# Patient Record
Sex: FEMALE | Race: WHITE | Employment: OTHER | RURAL
[De-identification: names, ages, dates, MRNs, and addresses within clinical notes are randomized per-mention and may not be internally consistent; named-entity substitution may affect disease eponyms.]

---

## 2017-02-06 ENCOUNTER — OFFICE VISIT (OUTPATIENT)
Dept: FAMILY MEDICINE CLINIC | Age: 72
End: 2017-02-06

## 2017-02-06 VITALS
HEIGHT: 60 IN | WEIGHT: 179.4 LBS | BODY MASS INDEX: 35.22 KG/M2 | HEART RATE: 67 BPM | DIASTOLIC BLOOD PRESSURE: 41 MMHG | OXYGEN SATURATION: 99 % | TEMPERATURE: 98 F | SYSTOLIC BLOOD PRESSURE: 140 MMHG | RESPIRATION RATE: 20 BRPM

## 2017-02-06 DIAGNOSIS — M19.90 OSTEOARTHRITIS, UNSPECIFIED OSTEOARTHRITIS TYPE, UNSPECIFIED SITE: ICD-10-CM

## 2017-02-06 DIAGNOSIS — N32.81 OVERACTIVE BLADDER: ICD-10-CM

## 2017-02-06 DIAGNOSIS — M40.14 OTHER SECONDARY KYPHOSIS, THORACIC REGION: ICD-10-CM

## 2017-02-06 DIAGNOSIS — I10 ESSENTIAL HYPERTENSION: ICD-10-CM

## 2017-02-06 DIAGNOSIS — M17.0 PRIMARY OSTEOARTHRITIS OF BOTH KNEES: ICD-10-CM

## 2017-02-06 DIAGNOSIS — J30.9 ALLERGIC RHINITIS, UNSPECIFIED ALLERGIC RHINITIS TRIGGER, UNSPECIFIED RHINITIS SEASONALITY: ICD-10-CM

## 2017-02-06 DIAGNOSIS — E03.4 HYPOTHYROIDISM DUE TO ACQUIRED ATROPHY OF THYROID: ICD-10-CM

## 2017-02-06 DIAGNOSIS — M54.5 CHRONIC LOW BACK PAIN, UNSPECIFIED BACK PAIN LATERALITY, WITH SCIATICA PRESENCE UNSPECIFIED: ICD-10-CM

## 2017-02-06 DIAGNOSIS — K52.9 COLITIS: Primary | ICD-10-CM

## 2017-02-06 DIAGNOSIS — M85.80 OSTEOPENIA: ICD-10-CM

## 2017-02-06 DIAGNOSIS — G89.29 CHRONIC LOW BACK PAIN, UNSPECIFIED BACK PAIN LATERALITY, WITH SCIATICA PRESENCE UNSPECIFIED: ICD-10-CM

## 2017-02-06 RX ORDER — BUDESONIDE 3 MG/1
9 CAPSULE, COATED PELLETS ORAL
Qty: 270 CAP | Refills: 1 | Status: SHIPPED | OUTPATIENT
Start: 2017-02-06 | End: 2022-02-22 | Stop reason: ALTCHOICE

## 2017-02-06 RX ORDER — TACROLIMUS 0.5 MG/1
CAPSULE ORAL DAILY
COMMUNITY
End: 2018-03-01

## 2017-02-06 RX ORDER — CLOBETASOL PROPIONATE 0.5 MG/G
OINTMENT TOPICAL DAILY
COMMUNITY
End: 2022-04-01

## 2017-02-06 RX ORDER — HYDROXYCHLOROQUINE SULFATE 200 MG/1
200 TABLET, FILM COATED ORAL 2 TIMES DAILY
COMMUNITY
End: 2019-05-29 | Stop reason: ALTCHOICE

## 2017-02-06 RX ORDER — FLUCONAZOLE 150 MG/1
150 TABLET ORAL
COMMUNITY
End: 2021-08-25 | Stop reason: SDUPTHER

## 2017-02-06 RX ORDER — TACROLIMUS 1 MG/1
1 CAPSULE ORAL 3 TIMES DAILY
COMMUNITY
End: 2022-02-22 | Stop reason: ALTCHOICE

## 2017-02-06 RX ORDER — TACROLIMUS 1 MG/G
OINTMENT TOPICAL DAILY
COMMUNITY
End: 2022-02-22 | Stop reason: ALTCHOICE

## 2017-02-06 NOTE — PROGRESS NOTES
Subjective: David Snowden presents with flare of her colitis please see previous notes. Followed by Dr. Rai Laureano in gastroenterology as an appointment with him in the spring. Is having a flare of her colitis typically treats flares with increased dose of Pentasa and budesonide. She is running out of budesonide and would like a refill takes 3 capsules a day for flares can use for 6-8 months. Having a flare over the last several weeks multiple loose stools overnight 6-8 last night occasionally some blood-tinged. No marcia hematochezia. Crampy abdominal pain but no significant localized tenderness. No nausea vomiting. Appetite is decreased taking fluids well. No fever chills or constitutional symptoms  She does plan phone follow-up with her gastroenterologist which is strongly encourage. She will be leaving on a short trip and needs medication now      Problem list reviewed as part of this encounter. Past Medical History   Diagnosis Date    Allergic rhinitis     Hypertension     Hypothyroidism     Lichen planus      seen at [facility], unspecified whether generalized or localized, unspecified site     Overactive bladder     Phlebitis and thrombophlebitis of lower extremities, unspecified ([address]     Raynaud's syndrome       Medication list reviewed and updated. Review of Systems -as per the above in previous documentation  Objective:  Visit Vitals    /41 (BP 1 Location: Left arm, BP Patient Position: Sitting)    Pulse 67    Temp 98 °F (36.7 °C) (Temporal)    Resp 20    Ht 5' (1.524 m)    Wt 179 lb 6.4 oz (81.4 kg)    SpO2 99%    BMI 35.04 kg/m2     Alert and oriented. No acute distress. HEENT   Eyes pupils equal, round, react to light and accommodation. Extraocular movements intact. Nose patent. Neck supple full range of motion no thyromegaly. No carotid bruits.   No significant lymphadenopathy  Lungs clear to auscultation without wheezes, rales, or rhonchi. Heart  RRR,  S1 & S2 are normal intensity. No murmur; no gallop  Abdomen bowel sounds active. No tenderness, guarding, rebound, masses, organomegaly. Back no CVA tenderness. Extremities without clubbing, cyanosis, or edema  Neuro HMF intact. Cranial nerves II through XII grossly normal.  Motor is 5 over 5 and symmetrical.        Assessment:  Encounter Diagnoses   Name Primary?  Colitis Yes    Osteoarthritis, unspecified osteoarthritis type, unspecified site     Essential hypertension     Hypothyroidism due to acquired atrophy of thyroid     Allergic rhinitis, unspecified allergic rhinitis trigger, unspecified rhinitis seasonality     Overactive bladder     Osteopenia     Other secondary kyphosis, thoracic region     Chronic low back pain, unspecified back pain laterality, with sciatica presence unspecified     Primary osteoarthritis of both knees            Plan:  See orders. Orders Placed This Encounter    tacrolimus (PROGRAF) 1 mg capsule     Sig: Take  by mouth three (3) times daily. One pill in water/ swish 3x daily    tacrolimus (PROGRAF) 0.5 mg capsule     Sig: Take  by mouth daily.  tacrolimus (PROTOPIC) 0.1 % ointment     Sig: Apply  to affected area daily. am    clobetasol (TEMOVATE) 0.05 % ointment     Sig: Apply  to affected area daily. pm    fluconazole (DIFLUCAN) 150 mg tablet     Sig: Take 150 mg by mouth daily. FDA advises cautious prescribing of oral fluconazole in pregnancy.  hydroxychloroquine (PLAQUENIL) 200 mg tablet     Sig: Take 200 mg by mouth two (2) times a day.  budesonide (ENTOCORT EC) 3 mg capsule     Sig: Take 3 Caps by mouth every morning. Taking 3 tabs in am     Dispense:  270 Cap     Refill:  1    colitis flare will refill budesonide, she is going to follow-up with her gastroenterologist  Follow up if symptoms worsen, change, fail to improve or any new symptoms develop.           There are no Patient Instructions on file for this visit.    (Please note that portions of this note were completed with a voice recognition program. Efforts were made to edit the dictations but occasionally words are mis-transcribed.)

## 2017-02-06 NOTE — MR AVS SNAPSHOT
Visit Information Date & Time Provider Department Dept. Phone Encounter #  
 2/6/2017  8:30 AM Margarita SheikhTho 71 [phone] [id number] Follow-up Instructions Return if symptoms worsen or fail to improve. Upcoming Health Maintenance Date Due DTaP/Tdap/Td series (1 - Tdap) 6/30/1965 FOBT Q 1 YEAR AGE 50-75 6/30/1994 GLAUCOMA SCREENING Q2Y 6/30/2009 OSTEOPOROSIS SCREENING (DEXA) 6/30/2009 MEDICARE YEARLY EXAM 6/30/2009 Pneumococcal 65+ Low/Medium Risk (2 of 2 - PPSV23) 11/8/2015 BREAST CANCER SCRN MAMMOGRAM 3/25/2018 Allergies as of 2/6/2017  Review Complete On: 2/6/2017 By: Margarita Sheikh MD  
  
 Severity Noted Reaction Type Reactions Latex High 11/16/2015    Rash Keflex [Cephalexin]  09/30/2013    Rash Levaquin [Levofloxacin]  09/30/2013    Rash Macrobid [Nitrofurantoin Monohyd/m-cryst]  09/30/2013    Rash  
 Sulfa (Sulfonamide Antibiotics)  09/30/2013    Rash Current Immunizations  Reviewed on 11/17/2016 Name Date Influenza High Dose Vaccine PF 11/17/2016 Influenza Vaccine 10/24/2014, 10/1/2013  9:36 AM  
 Influenza Vaccine Nidia Lanius) 10/20/2015 Pneumococcal Vaccine (Unspecified Type) 11/8/2010 Zoster Vaccine, Live 10/31/2014 Not reviewed this visit You Were Diagnosed With   
  
 Codes Comments Colitis    -  Primary ICD-10-CM: K52.9 ICD-9-CM: 558.9 Osteoarthritis, unspecified osteoarthritis type, unspecified site     ICD-10-CM: M19.90 ICD-9-CM: 715.90 Essential hypertension     ICD-10-CM: I10 
ICD-9-CM: 401.9 Hypothyroidism due to acquired atrophy of thyroid     ICD-10-CM: E03.4 ICD-9-CM: 244.8, 246.8 Allergic rhinitis, unspecified allergic rhinitis trigger, unspecified rhinitis seasonality     ICD-10-CM: J30.9 ICD-9-CM: 477.9 Overactive bladder     ICD-10-CM: N32.81 ICD-9-CM: 596.51 Osteopenia     ICD-10-CM: M85.80 ICD-9-CM: 733.90   
 Other secondary kyphosis, thoracic region     ICD-10-CM: M40.14 ICD-9-CM: 737.41 Chronic low back pain, unspecified back pain laterality, with sciatica presence unspecified     ICD-10-CM: M54.5, G89.29 ICD-9-CM: 724.2, 338.29 Primary osteoarthritis of both knees     ICD-10-CM: M17.0 ICD-9-CM: 715.16 Vitals BP Pulse Temp Resp Height(growth percentile) Weight(growth percentile) 140/41 (BP 1 Location: Left arm, BP Patient Position: Sitting) 67 98 °F (36.7 °C) (Temporal) 20 5' (1.524 m) 179 lb 6.4 oz (81.4 kg) SpO2 BMI OB Status Smoking Status 99% 35.04 kg/m2 Hysterectomy Former Smoker BMI and BSA Data Body Mass Index Body Surface Area 35.04 kg/m 2 1.86 m 2 Preferred Pharmacy Pharmacy Name Phone [address], [address] [place] [address] [phone] Your Updated Medication List  
  
   
This list is accurate as of: 2/6/17  9:12 AM.  Always use your most recent med list.  
  
  
  
  
 ADULT MULTIVITAMIN GUMMIES PO Take  by mouth. ALEVE -25 mg Tab Generic drug:  naproxen-diphenhydramine Take  by mouth daily. Pt takes 2 tabs  
  
 amLODIPine 10 mg tablet Commonly known as:  Young Sourav TAKE 1 TAB BY MOUTH DAILY. Arthritis Pain Relief 650 mg CR tablet Generic drug:  acetaminophen Take 650 mg by mouth every eight (8) hours. aspirin 81 mg chewable tablet Take 81 mg by mouth daily. augmented betamethasone dipropionate 0.05 % ointment Commonly known as:  Theoplis Sergeant Use BID to lesions on legs and arms  
  
 budesonide 3 mg capsule Commonly known as:  ENTOCORT EC Take 3 Caps by mouth every morning. Taking 3 tabs in am  
  
 calcium carbonate 200 mg calcium (500 mg) Chew Commonly known as:  TUMS Take 2 Tabs by mouth two (2) times a day. clindamycin 150 mg capsule Commonly known as:  CLEOCIN  
TAKE 4 CAPSULES BY MOUTH 1 HOUR BEFORE DENTAL APT. clobetasol 0.05 % ointment Commonly known as:  Sung Nim Apply  to affected area daily. pm  
  
 fluconazole 150 mg tablet Commonly known as:  DIFLUCAN Take 150 mg by mouth daily. FDA advises cautious prescribing of oral fluconazole in pregnancy. hydroxychloroquine 200 mg tablet Commonly known as:  PLAQUENIL Take 200 mg by mouth two (2) times a day. ibandronate 150 mg tablet Commonly known as:  Lawrence Locket TAKE 1 TABLET BY MOUTH ONCE EVERY 30 DAYS, FOR POST-MENOPAUSAL OSTEOPOROSIS PREVENTION  
  
 levothyroxine 100 mcg tablet Commonly known as:  SYNTHROID  
TAKE 1 TABLET BY MOUTH DAILY BEFORE BREAKFAST. lisinopril 40 mg tablet Commonly known as:  PRINIVIL, ZESTRIL  
TAKE 1 TAB BY MOUTH DAILY. loratadine 10 mg tablet Commonly known as:  Alyssa Lucia Take 1 Tab by mouth daily. montelukast 10 mg tablet Commonly known as:  SINGULAIR  
TAKE ONE TABLET BY MOUTH EVERY DAY  
  
 MYRBETRIQ 25 mg ER tablet Generic drug:  mirabegron ER Take 25 mg by mouth daily. omeprazole 20 mg capsule Commonly known as:  PRILOSEC Take 20 mg by mouth daily. OTHER  
zicam  
  
 PENTASA 500 mg CR capsule Generic drug:  mesalamine Take 500 mg by mouth two (2) times a day. Two Tablets 4x  daily * tacrolimus 1 mg capsule Commonly known as:  PROGRAF Take  by mouth three (3) times daily. One pill in water/ swish 3x daily * tacrolimus 0.5 mg capsule Commonly known as:  PROGRAF Take  by mouth daily. * tacrolimus 0.1 % ointment Commonly known as:  PROTOPIC Apply  to affected area daily. am  
  
 VITAMIN D3 1,000 unit tablet Generic drug:  cholecalciferol Take 1,000 Units by mouth daily. Indications: VITAMIN D DEFICIENCY * Notice: This list has 3 medication(s) that are the same as other medications prescribed for you. Read the directions carefully, and ask your doctor or other care provider to review them with you. Prescriptions Sent to Pharmacy Refills  
 budesonide (ENTOCORT EC) 3 mg capsule 1 Sig: Take 3 Caps by mouth every morning. Taking 3 tabs in am  
 Class: Normal  
 Pharmacy: [address], [address]  #: [phone] Route: Oral  
  
Follow-up Instructions Return if symptoms worsen or fail to improve. Introducing \[facility]\"" & HEALTH SERVICES! [facility] introduces Kekanto patient portal. Now you can access parts of your medical record, email your doctor's office, and request medication refills online. 1. In your internet browser, go to [email or web address] 2. Click on the First Time User? Click Here link in the Sign In box. You will see the New Member Sign Up page. 3. Enter your Kekanto Access Code exactly as it appears below. You will not need to use this code after youve completed the sign-up process. If you do not sign up before the expiration date, you must request a new code. · Kekanto Access Code: [email or web address] Expires: 2/15/2017 11:45 AM 
 
4. Enter the last four digits of your Social Security Number (xxxx) and Date of Birth (mm/dd/yyyy) as indicated and click Submit. You will be taken to the next sign-up page. 5. Create a Kekanto ID. This will be your Kekanto login ID and cannot be changed, so think of one that is secure and easy to remember. 6. Create a Kekanto password. You can change your password at any time. 7. Enter your Password Reset Question and Answer. This can be used at a later time if you forget your password. 8. Enter your e-mail address. You will receive e-mail notification when new information is available in [address] 9. Click Sign Up. You can now view and download portions of your medical record. 10. Click the Download Summary menu link to download a portable copy of your medical information.  
 
If you have questions, please visit the Frequently Asked Questions section of the LUVHAN. Remember, Voter[employer]ide[employer] is NOT to be used for urgent needs. For medical emergencies, dial 911. Now available from your iPhone and Android! Please provide this summary of care documentation to your next provider. Your primary care clinician is listed as Wesley Chung. If you have any questions after today's visit, please call [phone].

## 2017-03-31 RX ORDER — MONTELUKAST SODIUM 10 MG/1
10 TABLET ORAL DAILY
Qty: 30 TAB | Refills: 0 | Status: SHIPPED | OUTPATIENT
Start: 2017-03-31 | End: 2017-05-03 | Stop reason: SDUPTHER

## 2017-05-03 RX ORDER — LISINOPRIL 40 MG/1
TABLET ORAL
Qty: 90 TAB | Refills: 1 | Status: SHIPPED | OUTPATIENT
Start: 2017-05-03 | End: 2018-01-25 | Stop reason: SDUPTHER

## 2017-05-03 RX ORDER — MONTELUKAST SODIUM 10 MG/1
10 TABLET ORAL DAILY
Qty: 90 TAB | Refills: 0 | Status: SHIPPED | OUTPATIENT
Start: 2017-05-03 | End: 2017-10-23

## 2017-05-03 NOTE — TELEPHONE ENCOUNTER
Has an appointment to see Luis Enrique Bailey on 5/26/2017. Needs refills of Montelukast, Lisinopril. 90 day supply please.   Send RX's to [facility]

## 2017-05-26 ENCOUNTER — OFFICE VISIT (OUTPATIENT)
Dept: FAMILY MEDICINE CLINIC | Age: 72
End: 2017-05-26

## 2017-05-26 VITALS
WEIGHT: 183 LBS | OXYGEN SATURATION: 99 % | BODY MASS INDEX: 35.93 KG/M2 | RESPIRATION RATE: 16 BRPM | DIASTOLIC BLOOD PRESSURE: 60 MMHG | TEMPERATURE: 97.9 F | HEIGHT: 60 IN | HEART RATE: 81 BPM | SYSTOLIC BLOOD PRESSURE: 130 MMHG

## 2017-05-26 DIAGNOSIS — K52.9 COLITIS: ICD-10-CM

## 2017-05-26 DIAGNOSIS — L43.9 LICHEN PLANUS: ICD-10-CM

## 2017-05-26 DIAGNOSIS — Z00.00 ENCOUNTER FOR MEDICARE ANNUAL WELLNESS EXAM: ICD-10-CM

## 2017-05-26 DIAGNOSIS — I10 ESSENTIAL HYPERTENSION: Primary | ICD-10-CM

## 2017-05-26 DIAGNOSIS — E03.4 HYPOTHYROIDISM DUE TO ACQUIRED ATROPHY OF THYROID: ICD-10-CM

## 2017-05-26 DIAGNOSIS — J40 BRONCHITIS: ICD-10-CM

## 2017-05-26 RX ORDER — CICLOPIROX OLAMINE 7.7 MG/G
CREAM TOPICAL 2 TIMES DAILY
COMMUNITY
End: 2018-11-26 | Stop reason: ALTCHOICE

## 2017-05-26 RX ORDER — IPRATROPIUM BROMIDE 0.5 MG/2.5ML
0.5 SOLUTION RESPIRATORY (INHALATION) AS NEEDED
Qty: 2.5 ML | Refills: 0
Start: 2017-05-26 | End: 2017-10-23

## 2017-05-26 RX ORDER — AZITHROMYCIN 250 MG/1
TABLET, FILM COATED ORAL
Qty: 6 TAB | Refills: 0 | Status: SHIPPED | OUTPATIENT
Start: 2017-05-26 | End: 2017-05-31

## 2017-05-26 RX ORDER — ALBUTEROL SULFATE 0.83 MG/ML
2.5 SOLUTION RESPIRATORY (INHALATION) ONCE
Qty: 1 EACH | Refills: 0
Start: 2017-05-26 | End: 2017-05-26

## 2017-05-26 RX ORDER — TRIAMCINOLONE ACETONIDE 1 MG/G
CREAM TOPICAL 2 TIMES DAILY
COMMUNITY
End: 2018-11-26 | Stop reason: ALTCHOICE

## 2017-05-26 RX ORDER — BENZONATATE 200 MG/1
200 CAPSULE ORAL
Qty: 30 CAP | Refills: 3 | Status: SHIPPED | OUTPATIENT
Start: 2017-05-26 | End: 2017-06-02

## 2017-05-26 NOTE — MR AVS SNAPSHOT
Visit Information Date & Time Provider Department Dept. Phone Encounter #  
 5/26/2017 10:30 AM Ron Avila NP [address] [place] [address] [phone] [id number] Upcoming Health Maintenance Date Due DTaP/Tdap/Td series (1 - Tdap) 6/30/1965 FOBT Q 1 YEAR AGE 50-75 6/30/1994 GLAUCOMA SCREENING Q2Y 6/30/2009 OSTEOPOROSIS SCREENING (DEXA) 6/30/2009 MEDICARE YEARLY EXAM 6/30/2009 Pneumococcal 65+ Low/Medium Risk (2 of 2 - PPSV23) 11/8/2015 INFLUENZA AGE 9 TO ADULT 8/1/2017 BREAST CANCER SCRN MAMMOGRAM 4/20/2019 Allergies as of 5/26/2017  Review Complete On: 5/26/2017 By: Ron Avila NP Severity Noted Reaction Type Reactions Latex High 11/16/2015    Rash Keflex [Cephalexin]  09/30/2013    Rash Levaquin [Levofloxacin]  09/30/2013    Rash Macrobid [Nitrofurantoin Monohyd/m-cryst]  09/30/2013    Rash  
 Sulfa (Sulfonamide Antibiotics)  09/30/2013    Rash Current Immunizations  Reviewed on 11/17/2016 Name Date Influenza High Dose Vaccine PF 11/17/2016 Influenza Vaccine 10/24/2014, 10/1/2013  9:36 AM  
 Influenza Vaccine Landy Sherri) 10/20/2015 Pneumococcal Vaccine (Unspecified Type) 11/8/2010 Zoster Vaccine, Live 10/31/2014 Not reviewed this visit You Were Diagnosed With   
  
 Codes Comments Essential hypertension    -  Primary ICD-10-CM: I10 
ICD-9-CM: 401.9 Hypothyroidism due to acquired atrophy of thyroid     ICD-10-CM: E03.4 ICD-9-CM: 244.8, 246.8 Lichen planus     QFR-21-ZW: L43.9 ICD-9-CM: 697.0 Colitis     ICD-10-CM: K52.9 ICD-9-CM: 558.9 Bronchitis     ICD-10-CM: J40 ICD-9-CM: 048 Vitals BP Pulse Temp Resp Height(growth percentile) Weight(growth percentile) 130/60 (BP 1 Location: Left arm, BP Patient Position: Sitting) 81 97.9 °F (36.6 °C) (Oral) 16 5' (1.524 m) 183 lb (83 kg) SpO2 BMI OB Status Smoking Status 99% 35.74 kg/m2 Hysterectomy Former Smoker BMI and BSA Data Body Mass Index Body Surface Area 35.74 kg/m 2 1.87 m 2 Preferred Pharmacy Pharmacy Name Phone [facility]/PHARMACY #[id number], [address] [phone] Your Updated Medication List  
  
   
This list is accurate as of: 5/26/17 11:42 AM.  Always use your most recent med list.  
  
  
  
  
 ADULT MULTIVITAMIN GUMMIES PO Take  by mouth. albuterol 2.5 mg /3 mL (0.083 %) nebulizer solution Commonly known as:  PROVENTIL VENTOLIN  
3 mL by Nebulization route once for 1 dose. ALEVE -25 mg Tab Generic drug:  naproxen-diphenhydramine Take  by mouth daily. Pt takes 2 tabs  
  
 amLODIPine 10 mg tablet Commonly known as:  Renée Half TAKE 1 TAB BY MOUTH DAILY. Arthritis Pain Relief 650 mg CR tablet Generic drug:  acetaminophen Take 650 mg by mouth every eight (8) hours. aspirin 81 mg chewable tablet Take 81 mg by mouth daily. augmented betamethasone dipropionate 0.05 % ointment Commonly known as:  Pilar Molt Use BID to lesions on legs and arms  
  
 azithromycin 250 mg tablet Commonly known as:  Samuel Ebbs Take 2 tablets today, then take 1 tablet daily  
  
 benzonatate 200 mg capsule Commonly known as:  TESSALON Take 1 Cap by mouth three (3) times daily as needed for Cough for up to 7 days. budesonide 3 mg capsule Commonly known as:  ENTOCORT EC Take 3 Caps by mouth every morning. Taking 3 tabs in am  
  
 calcium carbonate 200 mg calcium (500 mg) Chew Commonly known as:  TUMS Take 2 Tabs by mouth two (2) times a day. ciclopirox 0.77 % topical cream  
Commonly known as:  Sarah Alex Apply  to affected area two (2) times a day. clindamycin 150 mg capsule Commonly known as:  CLEOCIN  
TAKE 4 CAPSULES BY MOUTH 1 HOUR BEFORE DENTAL APT. clobetasol 0.05 % ointment Commonly known as:  Mayking Fire Apply  to affected area daily. pm  
  
 fluconazole 150 mg tablet Commonly known as:  DIFLUCAN Take 150 mg by mouth daily. FDA advises cautious prescribing of oral fluconazole in pregnancy. hydroxychloroquine 200 mg tablet Commonly known as:  PLAQUENIL Take 200 mg by mouth two (2) times a day. ibandronate 150 mg tablet Commonly known as:  Fina Grit TAKE 1 TABLET BY MOUTH ONCE EVERY 30 DAYS, FOR POST-MENOPAUSAL OSTEOPOROSIS PREVENTION  
  
 ipratropium 0.02 % nebulizer solution Commonly known as:  ATROVENT  
2.5 mL by Nebulization route as needed for Wheezing. Indications: PREVENTION OF BRONCHOSPASM WITH CHRONIC BRONCHITIS  
  
 levothyroxine 100 mcg tablet Commonly known as:  SYNTHROID  
TAKE 1 TABLET BY MOUTH DAILY BEFORE BREAKFAST. lisinopril 40 mg tablet Commonly known as:  PRINIVIL, ZESTRIL  
TAKE 1 TABLET BY MOUTH DAILY. loratadine 10 mg tablet Commonly known as:  Vonna Hedger Take 1 Tab by mouth daily. montelukast 10 mg tablet Commonly known as:  SINGULAIR Take 1 Tab by mouth daily. MYRBETRIQ 25 mg ER tablet Generic drug:  mirabegron ER Take 25 mg by mouth daily. omeprazole 20 mg capsule Commonly known as:  PRILOSEC Take 20 mg by mouth daily. OTHER  
zicam  
  
 PENTASA 500 mg CR capsule Generic drug:  mesalamine Take 500 mg by mouth two (2) times a day. Two Tablets 4x  daily * tacrolimus 1 mg capsule Commonly known as:  PROGRAF Take  by mouth three (3) times daily. One pill in water/ swish 3x daily * tacrolimus 0.5 mg capsule Commonly known as:  PROGRAF Take  by mouth daily. * tacrolimus 0.1 % ointment Commonly known as:  PROTOPIC Apply  to affected area daily. am  
  
 triamcinolone acetonide 0.1 % topical cream  
Commonly known as:  KENALOG Apply  to affected area two (2) times a day. use thin layer VITAMIN D3 1,000 unit tablet Generic drug:  cholecalciferol Take 1,000 Units by mouth daily.  Indications: VITAMIN D DEFICIENCY  
  
 * Notice: This list has 3 medication(s) that are the same as other medications prescribed for you. Read the directions carefully, and ask your doctor or other care provider to review them with you. Prescriptions Sent to Pharmacy Refills  
 benzonatate (TESSALON) 200 mg capsule 3 Sig: Take 1 Cap by mouth three (3) times daily as needed for Cough for up to 7 days. Class: Normal  
 Pharmacy: [facility]/pharmacy #[id number] [facility], [facility] [address] Ph #: [phone] Route: Oral  
 azithromycin (ZITHROMAX) 250 mg tablet 0 Sig: Take 2 tablets today, then take 1 tablet daily Class: Normal  
 Pharmacy: [facility]/pharmacy #[id number] [place] [address] [address] Ph #: [phone] We Performed the Following ALBUTEROL, INHAL. SOL., FDA-APPROVED FINAL, NON-COMPOUND UNIT DOSE, 1 MG [ [facility]] INHAL RX, AIRWAY OBST/DX SPUTUM INDUCT [id number] CPT(R)] IPRATROPIUM BROMIDE NON-COMP [ [facility]] [place] INHAL RX, AIRWAY OBST/DX SPUTUM INDUCT [id number] CPT(R)] To-Do List   
 05/26/2017 Imaging:  XR CHEST PA LAT Introducing [facility] & HEALTH SERVICES! Ayala Rivera introduces Unified patient portal. Now you can access parts of your medical record, email your doctor's office, and request medication refills online. 1. In your internet browser, go to [email or web address] 2. Click on the First Time User? Click Here link in the Sign In box. You will see the New Member Sign Up page. 3. Enter your Unified Access Code exactly as it appears below. You will not need to use this code after youve completed the sign-up process. If you do not sign up before the expiration date, you must request a new code. · Unified Access Code: [email or web address] Expires: 7/18/2017  8:34 AM 
 
4. Enter the last four digits of your Social Security Number (xxxx) and Date of Birth (mm/dd/yyyy) as indicated and click Submit.  You will be taken to the next sign-up page. 5. Create a Unbound Concepts ID. This will be your [employer] login ID and cannot be changed, so think of one that is secure and easy to remember. 6. Create a [employer]s password. You can change your password at any time. 7. Enter your Password Reset Question and Answer. This can be used at a later time if you forget your password. 8. Enter your e-mail address. You will receive e-mail notification when new information is available in [address] 9. Click Sign Up. You can now view and download portions of your medical record. 10. Click the Download Summary menu link to download a portable copy of your medical information. If you have questions, please visit the Frequently Asked Questions section of the Unbound Concepts website. Remember, [employer] is NOT to be used for urgent needs. For medical emergencies, dial 911. Now available from your iPhone and Android! Please provide this summary of care documentation to your next provider. Your primary care clinician is listed as Yo Pérez. If you have any questions after today's visit, please call [phone].

## 2017-05-28 NOTE — PROGRESS NOTES
Subjective: Navid Root is a 66 y.o. female who presents today with the following:  Chief Complaint   Patient presents with   [facility] Annual Wellness Visit   Navid Root presents today for an acute vidit and scheduled medicare wellness exam. Compliant with medication. Respiratory infection: ill x 7 days with cough, chest congestion and low grade fever up to 100.1. Taking OTC zycam, tylenol, mucinex  and nasal saline with minimal relief. HTN: Denies chest pain, dyspnea, palpitations, HA or blurred vision. BP normotensive today. Hypothyrodism: On synthroid will check level next visit. Denies heat or cold intolerance. Colitis:No recent flare up. Followed by Dr. Yuri Malone take Pentasa and budesonide for flare ups. Lichen planus with candidiasis : followed at [facility]:  Gen: denies fever, chills, fatigue, weight loss, weight gain  HEENT:denies blurry vision,positivr  nasal congestion, sore throat  Resp: denies dypsnea,positve cough, wheezing  CV: denies chest pain radiating to the jaws or arms, palpitations, lower extremity edema  Abd: denies nausea, vomiting, diarrhea, constipation  Neuro: denies numbness/tingling  Endo: denies polyuria, polydipsia, heat/cold intolerance  Heme: no lymphadenopathy    Allergies   Allergen Reactions    Latex Rash    Keflex [Cephalexin] Rash    Levaquin [Levofloxacin] Rash    Macrobid [Nitrofurantoin Monohyd/M-Cryst] Rash    Sulfa (Sulfonamide Antibiotics) Rash         Current Outpatient Prescriptions:     ciclopirox (LOPROX) 0.77 % topical cream, Apply  to affected area two (2) times a day., Disp: , Rfl:     triamcinolone acetonide (KENALOG) 0.1 % topical cream, Apply  to affected area two (2) times a day. use thin layer, Disp: , Rfl:     ipratropium (ATROVENT) 0.02 % nebulizer solution, 2.5 mL by Nebulization route as needed for Wheezing.  Indications: PREVENTION OF BRONCHOSPASM WITH CHRONIC BRONCHITIS, Disp: 2.5 mL, Rfl: 0    benzonatate (TESSALON) 200 mg capsule, Take 1 Cap by mouth three (3) times daily as needed for Cough for up to 7 days. , Disp: 30 Cap, Rfl: 3    azithromycin (ZITHROMAX) 250 mg tablet, Take 2 tablets today, then take 1 tablet daily, Disp: 6 Tab, Rfl: 0    montelukast (SINGULAIR) 10 mg tablet, Take 1 Tab by mouth daily. , Disp: 90 Tab, Rfl: 0    lisinopril (PRINIVIL, ZESTRIL) 40 mg tablet, TAKE 1 TABLET BY MOUTH DAILY. , Disp: 90 Tab, Rfl: 1    amLODIPine (NORVASC) 10 mg tablet, TAKE 1 TAB BY MOUTH DAILY. , Disp: 90 Tab, Rfl: 1    levothyroxine (SYNTHROID) 100 mcg tablet, TAKE 1 TABLET BY MOUTH DAILY BEFORE BREAKFAST., Disp: 90 Tab, Rfl: 1    tacrolimus (PROGRAF) 1 mg capsule, Take  by mouth three (3) times daily. One pill in water/ swish 3x daily, Disp: , Rfl:     tacrolimus (PROTOPIC) 0.1 % ointment, Apply  to affected area daily. am, Disp: , Rfl:     clobetasol (TEMOVATE) 0.05 % ointment, Apply  to affected area daily. pm, Disp: , Rfl:     fluconazole (DIFLUCAN) 150 mg tablet, Take 150 mg by mouth daily. FDA advises cautious prescribing of oral fluconazole in pregnancy. , Disp: , Rfl:     hydroxychloroquine (PLAQUENIL) 200 mg tablet, Take 200 mg by mouth two (2) times a day., Disp: , Rfl:     budesonide (ENTOCORT EC) 3 mg capsule, Take 3 Caps by mouth every morning. Taking 3 tabs in am, Disp: 270 Cap, Rfl: 1    ibandronate (BONIVA) 150 mg tablet, TAKE 1 TABLET BY MOUTH ONCE EVERY 30 DAYS, FOR POST-MENOPAUSAL OSTEOPOROSIS PREVENTION, Disp: 12 Tab, Rfl: 1    augmented betamethasone dipropionate (DIPROLENE-AF) 0.05 % ointment, Use BID to lesions on legs and arms, Disp: 45 g, Rfl: 1    OTHER, zicam, Disp: , Rfl:     calcium carbonate (TUMS) 200 mg calcium (500 mg) chew, Take 2 Tabs by mouth two (2) times a day., Disp: , Rfl:     acetaminophen (ARTHRITIS PAIN RELIEF) 650 mg CR tablet, Take 650 mg by mouth every eight (8) hours. , Disp: , Rfl:     naproxen-diphenhydramine (ALEVE PM) 220-25 mg tab, Take  by mouth daily. Pt takes 2 tabs, Disp: , Rfl:     aspirin 81 mg chewable tablet, Take 81 mg by mouth daily. , Disp: , Rfl:     FOLIC ACID/MULTIVIT-MINERALS (ADULT MULTIVITAMIN GUMMIES PO), Take  by mouth., Disp: , Rfl:     clindamycin (CLEOCIN) 150 mg capsule, TAKE 4 CAPSULES BY MOUTH 1 HOUR BEFORE DENTAL APT., Disp: 16 capsule, Rfl: 0    loratadine (CLARITIN) 10 mg tablet, Take 1 Tab by mouth daily. , Disp: 90 Tab, Rfl: 3    mesalamine (PENTASA) 500 mg CR capsule, Take 500 mg by mouth two (2) times a day. Two Tablets 4x  daily, Disp: , Rfl:     mirabegron (MYRBETRIQ) 25 mg Tb24, Take 25 mg by mouth daily. , Disp: , Rfl:     omeprazole (PRILOSEC) 20 mg capsule, Take 20 mg by mouth daily. , Disp: , Rfl:     cholecalciferol (VITAMIN D3) 1,000 unit tablet, Take 1,000 Units by mouth daily. Indications: VITAMIN D DEFICIENCY, Disp: , Rfl:     tacrolimus (PROGRAF) 0.5 mg capsule, Take  by mouth daily. , Disp: , Rfl:     Past Medical History:   Diagnosis Date    Allergic rhinitis     Hypertension     Hypothyroidism     Lichen planus     seen at [facility], unspecified whether generalized or localized, unspecified site     Overactive bladder     Phlebitis and thrombophlebitis of lower extremities, unspecified ([facility] [address]     Raynaud's syndrome        Past Surgical History:   Procedure Laterality Date    HX APPENDECTOMY      HX GYN      HYSTERECTOMY//BTL    HX HEENT      TONSILLECTOMY    HX HYSTERECTOMY      HX OOPHORECTOMY      HX UROLOGICAL      BLADDER SLING       History   Smoking Status    Former Smoker    Types: Cigarettes   Smokeless Tobacco    Not on file       Social History     Social History    Marital status:      Spouse name: N/A    Number of children: N/A    Years of education: N/A     Social History Main Topics    Smoking status: Former Smoker     Types: Cigarettes    Smokeless tobacco: None    Alcohol use No    Drug use: No    Sexual activity: No     Other Topics Concern     Service No    Blood Transfusions No    Caffeine Concern No    Occupational Exposure No    Hobby Hazards No    Sleep Concern No    Stress Concern No    Weight Concern No    Special Diet No    Back Care No    Exercise Yes    Bike Helmet No    Seat Belt Yes    Self-Exams No     Social History Narrative       Family History   Problem Relation Age of Onset    Stroke Mother     Dementia Mother     Cancer Father      BRAIN TUMOR         Objective:     Visit Vitals    /60 (BP 1 Location: Left arm, BP Patient Position: Sitting)    Pulse 81    Temp 97.9 °F (36.6 °C) (Oral)    Resp 16    Ht 5' (1.524 m)    Wt 183 lb (83 kg)    SpO2 99%    BMI 35.74 kg/m2     Body mass index is 35.74 kg/(m^2). General: Alert and oriented. No acute distress. Well nourished  HEENT :  Ears:TMs are normal. Canals are clear. Eyes: pupils equal, round, react to light and accommodation. Extra ocular movements intact. Nose: patent. Mouth and throat is clear. Neck:supple full range of motion no thyromegaly. Trachea midline, No carotid bruits. No significant lymphadenopathy  Lungs[de-identified] expiratory  wheezes,with diffuse rales, negative rhonchi. Heart :RRR, S1 & S2 are normal intensity. No murmur; no gallop  Abdomen: bowel sounds active. No tenderness, guarding, rebound, masses, hepatic or spleen enlargement  Back: no CVA tenderness. Extremities: without clubbing, cyanosis, or edema  Pulses: radial and femoral pulses are normal  Neuro: HMF intact. Cranial nerves II through XII grossly normal.  Motor: is 5 over 5 and symmetrical.   Deep tendon reflexes: +2 equal    Results for orders placed or performed in visit on 12/12/16   UPPER RESPIRATORY CULTURE   Result Value Ref Range    Upper Respiratory Culture Routine respiratory raquel  Scant growth      TEST CODE CHANGE   Result Value Ref Range    Test code change Comment        No results found for this visit on 05/26/17.     Assessment/ Plan:     Marcus Lyn was seen today for annual wellness visit. Diagnoses and all orders for this visit:    Essential hypertension    Hypothyroidism due to acquired atrophy of thyroid    Lichen planus    Colitis    Bronchitis  -     ALBUTEROL, INHAL. XBB.()  -     INHAL RX, AIRWAY OBST/DX SPUTUM INDUCT ([id number])  -     IPRATROPIUM BROMIDE NON-COMP  -     DE INHAL RX, AIRWAY OBST/DX SPUTUM INDUCT  -     XR CHEST PA LAT; Future    Other orders  -     albuterol (PROVENTIL VENTOLIN) 2.5 mg /3 mL (0.083 %) nebulizer solution; 3 mL by Nebulization route once for 1 dose. -     ipratropium (ATROVENT) 0.02 % nebulizer solution; 2.5 mL by Nebulization route as needed for Wheezing. Indications: PREVENTION OF BRONCHOSPASM WITH CHRONIC BRONCHITIS  -     benzonatate (TESSALON) 200 mg capsule; Take 1 Cap by mouth three (3) times daily as needed for Cough for up to 7 days. -     azithromycin (ZITHROMAX) 250 mg tablet; Take 2 tablets today, then take 1 tablet daily         1. Essential hypertension    2. Hypothyroidism due to acquired atrophy of thyroid    3. Lichen planus    4. Colitis    5. Bronchitis        Orders Placed This Encounter    INHAL RX, AIRWAY OBST/DX SPUTUM INDUCT ([id number])    XR CHEST PA LAT     Standing Status:   Future     Standing Expiration Date:   6/26/2018     Order Specific Question:   Reason for Exam     Answer:   cough     Order Specific Question:   Is Patient Allergic to Contrast Dye? Answer:   No     Order Specific Question:   Which facility to perform procedure? Answer:   [facility]    ALBUTEROL, INHAL. HVB.()    IPRATROPIUM BROMIDE NON-COMP    DE INHAL RX, AIRWAY OBST/DX SPUTUM INDUCT    ciclopirox (LOPROX) 0.77 % topical cream     Sig: Apply  to affected area two (2) times a day.  triamcinolone acetonide (KENALOG) 0.1 % topical cream     Sig: Apply  to affected area two (2) times a day.  use thin layer    albuterol (PROVENTIL VENTOLIN) 2.5 mg /3 mL (0.083 %) nebulizer solution     Sig: 3 mL by Nebulization route once for 1 dose. Dispense:  1 Each     Refill:  0    ipratropium (ATROVENT) 0.02 % nebulizer solution     Si.5 mL by Nebulization route as needed for Wheezing. Indications: PREVENTION OF BRONCHOSPASM WITH CHRONIC BRONCHITIS     Dispense:  2.5 mL     Refill:  0    benzonatate (TESSALON) 200 mg capsule     Sig: Take 1 Cap by mouth three (3) times daily as needed for Cough for up to 7 days. Dispense:  30 Cap     Refill:  3    azithromycin (ZITHROMAX) 250 mg tablet     Sig: Take 2 tablets today, then take 1 tablet daily     Dispense:  6 Tab     Refill:  0     RTO in 6 months or sooner. Verbal and written instructions (see AVS) provided.  Patient expresses understanding of diagnosis and treatment plan. CRIS Collazo        Iveth Walker is a 66 y.o. female and presents for annual Medicare Wellness Visit. Problem List: Reviewed with patient and discussed risk factors.     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    Vitamin D deficiency E55.9    Pitting edema R60.9    Primary osteoarthritis of right hand P80.392    Lichen planus R96.2       Current medical providers:  Patient Care Team:  Crystal Clarke NP as PCP - General (Nurse Practitioner)  Fay Harada, NP (Nurse Practitioner)    PSH: Reviewed with patient  Past Surgical History:   Procedure Laterality Date    HX APPENDECTOMY      HX GYN      HYSTERECTOMY//BTL    HX HEENT      TONSILLECTOMY    HX HYSTERECTOMY      HX OOPHORECTOMY      HX UROLOGICAL      BLADDER SLING        SH: Reviewed with patient  Social History   Substance Use Topics    Smoking status: Former Smoker     Types: Cigarettes    Smokeless tobacco: None    Alcohol use No       FH: Reviewed with patient  Family History   Problem Relation Age of Onset    Stroke Mother     Dementia Mother     Cancer Father      BRAIN TUMOR       Medications/Allergies: Reviewed with patient  Current Outpatient Prescriptions on File Prior to Visit   Medication Sig Dispense Refill    montelukast (SINGULAIR) 10 mg tablet Take 1 Tab by mouth daily. 90 Tab 0    lisinopril (PRINIVIL, ZESTRIL) 40 mg tablet TAKE 1 TABLET BY MOUTH DAILY. 90 Tab 1    amLODIPine (NORVASC) 10 mg tablet TAKE 1 TAB BY MOUTH DAILY. 90 Tab 1    levothyroxine (SYNTHROID) 100 mcg tablet TAKE 1 TABLET BY MOUTH DAILY BEFORE BREAKFAST. 90 Tab 1    tacrolimus (PROGRAF) 1 mg capsule Take  by mouth three (3) times daily. One pill in water/ swish 3x daily      tacrolimus (PROTOPIC) 0.1 % ointment Apply  to affected area daily. am      clobetasol (TEMOVATE) 0.05 % ointment Apply  to affected area daily. pm      fluconazole (DIFLUCAN) 150 mg tablet Take 150 mg by mouth daily. FDA advises cautious prescribing of oral fluconazole in pregnancy.  hydroxychloroquine (PLAQUENIL) 200 mg tablet Take 200 mg by mouth two (2) times a day.  budesonide (ENTOCORT EC) 3 mg capsule Take 3 Caps by mouth every morning. Taking 3 tabs in am 270 Cap 1    ibandronate (BONIVA) 150 mg tablet TAKE 1 TABLET BY MOUTH ONCE EVERY 30 DAYS, FOR POST-MENOPAUSAL OSTEOPOROSIS PREVENTION 12 Tab 1    augmented betamethasone dipropionate (DIPROLENE-AF) 0.05 % ointment Use BID to lesions on legs and arms 45 g 1    OTHER zicam      calcium carbonate (TUMS) 200 mg calcium (500 mg) chew Take 2 Tabs by mouth two (2) times a day.  acetaminophen (ARTHRITIS PAIN RELIEF) 650 mg CR tablet Take 650 mg by mouth every eight (8) hours.  naproxen-diphenhydramine (ALEVE PM) 220-25 mg tab Take  by mouth daily. Pt takes 2 tabs      aspirin 81 mg chewable tablet Take 81 mg by mouth daily.       FOLIC ACID/MULTIVIT-MINERALS (ADULT MULTIVITAMIN GUMMIES PO) Take  by mouth.  clindamycin (CLEOCIN) 150 mg capsule TAKE 4 CAPSULES BY MOUTH 1 HOUR BEFORE DENTAL APT. 16 capsule 0    loratadine (CLARITIN) 10 mg tablet Take 1 Tab by mouth daily. 90 Tab 3    mesalamine (PENTASA) 500 mg CR capsule Take 500 mg by mouth two (2) times a day. Two Tablets 4x  daily      mirabegron (MYRBETRIQ) 25 mg Tb24 Take 25 mg by mouth daily.  omeprazole (PRILOSEC) 20 mg capsule Take 20 mg by mouth daily.  cholecalciferol (VITAMIN D3) 1,000 unit tablet Take 1,000 Units by mouth daily. Indications: VITAMIN D DEFICIENCY      tacrolimus (PROGRAF) 0.5 mg capsule Take  by mouth daily. No current facility-administered medications on file prior to visit. Allergies   Allergen Reactions    Latex Rash    Keflex [Cephalexin] Rash    Levaquin [Levofloxacin] Rash    Macrobid [Nitrofurantoin Monohyd/M-Cryst] Rash    Sulfa (Sulfonamide Antibiotics) Rash       Objective:  Visit Vitals    /60 (BP 1 Location: Left arm, BP Patient Position: Sitting)    Pulse 81    Temp 97.9 °F (36.6 °C) (Oral)    Resp 16    Ht 5' (1.524 m)    Wt 183 lb (83 kg)    SpO2 99%    BMI 35.74 kg/m2    Body mass index is 35.74 kg/(m^2). Assessment of cognitive impairment: Alert and oriented x 3    Depression Screen:   PHQ over the last two weeks 5/26/2017   Little interest or pleasure in doing things Not at all   Feeling down, depressed or hopeless Not at all   Total Score PHQ 2 0       Fall Risk Assessment:    Fall Risk Assessment, last 12 mths 5/26/2017   Able to walk? Yes   Fall in past 12 months? Yes   Fall with injury? No   Number of falls in past 12 months -   Fall Risk Score -       Functional Ability:   Does the patient exhibit a steady gait? yes   How long did it take the patient to get up and walk from a sitting position?  3 seconds   Is the patient self reliant?  (ie can do own laundry, meals, household chores)  yes     Does the patient handle his/her own medications? yes     Does the patient handle his/her own money? yes     Is the patients home safe (ie good lighting, handrails on stairs and bath, etc.)? yes     Did you notice or did patient express any hearing difficulties? no     Did you notice or did patient express any vision difficulties?   no     Were distance and reading eye charts used? no       Advance Care Planning:   Patient was offered the opportunity to discuss advance care planning:  yes     Does patient have an Advance Directive:  no   If no, did you provide information on C[employer]ctions? yes       Plan:      Orders Placed This Encounter    INHAL RX, AIRWAY OBST/DX SPUTUM INDUCT ([id number])    XR CHEST PA LAT    ALBUTEROL, INHAL. RTZ.()    IPRATROPIUM BROMIDE NON-COMP    AR INHAL RX, AIRWAY OBST/DX SPUTUM INDUCT    ciclopirox (LOPROX) 0.77 % topical cream    triamcinolone acetonide (KENALOG) 0.1 % topical cream    albuterol (PROVENTIL VENTOLIN) 2.5 mg /3 mL (0.083 %) nebulizer solution    ipratropium (ATROVENT) 0.02 % nebulizer solution    benzonatate (TESSALON) 200 mg capsule    azithromycin (ZITHROMAX) 250 mg tablet       Health Maintenance   Topic Date Due    DTaP/Tdap/Td series (1 - Tdap) 06/30/1965    FOBT Q 1 YEAR AGE 50-75  06/30/1994    GLAUCOMA SCREENING Q2Y  06/30/2009    OSTEOPOROSIS SCREENING (DEXA)  06/30/2009    MEDICARE YEARLY EXAM  06/30/2009    Pneumococcal 65+ Low/Medium Risk (2 of 2 - PPSV23) 11/08/2015    INFLUENZA AGE 9 TO ADULT  08/01/2017    BREAST CANCER SCRN MAMMOGRAM  04/20/2019    ZOSTER VACCINE AGE 60>  Completed       *Patient verbalized understanding and agreement with the plan. A copy of the After Visit Summary with personalized health plan was given to the patient today.       Gina ROLAND

## 2017-05-30 ENCOUNTER — DOCUMENTATION ONLY (OUTPATIENT)
Dept: FAMILY MEDICINE CLINIC | Age: 72
End: 2017-05-30

## 2017-05-30 DIAGNOSIS — J40 BRONCHITIS: ICD-10-CM

## 2017-05-30 NOTE — PROGRESS NOTES
CXR results provided via phone call. CXR  No cardiopulmonary abnormalities noted. Continues to have watery eyes sneezing and coughing. D/C loratidine . Start allegra and flonase.      Daron Harrison NP-C

## 2017-05-30 NOTE — PROGRESS NOTES
I spoke with patient . Discontinue loratidine. Start allegra and flonase OTC for watery eyes , sneezing and nasal congestion.

## 2017-06-19 ENCOUNTER — TELEPHONE (OUTPATIENT)
Dept: FAMILY MEDICINE CLINIC | Age: 72
End: 2017-06-19

## 2017-06-19 DIAGNOSIS — R05.9 COUGH: Primary | ICD-10-CM

## 2017-06-19 RX ORDER — BENZONATATE 200 MG/1
200 CAPSULE ORAL
Qty: 30 CAP | Refills: 3 | Status: SHIPPED | OUTPATIENT
Start: 2017-06-19 | End: 2017-06-26

## 2017-06-19 NOTE — TELEPHONE ENCOUNTER
Patient advised per Humera Martin that cough med has been called into [facility].  Patient reminded to call office if symptoms become worse.

## 2017-06-19 NOTE — TELEPHONE ENCOUNTER
----- Message from Saeed Myers sent at 6/19/2017  7:32 AM EDT -----  Regarding: JOHNNIE Mead/Telephone  Pt was calling for an appt today because she has a cough and would like a call back if she able to come in for an appt. Pt best contact number [phone].

## 2017-06-19 NOTE — TELEPHONE ENCOUNTER
Please ask Ms Dominique García if she would like cough medicine called in or is she taking OTC. Does she have any sign of  Infection such as fever. Is she coughing up blood or blood clots? Thanks!   DL

## 2017-06-19 NOTE — TELEPHONE ENCOUNTER
Pt states she is coughing more. Started a new inhaler Stiolto. Went to pulmonary MD last week. Pt states she has COPD. Please advise.

## 2017-06-19 NOTE — TELEPHONE ENCOUNTER
Spoke with patient. Last seen by Josseline Post 3 weeks ago for same symptoms. Stated she has already spoken with Uche Azul LPN. Also wanted it noted in her chart per specialist in [facility] she does have COPD.

## 2017-08-17 RX ORDER — LEVOTHYROXINE SODIUM 100 UG/1
TABLET ORAL
Qty: 90 TAB | Refills: 1 | Status: SHIPPED | OUTPATIENT
Start: 2017-08-17 | End: 2018-02-10 | Stop reason: SDUPTHER

## 2017-08-17 RX ORDER — AMLODIPINE BESYLATE 10 MG/1
TABLET ORAL
Qty: 90 TAB | Refills: 1 | Status: SHIPPED | OUTPATIENT
Start: 2017-08-17 | End: 2018-02-06 | Stop reason: SDUPTHER

## 2017-10-12 ENCOUNTER — TELEPHONE (OUTPATIENT)
Dept: FAMILY MEDICINE CLINIC | Age: 72
End: 2017-10-12

## 2017-10-12 NOTE — TELEPHONE ENCOUNTER
Patient wants RX for overwhelming feelings and depression, no appointments available today,advised seeking medical attention at an ER or urgent care facility.

## 2017-10-20 ENCOUNTER — CLINICAL SUPPORT (OUTPATIENT)
Dept: FAMILY MEDICINE CLINIC | Age: 72
End: 2017-10-20

## 2017-10-20 DIAGNOSIS — Z23 ENCOUNTER FOR IMMUNIZATION: ICD-10-CM

## 2017-10-20 NOTE — MR AVS SNAPSHOT
Visit Information Date & Time Provider Department Dept. Phone Encounter #  
 10/20/2017 11:45 AM [facility] [address] [facility] [phone] [id number] Upcoming Health Maintenance Date Due DTaP/Tdap/Td series (1 - Tdap) 6/30/1965 FOBT Q 1 YEAR AGE 50-75 6/30/1994 GLAUCOMA SCREENING Q2Y 6/30/2009 OSTEOPOROSIS SCREENING (DEXA) 6/30/2009 Pneumococcal 65+ Low/Medium Risk (2 of 2 - PPSV23) 11/8/2015 INFLUENZA AGE 9 TO ADULT 8/1/2017 MEDICARE YEARLY EXAM 5/27/2018 BREAST CANCER SCRN MAMMOGRAM 4/20/2019 Allergies as of 10/20/2017  Review Complete On: 5/26/2017 By: Krzysztof Florence NP Severity Noted Reaction Type Reactions Latex High 11/16/2015    Rash Keflex [Cephalexin]  09/30/2013    Rash Levaquin [Levofloxacin]  09/30/2013    Rash Macrobid [Nitrofurantoin Monohyd/m-cryst]  09/30/2013    Rash  
 Sulfa (Sulfonamide Antibiotics)  09/30/2013    Rash Current Immunizations  Reviewed on 11/17/2016 Name Date Influenza High Dose Vaccine PF  Incomplete, 11/17/2016 Influenza Vaccine 10/24/2014, 10/1/2013  9:36 AM  
 Influenza Vaccine Lexus Savory) 10/20/2015 Pneumococcal Vaccine (Unspecified Type) 11/8/2010 Zoster Vaccine, Live 10/31/2014 Not reviewed this visit You Were Diagnosed With   
  
 Codes Comments Encounter for immunization     ICD-10-CM: D74 ICD-9-CM: V03.89 Vitals OB Status Smoking Status Hysterectomy Former Smoker Preferred Pharmacy Pharmacy Name Phone [id number] [address] [phone] Your Updated Medication List  
  
   
This list is accurate as of: 10/20/17 12:00 PM.  Always use your most recent med list.  
  
  
  
  
 ADULT MULTIVITAMIN GUMMIES PO Take  by mouth. ALEVE -25 mg Tab Generic drug:  naproxen-diphenhydramine Take  by mouth daily. Pt takes 2 tabs amLODIPine 10 mg tablet Commonly known as:  Karly Blade TAKE 1 TAB BY MOUTH DAILY. Arthritis Pain Relief 650 mg CR tablet Generic drug:  acetaminophen Take 650 mg by mouth every eight (8) hours. aspirin 81 mg chewable tablet Take 81 mg by mouth daily. augmented betamethasone dipropionate 0.05 % ointment Commonly known as:  [place]  Use BID to lesions on legs and arms  
  
 budesonide 3 mg capsule Commonly known as:  ENTOCORT EC Take 3 Caps by mouth every morning. Taking 3 tabs in am  
  
 calcium carbonate 200 mg calcium (500 mg) Chew Commonly known as:  TUMS Take 2 Tabs by mouth two (2) times a day. ciclopirox 0.77 % topical cream  
Commonly known as:  Farzana Cordon Apply  to affected area two (2) times a day. clindamycin 150 mg capsule Commonly known as:  CLEOCIN  
TAKE 4 CAPSULES BY MOUTH 1 HOUR BEFORE DENTAL APT. clobetasol 0.05 % ointment Commonly known as:  Sherryll Stalling Apply  to affected area daily. pm  
  
 fluconazole 150 mg tablet Commonly known as:  DIFLUCAN Take 150 mg by mouth daily. FDA advises cautious prescribing of oral fluconazole in pregnancy. hydroxychloroquine 200 mg tablet Commonly known as:  PLAQUENIL Take 200 mg by mouth two (2) times a day. ibandronate 150 mg tablet Commonly known as:  Chikis Copeland TAKE 1 TABLET BY MOUTH ONCE EVERY 30 DAYS, FOR POST-MENOPAUSAL OSTEOPOROSIS PREVENTION  
  
 ipratropium 0.02 % Soln Commonly known as:  ATROVENT  
2.5 mL by Nebulization route as needed for Wheezing. Indications: PREVENTION OF BRONCHOSPASM WITH CHRONIC BRONCHITIS  
  
 levothyroxine 100 mcg tablet Commonly known as:  SYNTHROID  
TAKE 1 TABLET BY MOUTH DAILY BEFORE BREAKFAST. lisinopril 40 mg tablet Commonly known as:  PRINIVIL, ZESTRIL  
TAKE 1 TABLET BY MOUTH DAILY. loratadine 10 mg tablet Commonly known as:  Alanda Ganong Take 1 Tab by mouth daily. montelukast 10 mg tablet Commonly known as:  SINGULAIR Take 1 Tab by mouth daily. MYRBETRIQ 25 mg ER tablet Generic drug:  mirabegron ER Take 25 mg by mouth daily. omeprazole 20 mg capsule Commonly known as:  PRILOSEC Take 20 mg by mouth daily. OTHER  
zicam  
  
 PENTASA 500 mg CR capsule Generic drug:  mesalamine Take 500 mg by mouth two (2) times a day. Two Tablets 4x  daily STIOLTO RESPIMAT 2.5-2.5 mcg/actuation Mist  
Generic drug:  tiotropium-olodaterol Take  by inhalation daily. * tacrolimus 1 mg capsule Commonly known as:  PROGRAF Take  by mouth three (3) times daily. One pill in water/ swish 3x daily * tacrolimus 0.5 mg capsule Commonly known as:  PROGRAF Take  by mouth daily. * tacrolimus 0.1 % ointment Commonly known as:  PROTOPIC Apply  to affected area daily. am  
  
 triamcinolone acetonide 0.1 % topical cream  
Commonly known as:  KENALOG Apply  to affected area two (2) times a day. use thin layer VITAMIN D3 1,000 unit tablet Generic drug:  cholecalciferol Take 1,000 Units by mouth daily. Indications: VITAMIN D DEFICIENCY * Notice: This list has 3 medication(s) that are the same as other medications prescribed for you. Read the directions carefully, and ask your doctor or other care provider to review them with you. We Performed the Following INFLUENZA VIRUS VACCINE, HIGH DOSE SEASONAL, PRESERVATIVE FREE [06705 CPT(R)] Patient Instructions Vaccine Information Statement Influenza (Flu) Vaccine (Inactivated or Recombinant): What you need to know Many Vaccine Information Statements are available in Korean and other languages. See www.immunize.org/vis Hojas de Información Sobre Vacunas están disponibles en Español y en muchos otros idiomas. Visite www.immunize.org/vis 1. Why get vaccinated?  
 
Influenza (flu) is a contagious disease that spreads around the [facility] in the [place] every year, usually between October and May. Flu is caused by influenza viruses, and is spread mainly by coughing, sneezing, and close contact. Anyone can get flu. Flu strikes suddenly and can last several days. Symptoms vary by age, but can include: 
 fever/chills  sore throat  muscle aches  fatigue  cough  headache  runny or stuffy nose Flu can also lead to pneumonia and blood infections, and cause diarrhea and seizures in children. If you have a medical condition, such as heart or lung disease, flu can make it worse. Flu is more dangerous for some people. Infants and young children, people 72years of age and older, pregnant women, and people with certain health conditions or a weakened immune system are at greatest risk. Each year thousands of people in the [facility] die from flu, and many more are hospitalized. Flu vaccine can: 
 keep you from getting flu, 
 make flu less severe if you do get it, and 
 keep you from spreading flu to your family and other people. 2. Inactivated and recombinant flu vaccines A dose of flu vaccine is recommended every flu season. Children 6 months through 6years of age may need two doses during the same flu season. Everyone else needs only one dose each flu season. Some inactivated flu vaccines contain a very small amount of a mercury-based preservative called thimerosal. Studies have not shown thimerosal in vaccines to be harmful, but flu vaccines that do not contain thimerosal are available. There is no live flu virus in flu shots. They cannot cause the flu. There are many flu viruses, and they are always changing. Each year a new flu vaccine is made to protect against three or four viruses that are likely to cause disease in the upcoming flu season. But even when the vaccine doesnt exactly match these viruses, it may still provide some protection Flu vaccine cannot prevent:  flu that is caused by a virus not covered by the vaccine, or 
 illnesses that look like flu but are not. It takes about 2 weeks for protection to develop after vaccination, and protection lasts through the flu season. 3. Some people should not get this vaccine Tell the person who is giving you the vaccine:  If you have any severe, life-threatening allergies. If you ever had a life-threatening allergic reaction after a dose of flu vaccine, or have a severe allergy to any part of this vaccine, you may be advised not to get vaccinated. Most, but not all, types of flu vaccine contain a small amount of egg protein.  If you ever had Guillain-Barré Syndrome (also called GBS). Some people with a history of GBS should not get this vaccine. This should be discussed with your doctor.  If you are not feeling well. It is usually okay to get flu vaccine when you have a mild illness, but you might be asked to come back when you feel better. 4. Risks of a vaccine reaction With any medicine, including vaccines, there is a chance of reactions. These are usually mild and go away on their own, but serious reactions are also possible. Most people who get a flu shot do not have any problems with it. Minor problems following a flu shot include:  
 soreness, redness, or swelling where the shot was given  hoarseness  sore, red or itchy eyes  cough  fever  aches  headache  itching  fatigue If these problems occur, they usually begin soon after the shot and last 1 or 2 days. More serious problems following a flu shot can include the following:  There may be a small increased risk of Guillain-Barré Syndrome (GBS) after inactivated flu vaccine. This risk has been estimated at 1 or 2 additional cases per million people vaccinated. This is much lower than the risk of severe complications from flu, which can be prevented by flu vaccine.  Young children who get the flu shot along with pneumococcal vaccine (PCV13) and/or DTaP vaccine at the same time might be slightly more likely to have a seizure caused by fever. Ask your doctor for more information. Tell your doctor if a child who is getting flu vaccine has ever had a seizure. Problems that could happen after any injected vaccine:  People sometimes faint after a medical procedure, including vaccination. Sitting or lying down for about 15 minutes can help prevent fainting, and injuries caused by a fall. Tell your doctor if you feel dizzy, or have vision changes or ringing in the ears.  Some people get severe pain in the shoulder and have difficulty moving the arm where a shot was given. This happens very rarely.  Any medication can cause a severe allergic reaction. Such reactions from a vaccine are very rare, estimated at about 1 in a million doses, and would happen within a few minutes to a few hours after the vaccination. As with any medicine, there is a very remote chance of a vaccine causing a serious injury or death. The safety of vaccines is always being monitored. For more information, visit: www.cdc.gov/vaccinesafety/ 
 
5. What if there is a serious reaction? What should I look for?  Look for anything that concerns you, such as signs of a severe allergic reaction, very high fever, or unusual behavior. Signs of a severe allergic reaction can include hives, swelling of the face and throat, difficulty breathing, a fast heartbeat, dizziness, and weakness  usually within a few minutes to a few hours after the vaccination. What should I do?  If you think it is a severe allergic reaction or other emergency that cant wait, call 9-1-1 and get the person to the nearest hospital. Otherwise, call your doctor.  Reactions should be reported to the Vaccine Adverse Event Reporting System (VAERS).  Your doctor should file this report, or you can do it yourself through  the VAERS web site at www.vaers. [facility].gov, or by calling 0-796.857.4212. VAERS does not give medical advice. 6. The National Vaccine Injury Compensation Program 
 
The [facility] Vaccine Injury Compensation Program (VICP) is a federal program that was created to compensate people who may have been injured by certain vaccines. Persons who believe they may have been injured by a vaccine can learn about the program and about filing a claim by calling 5-961.118.8849 or visiting the [employer] [address] website at www.[facility].gov/vaccinecompensation. There is a time limit to file a claim for compensation. 7. How can I learn more?  Ask your healthcare provider. He or she can give you the vaccine package insert or suggest other sources of information.  Call your local or state health department.  Contact the Centers for Disease Control and Prevention (CDC): 
- Call 9-446.690.9585 (5-970-HXW-INFO) or 
- Visit CDCs website at www.cdc.gov/flu Vaccine Information Statement Inactivated Influenza Vaccine 8/7/2015 
42 RHETT Whitman [id number] Department of [facility] and [place] Centers for Disease Control and Prevention Office Use Only Introducing [facility]! [facility] introduces Improveit! 360 patient portal. Now you can access parts of your medical record, email your doctor's office, and request medication refills online. 1. In your internet browser, go to [email or web address] 2. Click on the First Time User? Click Here link in the Sign In box. You will see the New Member Sign Up page. 3. Enter your Improveit! 360 Access Code exactly as it appears below. You will not need to use this code after youve completed the sign-up process. If you do not sign up before the expiration date, you must request a new code. · Improveit! 360 Access Code: [email or web address] Expires: 1/18/2018 11:57 AM 
 
4.  Enter the last four digits of your Social Security Number (xxxx) and Date of Birth (mm/dd/yyyy) as indicated and click Submit. You will be taken to the next sign-up page. 5. Create a ExceleraRx ID. This will be your ExceleraRx login ID and cannot be changed, so think of one that is secure and easy to remember. 6. Create a ExceleraRx password. You can change your password at any time. 7. Enter your Password Reset Question and Answer. This can be used at a later time if you forget your password. 8. Enter your e-mail address. You will receive e-mail notification when new information is available in [address] 9. Click Sign Up. You can now view and download portions of your medical record. 10. Click the Download Summary menu link to download a portable copy of your medical information. If you have questions, please visit the Frequently Asked Questions section of the Excel[employer] website. Remember, ExceleraRx is NOT to be used for urgent needs. For medical emergencies, dial 911. Now available from your iPhone and Android! Please provide this summary of care documentation to your next provider. Your primary care clinician is listed as Waqar Bird. If you have any questions after today's visit, please call [phone].

## 2017-10-20 NOTE — PATIENT INSTRUCTIONS
Vaccine Information Statement    Influenza (Flu) Vaccine (Inactivated or Recombinant): What you need to know    Many Vaccine Information Statements are available in Khmer and other languages. See www.immunize.org/vis  Hojas de Información Sobre Vacunas están disponibles en Español y en muchos otros idiomas. Visite www.immunize.org/vis    1. Why get vaccinated? Influenza (flu) is a contagious disease that spreads around the [place] every year, usually between October and May. Flu is caused by influenza viruses, and is spread mainly by coughing, sneezing, and close contact. Anyone can get flu. Flu strikes suddenly and can last several days. Symptoms vary by age, but can include:   fever/chills   sore throat   muscle aches   fatigue   cough   headache    runny or stuffy nose    Flu can also lead to pneumonia and blood infections, and cause diarrhea and seizures in children. If you have a medical condition, such as heart or lung disease, flu can make it worse. Flu is more dangerous for some people. Infants and young children, people 72years of age and older, pregnant women, and people with certain health conditions or a weakened immune system are at greatest risk. Each year thousands of people in the [facility] die from flu, and many more are hospitalized. Flu vaccine can:   keep you from getting flu,   make flu less severe if you do get it, and   keep you from spreading flu to your family and other people. 2. Inactivated and recombinant flu vaccines    A dose of flu vaccine is recommended every flu season. Children 6 months through 6years of age may need two doses during the same flu season. Everyone else needs only one dose each flu season.        Some inactivated flu vaccines contain a very small amount of a mercury-based preservative called thimerosal. Studies have not shown thimerosal in vaccines to be harmful, but flu vaccines that do not contain thimerosal are available. There is no live flu virus in flu shots. They cannot cause the flu. There are many flu viruses, and they are always changing. Each year a new flu vaccine is made to protect against three or four viruses that are likely to cause disease in the upcoming flu season. But even when the vaccine doesnt exactly match these viruses, it may still provide some protection    Flu vaccine cannot prevent:   flu that is caused by a virus not covered by the vaccine, or   illnesses that look like flu but are not. It takes about 2 weeks for protection to develop after vaccination, and protection lasts through the flu season. 3. Some people should not get this vaccine    Tell the person who is giving you the vaccine:     If you have any severe, life-threatening allergies. If you ever had a life-threatening allergic reaction after a dose of flu vaccine, or have a severe allergy to any part of this vaccine, you may be advised not to get vaccinated. Most, but not all, types of flu vaccine contain a small amount of egg protein.  If you ever had Guillain-Barré Syndrome (also called GBS). Some people with a history of GBS should not get this vaccine. This should be discussed with your doctor.  If you are not feeling well. It is usually okay to get flu vaccine when you have a mild illness, but you might be asked to come back when you feel better. 4. Risks of a vaccine reaction    With any medicine, including vaccines, there is a chance of reactions. These are usually mild and go away on their own, but serious reactions are also possible. Most people who get a flu shot do not have any problems with it.      Minor problems following a flu shot include:    soreness, redness, or swelling where the shot was given     hoarseness   sore, red or itchy eyes   cough   fever   aches   headache   itching   fatigue  If these problems occur, they usually begin soon after the shot and last 1 or 2 days. More serious problems following a flu shot can include the following:     There may be a small increased risk of Guillain-Barré Syndrome (GBS) after inactivated flu vaccine. This risk has been estimated at 1 or 2 additional cases per million people vaccinated. This is much lower than the risk of severe complications from flu, which can be prevented by flu vaccine.  Young children who get the flu shot along with pneumococcal vaccine (PCV13) and/or DTaP vaccine at the same time might be slightly more likely to have a seizure caused by fever. Ask your doctor for more information. Tell your doctor if a child who is getting flu vaccine has ever had a seizure. Problems that could happen after any injected vaccine:      People sometimes faint after a medical procedure, including vaccination. Sitting or lying down for about 15 minutes can help prevent fainting, and injuries caused by a fall. Tell your doctor if you feel dizzy, or have vision changes or ringing in the ears.  Some people get severe pain in the shoulder and have difficulty moving the arm where a shot was given. This happens very rarely.  Any medication can cause a severe allergic reaction. Such reactions from a vaccine are very rare, estimated at about 1 in a million doses, and would happen within a few minutes to a few hours after the vaccination. As with any medicine, there is a very remote chance of a vaccine causing a serious injury or death. The safety of vaccines is always being monitored. For more information, visit: www.cdc.gov/vaccinesafety/    5. What if there is a serious reaction? What should I look for?  Look for anything that concerns you, such as signs of a severe allergic reaction, very high fever, or unusual behavior.     Signs of a severe allergic reaction can include hives, swelling of the face and throat, difficulty breathing, a fast heartbeat, dizziness, and weakness - usually within a few minutes to a few hours after the vaccination. What should I do?  If you think it is a severe allergic reaction or other emergency that cant wait, call 9-1-1 and get the person to the nearest hospital. Otherwise, call your doctor.  Reactions should be reported to the Vaccine Adverse Event Reporting System (VAERS). Your doctor should file this report, or you can do it yourself through  the VAERS web site at www.vaers. [facility].gov, or by calling 1-814.332.8420. VAERS does not give medical advice. 6. The National Vaccine Injury Compensation Program    The [facility] Vaccine Injury Compensation Program (VICP) is a federal program that was created to compensate people who may have been injured by certain vaccines. Persons who believe they may have been injured by a vaccine can learn about the program and about filing a claim by calling 3-810.598.8758 or visiting the [employer] website at www.[facility].gov/vaccinecompensation. There is a time limit to file a claim for compensation. 7. How can I learn more?  Ask your healthcare provider. He or she can give you the vaccine package insert or suggest other sources of information.  Call your local or state health department.  Contact the Centers for Disease Control and Prevention (CDC):  - Call 9-176.607.7696 (1-800-CDC-INFO) or  - Visit CDCs website at www.cdc.gov/flu    Vaccine Information Statement   Inactivated Influenza Vaccine   8/7/2015  [address] Koko [id number]    Department of Health and Human Services  Centers for Disease Control and Prevention    Office Use Only

## 2017-10-23 RX ORDER — UMECLIDINIUM BROMIDE AND VILANTEROL TRIFENATATE 62.5; 25 UG/1; UG/1
POWDER RESPIRATORY (INHALATION)
Refills: 5 | COMMUNITY
Start: 2017-09-15 | End: 2022-03-03

## 2017-10-23 RX ORDER — ALBUTEROL SULFATE 90 UG/1
2 AEROSOL, METERED RESPIRATORY (INHALATION)
COMMUNITY

## 2017-10-23 RX ORDER — BENZONATATE 200 MG/1
CAPSULE ORAL
Refills: 1 | COMMUNITY
Start: 2017-10-06 | End: 2018-11-11

## 2017-11-15 ENCOUNTER — OFFICE VISIT (OUTPATIENT)
Dept: FAMILY MEDICINE CLINIC | Age: 72
End: 2017-11-15

## 2017-11-15 VITALS
HEART RATE: 72 BPM | SYSTOLIC BLOOD PRESSURE: 132 MMHG | DIASTOLIC BLOOD PRESSURE: 70 MMHG | WEIGHT: 182.4 LBS | TEMPERATURE: 97 F | HEIGHT: 60 IN | BODY MASS INDEX: 35.81 KG/M2 | RESPIRATION RATE: 18 BRPM

## 2017-11-15 DIAGNOSIS — R39.9 UTI SYMPTOMS: Primary | ICD-10-CM

## 2017-11-15 LAB
BILIRUB UR QL STRIP: NEGATIVE
GLUCOSE UR-MCNC: NEGATIVE MG/DL
KETONES P FAST UR STRIP-MCNC: NEGATIVE MG/DL
PH UR STRIP: 5.5 [PH] (ref 4.6–8)
PROT UR QL STRIP: NEGATIVE
SP GR UR STRIP: 1.01 (ref 1–1.03)
UA UROBILINOGEN AMB POC: NORMAL (ref 0.2–1)
URINALYSIS CLARITY POC: NORMAL
URINALYSIS COLOR POC: YELLOW
URINE BLOOD POC: NORMAL
URINE LEUKOCYTES POC: NORMAL
URINE NITRITES POC: NEGATIVE

## 2017-11-15 RX ORDER — AZITHROMYCIN 250 MG/1
250 TABLET, FILM COATED ORAL SEE ADMIN INSTRUCTIONS
Qty: 6 TAB | Refills: 0 | Status: SHIPPED | OUTPATIENT
Start: 2017-11-15 | End: 2017-12-13 | Stop reason: ALTCHOICE

## 2017-11-15 NOTE — MR AVS SNAPSHOT
Visit Information Date & Time Provider Department Dept. Phone Encounter #  
 11/15/2017  9:00 AM Elian Landry NP [facility] [address] [facility] [phone] [id number] Your Appointments 11/15/2017  9:00 AM  
ESTABLISHED PATIENT with Elian Landry NP  
[address] ([address]) Appt Note: walk in 7:25 sick visit [address] [address]  
[address] [facility] [place] [address] [address] Upcoming Health Maintenance Date Due DTaP/Tdap/Td series (1 - Tdap) 6/30/1965 FOBT Q 1 YEAR AGE 50-75 6/30/1994 GLAUCOMA SCREENING Q2Y 6/30/2009 OSTEOPOROSIS SCREENING (DEXA) 6/30/2009 MEDICARE YEARLY EXAM 5/27/2018 BREAST CANCER SCRN MAMMOGRAM 4/20/2019 Allergies as of 11/15/2017  Review Complete On: 11/15/2017 By: Elian Landry NP Severity Noted Reaction Type Reactions Latex High 11/16/2015    Rash Keflex [Cephalexin]  09/30/2013    Rash Levaquin [Levofloxacin]  09/30/2013    Rash Macrobid [Nitrofurantoin Monohyd/m-cryst]  09/30/2013    Rash  
 Sulfa (Sulfonamide Antibiotics)  09/30/2013    Rash Current Immunizations  Reviewed on 11/17/2016 Name Date Influenza High Dose Vaccine PF 10/20/2017, 11/17/2016 Influenza Vaccine 10/24/2014, 10/1/2013  9:36 AM  
 Influenza Vaccine Noe Push) 10/20/2015 Pneumococcal Conjugate (PCV-13) 10/20/2017 Pneumococcal Vaccine (Unspecified Type) 11/8/2010 Zoster Vaccine, Live 10/31/2014 Not reviewed this visit You Were Diagnosed With   
  
 Codes Comments UTI symptoms    -  Primary ICD-10-CM: R39.9 ICD-9-CM: 788.99 BMI 35.0-35.9,adult     ICD-10-CM: D44.43 ICD-9-CM: V85.35 Vitals BP Pulse Temp Resp Height(growth percentile) Weight(growth percentile)  132/70 (BP 1 Location: Left arm, BP Patient Position: Sitting) 72 97 °F (36.1 °C) (Temporal) 18 5' (1.524 m) 182 lb 6.4 oz (82.7 kg) BMI OB Status Smoking Status 35.62 kg/m2 Hysterectomy Former Smoker Vitals History BMI and BSA Data Body Mass Index Body Surface Area  
 35.62 kg/m 2 1.87 m 2 Preferred Pharmacy Pharmacy Name Phone [id number] [place] [phone] Your Updated Medication List  
  
   
This list is accurate as of: 11/15/17  8:25 AM.  Always use your most recent med list.  
  
  
  
  
 ADULT MULTIVITAMIN GUMMIES PO Take  by mouth. albuterol 90 mcg/actuation inhaler Commonly known as:  PROVENTIL HFA, VENTOLIN HFA, PROAIR HFA Take 2 Puffs by inhalation. ALEVE -25 mg Tab Generic drug:  naproxen-diphenhydramine Take  by mouth daily. Pt takes 2 tabs  
  
 amLODIPine 10 mg tablet Commonly known as:  Darielaa Anay TAKE 1 TAB BY MOUTH DAILY. ANORO ELLIPTA 62.5-25 mcg/actuation inhaler Generic drug:  umeclidinium-vilanterol USE 1 INHALATION ONCE A DAY Arthritis Pain Relief 650 mg Jordon Pipe Generic drug:  acetaminophen Take 650 mg by mouth every eight (8) hours. aspirin 81 mg chewable tablet Take 81 mg by mouth daily. augmented betamethasone dipropionate 0.05 % ointment Commonly known as:  Linda Benes Use BID to lesions on legs and arms  
  
 azithromycin 250 mg tablet Commonly known as:  Natasha Magda Take 1 Tab by mouth See Admin Instructions. Take 2 tabs today and one each day x 4 days  Indications: cystitis  
  
 benzonatate 200 mg capsule Commonly known as:  TESSALON  
TAKE ONE CAPSULE BY MOUTH 3 TIMES A DAY AS NEEDED FOR COUGH FOR UP TO 7 DAYS  
  
 budesonide 3 mg capsule Commonly known as:  ENTOCORT EC Take 3 Caps by mouth every morning. Taking 3 tabs in am  
  
 calcium carbonate 200 mg calcium (500 mg) Chew Commonly known as:  TUMS Take 2 Tabs by mouth two (2) times a day. ciclopirox 0.77 % topical cream  
Commonly known as:  Ashia August Apply  to affected area two (2) times a day. clindamycin 150 mg capsule Commonly known as:  CLEOCIN  
TAKE 4 CAPSULES BY MOUTH 1 HOUR BEFORE DENTAL APT. clobetasol 0.05 % ointment Commonly known as:  Harrie Reagin Apply  to affected area daily. pm  
  
 fluconazole 150 mg tablet Commonly known as:  DIFLUCAN Take 150 mg by mouth daily. FDA advises cautious prescribing of oral fluconazole in pregnancy. hydroxychloroquine 200 mg tablet Commonly known as:  PLAQUENIL Take 200 mg by mouth two (2) times a day. ibandronate 150 mg tablet Commonly known as:  Lina Elder TAKE 1 TABLET BY MOUTH ONCE EVERY 30 DAYS, FOR POST-MENOPAUSAL OSTEOPOROSIS PREVENTION  
  
 levothyroxine 100 mcg tablet Commonly known as:  SYNTHROID  
TAKE 1 TABLET BY MOUTH DAILY BEFORE BREAKFAST. lisinopril 40 mg tablet Commonly known as:  PRINIVIL, ZESTRIL  
TAKE 1 TABLET BY MOUTH DAILY. MYRBETRIQ 25 mg ER tablet Generic drug:  mirabegron ER Take 25 mg by mouth daily. omeprazole 20 mg capsule Commonly known as:  PRILOSEC Take 20 mg by mouth daily. PENTASA 500 mg CR capsule Generic drug:  mesalamine Take 500 mg by mouth two (2) times a day. Two Tablets 4x  daily * tacrolimus 1 mg capsule Commonly known as:  PROGRAF Take  by mouth three (3) times daily. One pill in water/ swish 3x daily * tacrolimus 0.5 mg capsule Commonly known as:  PROGRAF Take  by mouth daily. * tacrolimus 0.1 % ointment Commonly known as:  PROTOPIC Apply  to affected area daily. am  
  
 triamcinolone acetonide 0.1 % topical cream  
Commonly known as:  KENALOG Apply  to affected area two (2) times a day. use thin layer VITAMIN D3 1,000 unit tablet Generic drug:  cholecalciferol Take 1,000 Units by mouth daily.  Indications: VITAMIN D DEFICIENCY  
  
 * Notice: This list has 3 medication(s) that are the same as other medications prescribed for you. Read the directions carefully, and ask your doctor or other care provider to review them with you. Prescriptions Sent to Pharmacy Refills  
 azithromycin (ZITHROMAX) 250 mg tablet 0 Sig: Take 1 Tab by mouth See Admin Instructions. Take 2 tabs today and one each day x 4 days  Indications: cystitis Class: Normal  
 Pharmacy: [facility]/pharmacy #[id number] Sravani Gerard, [address] [place] Ph #: [phone] Route: Oral  
  
We Performed the Following AMB POC URINALYSIS DIP STICK AUTO W/O MICRO [59560 CPT(R)] Comments: AMB POC URINALYSIS DIP STICK AUTO W/O  
 CULTURE, URINE [23572 CPT(R)] Introducing [facility] & [facility] SERVICES! [facility] introduces [employer] patient portal. Now you can access parts of your medical record, email your doctor's office, and request medication refills online. 1. In your internet browser, go to [email or web address] 2. Click on the First Time User? Click Here link in the Sign In box. You will see the New Member Sign Up page. 3. Enter your Cerac Access Code exactly as it appears below. You will not need to use this code after youve completed the sign-up process. If you do not sign up before the expiration date, you must request a new code. · Cerac Access Code: [email or web address] Expires: 1/18/2018 10:57 AM 
 
4. Enter the last four digits of your Social Security Number (xxxx) and Date of Birth (mm/dd/yyyy) as indicated and click Submit. You will be taken to the next sign-up page. 5. Create a Cerac ID. This will be your Cerac login ID and cannot be changed, so think of one that is secure and easy to remember. 6. Create a Cerac password. You can change your password at any time. 7. Enter your Password Reset Question and Answer. This can be used at a later time if you forget your password. 8. Enter your e-mail address. You will receive e-mail notification when new information is available in [address] 9. Click Sign Up. You can now view and download portions of your medical record. 10. Click the Download Summary menu link to download a portable copy of your medical information. If you have questions, please visit the Frequently Asked Questions section of the inFreeDA website. Remember, inFreeDA is NOT to be used for urgent needs. For medical emergencies, dial 911. Now available from your iPhone and Android! Please provide this summary of care documentation to your next provider. Your primary care clinician is listed as Sandra Pimentel. If you have any questions after today's visit, please call [phone].

## 2017-11-16 NOTE — PROGRESS NOTES
Subjective: Poli Huddleston is a 67 y.o. female who presents today with the following:  Chief Complaint   Patient presents with    Bladder Infection     Frnaklin Ramirez presents for an acute visit. Burning , hesitancy and urgency x 1 week. . Denies , , feverflank pain or  Hematuria. COMPLIANT WITH MEDICATION:   BMIDiscussed the patient's BMI with her. The BMI follow up plan is as follows:     dietary management education, guidance, and counseling  encourage exercise  monitor weight  prescribed dietary intake    An After Visit Summary was printed and given to the patient. ROS:  Gen: denies fever, chills, fatigue, weight loss, weight gain  HEENT:denies blurry vision, nasal congestion, sore throat  Resp: denies dypsnea, cough, wheezing  CV: denies chest pain radiating to the jaws or arms, palpitations, lower extremity edema  Abd: denies nausea, vomiting, diarrhea, constipation  Neuro: denies numbness/tingling  Endo: denies polyuria, polydipsia, heat/cold intolerance  Heme: no lymphadenopathy    Allergies   Allergen Reactions    Latex Rash    Keflex [Cephalexin] Rash    Levaquin [Levofloxacin] Rash    Macrobid [Nitrofurantoin Monohyd/M-Cryst] Rash    Sulfa (Sulfonamide Antibiotics) Rash         Current Outpatient Prescriptions:     azithromycin (ZITHROMAX) 250 mg tablet, Take 1 Tab by mouth See Admin Instructions. Take 2 tabs today and one each day x 4 days  Indications: cystitis, Disp: 6 Tab, Rfl: 0    ANORO ELLIPTA 62.5-25 mcg/actuation inhaler, USE 1 INHALATION ONCE A DAY, Disp: , Rfl: 5    albuterol (PROVENTIL HFA, VENTOLIN HFA, PROAIR HFA) 90 mcg/actuation inhaler, Take 2 Puffs by inhalation. , Disp: , Rfl:     benzonatate (TESSALON) 200 mg capsule, TAKE ONE CAPSULE BY MOUTH 3 TIMES A DAY AS NEEDED FOR COUGH FOR UP TO 7 DAYS, Disp: , Rfl: 1    amLODIPine (NORVASC) 10 mg tablet, TAKE 1 TAB BY MOUTH DAILY. , Disp: 90 Tab, Rfl: 1    levothyroxine (SYNTHROID) 100 mcg tablet, TAKE 1 TABLET BY MOUTH   clindamycin (CLEOCIN) 150 mg capsule, TAKE 4 CAPSULES BY MOUTH 1 HOUR BEFORE DENTAL APT., Disp: 16 capsule, Rfl: 0    mesalamine (PENTASA) 500 mg CR capsule, Take 500 mg by mouth two (2) times a day. Two Tablets 4x  daily, Disp: , Rfl:     mirabegron (MYRBETRIQ) 25 mg Tb24, Take 25 mg by mouth daily. , Disp: , Rfl:     omeprazole (PRILOSEC) 20 mg capsule, Take 20 mg by mouth daily. , Disp: , Rfl:     cholecalciferol (VITAMIN D3) 1,000 unit tablet, Take 1,000 Units by mouth daily.  Indications: VITAMIN D DEFICIENCY, Disp: , Rfl:     Past Medical History:   Diagnosis Date    Allergic rhinitis     COPD (chronic obstructive pulmonary disease) (HCC)     Hypertension     Hypothyroidism     Lichen planus     seen at [facility], unspecified whether generalized or localized, unspecified site     Overactive bladder     Phlebitis and thrombophlebitis of lower extremities, unspecified     Raynaud's syndrome        Past Surgical History:   Procedure Laterality Date    HX APPENDECTOMY      HX GYN      HYSTERECTOMY//BTL    HX HEENT      TONSILLECTOMY    HX HYSTERECTOMY      HX OOPHORECTOMY      HX UROLOGICAL      BLADDER SLING       History   Smoking Status    Former Smoker    Types: Cigarettes   Smokeless Tobacco    Never Used       Social History     Social History    Marital status:      Spouse name: N/A    Number of children: N/A    Years of education: N/A     Social History Main Topics    Smoking status: Former Smoker     Types: Cigarettes    Smokeless tobacco: Never Used    Alcohol use No    Drug use: No    Sexual activity: No     Other Topics Concern     Service No    Blood Transfusions No    Caffeine Concern No    Occupational Exposure No    Hobby Hazards No    Sleep Concern No    Stress Concern No    Weight Concern No    Special Diet No    Back Care No    Exercise Yes    Bike Helmet No    Seat Belt Yes    Self-Exams No     Social History Narrative       Family History   Problem Relation Age of Onset    Stroke Mother     Dementia Mother     Cancer Father      BRAIN TUMOR         Objective:     Visit Vitals    /70 (BP 1 Location: Left arm, BP Patient Position: Sitting)    Pulse 72    Temp 97 °F (36.1 °C) (Temporal)    Resp 18    Ht 5' (1.524 m)    Wt 182 lb 6.4 oz (82.7 kg)    BMI 35.62 kg/m2     Body mass index is 35.62 kg/(m^2). General: Alert and oriented. No acute distress. Well nourished  HEENT :  Ears:TMs are normal. Canals are clear. Eyes: pupils equal, round, react to light and accommodation. Extra ocular movements intact. Nose: patent. Mouth and throat is clear. Neck:supple full range of motion no thyromegaly. Trachea midline, No carotid bruits. No significant lymphadenopathy  Lungs[de-identified] clear to auscultation without wheezes, rales, or rhonchi. Heart :RRR, S1 & S2 are normal intensity. No murmur; no gallop  Abdomen: bowel sounds active. No tenderness, guarding, rebound, masses, hepatic or spleen enlargement  Back: no CVA tenderness. Extremities: without clubbing, cyanosis, or edema  Pulses: radial and femoral pulses are normal  Neuro: HMF intact.  Cranial nerves II through XII grossly normal.  Motor: is 5 over 5 and symmetrical.   Deep tendon reflexes: +2 equal    Results for orders placed or performed in visit on 11/15/17   AMB POC URINALYSIS DIP STICK AUTO W/O MICRO   Result Value Ref Range    Color (UA POC) Yellow     Clarity (UA POC) Cloudy     Glucose (UA POC) Negative Negative    Bilirubin (UA POC) Negative Negative    Ketones (UA POC) Negative Negative    Specific gravity (UA POC) 1.010 1.001 - 1.035    Blood (UA POC) Trace Negative    pH (UA POC) 5.5 4.6 - 8.0    Protein (UA POC) Negative Negative    Urobilinogen (UA POC) 0.2 mg/dL 0.2 - 1    Nitrites (UA POC) Negative Negative    Leukocyte esterase (UA POC) 2+ Negative       Results for orders placed or performed in visit on 11/15/17   AMB POC URINALYSIS DIP STICK AUTO W/O MICRO   Result Value Ref Range    Color (UA POC) Yellow     Clarity (UA POC) Cloudy     Glucose (UA POC) Negative Negative    Bilirubin (UA POC) Negative Negative    Ketones (UA POC) Negative Negative    Specific gravity (UA POC) 1.010 1.001 - 1.035    Blood (UA POC) Trace Negative    pH (UA POC) 5.5 4.6 - 8.0    Protein (UA POC) Negative Negative    Urobilinogen (UA POC) 0.2 mg/dL 0.2 - 1    Nitrites (UA POC) Negative Negative    Leukocyte esterase (UA POC) 2+ Negative       Assessment/ Plan:     Diagnoses and all orders for this visit:    1. UTI symptoms  -     CULTURE, URINE  -     AMB POC URINALYSIS DIP STICK AUTO W/O MICRO    2. BMI 35.0-35.9,adult    Other orders  -     azithromycin (ZITHROMAX) 250 mg tablet; Take 1 Tab by mouth See Admin Instructions. Take 2 tabs today and one each day x 4 days  Indications: cystitis         1. UTI symptoms    2. BMI 35.0-35.9,adult        Orders Placed This Encounter    CULTURE, URINE    AMB POC URINALYSIS DIP STICK AUTO W/O MICRO     AMB POC URINALYSIS DIP STICK AUTO W/O    azithromycin (ZITHROMAX) 250 mg tablet     Sig: Take 1 Tab by mouth See Admin Instructions. Take 2 tabs today and one each day x 4 days  Indications: cystitis     Dispense:  6 Tab     Refill:  0         Verbal and written instructions (see AVS) provided.  Patient expresses understanding of diagnosis and treatment plan. Follow-up Disposition:  Return if symptoms worsen or fail to improve.       CRIS Paz

## 2017-11-17 LAB — BACTERIA UR CULT: ABNORMAL

## 2017-12-12 ENCOUNTER — TELEPHONE (OUTPATIENT)
Dept: FAMILY MEDICINE CLINIC | Age: 72
End: 2017-12-12

## 2017-12-12 NOTE — TELEPHONE ENCOUNTER
----- Message from Jodee John sent at 12/12/2017  3:46 PM EST -----  Regarding: Dr. Georgie Bettencourt / Telephone  Contact: [phone]  Pt requested a f/up appt for a UTI

## 2017-12-13 ENCOUNTER — OFFICE VISIT (OUTPATIENT)
Dept: FAMILY MEDICINE CLINIC | Age: 72
End: 2017-12-13

## 2017-12-13 VITALS
RESPIRATION RATE: 18 BRPM | OXYGEN SATURATION: 98 % | DIASTOLIC BLOOD PRESSURE: 60 MMHG | HEART RATE: 68 BPM | SYSTOLIC BLOOD PRESSURE: 144 MMHG | TEMPERATURE: 97.2 F | HEIGHT: 60 IN | WEIGHT: 180 LBS | BODY MASS INDEX: 35.34 KG/M2

## 2017-12-13 DIAGNOSIS — N30.90 CYSTITIS: ICD-10-CM

## 2017-12-13 DIAGNOSIS — R39.9 UTI SYMPTOMS: Primary | ICD-10-CM

## 2017-12-13 LAB
BILIRUB UR QL STRIP: NEGATIVE
GLUCOSE UR-MCNC: NEGATIVE MG/DL
KETONES P FAST UR STRIP-MCNC: NEGATIVE MG/DL
PH UR STRIP: 6 [PH] (ref 4.6–8)
PROT UR QL STRIP: NEGATIVE
SP GR UR STRIP: 1.01 (ref 1–1.03)
UA UROBILINOGEN AMB POC: NORMAL (ref 0.2–1)
URINALYSIS CLARITY POC: CLEAR
URINALYSIS COLOR POC: YELLOW
URINE BLOOD POC: NORMAL
URINE LEUKOCYTES POC: NORMAL
URINE NITRITES POC: NEGATIVE

## 2017-12-13 RX ORDER — DOXYCYCLINE 100 MG/1
100 CAPSULE ORAL 2 TIMES DAILY
Qty: 42 CAP | Refills: 0 | Status: SHIPPED | OUTPATIENT
Start: 2017-12-13 | End: 2018-01-03

## 2017-12-13 NOTE — MR AVS SNAPSHOT
Visit Information Date & Time Provider Department Dept. Phone Encounter #  
 12/13/2017  9:30 AM Joel Diane NP [address] [place] [address] [phone] [id number] Upcoming Health Maintenance Date Due DTaP/Tdap/Td series (1 - Tdap) 6/30/1966 FOBT Q 1 YEAR AGE 50-75 6/30/1994 GLAUCOMA SCREENING Q2Y 6/30/2009 OSTEOPOROSIS SCREENING (DEXA) 6/30/2009 MEDICARE YEARLY EXAM 5/27/2018 Allergies as of 12/13/2017  Review Complete On: 12/13/2017 By: Konrad Barnes RN Severity Noted Reaction Type Reactions Latex High 11/16/2015    Rash Keflex [Cephalexin]  09/30/2013    Rash Levaquin [Levofloxacin]  09/30/2013    Rash Macrobid [Nitrofurantoin Monohyd/m-cryst]  09/30/2013    Rash  
 Sulfa (Sulfonamide Antibiotics)  09/30/2013    Rash Current Immunizations  Reviewed on 11/17/2016 Name Date Influenza High Dose Vaccine PF 10/20/2017, 11/17/2016 Influenza Vaccine 10/24/2014, 10/1/2013  9:36 AM, 10/9/2009 12:00 AM, 10/17/2008 12:00 AM  
 Influenza Vaccine (Quad) 10/20/2015 Pneumococcal Conjugate (PCV-13) 10/20/2017 Pneumococcal Vaccine (Unspecified Type) 11/8/2010 Zoster Vaccine, Live 10/31/2014 Not reviewed this visit You Were Diagnosed With   
  
 Codes Comments UTI symptoms    -  Primary ICD-10-CM: R39.9 ICD-9-CM: 788.99 Cystitis     ICD-10-CM: N30.90 ICD-9-CM: 595.9 Vitals BP Pulse Temp Resp Height(growth percentile) 144/60 (BP 1 Location: Left arm, BP Patient Position: Sitting) 68 97.2 °F (36.2 °C) (Temporal) 18 5' (1.524 m) Weight(growth percentile) SpO2 BMI OB Status Smoking Status 180 lb (81.6 kg) 98% 35.15 kg/m2 Hysterectomy Former Smoker Vitals History BMI and BSA Data Body Mass Index Body Surface Area  
 35.15 kg/m 2 1.86 m 2 Preferred Pharmacy Pharmacy Name Phone [id number], [address] [phone] Your Updated Medication List  
  
   
This list is accurate as of: 12/13/17 10:08 AM.  Always use your most recent med list.  
  
  
  
  
 ADULT MULTIVITAMIN GUMMIES PO Take  by mouth. albuterol 90 mcg/actuation inhaler Commonly known as:  PROVENTIL HFA, VENTOLIN HFA, PROAIR HFA Take 2 Puffs by inhalation. ALEVE -25 mg Tab Generic drug:  naproxen-diphenhydramine Take  by mouth daily. Pt takes 2 tabs  
  
 amLODIPine 10 mg tablet Commonly known as:  Wandra Anay TAKE 1 TAB BY MOUTH DAILY. ANORO ELLIPTA 62.5-25 mcg/actuation inhaler Generic drug:  umeclidinium-vilanterol USE 1 INHALATION ONCE A DAY Arthritis Pain Relief 650 mg Jordon Pipe Generic drug:  acetaminophen Take 650 mg by mouth every eight (8) hours. aspirin 81 mg chewable tablet Take 81 mg by mouth daily. augmented betamethasone dipropionate 0.05 % ointment Commonly known as:  Linda Benes Use BID to lesions on legs and arms  
  
 benzonatate 200 mg capsule Commonly known as:  TESSALON  
TAKE ONE CAPSULE BY MOUTH 3 TIMES A DAY AS NEEDED FOR COUGH FOR UP TO 7 DAYS  
  
 budesonide 3 mg capsule Commonly known as:  ENTOCORT EC Take 3 Caps by mouth every morning. Taking 3 tabs in am  
  
 calcium carbonate 200 mg calcium (500 mg) Chew Commonly known as:  TUMS Take 2 Tabs by mouth two (2) times a day. ciclopirox 0.77 % topical cream  
Commonly known as:  Teresa Arts Apply  to affected area two (2) times a day. clindamycin 150 mg capsule Commonly known as:  CLEOCIN  
TAKE 4 CAPSULES BY MOUTH 1 HOUR BEFORE DENTAL APT. clobetasol 0.05 % ointment Commonly known as:  Rosalia Nestle Apply  to affected area daily. pm  
  
 doxycycline 100 mg capsule Commonly known as:  VIBRAMYCIN Take 1 Cap by mouth two (2) times a day for 21 days. Indications: e coli infection  
  
 fluconazole 150 mg tablet Commonly known as:  DIFLUCAN  
 Take 150 mg by mouth daily. FDA advises cautious prescribing of oral fluconazole in pregnancy. hydroxychloroquine 200 mg tablet Commonly known as:  PLAQUENIL Take 200 mg by mouth two (2) times a day. ibandronate 150 mg tablet Commonly known as:  Ruslan Searing TAKE 1 TABLET BY MOUTH ONCE EVERY 30 DAYS, FOR POST-MENOPAUSAL OSTEOPOROSIS PREVENTION  
  
 levothyroxine 100 mcg tablet Commonly known as:  SYNTHROID  
TAKE 1 TABLET BY MOUTH DAILY BEFORE BREAKFAST. lisinopril 40 mg tablet Commonly known as:  PRINIVIL, ZESTRIL  
TAKE 1 TABLET BY MOUTH DAILY. MYRBETRIQ 25 mg ER tablet Generic drug:  mirabegron ER Take 25 mg by mouth daily. omeprazole 20 mg capsule Commonly known as:  PRILOSEC Take 20 mg by mouth daily. PENTASA 500 mg CR capsule Generic drug:  mesalamine Take 500 mg by mouth two (2) times a day. Two Tablets 4x  daily * tacrolimus 1 mg capsule Commonly known as:  PROGRAF Take  by mouth three (3) times daily. One pill in water/ swish 3x daily * tacrolimus 0.5 mg capsule Commonly known as:  PROGRAF Take  by mouth daily. * tacrolimus 0.1 % ointment Commonly known as:  PROTOPIC Apply  to affected area daily. am  
  
 triamcinolone acetonide 0.1 % topical cream  
Commonly known as:  KENALOG Apply  to affected area two (2) times a day. use thin layer VITAMIN D3 1,000 unit tablet Generic drug:  cholecalciferol Take 1,000 Units by mouth daily. Indications: VITAMIN D DEFICIENCY * Notice: This list has 3 medication(s) that are the same as other medications prescribed for you. Read the directions carefully, and ask your doctor or other care provider to review them with you. Prescriptions Sent to Pharmacy Refills  
 doxycycline (VIBRAMYCIN) 100 mg capsule 0 Sig: Take 1 Cap by mouth two (2) times a day for 21 days. Indications: e coli infection  Class: Normal  
 Pharmacy: [facility]/pharmacy #[id number] [address]  #: [phone] Route: Oral  
  
We Performed the Following AMB POC URINALYSIS DIP STICK AUTO W/O MICRO [93849 CPT(R)] Comments: AMB POC URINALYSIS DIP STICK AUTO W/O  
 CULTURE, URINE [36041 CPT(R)] Introducing [facility] & HEALTH SERVICES! Wei Mckinney introduces Wipebook patient portal. Now you can access parts of your medical record, email your doctor's office, and request medication refills online. 1. In your internet browser, go to [email or web address] 2. Click on the First Time User? Click Here link in the Sign In box. You will see the New Member Sign Up page. 3. Enter your Wipebook Access Code exactly as it appears below. You will not need to use this code after youve completed the sign-up process. If you do not sign up before the expiration date, you must request a new code. · Wipebook Access Code: [email or web address] Expires: 1/18/2018 10:57 AM 
 
4. Enter the last four digits of your Social Security Number (xxxx) and Date of Birth (mm/dd/yyyy) as indicated and click Submit. You will be taken to the next sign-up page. 5. Create a Wipebook ID. This will be your Wipebook login ID and cannot be changed, so think of one that is secure and easy to remember. 6. Create a Wipebook password. You can change your password at any time. 7. Enter your Password Reset Question and Answer. This can be used at a later time if you forget your password. 8. Enter your e-mail address. You will receive e-mail notification when new information is available in [address] 9. Click Sign Up. You can now view and download portions of your medical record. 10. Click the Download Summary menu link to download a portable copy of your medical information. If you have questions, please visit the Frequently Asked Questions section of the Wipebook website.  Remember, Wipebook is NOT to be used for urgent needs. For medical emergencies, dial 911. Now available from your iPhone and Android! Please provide this summary of care documentation to your next provider. Your primary care clinician is listed as Louann Jay. If you have any questions after today's visit, please call [phone].

## 2017-12-15 LAB
BACTERIA UR CULT: ABNORMAL
BACTERIA UR CULT: ABNORMAL

## 2017-12-17 NOTE — PROGRESS NOTES
Subjective: Fredrick Campos is a 67 y.o. female who presents today with the following:  Chief Complaint   Patient presents with    Bladder Infection     frequency   HPI:  Juhi Youssef presents for an acute visit for urinary symptoms. Frequency (6x per night), burning , hematuria. Followed by Dr. Tamar Clark   COMPLIANT WITH MEDICATION:         ROS:  Gen: denies fever, chills, fatigue, weight loss, weight gain  HEENT:denies blurry vision, nasal congestion, sore throat  Resp: denies dypsnea, cough, wheezing  CV: denies chest pain radiating to the jaws or arms, palpitations, lower extremity edema  Abd: denies nausea, vomiting, diarrhea, constipation  Neuro: denies numbness/tingling  Endo: denies polyuria, polydipsia, heat/cold intolerance  Heme: no lymphadenopathy    Allergies   Allergen Reactions    Latex Rash    Keflex [Cephalexin] Rash    Levaquin [Levofloxacin] Rash    Macrobid [Nitrofurantoin Monohyd/M-Cryst] Rash    Sulfa (Sulfonamide Antibiotics) Rash         Current Outpatient Prescriptions:     doxycycline (VIBRAMYCIN) 100 mg capsule, Take 1 Cap by mouth two (2) times a day for 21 days. Indications: e coli infection, Disp: 42 Cap, Rfl: 0    ANORO ELLIPTA 62.5-25 mcg/actuation inhaler, USE 1 INHALATION ONCE A DAY, Disp: , Rfl: 5    albuterol (PROVENTIL HFA, VENTOLIN HFA, PROAIR HFA) 90 mcg/actuation inhaler, Take 2 Puffs by inhalation. , Disp: , Rfl:     benzonatate (TESSALON) 200 mg capsule, TAKE ONE CAPSULE BY MOUTH 3 TIMES A DAY AS NEEDED FOR COUGH FOR UP TO 7 DAYS, Disp: , Rfl: 1    amLODIPine (NORVASC) 10 mg tablet, TAKE 1 TAB BY MOUTH DAILY. , Disp: 90 Tab, Rfl: 1    levothyroxine (SYNTHROID) 100 mcg tablet, TAKE 1 TABLET BY MOUTH DAILY BEFORE BREAKFAST., Disp: 90 Tab, Rfl: 1    ciclopirox (LOPROX) 0.77 % topical cream, Apply  to affected area two (2) times a day., Disp: , Rfl:     triamcinolone acetonide (KENALOG) 0.1 % topical cream, Apply  to affected area two (2) times a day.  use thin layer, Disp: , Rfl:     lisinopril (PRINIVIL, ZESTRIL) 40 mg tablet, TAKE 1 TABLET BY MOUTH DAILY. , Disp: 90 Tab, Rfl: 1    tacrolimus (PROGRAF) 1 mg capsule, Take  by mouth three (3) times daily. One pill in water/ swish 3x daily, Disp: , Rfl:     tacrolimus (PROGRAF) 0.5 mg capsule, Take  by mouth daily. , Disp: , Rfl:     tacrolimus (PROTOPIC) 0.1 % ointment, Apply  to affected area daily. am, Disp: , Rfl:     clobetasol (TEMOVATE) 0.05 % ointment, Apply  to affected area daily. pm, Disp: , Rfl:     fluconazole (DIFLUCAN) 150 mg tablet, Take 150 mg by mouth daily. FDA advises cautious prescribing of oral fluconazole in pregnancy. , Disp: , Rfl:     hydroxychloroquine (PLAQUENIL) 200 mg tablet, Take 200 mg by mouth two (2) times a day., Disp: , Rfl:     budesonide (ENTOCORT EC) 3 mg capsule, Take 3 Caps by mouth every morning. Taking 3 tabs in am, Disp: 270 Cap, Rfl: 1    ibandronate (BONIVA) 150 mg tablet, TAKE 1 TABLET BY MOUTH ONCE EVERY 30 DAYS, FOR POST-MENOPAUSAL OSTEOPOROSIS PREVENTION, Disp: 12 Tab, Rfl: 1    augmented betamethasone dipropionate (DIPROLENE-AF) 0.05 % ointment, Use BID to lesions on legs and arms, Disp: 45 g, Rfl: 1    calcium carbonate (TUMS) 200 mg calcium (500 mg) chew, Take 2 Tabs by mouth two (2) times a day., Disp: , Rfl:     acetaminophen (ARTHRITIS PAIN RELIEF) 650 mg CR tablet, Take 650 mg by mouth every eight (8) hours. , Disp: , Rfl:     naproxen-diphenhydramine (ALEVE PM) 220-25 mg tab, Take  by mouth daily. Pt takes 2 tabs, Disp: , Rfl:     aspirin 81 mg chewable tablet, Take 81 mg by mouth daily. , Disp: , Rfl:     FOLIC ACID/MULTIVIT-MINERALS (ADULT MULTIVITAMIN GUMMIES PO), Take  by mouth., Disp: , Rfl:     clindamycin (CLEOCIN) 150 mg capsule, TAKE 4 CAPSULES BY MOUTH 1 HOUR BEFORE DENTAL APT., Disp: 16 capsule, Rfl: 0    mesalamine (PENTASA) 500 mg CR capsule, Take 500 mg by mouth two (2) times a day.  Two Tablets 4x  daily, Disp: , Rfl:     mirabegron (MYRBETRIQ) 25 mg Tb24, Take 25 mg by mouth daily. , Disp: , Rfl:     omeprazole (PRILOSEC) 20 mg capsule, Take 20 mg by mouth daily. , Disp: , Rfl:     cholecalciferol (VITAMIN D3) 1,000 unit tablet, Take 1,000 Units by mouth daily.  Indications: VITAMIN D DEFICIENCY, Disp: , Rfl:     Past Medical History:   Diagnosis Date    Allergic rhinitis     COPD (chronic obstructive pulmonary disease) (HCC)     Hypertension     Hypothyroidism     Lichen planus     seen at [facility], unspecified whether generalized or localized, unspecified site     Overactive bladder     Phlebitis and thrombophlebitis of lower extremities, unspecified     Raynaud's syndrome        Past Surgical History:   Procedure Laterality Date    HX APPENDECTOMY      HX GYN      HYSTERECTOMY//BTL    HX HEENT      TONSILLECTOMY    HX HYSTERECTOMY      HX OOPHORECTOMY      HX UROLOGICAL      BLADDER SLING       History   Smoking Status    Former Smoker    Types: Cigarettes   Smokeless Tobacco    Never Used       Social History     Social History    Marital status:      Spouse name: N/A    Number of children: N/A    Years of education: N/A     Social History Main Topics    Smoking status: Former Smoker     Types: Cigarettes    Smokeless tobacco: Never Used    Alcohol use No    Drug use: No    Sexual activity: No     Other Topics Concern     Service No    Blood Transfusions No    Caffeine Concern No    Occupational Exposure No    Hobby Hazards No    Sleep Concern No    Stress Concern No    Weight Concern No    Special Diet No    Back Care No    Exercise Yes    Bike Helmet No    Seat Belt Yes    Self-Exams No     Social History Narrative       Family History   Problem Relation Age of Onset    Stroke Mother     Dementia Mother     Cancer Father      BRAIN TUMOR         Objective:     Visit Vitals    /60 (BP 1 Location: Left arm, BP Patient Position: Sitting)    Pulse 68    Temp 97.2 °F (36.2 °C) (Temporal)    Resp 18    Ht 5' (1.524 m)    Wt 180 lb (81.6 kg)    SpO2 98%    BMI 35.15 kg/m2     Body mass index is 35.15 kg/(m^2). General: Alert and oriented. No acute distress. Well nourished  HENT :  Eyes: pupils equal, round, react to light and accommodation. Extra ocular movements intact. Nose: patent. Mouth and throat is clear. Neck:supple full range of motion no thyromegaly. Trachea midline, No carotid bruits. No significant lymphadenopathy  Lungs[de-identified] clear to auscultation without wheezes, rales, or rhonchi. Heart :RRR, S1 & S2 are normal intensity. No murmur; no gallop  Abdomen: bowel sounds active. No tenderness, guarding, rebound, masses, hepatic or spleen enlargement  Back: no CVA tenderness. Extremities: without clubbing, cyanosis, or edema  Pulses: radial and femoral pulses are normal  Neuro: HMF intact. Cranial nerves II through XII grossly normal.         Results for orders placed or performed in visit on 12/13/17   CULTURE, URINE   Result Value Ref Range    Urine Culture, Routine (A)      Escherichia coli  Identified by an automated biochemical system.       Urine Culture, Routine       Mixed urogenital raquel  Less than 10,000 colonies/mL         Susceptibility    Escherichia coli -  (no method available)*     Amoxicillin/Clavulanic A S Susceptible ug/mL     Ampicillin ($) S Susceptible ug/mL     Cefepime ($$) S Susceptible ug/mL     Ceftriaxone ($) S Susceptible ug/mL     Cefuroxime ($) S Susceptible ug/mL     Cephalothin S Susceptible ug/mL     Ciprofloxacin ($) S Susceptible ug/mL     Ertapenem ($$$$) S Susceptible ug/mL     Gentamicin ($) S Susceptible ug/mL     Imipenem S Susceptible ug/mL     Levofloxacin ($) S Susceptible ug/mL     Nitrofurantoin S Susceptible ug/mL     Piperacillin S Susceptible ug/mL     Tetracycline S Susceptible ug/mL     Tobramycin ($) S Susceptible ug/mL     Trimeth-Sulfamethoxa R Resistant ug/mL     * Performed at:  [address] [place] [address] [address], [address] [facility]  [id number] Director: Mo Zendejas MD, Phone:  [phone]   AMB POC URINALYSIS DIP STICK AUTO W/O MICRO   Result Value Ref Range    Color (UA POC) Yellow     Clarity (UA POC) Clear     Glucose (UA POC) Negative Negative    Bilirubin (UA POC) Negative Negative    Ketones (UA POC) Negative Negative    Specific gravity (UA POC) 1.015 1.001 - 1.035    Blood (UA POC) Trace Negative    pH (UA POC) 6.0 4.6 - 8.0    Protein (UA POC) Negative Negative    Urobilinogen (UA POC) 0.2 mg/dL 0.2 - 1    Nitrites (UA POC) Negative Negative    Leukocyte esterase (UA POC) 1+ Negative       Results for orders placed or performed in visit on 12/13/17   CULTURE, URINE   Result Value Ref Range    Urine Culture, Routine (A)      Escherichia coli  Identified by an automated biochemical system.       Urine Culture, Routine       Mixed urogenital raquel  Less than 10,000 colonies/mL         Susceptibility    Escherichia coli -  (no method available)*     Amoxicillin/Clavulanic A S Susceptible ug/mL     Ampicillin ($) S Susceptible ug/mL     Cefepime ($$) S Susceptible ug/mL     Ceftriaxone ($) S Susceptible ug/mL     Cefuroxime ($) S Susceptible ug/mL     Cephalothin S Susceptible ug/mL     Ciprofloxacin ($) S Susceptible ug/mL     Ertapenem ($$$$) S Susceptible ug/mL     Gentamicin ($) S Susceptible ug/mL     Imipenem S Susceptible ug/mL     Levofloxacin ($) S Susceptible ug/mL     Nitrofurantoin S Susceptible ug/mL     Piperacillin S Susceptible ug/mL     Tetracycline S Susceptible ug/mL     Tobramycin ($) S Susceptible ug/mL     Trimeth-Sulfamethoxa R Resistant ug/mL     * Performed at:  [address] [address] [facility]  [id number] Director: Mo Zendejas MD, Phone:  [phone]    Narrative    Performed at:  [address] [place] [address], [address] [facility]  [id number]  : Mo Zendejas MD, Phone:  [phone]   AMB POC URINALYSIS DIP STICK AUTO W/O MICRO   Result Value Ref Range    Color (UA POC) Yellow     Clarity (UA POC) Clear     Glucose (UA POC) Negative Negative    Bilirubin (UA POC) Negative Negative    Ketones (UA POC) Negative Negative    Specific gravity (UA POC) 1.015 1.001 - 1.035    Blood (UA POC) Trace Negative    pH (UA POC) 6.0 4.6 - 8.0    Protein (UA POC) Negative Negative    Urobilinogen (UA POC) 0.2 mg/dL 0.2 - 1    Nitrites (UA POC) Negative Negative    Leukocyte esterase (UA POC) 1+ Negative       Assessment/ Plan:     Diagnoses and all orders for this visit:    1. UTI symptoms  -     AMB POC URINALYSIS DIP STICK AUTO W/O MICRO  -     CULTURE, URINE    2. Cystitis    Other orders  -     doxycycline (VIBRAMYCIN) 100 mg capsule; Take 1 Cap by mouth two (2) times a day for 21 days. Indications: e coli infection         1. UTI symptoms    2. Cystitis        Orders Placed This Encounter    CULTURE, URINE    AMB POC URINALYSIS DIP STICK AUTO W/O MICRO     AMB POC URINALYSIS DIP STICK AUTO W/O    doxycycline (VIBRAMYCIN) 100 mg capsule     Sig: Take 1 Cap by mouth two (2) times a day for 21 days. Indications: e coli infection     Dispense:  42 Cap     Refill:  0     Continue antibiotics until appointment in early Jan with urologist    Verbal and written instructions (see AVS) provided.  Patient expresses understanding of diagnosis and treatment plan. Follow-up Disposition:  Return if symptoms worsen or fail to improve.       CRIS Isbell

## 2018-01-25 RX ORDER — LISINOPRIL 40 MG/1
TABLET ORAL
Qty: 90 TAB | Refills: 1 | Status: SHIPPED | OUTPATIENT
Start: 2018-01-25 | End: 2018-07-30 | Stop reason: SDUPTHER

## 2018-02-07 RX ORDER — AMLODIPINE BESYLATE 10 MG/1
TABLET ORAL
Qty: 90 TAB | Refills: 1 | Status: SHIPPED | OUTPATIENT
Start: 2018-02-07 | End: 2018-06-18 | Stop reason: SDUPTHER

## 2018-02-10 RX ORDER — LEVOTHYROXINE SODIUM 100 UG/1
TABLET ORAL
Qty: 90 TAB | Refills: 1 | Status: SHIPPED | OUTPATIENT
Start: 2018-02-10 | End: 2018-06-18 | Stop reason: SDUPTHER

## 2018-02-22 DIAGNOSIS — Z20.828 EXPOSURE TO THE FLU: Primary | ICD-10-CM

## 2018-02-22 RX ORDER — OSELTAMIVIR PHOSPHATE 75 MG/1
75 CAPSULE ORAL DAILY
Qty: 10 CAP | Refills: 0 | Status: SHIPPED | OUTPATIENT
Start: 2018-02-22 | End: 2018-03-01

## 2018-03-01 ENCOUNTER — OFFICE VISIT (OUTPATIENT)
Dept: RHEUMATOLOGY | Age: 73
End: 2018-03-01

## 2018-03-01 VITALS
DIASTOLIC BLOOD PRESSURE: 72 MMHG | TEMPERATURE: 97.7 F | BODY MASS INDEX: 34.36 KG/M2 | WEIGHT: 175 LBS | RESPIRATION RATE: 18 BRPM | HEART RATE: 65 BPM | SYSTOLIC BLOOD PRESSURE: 146 MMHG | HEIGHT: 60 IN

## 2018-03-01 DIAGNOSIS — Z78.0 POST-MENOPAUSAL: ICD-10-CM

## 2018-03-01 DIAGNOSIS — M85.80 OSTEOPENIA WITH HIGH RISK OF FRACTURE: Primary | ICD-10-CM

## 2018-03-01 DIAGNOSIS — M19.041 PRIMARY OSTEOARTHRITIS OF RIGHT HAND: ICD-10-CM

## 2018-03-01 DIAGNOSIS — M89.9 DISORDER OF BONE: ICD-10-CM

## 2018-03-01 RX ORDER — IBANDRONATE SODIUM 150 MG/1
150 TABLET, FILM COATED ORAL
Qty: 12 TAB | Refills: 6 | Status: SHIPPED | OUTPATIENT
Start: 2018-03-01 | End: 2018-05-25 | Stop reason: SDUPTHER

## 2018-03-01 NOTE — PROGRESS NOTES
REASON FOR VISIT    This is the a follow up visit for Ms. Giana Tony for High Risk Osteopenia. Osteoppenia Historical Synopsis    Height loss since age 26 (at least two inches): 2 inches  Fracture history includes: distal legs/ankle  Family history of hip fracture: none  Fall Risk: no    Daily calcium intake is 1200 mg  Daily vitamin D intake is 1000 IU     Smoking history: 10 years duration (quit 40 years ago)  Alcohol consumption: none  Prednisone history: none    Exercise: yes    Previous work-up for osteoporosis includes the following:  DEXA Scan: 9/02/2015  Vitamin 25OH D level: 40.5 (11/16/2015)  PTH: None  TSH:0.347 (7/06/2016)    Therapy History includes:    Current osteoporosis therapy includes: Boniva (9/2015 to present)  Prior osteoporosis therapy includes: none  The following osteoporosis therapy have been ineffective: none  The following osteoporosis therapy were stopped because of side effects: none    HISTORY OF PRESENT ILLNESS      Ms. Andres Callahan presents for follow up. On her last visit, I continued Boniva. Her last dose was 2/15/2018. She reports an interval fall but no fractures. She had an interval diagnosis of colitis and oral lichen planus. REVIEW OF SYSTEMS    A comprehensive review of systems was performed and pertinent results are documented in the HPI, review of systems is otherwise non-contributory. PAST MEDICAL HISTORY    She has a past medical history of Allergic rhinitis; COPD (chronic obstructive pulmonary disease) ([address] Hypertension; Hypothyroidism; Lichen planus; Osteoarthrosis, unspecified whether generalized or localized, unspecified site; Overactive bladder; Phlebitis and thrombophlebitis of lower extremities, unspecified; and Raynaud's syndrome. FAMILY HISTORY    Her family history includes Cancer in her father; Dementia in her mother; Stroke in her mother. SOCIAL HISTORY    She reports that she has quit smoking.  Her smoking use included Cigarettes. She has never used smokeless tobacco. She reports that she does not drink alcohol or use illicit drugs. MEDICATIONS    Current Outpatient Prescriptions   Medication Sig Dispense Refill    ibandronate (BONIVA) 150 mg tablet Take 150 mg by mouth every thirty (30) days for 90 days. 12 Tab 6    levothyroxine (SYNTHROID) 100 mcg tablet TAKE 1 TABLET BY MOUTH DAILY BEFORE BREAKFAST. 90 Tab 1    amLODIPine (NORVASC) 10 mg tablet TAKE 1 TABLET BY MOUTH DAILY. 90 Tab 1    lisinopril (PRINIVIL, ZESTRIL) 40 mg tablet TAKE 1 TABLET BY MOUTH EVERY DAY 90 Tab 1    ANORO ELLIPTA 62.5-25 mcg/actuation inhaler USE 1 INHALATION ONCE A DAY  5    albuterol (PROVENTIL HFA, VENTOLIN HFA, PROAIR HFA) 90 mcg/actuation inhaler Take 2 Puffs by inhalation.  benzonatate (TESSALON) 200 mg capsule TAKE ONE CAPSULE BY MOUTH 3 TIMES A DAY AS NEEDED FOR COUGH FOR UP TO 7 DAYS  1    ciclopirox (LOPROX) 0.77 % topical cream Apply  to affected area two (2) times a day.  triamcinolone acetonide (KENALOG) 0.1 % topical cream Apply  to affected area two (2) times a day. use thin layer      tacrolimus (PROGRAF) 1 mg capsule Take  by mouth three (3) times daily. One pill in water/ swish 3x daily      tacrolimus (PROTOPIC) 0.1 % ointment Apply  to affected area daily. am      clobetasol (TEMOVATE) 0.05 % ointment Apply  to affected area daily. pm      fluconazole (DIFLUCAN) 150 mg tablet Take 150 mg by mouth daily. FDA advises cautious prescribing of oral fluconazole in pregnancy.  hydroxychloroquine (PLAQUENIL) 200 mg tablet Take 200 mg by mouth two (2) times a day.  budesonide (ENTOCORT EC) 3 mg capsule Take 3 Caps by mouth every morning. Taking 3 tabs in am 270 Cap 1    augmented betamethasone dipropionate (DIPROLENE-AF) 0.05 % ointment Use BID to lesions on legs and arms 45 g 1    calcium carbonate (TUMS) 200 mg calcium (500 mg) chew Take 2 Tabs by mouth two (2) times a day.       acetaminophen (ARTHRITIS PAIN RELIEF) 650 mg CR tablet Take 650 mg by mouth every eight (8) hours.  naproxen-diphenhydramine (ALEVE PM) 220-25 mg tab Take  by mouth daily. Pt takes 2 tabs      aspirin 81 mg chewable tablet Take 81 mg by mouth daily.  FOLIC ACID/MULTIVIT-MINERALS (ADULT MULTIVITAMIN GUMMIES PO) Take  by mouth.  mesalamine (PENTASA) 500 mg CR capsule Take 500 mg by mouth two (2) times a day. Two Tablets 4x  daily      mirabegron (MYRBETRIQ) 25 mg Tb24 Take 25 mg by mouth daily.  omeprazole (PRILOSEC) 20 mg capsule Take 20 mg by mouth daily.  cholecalciferol (VITAMIN D3) 1,000 unit tablet Take 1,000 Units by mouth daily. Indications: VITAMIN D DEFICIENCY         ALLERGIES    Allergies   Allergen Reactions    Latex Rash    Keflex [Cephalexin] Rash    Levaquin [Levofloxacin] Rash    Macrobid [Nitrofurantoin Monohyd/M-Cryst] Rash    Sulfa (Sulfonamide Antibiotics) Rash       PHYSICAL EXAMINATION    Visit Vitals    /72 (BP 1 Location: Left arm, BP Patient Position: Sitting)    Pulse 65    Temp 97.7 °F (36.5 °C)    Resp 18    Ht 5' (1.524 m)    Wt 175 lb (79.4 kg)    BMI 34.18 kg/m2     Body mass index is 34.18 kg/(m^2). General: Patient is alert, oriented x 3, not in acute distress    HEENT:   Conjunctiva are not injected and appear moist, oral mucous membranes are moist, there are no ulcers present, there is no alopecia, neck is supple, there is no lymphadenopathy. Salivary glands are normal    Cardiovascular:  Heart is regular rate and rhythm, no murmurs. Chest:  Lungs are clear to auscultation bilaterally. Abdomen:  Soft, non-tender. Extremities:  Free of clubbing, cyanosis, edema, extremities well perfused. Neurological exam:  No focal sensory deficits, muscle strength is full in upper and lower extremities     Skin exam:  There are no rashes, no tophi, no psoriasis, no active Raynaud's, no livedo reticularis, no periungual erythema.     Musculoskeletal exam:  A comprehensive musculoskeletal exam was performed for all joints of each upper and lower extremity and assessed for swelling, tenderness and range of motion. No synovitis. DATA REVIEW    Laboratory     Recent laboratory results were reviewed, summarized, and discussed with the patient. Imaging    Musculoskeletal Ultrasound    None    Radiographs    Thoracic Spine 11/16/2015: No thoracic malalignment. Mild multilevel degenerative disc disease without evidence for acute fracture.      CT Imaging    None    MR Imaging    None    DXA     DXA 9/02/2015:  lumbar spine L1-L4 T score 0.9 (BMD 1.149 g/cm2),  Left femoral neck T score: -1.0 (0.741 g/cm2) and right femoral neck T score -1.1 (0.727g/cm2). ASSESSMENT AND PLAN    1) Osteopenia with High Risk. Her most recent DXA scan showed a t-score of -1.1 in the femoral neck while on no therapy. She has been on Boniva since 9/2015. She reports an interval fall but no fractures. The patient has kyphosis and a T-score between -1.0 and -2.5, advanced age, previous fracture, history of cigarette smoking. She has been on Boniva for 27 months. Rosalina Left al reported that the risk of atypical femoral fractures is low, with an incidence of up to 50 per 100,000 person-years during the first 5 years of bisphosphonate use, resulting in a clear positive benefit/risk ratio within this time frame (J Bone  Res. 2016 Jan;31(1):16-35). Therefore, if she requires treatment for high risk osteopenia or osteoporosis, after 5 years of Boniva, she should be started on Prolia unless she fractures, and then I would recommend Forteo (24 months) or Tylmos (18 months). No more bipshosphonates after 5 years of total use. She is due for a repeat DXA. I gave her an order for a DXA and future lab orders.  She lives 2 hours away so has a round trip of at least 4 hours, which I explained to her is too far for her sake just for an office visit for osteoporosis management and that I am confident that her PCP can continue her osteopenia/osteoporosis management. 2) Bilateral Hand Osteoarthritis. This was not an active issue today. 3) Proton Pump Inhibitors. We avoid proton pump inhibitors, such as omeprazole, due to the FDA warning for atypical fractures, osteoporosis, increased risk for Clostridium difficile associated diarrhea and hypomagnesaemia. They have also been linked to increased cardiovascular disease by increasing plasma levels of asymmetrical dimethylarginine which inhibits generation of vascular nitric oxide (NO), and thus increase platelet interaction with the vessel wall (Filippo SANCHEZ et al. Circulation. 2013 Aug 20;128(8):845-53). I spent at least 15 minutes in a face to face encouter with the patient. All questions asked and answered. The patient voiced understanding of the aforementioned assessment and plan. Summary of plan was provided in the After Visit Summary patient instructions. TODAY'S ORDERS    Orders Placed This Encounter    DEXA BONE DENSITY STUDY AXIAL    PROTEIN ELECTROPHORESIS W/ REFLX LU    PTH INTACT    TSH REFLEX TO T4    VITAMIN D, 25 HYDROXY    ibandronate (BONIVA) 150 mg tablet       No future appointments.     Kilo Bernard MD, [address]    Adult Rheumatology   Musculoskeletal Ultrasound Certified  [address] [place] [address], [place], [address] [place] [address]   Phone [phone]  Fax [phone]

## 2018-03-01 NOTE — PATIENT INSTRUCTIONS
If after 5 years of Boniva, you continue to have a bone density scan showing high risk osteopenia or osteoporosis, then I recommend you start taking Prolia injections which are every 6 months injections. No more than 5 years on any bisphosphosphonate medication (Fosamax, Actonel, Boniva, Reclast). Kylie Echevarria al reported that the risk of atypical femoral fractures is low, with an incidence of up to 50 per 100,000 person-years during the first 5 years of bisphosphonate use, resulting in a clear positive benefit/risk ratio within this time frame (J Bone [place] Res. 2016 Jan;31(1):16-35). Therefore, after years, you need to be on Prolia.      If you fracture, you will need to be on either Forteo (24 months) or Tymlos (18 months) daily injections

## 2018-03-01 NOTE — MR AVS SNAPSHOT
[address] 
[phone] Patient: Lydia Zapata MRN: [id number] Visit Information Date & Time Provider Department Dept. Phone Encounter #  
 3/1/2018  3:40 PM Ignacio Schwartz, [address] [id number] Upcoming Health Maintenance Date Due DTaP/Tdap/Td series (1 - Tdap) 6/30/1965 FOBT Q 1 YEAR AGE 50-75 6/30/1994 GLAUCOMA SCREENING Q2Y 6/30/2009 OSTEOPOROSIS SCREENING (DEXA) 6/30/2009 MEDICARE YEARLY EXAM 5/27/2018 BREAST CANCER SCRN MAMMOGRAM 4/20/2019 Allergies as of 3/1/2018  Review Complete On: 3/1/2018 By: Natasha Ortega RN Severity Noted Reaction Type Reactions Latex High 11/16/2015    Rash Keflex [Cephalexin]  09/30/2013    Rash Levaquin [Levofloxacin]  09/30/2013    Rash Macrobid [Nitrofurantoin Monohyd/m-cryst]  09/30/2013    Rash  
 Sulfa (Sulfonamide Antibiotics)  09/30/2013    Rash Current Immunizations  Reviewed on 11/17/2016 Name Date Influenza High Dose Vaccine PF 10/20/2017, 11/17/2016 Influenza Vaccine 10/24/2014, 10/1/2013  9:36 AM, 10/9/2009 12:00 AM, 10/17/2008 12:00 AM  
 Influenza Vaccine (Quad) 10/20/2015 Pneumococcal Conjugate (PCV-13) 10/20/2017 Pneumococcal Vaccine (Unspecified Type) 11/8/2010 Zoster Vaccine, Live 10/31/2014 Not reviewed this visit You Were Diagnosed With   
  
 Codes Comments Osteopenia, unspecified location    -  Primary ICD-10-CM: M85.80 ICD-9-CM: 733.90 Post-menopausal     ICD-10-CM: Z78.0 ICD-9-CM: V49.81 Other osteoporosis without current pathological fracture     ICD-10-CM: M81.8 ICD-9-CM: 733.09 Disorder of bone     ICD-10-CM: M89.9 ICD-9-CM: 733.90 Vitals BP Pulse Temp Resp Height(growth percentile) Weight(growth percentile) 146/72 (BP 1 Location: Left arm, BP Patient Position: Sitting) 65 97.7 °F (36.5 °C) 18 5' (1.524 m) 175 lb (79.4 kg) BMI OB Status Smoking Status 34.18 kg/m2 Hysterectomy Former Smoker BMI and BSA Data Body Mass Index Body Surface Area  
 34.18 kg/m 2 1.83 m 2 Preferred Pharmacy Pharmacy Name Phone [facility]/PHARMACY #[id number], [address] [facility] [address] [place] [phone] Your Updated Medication List  
  
   
This list is accurate as of 3/1/18  4:01 PM.  Always use your most recent med list.  
  
  
  
  
 ADULT MULTIVITAMIN GUMMIES PO Take  by mouth. albuterol 90 mcg/actuation inhaler Commonly known as:  PROVENTIL HFA, VENTOLIN HFA, PROAIR HFA Take 2 Puffs by inhalation. ALEVE -25 mg Tab Generic drug:  naproxen-diphenhydramine Take  by mouth daily. Pt takes 2 tabs  
  
 amLODIPine 10 mg tablet Commonly known as:  Larayne Diana TAKE 1 TABLET BY MOUTH DAILY. ANORO ELLIPTA 62.5-25 mcg/actuation inhaler Generic drug:  umeclidinium-vilanterol USE 1 INHALATION ONCE A DAY Arthritis Pain Relief 650 mg Dee Cos Generic drug:  acetaminophen Take 650 mg by mouth every eight (8) hours. aspirin 81 mg chewable tablet Take 81 mg by mouth daily. augmented betamethasone dipropionate 0.05 % ointment Commonly known as:  Efrain Race Use BID to lesions on legs and arms  
  
 benzonatate 200 mg capsule Commonly known as:  TESSALON  
TAKE ONE CAPSULE BY MOUTH 3 TIMES A DAY AS NEEDED FOR COUGH FOR UP TO 7 DAYS  
  
 budesonide 3 mg capsule Commonly known as:  ENTOCORT EC Take 3 Caps by mouth every morning. Taking 3 tabs in am  
  
 calcium carbonate 200 mg calcium (500 mg) Chew Commonly known as:  TUMS Take 2 Tabs by mouth two (2) times a day. ciclopirox 0.77 % topical cream  
Commonly known as:  Leim Castleman Apply  to affected area two (2) times a day. clobetasol 0.05 % ointment Commonly known as:  Somis Sand Apply  to affected area daily. pm  
  
 fluconazole 150 mg tablet Commonly known as:  DIFLUCAN Take 150 mg by mouth daily. FDA advises cautious prescribing of oral fluconazole in pregnancy. hydroxychloroquine 200 mg tablet Commonly known as:  PLAQUENIL Take 200 mg by mouth two (2) times a day. ibandronate 150 mg tablet Commonly known as:  Kari Agreste Take 150 mg by mouth every thirty (30) days for 90 days. levothyroxine 100 mcg tablet Commonly known as:  SYNTHROID  
TAKE 1 TABLET BY MOUTH DAILY BEFORE BREAKFAST. lisinopril 40 mg tablet Commonly known as:  PRINIVIL, ZESTRIL  
TAKE 1 TABLET BY MOUTH EVERY DAY  
  
 MYRBETRIQ 25 mg ER tablet Generic drug:  mirabegron ER Take 25 mg by mouth daily. omeprazole 20 mg capsule Commonly known as:  PRILOSEC Take 20 mg by mouth daily. PENTASA 500 mg CR capsule Generic drug:  mesalamine Take 500 mg by mouth two (2) times a day. Two Tablets 4x  daily * tacrolimus 1 mg capsule Commonly known as:  PROGRAF Take  by mouth three (3) times daily. One pill in water/ swish 3x daily * tacrolimus 0.1 % ointment Commonly known as:  PROTOPIC Apply  to affected area daily. am  
  
 triamcinolone acetonide 0.1 % topical cream  
Commonly known as:  KENALOG Apply  to affected area two (2) times a day. use thin layer VITAMIN D3 1,000 unit tablet Generic drug:  cholecalciferol Take 1,000 Units by mouth daily. Indications: VITAMIN D DEFICIENCY * Notice: This list has 2 medication(s) that are the same as other medications prescribed for you. Read the directions carefully, and ask your doctor or other care provider to review them with you. Prescriptions Sent to Pharmacy Refills  
 ibandronate (BONIVA) 150 mg tablet 6 Sig: Take 150 mg by mouth every thirty (30) days for 90 days.   
 Class: Normal  
 Pharmacy: [facility]/pharmacy #[id number] [address] Ph #: [phone] Route: Oral  
  
To-Do List   
 03/01/2018 Imaging:  DEXA BONE DENSITY STUDY AXIAL   
  
 03/02/2018 Lab:  PROTEIN ELECTROPHORESIS W/ REFLX LU   
  
 03/02/2018 Lab:  PTH INTACT   
  
 03/02/2018 Lab:  TSH REFLEX TO T4   
  
 03/02/2018 Lab:  VITAMIN D, 25 HYDROXY Patient Instructions If after 5 years of Boniva, you continue to have a bone density scan showing high risk osteopenia or osteoporosis, then I recommend you start taking Prolia injections which are every 6 months injections. No more than 5 years on any bisphosphosphonate medication (Fosamax, Actonel, Boniva, Reclast). Saad Willson al reported that the risk of atypical femoral fractures is low, with an incidence of up to 50 per 100,000 person-years during the first 5 years of bisphosphonate use, resulting in a clear positive benefit/risk ratio within this time frame (J Bone  Res. 2016 Jan;31(1):16-35). Therefore, after years, you need to be on Prolia. If you fracture, you will need to be on either Forteo (24 months) or Tymlos (18 months) daily injections Introducing \[facility]\"" & HEALTH SERVICES! [facility] introduces [employer] patient portal. Now you can access parts of your medical record, email your doctor's office, and request medication refills online. 1. In your internet browser, go to [email or web address] 2. Click on the First Time User? Click Here link in the Sign In box. You will see the New Member Sign Up page. 3. Enter your Z[employer]ie Access Code exactly as it appears below. You will not need to use this code after youve completed the sign-up process. If you do not sign up before the expiration date, you must request a new code. · [employer] Access Code: [email or web address] Expires: 5/30/2018  3:57 PM 
 
4.  Enter the last four digits of your Social Security Number (xxxx) and Date of Birth (mm/dd/yyyy) as indicated and click Submit. You will be taken to the next sign-up page. 5. Create a frents ID. This will be your frents login ID and cannot be changed, so think of one that is secure and easy to remember. 6. Create a frents password. You can change your password at any time. 7. Enter your Password Reset Question and Answer. This can be used at a later time if you forget your password. 8. Enter your e-mail address. You will receive e-mail notification when new information is available in [address] 9. Click Sign Up. You can now view and download portions of your medical record. 10. Click the Download Summary menu link to download a portable copy of your medical information. If you have questions, please visit the Frequently Asked Questions section of the frents website. Remember, frents is NOT to be used for urgent needs. For medical emergencies, dial 911. Now available from your iPhone and Android! Please provide this summary of care documentation to your next provider. Your primary care clinician is listed as Karely Lora. If you have any questions after today's visit, please call [phone].

## 2018-03-12 ENCOUNTER — DOCUMENTATION ONLY (OUTPATIENT)
Dept: RHEUMATOLOGY | Age: 73
End: 2018-03-12

## 2018-05-03 ENCOUNTER — LAB ONLY (OUTPATIENT)
Dept: FAMILY MEDICINE CLINIC | Age: 73
End: 2018-05-03

## 2018-05-03 DIAGNOSIS — L43.8 ORAL LICHEN PLANUS: Primary | ICD-10-CM

## 2018-05-04 LAB — HCV AB S/CO SERPL IA: <0.1 S/CO RATIO (ref 0–0.9)

## 2018-05-25 DIAGNOSIS — M85.80 OSTEOPENIA WITH HIGH RISK OF FRACTURE: ICD-10-CM

## 2018-05-25 RX ORDER — IBANDRONATE SODIUM 150 MG/1
150 TABLET, FILM COATED ORAL
Qty: 12 TAB | Refills: 6 | Status: SHIPPED | OUTPATIENT
Start: 2018-05-25 | End: 2018-08-23

## 2018-06-18 ENCOUNTER — OFFICE VISIT (OUTPATIENT)
Dept: FAMILY MEDICINE CLINIC | Age: 73
End: 2018-06-18

## 2018-06-18 VITALS
WEIGHT: 181.6 LBS | DIASTOLIC BLOOD PRESSURE: 60 MMHG | SYSTOLIC BLOOD PRESSURE: 144 MMHG | HEIGHT: 59 IN | BODY MASS INDEX: 36.61 KG/M2 | TEMPERATURE: 97.4 F | RESPIRATION RATE: 26 BRPM | HEART RATE: 68 BPM | OXYGEN SATURATION: 100 %

## 2018-06-18 DIAGNOSIS — M85.80 OSTEOPENIA, UNSPECIFIED LOCATION: ICD-10-CM

## 2018-06-18 DIAGNOSIS — Z23 ENCOUNTER FOR IMMUNIZATION: ICD-10-CM

## 2018-06-18 DIAGNOSIS — I10 ESSENTIAL HYPERTENSION: Primary | ICD-10-CM

## 2018-06-18 DIAGNOSIS — E03.9 ACQUIRED HYPOTHYROIDISM: ICD-10-CM

## 2018-06-18 PROBLEM — E66.01 SEVERE OBESITY (BMI 35.0-39.9): Status: ACTIVE | Noted: 2018-06-18

## 2018-06-18 RX ORDER — AMLODIPINE BESYLATE 10 MG/1
TABLET ORAL
Qty: 90 TAB | Refills: 1 | Status: SHIPPED | OUTPATIENT
Start: 2018-06-18 | End: 2019-02-02 | Stop reason: SDUPTHER

## 2018-06-18 RX ORDER — LEVOTHYROXINE SODIUM 100 UG/1
TABLET ORAL
Qty: 90 TAB | Refills: 1 | Status: SHIPPED | OUTPATIENT
Start: 2018-06-18 | End: 2019-02-02 | Stop reason: SDUPTHER

## 2018-06-18 NOTE — PROGRESS NOTES
Subjective: Natalee Lyn is a 67 y.o. 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 Blood pressure normotensive. Osteopenia: Dr. Kimberly Baca has prescribed Boniva for five years. (2 1/2 years at present). Requests PCP to continue and thensSwitch to Prolia after five years. HM: DTAP plans to take care of her young grandchildren over the summer. Hypothyroidism:takes levothyroxine 100 mcg daily as prescribed          ROS:  Gen: denies fever, chills, fatigue, weight loss, weight gain  HEENT:denies blurry vision, nasal congestion, sore throat  Resp: denies dypsnea, cough, wheezing  CV: denies chest pain radiating to the jaws or arms, palpitations, lower extremity edema  Abd: denies nausea, vomiting, diarrhea, constipation  Neuro: denies numbness/tingling  Endo: denies polyuria, polydipsia, heat/cold intolerance  Heme: no lymphadenopathy    Allergies   Allergen Reactions    Latex Rash    Keflex [Cephalexin] Rash    Levaquin [Levofloxacin] Rash    Macrobid [Nitrofurantoin Monohyd/M-Cryst] Rash    Sulfa (Sulfonamide Antibiotics) Rash         Current Outpatient Prescriptions:     levothyroxine (SYNTHROID) 100 mcg tablet, TAKE 1 TABLET BY MOUTH DAILY BEFORE BREAKFAST. , Disp: 90 Tab, Rfl: 1    amLODIPine (NORVASC) 10 mg tablet, TAKE 1 TABLET BY MOUTH DAILY. , Disp: 90 Tab, Rfl: 1    ibandronate (BONIVA) 150 mg tablet, Take 150 mg by mouth every thirty (30) days for 90 days. , Disp: 12 Tab, Rfl: 6    lisinopril (PRINIVIL, ZESTRIL) 40 mg tablet, TAKE 1 TABLET BY MOUTH EVERY DAY, Disp: 90 Tab, Rfl: 1    ANORO ELLIPTA 62.5-25 mcg/actuation inhaler, USE 1 INHALATION ONCE A DAY, Disp: , Rfl: 5    albuterol (PROVENTIL HFA, VENTOLIN HFA, PROAIR HFA) 90 mcg/actuation inhaler, Take 2 Puffs by inhalation. , Disp: , Rfl:     benzonatate (TESSALON) 200 mg capsule, TAKE ONE CAPSULE BY MOUTH 3 TIMES A DAY AS NEEDED FOR COUGH FOR UP TO 7 DAYS, Disp: , Rfl: 1    ciclopirox (LOPROX) 0.77 % topical cream, Apply  to affected area two (2) times a day., Disp: , Rfl:     triamcinolone acetonide (KENALOG) 0.1 % topical cream, Apply  to affected area two (2) times a day. use thin layer, Disp: , Rfl:     tacrolimus (PROGRAF) 1 mg capsule, Take  by mouth three (3) times daily. One pill in water/ swish 3x daily, Disp: , Rfl:     tacrolimus (PROTOPIC) 0.1 % ointment, Apply  to affected area daily. am, Disp: , Rfl:     clobetasol (TEMOVATE) 0.05 % ointment, Apply  to affected area daily. pm, Disp: , Rfl:     fluconazole (DIFLUCAN) 150 mg tablet, Take 150 mg by mouth daily. FDA advises cautious prescribing of oral fluconazole in pregnancy. , Disp: , Rfl:     hydroxychloroquine (PLAQUENIL) 200 mg tablet, Take 200 mg by mouth two (2) times a day., Disp: , Rfl:     budesonide (ENTOCORT EC) 3 mg capsule, Take 3 Caps by mouth every morning.  Taking 3 tabs in am, Disp: 270 Cap, Rfl: 1    augmented betamethasone dipropionate (DIPROLENE-AF) 0.05 % ointment, Use BID to lesions on legs and arms, Disp: 45 g, Rfl: 1    calcium carbonate (TUMS) 200 mg calcium (500 mg) chew, Take 2 Tabs by mouth two (2) times a day., Disp: , Rfl:     acetaminophen (ARTHRITIS PAIN RELIEF) 650 mg CR tablet, Take 650 mg by mouth every eight (8) hours. , Disp: , Rfl:     naproxen-diphenhydramine (ALEVE PM) 220-25 mg tab, Take  by mouth daily. Pt takes 2 tabs, Disp: , Rfl:     aspirin 81 mg chewable tablet, Take 81 mg by mouth daily. , Disp: , Rfl:     FOLIC ACID/MULTIVIT-MINERALS (ADULT MULTIVITAMIN GUMMIES PO), Take  by mouth., Disp: , Rfl:     mesalamine (PENTASA) 500 mg CR capsule, Take 500 mg by mouth two (2) times a day. Two Tablets 4x  daily, Disp: , Rfl:     mirabegron (MYRBETRIQ) 25 mg Tb24, Take 25 mg by mouth daily. , Disp: , Rfl:     omeprazole (PRILOSEC) 20 mg capsule, Take 20 mg by mouth daily. , Disp: , Rfl:     cholecalciferol (VITAMIN D3) 1,000 unit tablet, Take 1,000 Units by mouth daily.  Indications: VITAMIN D DEFICIENCY, Disp: , Rfl:     Past Medical History:   Diagnosis Date    Allergic rhinitis     COPD (chronic obstructive pulmonary disease) (HCC)     Hypertension     Hypothyroidism     Lichen planus     [place] [facility]    Menopause     Osteoarthrosis, unspecified whether generalized or localized, unspecified site     Overactive bladder     Phlebitis and thrombophlebitis of lower extremities, unspecified     Raynaud's syndrome        Past Surgical History:   Procedure Laterality Date    HX APPENDECTOMY      HX GYN      HYSTERECTOMY//BTL    HX HEENT      TONSILLECTOMY    HX HYSTERECTOMY      HX MOHS PROCEDURES  2018    left lower leg    HX OOPHORECTOMY      HX UROLOGICAL      BLADDER SLING       History   Smoking Status    Former Smoker    Types: Cigarettes   Smokeless Tobacco    Never Used       Social History     Social History    Marital status:      Spouse name: N/A    Number of children: N/A    Years of education: N/A     Social History Main Topics    Smoking status: Former Smoker     Types: Cigarettes    Smokeless tobacco: Never Used    Alcohol use No    Drug use: No    Sexual activity: No     Other Topics Concern     Service No    Blood Transfusions No    Caffeine Concern No    Occupational Exposure No    Hobby Hazards No    Sleep Concern No    Stress Concern No    Weight Concern No    Special Diet No    Back Care No    Exercise Yes    Bike Helmet No    Seat Belt Yes    Self-Exams No     Social History Narrative       Family History   Problem Relation Age of Onset    Stroke Mother     Dementia Mother     Cancer Father      BRAIN TUMOR    COPD Brother      emphsema    Cancer Niece          Objective:     Visit Vitals    /60 (BP 1 Location: Left arm, BP Patient Position: Sitting)    Pulse 68    Temp 97.4 °F (36.3 °C) (Temporal)    Resp 26    Ht 4' 11.25\" (1.505 m)    Wt 181 lb 9.6 oz (82.4 kg)    SpO2 100%    BMI 36.37 kg/m2     Body mass index is 36.37 kg/(m^2). General: Alert and oriented. No acute distress. Well nourished  HEENT :  Ears:TMs are normal. Canals are clear. Eyes: pupils equal, round, react to light and accommodation. Extra ocular movements intact. Nose: patent. Mouth and throat is clear. Neck:supple full range of motion no thyromegaly. Trachea midline, No carotid bruits. No significant lymphadenopathy  Lungs[de-identified] clear to auscultation without wheezes, rales, or rhonchi. Heart :RRR, S1 & S2 are normal intensity. No murmur; no gallop  Abdomen: bowel sounds active. No tenderness, guarding, rebound, masses, hepatic or spleen enlargement  Back: no CVA tenderness. Extremities: without clubbing, cyanosis, or edema  Pulses: radial and femoral pulses are normal  Neuro: HMF intact. Cranial nerves II through XII grossly normal.  Motor: is 5 over 5 and symmetrical.   Deep tendon reflexes: +2 equal  SKIN: Left lower leg stitches from MOHS procedure 6/13/2018, no redness, oozing, or warmness. Results for orders placed or performed in visit on 05/03/18   HEPATITIS C AB   Result Value Ref Range    Hep C Virus Ab <0.1 0.0 - 0.9 s/co ratio       No results found for this visit on 06/18/18. Assessment/ Plan:     1.  BMI 36.0-36.9,adult  Discussed the patient's BMI with her. The BMI follow up plan is as follows:     dietary management education, guidance, and counseling  encourage exercise  monitor weight  prescribed dietary intake    An After Visit Summary was printed and given to the patient. Discussed diet and exercise. Sarita Anderson states that she rides a bicycle for exercise. - CBC WITH AUTOMATED DIFF; Future  - METABOLIC PANEL, COMPREHENSIVE; Future  - LIPID PANEL; Future  - TSH 3RD GENERATION; Future    2. Encounter for immunization    - TETANUS, DIPHTHERIA TOXOIDS AND ACELLULAR PERTUSSIS VACCINE (TDAP), IN INDIVIDS. >=7, IM  - [place] IMMUNIZ ADMIN,1 SINGLE/COMB VAC/TOXOID    - 3. Essential hypertension  BP for age in goal  - CBC WITH AUTOMATED DIFF; Future  - METABOLIC PANEL, COMPREHENSIVE; Future  - LIPID PANEL; Future  - TSH 3RD GENERATION; Future    4. Acquired hypothyroidism    - TSH 3RD GENERATION; Future      Orders Placed This Encounter    TETANUS, DIPHTHERIA TOXOIDS AND ACELLULAR PERTUSSIS VACCINE (TDAP), IN INDIVIDS. >=7, IM    CBC WITH AUTOMATED DIFF     Standing Status:   Future     Standing Expiration Date:   31/13/2683    METABOLIC PANEL, COMPREHENSIVE     Standing Status:   Future     Standing Expiration Date:   10/18/2018    LIPID PANEL     Standing Status:   Future     Standing Expiration Date:   10/18/2018    TSH 3RD GENERATION     Standing Status:   Future     Standing Expiration Date:   10/18/2018    [place] IMMUNIZ ADMIN,1 SINGLE/COMB VAC/TOXOID   Nettie Adams levothyroxine (SYNTHROID) 100 mcg tablet     Sig: TAKE 1 TABLET BY MOUTH DAILY BEFORE BREAKFAST. Dispense:  90 Tab     Refill:  1    amLODIPine (NORVASC) 10 mg tablet     Sig: TAKE 1 TABLET BY MOUTH DAILY. Dispense:  90 Tab     Refill:  1         Verbal and written instructions (see AVS) provided.  Patient expresses understanding of diagnosis and treatment plan.     Health Maintenance Due   Topic Date Due    DTaP/Tdap/Td series (1 - Tdap) 06/30/1965    GLAUCOMA SCREENING Q2Y  06/30/2009         Follow-up Disposition:  Return in about 9 months (around 3/18/2019). or sooner as needed.       CRIS Knapp

## 2018-06-18 NOTE — PATIENT INSTRUCTIONS
Tdap (Tetanus, Diphtheria, Pertussis) Vaccine: What You Need to Know  Why get vaccinated? Tetanus, diphtheria, and pertussis are very serious diseases. Tdap vaccine can protect us from these diseases. And Tdap vaccine given to pregnant women can protect  babies against pertussis. Tetanus (lockjaw) is rare in the [facility] today. It causes painful muscle tightening and stiffness, usually all over the body. · It can lead to tightening of muscles in the head and neck so you can't open your mouth, swallow, or sometimes even breathe. Tetanus kills about 1 out of 10 people who are infected even after receiving the best medical care. Diphtheria is also rare in the [place] today. It can cause a thick coating to form in the back of the throat. · It can lead to breathing problems, heart failure, paralysis, and death. Pertussis (whooping cough) causes severe coughing spells, which can cause difficulty breathing, vomiting, and disturbed sleep. · It can also lead to weight loss, incontinence, and rib fractures. Up to 2 in 100 adolescents and 5 in 100 adults with pertussis are hospitalized or have complications, which could include pneumonia or death. These diseases are caused by bacteria. Diphtheria and pertussis are spread from person to person through secretions from coughing or sneezing. Tetanus enters the body through cuts, scratches, or wounds. Before vaccines, as many as 200,000 cases of diphtheria, 200,000 cases of pertussis, and hundreds of cases of tetanus were reported in the [place] each year. Since vaccination began, reports of cases for tetanus and diphtheria have dropped by about 99% and for pertussis by about 80%. Tdap vaccine  The Tdap vaccine can protect adolescents and adults from tetanus, diphtheria, and pertussis. One dose of Tdap is routinely given at age 6 or 15. People who did not get Tdap at that age should get it as soon as possible.   Tdap is especially important for health care professionals and anyone having close contact with a baby younger than 12 months. Pregnant women should get a dose of Tdap during every pregnancy, to protect the  from pertussis. Infants are most at risk for severe, life-threatening complications from pertussis. Another vaccine, called Td, protects against tetanus and diphtheria, but not pertussis. A Td booster should be given every 10 years. Tdap may be given as one of these boosters if you have never gotten Tdap before. Tdap may also be given after a severe cut or burn to prevent tetanus infection. Your doctor or the person giving you the vaccine can give you more information. Tdap may safely be given at the same time as other vaccines. Some people should not get this vaccine  · A person who has ever had a life-threatening allergic reaction after a previous dose of any diphtheria-, tetanus-, or pertussis-containing vaccine, OR has a severe allergy to any part of this vaccine, should not get Tdap vaccine. Tell the person giving the vaccine about any severe allergies. · Anyone who had coma or long repeated seizures within 7 days after a childhood dose of DTP or DTaP, or a previous dose of Tdap, should not get Tdap, unless a cause other than the vaccine was found. They can still get Td. · Talk to your doctor if you:  ¨ Have seizures or another nervous system problem. ¨ Had severe pain or swelling after any vaccine containing diphtheria, tetanus, or pertussis. ¨ Ever had a condition called Guillain-Barré Syndrome (GBS). ¨ Aren't feeling well on the day the shot is scheduled. Risks  With any medicine, including vaccines, there is a chance of side effects. These are usually mild and go away on their own. Serious reactions are also possible but are rare. Most people who get Tdap vaccine do not have any problems with it.   Mild problems following Tdap  (Did not interfere with activities)  · Pain where the shot was given (about 3 in 4 adolescents or 2 in 3 adults)  · Redness or swelling where the shot was given (about 1 person in 5)  · Mild fever of at least 100.4°F (up to about 1 in 25 adolescents or 1 in 100 adults)  · Headache (about 3 or 4 people in 10)  · Tiredness (about 1 person in 3 or 4)  · Nausea, vomiting, diarrhea, stomachache (up to 1 in 4 adolescents or 1 in 10 adults)  · Chills, sore joints (about 1 person in 10)  · Body aches (about 1 person in 3 or 4)  · Rash, swollen glands (uncommon)  Moderate problems following Tdap  (Interfered with activities, but did not require medical attention)  · Pain where the shot was given (up to 1 in 5 or 6)  · Redness or swelling where the shot was given (up to about 1 in 16 adolescents or 1 in 12 adults)  · Fever over 102°F (about 1 in 100 adolescents or 1 in 250 adults)  · Headache (about 1 in 7 adolescents or 1 in 10 adults)  · Nausea, vomiting, diarrhea, stomachache (up to 1 to 3 people in 100)  · Swelling of the entire arm where the shot was given (up to about 1 in 500)  Severe problems following Tdap  (Unable to perform usual activities; required medical attention)  · Swelling, severe pain, bleeding and redness in the arm where the shot was given (rare)  Problems that could happen after any vaccine:  · People sometimes faint after a medical procedure, including vaccination. Sitting or lying down for about 15 minutes can help prevent fainting, and injuries caused by a fall. Tell your doctor if you feel dizzy or have vision changes or ringing in the ears. · Some people get severe pain in the shoulder and have difficulty moving the arm where a shot was given. This happens very rarely. · Any medication can cause a severe allergic reaction. Such reactions from a vaccine are very rare, estimated at fewer than 1 in a million doses, and would happen within a few minutes to a few hours after the vaccination.   As with any medicine, there is a very remote chance of a vaccine causing a serious injury or death. The safety of vaccines is always being monitored. For more information, visit: www.cdc.gov/vaccinesafety. What if there is a serious problem? What should I look for? · Look for anything that concerns you, such as signs of a severe allergic reaction, very high fever, or unusual behavior. Signs of a severe allergic reaction can include hives, swelling of the face and throat, difficulty breathing, a fast heartbeat, dizziness, and weakness. These would usually start a few minutes to a few hours after the vaccination. What should I do? · If you think it is a severe allergic reaction or other emergency that can't wait, call 9-1-1 or get the person to the nearest hospital. Otherwise, call your doctor. · Afterward, the reaction should be reported to the Vaccine Adverse Event Reporting System (VAERS). Your doctor might file this report, or you can do it yourself through the VAERS web site at www.vaers. [facility].gov, or by calling 6-856.908.9028. VAERS does not give medical advice. The National Vaccine Injury Compensation Program  The National Vaccine Injury Compensation Program (VICP) is a federal program that was created to compensate people who may have been injured by certain vaccines. Persons who believe they may have been injured by a vaccine can learn about the program and about filing a claim by calling 8-751.600.6475 or visiting the [employer] website at www.[facility].gov/vaccinecompensation. There is a time limit to file a claim for compensation. How can I learn more? · Ask your doctor. He or she can give you the vaccine package insert or suggest other sources of information. · Call your local or state health department. · Contact the Centers for Disease Control and Prevention (CDC):  ¨ Call 7-466.577.2374 (3-764-IHI-INFO) or  ¨ Visit CDC's website at www.cdc.gov/vaccines  Vaccine Information Statement (Interim)  Tdap Vaccine  (2/24/15)  42 RHETT Palmer 106RE-32  [facility] for Disease Control and Prevention  Many Vaccine Information Statements are available in [place] and other languages. See www.immunize.org/vis. Muchas hojas de información sobre vacunas están disponibles en español y en otros idiomas. Visite www.immunize.org/vis. Care instructions adapted under license by [employer] (which disclaims liability or warranty for this information). If you have questions about a medical condition or this instruction, always ask your healthcare professional. [address] any warranty or liability for your use of this information. Body Mass Index: Care Instructions  Your Care Instructions    Body mass index (BMI) can help you see if your weight is raising your risk for health problems. It uses a formula to compare how much you weigh with how tall you are. · A BMI lower than 18.5 is considered underweight. · A BMI between 18.5 and 24.9 is considered healthy. · A BMI between 25 and 29.9 is considered overweight. A BMI of 30 or higher is considered obese. If your BMI is in the normal range, it means that you have a lower risk for weight-related health problems. If your BMI is in the overweight or obese range, you may be at increased risk for weight-related health problems, such as high blood pressure, heart disease, stroke, arthritis or joint pain, and diabetes. If your BMI is in the underweight range, you may be at increased risk for health problems such as fatigue, lower protection (immunity) against illness, muscle loss, bone loss, hair loss, and hormone problems. BMI is just one measure of your risk for weight-related health problems. You may be at higher risk for health problems if you are not active, you eat an unhealthy diet, or you drink too much alcohol or use tobacco products. Follow-up care is a key part of your treatment and safety. Be sure to make and go to all appointments, and call your doctor if you are having problems.  It's also a good idea to know your test results and keep a list of the medicines you take. How can you care for yourself at home? · Practice healthy eating habits. This includes eating plenty of fruits, vegetables, whole grains, lean protein, and low-fat dairy. · If your doctor recommends it, get more exercise. Walking is a good choice. Bit by bit, increase the amount you walk every day. Try for at least 30 minutes on most days of the week. · Do not smoke. Smoking can increase your risk for health problems. If you need help quitting, talk to your doctor about stop-smoking programs and medicines. These can increase your chances of quitting for good. · Limit alcohol to 2 drinks a day for men and 1 drink a day for women. Too much alcohol can cause health problems. If you have a BMI higher than 25  · Your doctor may do other tests to check your risk for weight-related health problems. This may include measuring the distance around your waist. A waist measurement of more than 40 inches in men or 35 inches in women can increase the risk of weight-related health problems. · Talk with your doctor about steps you can take to stay healthy or improve your health. You may need to make lifestyle changes to lose weight and stay healthy, such as changing your diet and getting regular exercise. If you have a BMI lower than 18.5  · Your doctor may do other tests to check your risk for health problems. · Talk with your doctor about steps you can take to stay healthy or improve your health. You may need to make lifestyle changes to gain or maintain weight and stay healthy, such as getting more healthy foods in your diet and doing exercises to build muscle. Where can you learn more? Go to http://mihaela-yosef.info/. Enter S176 in the search box to learn more about \"Body Mass Index: Care Instructions. \"  Current as of: October 13, 2016  Content Version: 11.4  © 1691-2479 [employer].  Care instructions adapted under license by [employer] (which disclaims liability or warranty for this information). If you have questions about a medical condition or this instruction, always ask your healthcare professional. [address] any warranty or liability for your use of this information. Heart-Healthy Diet: Care Instructions  Your Care Instructions    A heart-healthy diet has lots of vegetables, fruits, nuts, beans, and whole grains, and is low in salt. It limits foods that are high in saturated fat, such as meats, cheeses, and fried foods. It may be hard to change your diet, but even small changes can lower your risk of heart attack and heart disease. Follow-up care is a key part of your treatment and safety. Be sure to make and go to all appointments, and call your doctor if you are having problems. It's also a good idea to know your test results and keep a list of the medicines you take. How can you care for yourself at home? Watch your portions  · Learn what a serving is. A \"serving\" and a \"portion\" are not always the same thing. Make sure that you are not eating larger portions than are recommended. For example, a serving of pasta is ½ cup. A serving size of meat is 2 to 3 ounces. A 3-ounce serving is about the size of a deck of cards. Measure serving sizes until you are good at [place]" them. Keep in mind that restaurants often serve portions that are 2 or 3 times the size of one serving. · To keep your energy level up and keep you from feeling hungry, eat often but in smaller portions. · Eat only the number of calories you need to stay at a healthy weight. If you need to lose weight, eat fewer calories than your body burns (through exercise and other physical activity). Eat more fruits and vegetables  · Eat a variety of fruit and vegetables every day. Dark green, deep orange, red, or yellow fruits and vegetables are especially good for you.  Examples include spinach, carrots, peaches, and berries. · Keep carrots, celery, and other veggies handy for snacks. Buy fruit that is in season and store it where you can see it so that you will be tempted to eat it. · Cook dishes that have a lot of veggies in them, such as stir-fries and soups. Limit saturated and trans fat  · Read food labels, and try to avoid saturated and trans fats. They increase your risk of heart disease. Trans fat is found in many processed foods such as cookies and crackers. · Use olive or canola oil when you cook. Try cholesterol-lowering spreads, such as Benecol or Take Control. · Bake, broil, grill, or steam foods instead of frying them. · Choose lean meats instead of high-fat meats such as hot dogs and sausages. Cut off all visible fat when you prepare meat. · Eat fish, skinless poultry, and meat alternatives such as soy products instead of high-fat meats. Soy products, such as tofu, may be especially good for your heart. · Choose low-fat or fat-free milk and dairy products. Eat fish  · Eat at least two servings of fish a week. Certain fish, such as salmon and tuna, contain omega-3 fatty acids, which may help reduce your risk of heart attack. Eat foods high in fiber  · Eat a variety of grain products every day. Include whole-grain foods that have lots of fiber and nutrients. Examples of whole-grain foods include oats, whole wheat bread, and brown rice. · Buy whole-grain breads and cereals, instead of white bread or pastries. Limit salt and sodium  · Limit how much salt and sodium you eat to help lower your blood pressure. · Taste food before you salt it. Add only a little salt when you think you need it. With time, your taste buds will adjust to less salt. · Eat fewer snack items, fast foods, and other high-salt, processed foods. Check food labels for the amount of sodium in packaged foods. · Choose low-sodium versions of canned goods (such as soups, vegetables, and beans).   Limit sugar  · Limit drinks and foods with added sugar. These include candy, desserts, and soda pop. Limit alcohol  · Limit alcohol to no more than 2 drinks a day for men and 1 drink a day for women. Too much alcohol can cause health problems. When should you call for help? Watch closely for changes in your health, and be sure to contact your doctor if:  ? · You would like help planning heart-healthy meals. Where can you learn more? Go to http://mihaela-yosef.info/. Enter V137 in the search box to learn more about \"Heart-Healthy Diet: Care Instructions. \"  Current as of: September 21, 2016  Content Version: 11.4  © 1964-8676 [employer]. Care instructions adapted under license by [employer] (which disclaims liability or warranty for this information). If you have questions about a medical condition or this instruction, always ask your healthcare professional. [place] [address] any warranty or liability for your use of this information.

## 2018-06-18 NOTE — PROGRESS NOTES
TPatient does total self carehis is the Subsequent Medicare Annual Wellness Exam, performed 12 months or more after the Initial AWV or the last Subsequent AWV    I have reviewed the patient's medical history in detail and updated the computerized patient record. History     Past Medical History:   Diagnosis Date    Allergic rhinitis     COPD (chronic obstructive pulmonary disease) (HCC)     Hypertension     Hypothyroidism     Lichen planus     seen at [facility]    Menopause     Osteoarthrosis, unspecified whether generalized or localized, unspecified site     Overactive bladder     Phlebitis and thrombophlebitis of lower extremities, unspecified     Raynaud's syndrome       Past Surgical History:   Procedure Laterality Date    HX APPENDECTOMY      HX GYN      HYSTERECTOMY//BTL    HX HEENT      TONSILLECTOMY    HX HYSTERECTOMY      HX MOHS PROCEDURES  2018    left lower leg    HX OOPHORECTOMY      HX UROLOGICAL      BLADDER SLING     Current Outpatient Prescriptions   Medication Sig Dispense Refill    ibandronate (BONIVA) 150 mg tablet Take 150 mg by mouth every thirty (30) days for 90 days. 12 Tab 6    levothyroxine (SYNTHROID) 100 mcg tablet TAKE 1 TABLET BY MOUTH DAILY BEFORE BREAKFAST. 90 Tab 1    amLODIPine (NORVASC) 10 mg tablet TAKE 1 TABLET BY MOUTH DAILY. 90 Tab 1    lisinopril (PRINIVIL, ZESTRIL) 40 mg tablet TAKE 1 TABLET BY MOUTH EVERY DAY 90 Tab 1    ANORO ELLIPTA 62.5-25 mcg/actuation inhaler USE 1 INHALATION ONCE A DAY  5    albuterol (PROVENTIL HFA, VENTOLIN HFA, PROAIR HFA) 90 mcg/actuation inhaler Take 2 Puffs by inhalation.  benzonatate (TESSALON) 200 mg capsule TAKE ONE CAPSULE BY MOUTH 3 TIMES A DAY AS NEEDED FOR COUGH FOR UP TO 7 DAYS  1    ciclopirox (LOPROX) 0.77 % topical cream Apply  to affected area two (2) times a day.  triamcinolone acetonide (KENALOG) 0.1 % topical cream Apply  to affected area two (2) times a day.  use thin layer      tacrolimus (PROGRAF) 1 mg capsule Take  by mouth three (3) times daily. One pill in water/ swish 3x daily      tacrolimus (PROTOPIC) 0.1 % ointment Apply  to affected area daily. am      clobetasol (TEMOVATE) 0.05 % ointment Apply  to affected area daily. pm      fluconazole (DIFLUCAN) 150 mg tablet Take 150 mg by mouth daily. FDA advises cautious prescribing of oral fluconazole in pregnancy.  hydroxychloroquine (PLAQUENIL) 200 mg tablet Take 200 mg by mouth two (2) times a day.  budesonide (ENTOCORT EC) 3 mg capsule Take 3 Caps by mouth every morning. Taking 3 tabs in am 270 Cap 1    augmented betamethasone dipropionate (DIPROLENE-AF) 0.05 % ointment Use BID to lesions on legs and arms 45 g 1    calcium carbonate (TUMS) 200 mg calcium (500 mg) chew Take 2 Tabs by mouth two (2) times a day.  acetaminophen (ARTHRITIS PAIN RELIEF) 650 mg CR tablet Take 650 mg by mouth every eight (8) hours.  naproxen-diphenhydramine (ALEVE PM) 220-25 mg tab Take  by mouth daily. Pt takes 2 tabs      aspirin 81 mg chewable tablet Take 81 mg by mouth daily.  FOLIC ACID/MULTIVIT-MINERALS (ADULT MULTIVITAMIN GUMMIES PO) Take  by mouth.  mesalamine (PENTASA) 500 mg CR capsule Take 500 mg by mouth two (2) times a day. Two Tablets 4x  daily      mirabegron (MYRBETRIQ) 25 mg Tb24 Take 25 mg by mouth daily.  omeprazole (PRILOSEC) 20 mg capsule Take 20 mg by mouth daily.  cholecalciferol (VITAMIN D3) 1,000 unit tablet Take 1,000 Units by mouth daily.  Indications: VITAMIN D DEFICIENCY       Allergies   Allergen Reactions    Latex Rash    Keflex [Cephalexin] Rash    Levaquin [Levofloxacin] Rash    Macrobid [Nitrofurantoin Monohyd/M-Cryst] Rash    Sulfa (Sulfonamide Antibiotics) Rash     Family History   Problem Relation Age of Onset    Stroke Mother     Dementia Mother     Cancer Father      BRAIN TUMOR    COPD Brother      emphsema    Cancer Niece      Social History   Substance Use Topics  Smoking status: Former Smoker     Types: Cigarettes    Smokeless tobacco: Never Used    Alcohol use No     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 Functional Ability and Level of Safety:   Hearing Loss  Hearing is good. Activities of Daily Living  The home contains: handrails  Patient does total self care    Fall Risk  Fall Risk Assessment, last 12 mths 6/18/2018   Able to walk? Yes   Fall in past 12 months? No   Fall with injury? -   Number of falls in past 12 months -   Fall Risk Score -       Abuse Screen  Patient is not abused    Cognitive Screening   Evaluation of Cognitive Function:  Has your family/caregiver stated any concerns about your memory: no  Normal    Patient Care Team   Patient Care Team:  Roshni Hanson NP as PCP - General (Nurse Practitioner)  Kristopher Nam NP (Nurse Practitioner)    Assessment/Plan   Education and counseling provided:  Are appropriate based on today's review and evaluation    Diagnoses and all orders for this visit:    1. Essential hypertension  -     LIPID PANEL  -     METABOLIC PANEL, COMPREHENSIVE  -     CBC WITH AUTOMATED DIFF  -     COLLECTION VENOUS BLOOD,VENIPUNCTURE    2.  BMI 36.0-36.9,adult  -     LIPID PANEL  - METABOLIC PANEL, COMPREHENSIVE  -     CBC WITH AUTOMATED DIFF  -     COLLECTION VENOUS BLOOD,VENIPUNCTURE    3. Encounter for immunization  -     TETANUS, DIPHTHERIA TOXOIDS AND ACELLULAR PERTUSSIS VACCINE (TDAP), IN INDIVIDS. >=7, IM  -     ID IMMUNIZ ADMIN,1 SINGLE/COMB VAC/TOXOID  -     levothyroxine (SYNTHROID) 100 mcg tablet; TAKE 1 TABLET BY MOUTH DAILY BEFORE BREAKFAST. 4. Acquired hypothyroidism  -     TSH 3RD GENERATION  -     COLLECTION VENOUS BLOOD,VENIPUNCTURE    Other orders  -     amLODIPine (NORVASC) 10 mg tablet; TAKE 1 TABLET BY MOUTH DAILY. Health Maintenance Due   Topic Date Due    DTaP/Tdap/Td series (1 - Tdap) 06/30/1965    FOBT Q 1 YEAR AGE 50-75  06/30/1994    GLAUCOMA SCREENING Q2Y  06/30/2009       1. Have you been to the ER, urgent care clinic since your last visit? Hospitalized since your last visit? no    2. Have you seen or consulted any other health care providers outside of the [address] [place] [address] since your last visit? Include any pap smears or colon screening. Yes,dermatologist ,[facility]askar Rodriguez [facility]-,dermatologist,  , [facility], DR. Lang  5-,checkup. Urologist Dr Silvio Fleming this spring for medication update, opthomagist DR Amber Huffman this spring.   Larry ROALND

## 2018-06-18 NOTE — MR AVS SNAPSHOT
[address] [place] [address]  
[phone] Patient: Aniceto Tavarez MRN: [id number] UVD:2/74/5736 Visit Information Date & Time Provider Department Dept. Phone Encounter #  
 6/18/2018  8:30 AM Harman Hernandes NP [facility] [address] [facility] [phone] [id number] Upcoming Health Maintenance Date Due DTaP/Tdap/Td series (1 - Tdap) 6/30/1966 GLAUCOMA SCREENING Q2Y 6/30/2009 FOBT Q 1 YEAR AGE 50-75 12/30/2018* Influenza Age 5 to Adult 8/1/2018 MEDICARE YEARLY EXAM 6/19/2019 BREAST CANCER SCRN MAMMOGRAM 5/3/2020 *Topic was postponed. The date shown is not the original due date. Allergies as of 6/18/2018  Review Complete On: 6/18/2018 By: Harman Hernandes NP Severity Noted Reaction Type Reactions Latex High 11/16/2015    Rash Keflex [Cephalexin]  09/30/2013    Rash Levaquin [Levofloxacin]  09/30/2013    Rash Macrobid [Nitrofurantoin Monohyd/m-cryst]  09/30/2013    Rash  
 Sulfa (Sulfonamide Antibiotics)  09/30/2013    Rash Current Immunizations  Reviewed on 11/17/2016 Name Date Influenza High Dose Vaccine PF 10/20/2017, 11/17/2016 Influenza Vaccine 10/24/2014, 10/1/2013  9:36 AM, 10/9/2009 12:00 AM, 10/17/2008 12:00 AM  
 Influenza Vaccine (Quad) 10/20/2015 Pneumococcal Conjugate (PCV-13) 10/20/2017 Pneumococcal Vaccine (Unspecified Type) 11/8/2010 Tdap 6/18/2018 Zoster Vaccine, Live 10/31/2014 Not reviewed this visit You Were Diagnosed With   
  
 Codes Comments Essential hypertension    -  Primary ICD-10-CM: I10 
ICD-9-CM: 401.9 BMI 36.0-36.9,adult     ICD-10-CM: O14.99 
ICD-9-CM: V85.36 Encounter for immunization     ICD-10-CM: M10 ICD-9-CM: V03.89 Acquired hypothyroidism     ICD-10-CM: E03.9 ICD-9-CM: 244. 9 Vitals BP Pulse Temp Resp Height(growth percentile) 144/60 (BP 1 Location: Left arm, BP Patient Position: Sitting) 68 97.4 °F (36.3 °C) (Temporal) 26 4' 11.25\" (1.505 m) Weight(growth percentile) SpO2 BMI OB Status Smoking Status 181 lb 9.6 oz (82.4 kg) 100% 36.37 kg/m2 Hysterectomy Former Smoker BMI and BSA Data Body Mass Index Body Surface Area  
 36.37 kg/m 2 1.86 m 2 Preferred Pharmacy Pharmacy Name Phone [facility]/PHARMACY #[id number] [place] [phone] Your Updated Medication List  
  
   
This list is accurate as of 6/18/18 10:22 AM.  Always use your most recent med list.  
  
  
  
  
 ADULT MULTIVITAMIN GUMMIES PO Take  by mouth. albuterol 90 mcg/actuation inhaler Commonly known as:  PROVENTIL HFA, VENTOLIN HFA, PROAIR HFA Take 2 Puffs by inhalation. ALEVE -25 mg Tab Generic drug:  naproxen-diphenhydramine Take  by mouth daily. Pt takes 2 tabs  
  
 amLODIPine 10 mg tablet Commonly known as:  Madyson Cordial TAKE 1 TABLET BY MOUTH DAILY. ANORO ELLIPTA 62.5-25 mcg/actuation inhaler Generic drug:  umeclidinium-vilanterol USE 1 INHALATION ONCE A DAY Arthritis Pain Relief 650 mg Robertha Slates Generic drug:  acetaminophen Take 650 mg by mouth every eight (8) hours. aspirin 81 mg chewable tablet Take 81 mg by mouth daily. augmented betamethasone dipropionate 0.05 % ointment Commonly known as:  Jeanette Pontiff Use BID to lesions on legs and arms  
  
 benzonatate 200 mg capsule Commonly known as:  TESSALON  
TAKE ONE CAPSULE BY MOUTH 3 TIMES A DAY AS NEEDED FOR COUGH FOR UP TO 7 DAYS  
  
 budesonide 3 mg capsule Commonly known as:  ENTOCORT EC Take 3 Caps by mouth every morning. Taking 3 tabs in am  
  
 calcium carbonate 200 mg calcium (500 mg) Chew Commonly known as:  TUMS Take 2 Tabs by mouth two (2) times a day. ciclopirox 0.77 % topical cream  
Commonly known as:  Iwona Bream Apply  to affected area two (2) times a day. clobetasol 0.05 % ointment Commonly known as:  Gladystine Dryer Apply  to affected area daily. pm  
  
 fluconazole 150 mg tablet Commonly known as:  DIFLUCAN Take 150 mg by mouth daily. FDA advises cautious prescribing of oral fluconazole in pregnancy. hydroxychloroquine 200 mg tablet Commonly known as:  PLAQUENIL Take 200 mg by mouth two (2) times a day. ibandronate 150 mg tablet Commonly known as:  Roise Belarusian Take 150 mg by mouth every thirty (30) days for 90 days. levothyroxine 100 mcg tablet Commonly known as:  SYNTHROID  
TAKE 1 TABLET BY MOUTH DAILY BEFORE BREAKFAST. lisinopril 40 mg tablet Commonly known as:  PRINIVIL, ZESTRIL  
TAKE 1 TABLET BY MOUTH EVERY DAY  
  
 MYRBETRIQ 25 mg ER tablet Generic drug:  mirabegron ER Take 25 mg by mouth daily. omeprazole 20 mg capsule Commonly known as:  PRILOSEC Take 20 mg by mouth daily. PENTASA 500 mg CR capsule Generic drug:  mesalamine Take 500 mg by mouth two (2) times a day. Two Tablets 4x  daily * tacrolimus 1 mg capsule Commonly known as:  PROGRAF Take  by mouth three (3) times daily. One pill in water/ swish 3x daily * tacrolimus 0.1 % ointment Commonly known as:  PROTOPIC Apply  to affected area daily. am  
  
 triamcinolone acetonide 0.1 % topical cream  
Commonly known as:  KENALOG Apply  to affected area two (2) times a day. use thin layer VITAMIN D3 1,000 unit tablet Generic drug:  cholecalciferol Take 1,000 Units by mouth daily. Indications: VITAMIN D DEFICIENCY * Notice: This list has 2 medication(s) that are the same as other medications prescribed for you. Read the directions carefully, and ask your doctor or other care provider to review them with you. Prescriptions Sent to Pharmacy Refills  
 levothyroxine (SYNTHROID) 100 mcg tablet 1 Sig: TAKE 1 TABLET BY MOUTH DAILY BEFORE BREAKFAST.   
 Class: Normal  
 Pharmacy: [facility]/pharmacy #[id number] [address] Ph #: [phone]  
 amLODIPine (NORVASC) 10 mg tablet 1 Sig: TAKE 1 TABLET BY MOUTH DAILY. Class: Normal  
 Pharmacy: [facility]/pharmacy #[id number] [address], [address] [place] Ph #: [phone] We Performed the Following [place] IMMUNIZ ADMIN,1 SINGLE/COMB VAC/TOXOID [id number] CPT(R)] TETANUS, DIPHTHERIA TOXOIDS AND ACELLULAR PERTUSSIS VACCINE (TDAP), IN INDIVIDS. >=7, IM [id number] CPT(R)] To-Do List   
 2018 Lab:  CBC WITH AUTOMATED DIFF   
  
 2018 Lab:  LIPID PANEL   
  
 2018 Lab:  METABOLIC PANEL, COMPREHENSIVE   
  
 2018 Lab:  TSH 3RD GENERATION Patient Instructions Tdap (Tetanus, Diphtheria, Pertussis) Vaccine: What You Need to Know Why get vaccinated? Tetanus, diphtheria, and pertussis are very serious diseases. Tdap vaccine can protect us from these diseases. And Tdap vaccine given to pregnant women can protect  babies against pertussis. Tetanus (lockjaw) is rare in the [facility] today. It causes painful muscle tightening and stiffness, usually all over the body. · It can lead to tightening of muscles in the head and neck so you can't open your mouth, swallow, or sometimes even breathe. Tetanus kills about 1 out of 10 people who are infected even after receiving the best medical care. Diphtheria is also rare in the [place] today. It can cause a thick coating to form in the back of the throat. · It can lead to breathing problems, heart failure, paralysis, and death. Pertussis (whooping cough) causes severe coughing spells, which can cause difficulty breathing, vomiting, and disturbed sleep. · It can also lead to weight loss, incontinence, and rib fractures. Up to 2 in 100 adolescents and 5 in 100 adults with pertussis are hospitalized or have complications, which could include pneumonia or death. These diseases are caused by bacteria. Diphtheria and pertussis are spread from person to person through secretions from coughing or sneezing. Tetanus enters the body through cuts, scratches, or wounds. Before vaccines, as many as 200,000 cases of diphtheria, 200,000 cases of pertussis, and hundreds of cases of tetanus were reported in the [place] each year. Since vaccination began, reports of cases for tetanus and diphtheria have dropped by about 99% and for pertussis by about 80%. Tdap vaccine The Tdap vaccine can protect adolescents and adults from tetanus, diphtheria, and pertussis. One dose of Tdap is routinely given at age 6 or 15. People who did not get Tdap at that age should get it as soon as possible. Tdap is especially important for health care professionals and anyone having close contact with a baby younger than 12 months. Pregnant women should get a dose of Tdap during every pregnancy, to protect the  from pertussis. Infants are most at risk for severe, life-threatening complications from pertussis. Another vaccine, called Td, protects against tetanus and diphtheria, but not pertussis. A Td booster should be given every 10 years. Tdap may be given as one of these boosters if you have never gotten Tdap before. Tdap may also be given after a severe cut or burn to prevent tetanus infection. Your doctor or the person giving you the vaccine can give you more information. Tdap may safely be given at the same time as other vaccines. Some people should not get this vaccine · A person who has ever had a life-threatening allergic reaction after a previous dose of any diphtheria-, tetanus-, or pertussis-containing vaccine, OR has a severe allergy to any part of this vaccine, should not get Tdap vaccine. Tell the person giving the vaccine about any severe allergies.  
· Anyone who had coma or long repeated seizures within 7 days after a childhood dose of DTP or DTaP, or a previous dose of Tdap, should not get Tdap, unless a cause other than the vaccine was found. They can still get Td. · Talk to your doctor if you: 
¨ Have seizures or another nervous system problem. ¨ Had severe pain or swelling after any vaccine containing diphtheria, tetanus, or pertussis. ¨ Ever had a condition called Guillain-Barré Syndrome (GBS). ¨ Aren't feeling well on the day the shot is scheduled. Risks With any medicine, including vaccines, there is a chance of side effects. These are usually mild and go away on their own. Serious reactions are also possible but are rare. Most people who get Tdap vaccine do not have any problems with it. Mild problems following Tdap 
(Did not interfere with activities) · Pain where the shot was given (about 3 in 4 adolescents or 2 in 3 adults) · Redness or swelling where the shot was given (about 1 person in 5) · Mild fever of at least 100.4°F (up to about 1 in 25 adolescents or 1 in 100 adults) · Headache (about 3 or 4 people in 10) · Tiredness (about 1 person in 3 or 4) · Nausea, vomiting, diarrhea, stomachache (up to 1 in 4 adolescents or 1 in 10 adults) · Chills, sore joints (about 1 person in 10) · Body aches (about 1 person in 3 or 4) · Rash, swollen glands (uncommon) Moderate problems following Tdap (Interfered with activities, but did not require medical attention) · Pain where the shot was given (up to 1 in 5 or 6) · Redness or swelling where the shot was given (up to about 1 in 16 adolescents or 1 in 12 adults) · Fever over 102°F (about 1 in 100 adolescents or 1 in 250 adults) · Headache (about 1 in 7 adolescents or 1 in 10 adults) · Nausea, vomiting, diarrhea, stomachache (up to 1 to 3 people in 100) · Swelling of the entire arm where the shot was given (up to about 1 in 500) Severe problems following Tdap 
(Unable to perform usual activities; required medical attention) · Swelling, severe pain, bleeding and redness in the arm where the shot was given (rare) Problems that could happen after any vaccine: · People sometimes faint after a medical procedure, including vaccination. Sitting or lying down for about 15 minutes can help prevent fainting, and injuries caused by a fall. Tell your doctor if you feel dizzy or have vision changes or ringing in the ears. · Some people get severe pain in the shoulder and have difficulty moving the arm where a shot was given. This happens very rarely. · Any medication can cause a severe allergic reaction. Such reactions from a vaccine are very rare, estimated at fewer than 1 in a million doses, and would happen within a few minutes to a few hours after the vaccination. As with any medicine, there is a very remote chance of a vaccine causing a serious injury or death. The safety of vaccines is always being monitored. For more information, visit: www.cdc.gov/vaccinesafety. What if there is a serious problem? What should I look for? · Look for anything that concerns you, such as signs of a severe allergic reaction, very high fever, or unusual behavior. Signs of a severe allergic reaction can include hives, swelling of the face and throat, difficulty breathing, a fast heartbeat, dizziness, and weakness. These would usually start a few minutes to a few hours after the vaccination. What should I do? · If you think it is a severe allergic reaction or other emergency that can't wait, call 9-1-1 or get the person to the nearest hospital. Otherwise, call your doctor. · Afterward, the reaction should be reported to the Vaccine Adverse Event Reporting System (VAERS). Your doctor might file this report, or you can do it yourself through the VAERS web site at www.vaers. hhs.gov, or by calling 5-600.544.7486. VAERS does not give medical advice.  
The [employer] Vaccine Injury [facility] [place] 
 The [employer] Injury Compensation Program (VICP) is a federal program that was created to compensate people who may have been injured by certain vaccines. Persons who believe they may have been injured by a vaccine can learn about the program and about filing a claim by calling 9-945.166.5434 or visiting the [employer] website at www.[facility].gov/vaccinecompensation. There is a time limit to file a claim for compensation. How can I learn more? · Ask your doctor. He or she can give you the vaccine package insert or suggest other sources of information. · Call your local or state health department. · Contact the Centers for Disease Control and Prevention (CDC): 
¨ Call 5-308.502.8341 (1-800-CDC-INFO) or ¨ Visit CDC's website at www.cdc.gov/vaccines Vaccine Information Statement (Interim) Tdap Vaccine 
(2/24/15) 42 RHETT Centeno [id number]-[id number] [facility] and [employer] [place] Centers for Disease Control and Prevention Many Vaccine Information Statements are available in French and other languages. See www.immunize.org/vis. Muchas hojas de información sobre vacunas están disponibles en español y en otros idiomas. Visite www.immunize.org/vis. Care instructions adapted under license by [employer] (which disclaims liability or warranty for this information). If you have questions about a medical condition or this instruction, always ask your healthcare professional. [place] [address] any warranty or liability for your use of this information. Introducing hospitals & HEALTH SERVICES! [facility]connor introduces [employer] patient portal. Now you can access parts of your medical record, email your doctor's office, and request medication refills online. 1. In your internet browser, go to [email or web address] 2. Click on the First Time User? Click Here link in the Sign In box. You will see the New Member Sign Up page. 3. Enter your [facility] Access Code exactly as it appears below. You will not need to use this code after youve completed the sign-up process. If you do not sign up before the expiration date, you must request a new code. · [facility] Access Code: [email or web address] Expires: 9/16/2018 10:22 AM 
 
4. Enter the last four digits of your Social Security Number (xxxx) and Date of Birth (mm/dd/yyyy) as indicated and click Submit. You will be taken to the next sign-up page. 5. Create a [facility] ID. This will be your [facility] login ID and cannot be changed, so think of one that is secure and easy to remember. 6. Create a [facility] password. You can change your password at any time. 7. Enter your Password Reset Question and Answer. This can be used at a later time if you forget your password. 8. Enter your e-mail address. You will receive e-mail notification when new information is available in [address] 9. Click Sign Up. You can now view and download portions of your medical record. 10. Click the Download Summary menu link to download a portable copy of your medical information. If you have questions, please visit the Frequently Asked Questions section of the [facility] website. Remember, [facility] is NOT to be used for urgent needs. For medical emergencies, dial 911. Now available from your iPhone and Android! Please provide this summary of care documentation to your next provider. Your primary care clinician is listed as Veronica Wylie. If you have any questions after today's visit, please call [phone].

## 2018-06-20 ENCOUNTER — LAB ONLY (OUTPATIENT)
Dept: FAMILY MEDICINE CLINIC | Age: 73
End: 2018-06-20

## 2018-06-20 DIAGNOSIS — E03.9 ACQUIRED HYPOTHYROIDISM: ICD-10-CM

## 2018-06-20 DIAGNOSIS — I10 ESSENTIAL HYPERTENSION: ICD-10-CM

## 2018-06-20 NOTE — MR AVS SNAPSHOT
[address] [place] [address] 
[phone] Patient: Remedios Evans MRN: [id number] BLT:[id number] Visit Information Date & Time Provider Department Dept. Phone Encounter #  
 6/20/2018  9:15 AM [facility] [address] [facility] [phone] [id number] Upcoming Health Maintenance Date Due  
 GLAUCOMA SCREENING Q2Y 6/30/2009 FOBT Q 1 YEAR AGE 50-75 12/30/2018* Influenza Age 5 to Adult 8/1/2018 MEDICARE YEARLY EXAM 6/19/2019 BREAST CANCER SCRN MAMMOGRAM 5/3/2020 DTaP/Tdap/Td series (2 - Td) 6/18/2028 *Topic was postponed. The date shown is not the original due date. Allergies as of 6/20/2018  Review Complete On: 6/18/2018 By: Jadiel Jhaveri NP Severity Noted Reaction Type Reactions Latex High 11/16/2015    Rash Keflex [Cephalexin]  09/30/2013    Rash Levaquin [Levofloxacin]  09/30/2013    Rash Macrobid [Nitrofurantoin Monohyd/m-cryst]  09/30/2013    Rash  
 Sulfa (Sulfonamide Antibiotics)  09/30/2013    Rash Current Immunizations  Reviewed on 11/17/2016 Name Date Influenza High Dose Vaccine PF 10/20/2017, 11/17/2016 Influenza Vaccine 10/24/2014, 10/1/2013  9:36 AM, 10/9/2009 12:00 AM, 10/17/2008 12:00 AM  
 Influenza Vaccine (Quad) 10/20/2015 Pneumococcal Conjugate (PCV-13) 10/20/2017 Pneumococcal Vaccine (Unspecified Type) 11/8/2010 Tdap 6/18/2018 Zoster Vaccine, Live 10/31/2014 Not reviewed this visit You Were Diagnosed With   
  
 Codes Comments BMI 36.0-36.9,adult     ICD-10-CM: D42.05 
ICD-9-CM: V85.36 Essential hypertension     ICD-10-CM: I10 
ICD-9-CM: 401.9 Acquired hypothyroidism     ICD-10-CM: E03.9 ICD-9-CM: 603. 9 Vitals OB Status Smoking Status Hysterectomy Former Smoker Preferred Pharmacy Pharmacy Name Phone [facility]/PHARMACY #[id number] [address] [place] [address] [place] [phone] Your Updated Medication List  
  
   
This list is accurate as of 6/20/18 11:59 PM.  Always use your most recent med list.  
  
  
  
  
 ADULT MULTIVITAMIN GUMMIES PO Take  by mouth. albuterol 90 mcg/actuation inhaler Commonly known as:  PROVENTIL HFA, VENTOLIN HFA, PROAIR HFA Take 2 Puffs by inhalation. ALEVE -25 mg Tab Generic drug:  naproxen-diphenhydramine Take  by mouth daily. Pt takes 2 tabs  
  
 amLODIPine 10 mg tablet Commonly known as:  Fan Inks TAKE 1 TABLET BY MOUTH DAILY. ANORO ELLIPTA 62.5-25 mcg/actuation inhaler Generic drug:  umeclidinium-vilanterol USE 1 INHALATION ONCE A DAY Arthritis Pain Relief 650 mg Meliton Asa Generic drug:  acetaminophen Take 650 mg by mouth every eight (8) hours. aspirin 81 mg chewable tablet Take 81 mg by mouth daily. augmented betamethasone dipropionate 0.05 % ointment Commonly known as:  Tay [place] Use BID to lesions on legs and arms  
  
 benzonatate 200 mg capsule Commonly known as:  TESSALON  
TAKE ONE CAPSULE BY MOUTH 3 TIMES A DAY AS NEEDED FOR COUGH FOR UP TO 7 DAYS  
  
 budesonide 3 mg capsule Commonly known as:  ENTOCORT EC Take 3 Caps by mouth every morning. Taking 3 tabs in am  
  
 calcium carbonate 200 mg calcium (500 mg) Chew Commonly known as:  TUMS Take 2 Tabs by mouth two (2) times a day. ciclopirox 0.77 % topical cream  
Commonly known as:  Louvenia Elm Apply  to affected area two (2) times a day. clobetasol 0.05 % ointment Commonly known as:  Wynelle Craze Apply  to affected area daily. pm  
  
 fluconazole 150 mg tablet Commonly known as:  DIFLUCAN Take 150 mg by mouth daily. FDA advises cautious prescribing of oral fluconazole in pregnancy. hydroxychloroquine 200 mg tablet Commonly known as:  PLAQUENIL Take 200 mg by mouth two (2) times a day. ibandronate 150 mg tablet Commonly known as:  Jadenotilia Docker Take 150 mg by mouth every thirty (30) days for 90 days. levothyroxine 100 mcg tablet Commonly known as:  SYNTHROID  
TAKE 1 TABLET BY MOUTH DAILY BEFORE BREAKFAST. lisinopril 40 mg tablet Commonly known as:  PRINIVIL, ZESTRIL  
TAKE 1 TABLET BY MOUTH EVERY DAY  
  
 MYRBETRIQ 25 mg ER tablet Generic drug:  mirabegron ER Take 25 mg by mouth daily. omeprazole 20 mg capsule Commonly known as:  PRILOSEC Take 20 mg by mouth daily. PENTASA 500 mg CR capsule Generic drug:  mesalamine Take 500 mg by mouth two (2) times a day. Two Tablets 4x  daily * tacrolimus 1 mg capsule Commonly known as:  PROGRAF Take  by mouth three (3) times daily. One pill in water/ swish 3x daily * tacrolimus 0.1 % ointment Commonly known as:  PROTOPIC Apply  to affected area daily. am  
  
 triamcinolone acetonide 0.1 % topical cream  
Commonly known as:  KENALOG Apply  to affected area two (2) times a day. use thin layer VITAMIN D3 1,000 unit tablet Generic drug:  cholecalciferol Take 1,000 Units by mouth daily. Indications: VITAMIN D DEFICIENCY * Notice: This list has 2 medication(s) that are the same as other medications prescribed for you. Read the directions carefully, and ask your doctor or other care provider to review them with you. We Performed the Following CBC WITH AUTOMATED DIFF [13960 CPT(R)] COLLECTION VENOUS BLOOD,VENIPUNCTURE [id number] CPT(R)] LIPID PANEL [10150 CPT(R)] METABOLIC PANEL, COMPREHENSIVE [19223 CPT(R)] TSH 3RD GENERATION [17600 CPT(R)] Introducing [facility] & HEALTH SERVICES! [facility] introduces [employer] patient portal. Now you can access parts of your medical record, email your doctor's office, and request medication refills online. 1. In your internet browser, go to [email or web address] 2. Click on the First Time User? Click Here link in the Sign In box. You will see the New Member Sign Up page. 3. Enter your [employer] Access Code exactly as it appears below. You will not need to use this code after youve completed the sign-up process. If you do not sign up before the expiration date, you must request a new code. · [employer] Access Code: [email or web address] Expires: 9/16/2018 10:22 AM 
 
4. Enter the last four digits of your Social Security Number (xxxx) and Date of Birth (mm/dd/yyyy) as indicated and click Submit. You will be taken to the next sign-up page. 5. Create a [employer] ID. This will be your [employer] login ID and cannot be changed, so think of one that is secure and easy to remember. 6. Create a [employer] password. You can change your password at any time. 7. Enter your Password Reset Question and Answer. This can be used at a later time if you forget your password. 8. Enter your e-mail address. You will receive e-mail notification when new information is available in [address] 9. Click Sign Up. You can now view and download portions of your medical record. 10. Click the Download Summary menu link to download a portable copy of your medical information. If you have questions, please visit the Frequently Asked Questions section of the [employer] website. Remember, [employer] is NOT to be used for urgent needs. For medical emergencies, dial 911. Now available from your iPhone and Android! Please provide this summary of care documentation to your next provider. Your primary care clinician is listed as Alexander Bath. If you have any questions after today's visit, please call [phone].

## 2018-06-21 LAB
ALBUMIN SERPL-MCNC: 4.3 G/DL (ref 3.5–4.8)
ALBUMIN/GLOB SERPL: 1.9 {RATIO} (ref 1.2–2.2)
ALP SERPL-CCNC: 68 IU/L (ref 39–117)
ALT SERPL-CCNC: 14 IU/L (ref 0–32)
AST SERPL-CCNC: 21 IU/L (ref 0–40)
BASOPHILS # BLD AUTO: 0 X10E3/UL (ref 0–0.2)
BASOPHILS NFR BLD AUTO: 1 %
BILIRUB SERPL-MCNC: 0.3 MG/DL (ref 0–1.2)
BUN SERPL-MCNC: 18 MG/DL (ref 8–27)
BUN/CREAT SERPL: 20 (ref 12–28)
CALCIUM SERPL-MCNC: 8.9 MG/DL (ref 8.7–10.3)
CHLORIDE SERPL-SCNC: 97 MMOL/L (ref 96–106)
CHOLEST SERPL-MCNC: 199 MG/DL (ref 100–199)
CO2 SERPL-SCNC: 20 MMOL/L (ref 20–29)
CREAT SERPL-MCNC: 0.9 MG/DL (ref 0.57–1)
EOSINOPHIL # BLD AUTO: 0.2 X10E3/UL (ref 0–0.4)
EOSINOPHIL NFR BLD AUTO: 3 %
ERYTHROCYTE [DISTWIDTH] IN BLOOD BY AUTOMATED COUNT: 15.4 % (ref 12.3–15.4)
GFR SERPLBLD CREATININE-BSD FMLA CKD-EPI: 64 ML/MIN/1.73
GFR SERPLBLD CREATININE-BSD FMLA CKD-EPI: 73 ML/MIN/1.73
GLOBULIN SER CALC-MCNC: 2.3 G/DL (ref 1.5–4.5)
GLUCOSE SERPL-MCNC: 75 MG/DL (ref 65–99)
HCT VFR BLD AUTO: 31.1 % (ref 34–46.6)
HDLC SERPL-MCNC: 94 MG/DL
HGB BLD-MCNC: 9.8 G/DL (ref 11.1–15.9)
IMM GRANULOCYTES # BLD: 0 X10E3/UL (ref 0–0.1)
IMM GRANULOCYTES NFR BLD: 0 %
LDLC SERPL CALC-MCNC: 94 MG/DL (ref 0–99)
LYMPHOCYTES # BLD AUTO: 0.7 X10E3/UL (ref 0.7–3.1)
LYMPHOCYTES NFR BLD AUTO: 14 %
MCH RBC QN AUTO: 24.5 PG (ref 26.6–33)
MCHC RBC AUTO-ENTMCNC: 31.5 G/DL (ref 31.5–35.7)
MCV RBC AUTO: 78 FL (ref 79–97)
MONOCYTES # BLD AUTO: 0.6 X10E3/UL (ref 0.1–0.9)
MONOCYTES NFR BLD AUTO: 13 %
NEUTROPHILS # BLD AUTO: 3.2 X10E3/UL (ref 1.4–7)
NEUTROPHILS NFR BLD AUTO: 69 %
PLATELET # BLD AUTO: 258 X10E3/UL (ref 150–379)
POTASSIUM SERPL-SCNC: 4.8 MMOL/L (ref 3.5–5.2)
PROT SERPL-MCNC: 6.6 G/DL (ref 6–8.5)
RBC # BLD AUTO: 4 X10E6/UL (ref 3.77–5.28)
SODIUM SERPL-SCNC: 133 MMOL/L (ref 134–144)
TRIGL SERPL-MCNC: 54 MG/DL (ref 0–149)
TSH SERPL DL<=0.005 MIU/L-ACNC: 1.14 UIU/ML (ref 0.45–4.5)
VLDLC SERPL CALC-MCNC: 11 MG/DL (ref 5–40)
WBC # BLD AUTO: 4.7 X10E3/UL (ref 3.4–10.8)

## 2018-06-22 NOTE — PROGRESS NOTES
CBC some improvement of anemia  Metabolic ewelina good liver and kidney functions  Lipid panel is excellent

## 2018-07-31 RX ORDER — LISINOPRIL 40 MG/1
TABLET ORAL
Qty: 90 TAB | Refills: 1 | Status: SHIPPED | OUTPATIENT
Start: 2018-07-31 | End: 2019-02-02 | Stop reason: SDUPTHER

## 2018-11-18 ENCOUNTER — HOSPITAL ENCOUNTER (INPATIENT)
Age: 73
LOS: 3 days | Discharge: HOME | DRG: 315 | End: 2018-11-21
Attending: INTERNAL MEDICINE | Admitting: INTERNAL MEDICINE
Payer: MEDICARE

## 2018-11-18 ENCOUNTER — APPOINTMENT (OUTPATIENT)
Dept: GENERAL RADIOLOGY | Age: 73
DRG: 315 | End: 2018-11-18
Attending: INTERNAL MEDICINE
Payer: MEDICARE

## 2018-11-18 PROBLEM — I31.39 PERICARDIAL EFFUSION: Status: ACTIVE | Noted: 2018-11-18

## 2018-11-18 LAB
ANION GAP SERPL CALC-SCNC: 10 MMOL/L (ref 5–15)
ANION GAP SERPL CALC-SCNC: 11 MMOL/L (ref 5–15)
ANION GAP SERPL CALC-SCNC: 13 MMOL/L (ref 5–15)
APPEARANCE UR: CLEAR
ATRIAL RATE: 46 BPM
BACTERIA URNS QL MICRO: NEGATIVE /HPF
BILIRUB UR QL: NEGATIVE
BUN SERPL-MCNC: 11 MG/DL (ref 6–20)
BUN SERPL-MCNC: 13 MG/DL (ref 6–20)
BUN SERPL-MCNC: 13 MG/DL (ref 6–20)
BUN/CREAT SERPL: 17 (ref 12–20)
BUN/CREAT SERPL: 18 (ref 12–20)
BUN/CREAT SERPL: 19 (ref 12–20)
CALCIUM SERPL-MCNC: 7.5 MG/DL (ref 8.5–10.1)
CALCIUM SERPL-MCNC: 7.8 MG/DL (ref 8.5–10.1)
CALCIUM SERPL-MCNC: 8.3 MG/DL (ref 8.5–10.1)
CALCULATED R AXIS, ECG10: 13 DEGREES
CALCULATED T AXIS, ECG11: 24 DEGREES
CHLORIDE SERPL-SCNC: 88 MMOL/L (ref 97–108)
CHLORIDE SERPL-SCNC: 89 MMOL/L (ref 97–108)
CHLORIDE SERPL-SCNC: 92 MMOL/L (ref 97–108)
CK MB CFR SERPL CALC: 2.2 % (ref 0–2.5)
CK MB CFR SERPL CALC: NORMAL % (ref 0–2.5)
CK MB CFR SERPL CALC: NORMAL % (ref 0–2.5)
CK MB SERPL-MCNC: 1.4 NG/ML (ref 5–25)
CK MB SERPL-MCNC: <1 NG/ML (ref 5–25)
CK MB SERPL-MCNC: <1 NG/ML (ref 5–25)
CK SERPL-CCNC: 49 U/L (ref 26–192)
CK SERPL-CCNC: 55 U/L (ref 26–192)
CK SERPL-CCNC: 64 U/L (ref 26–192)
CO2 SERPL-SCNC: 19 MMOL/L (ref 21–32)
CO2 SERPL-SCNC: 21 MMOL/L (ref 21–32)
CO2 SERPL-SCNC: 22 MMOL/L (ref 21–32)
COLOR UR: ABNORMAL
COMMENT, HOLDF: NORMAL
CREAT SERPL-MCNC: 0.62 MG/DL (ref 0.55–1.02)
CREAT SERPL-MCNC: 0.67 MG/DL (ref 0.55–1.02)
CREAT SERPL-MCNC: 0.75 MG/DL (ref 0.55–1.02)
DIAGNOSIS, 93000: NORMAL
EPITH CASTS URNS QL MICRO: ABNORMAL /LPF
ERYTHROCYTE [DISTWIDTH] IN BLOOD BY AUTOMATED COUNT: 15.8 % (ref 11.5–14.5)
GLUCOSE SERPL-MCNC: 103 MG/DL (ref 65–100)
GLUCOSE SERPL-MCNC: 109 MG/DL (ref 65–100)
GLUCOSE SERPL-MCNC: 94 MG/DL (ref 65–100)
GLUCOSE UR STRIP.AUTO-MCNC: NEGATIVE MG/DL
HCT VFR BLD AUTO: 24.5 % (ref 35–47)
HGB BLD-MCNC: 7.8 G/DL (ref 11.5–16)
HGB UR QL STRIP: NEGATIVE
KETONES UR QL STRIP.AUTO: NEGATIVE MG/DL
LEUKOCYTE ESTERASE UR QL STRIP.AUTO: ABNORMAL
MCH RBC QN AUTO: 25.5 PG (ref 26–34)
MCHC RBC AUTO-ENTMCNC: 31.8 G/DL (ref 30–36.5)
MCV RBC AUTO: 80.1 FL (ref 80–99)
NITRITE UR QL STRIP.AUTO: NEGATIVE
NRBC # BLD: 0 K/UL (ref 0–0.01)
NRBC BLD-RTO: 0 PER 100 WBC
OSMOLALITY SERPL: 251 MOSM/KG H2O
OSMOLALITY UR: 110 MOSM/KG H2O
PH UR STRIP: 5 [PH] (ref 5–8)
PLATELET # BLD AUTO: 257 K/UL (ref 150–400)
PMV BLD AUTO: 9.8 FL (ref 8.9–12.9)
POTASSIUM SERPL-SCNC: 3.9 MMOL/L (ref 3.5–5.1)
POTASSIUM SERPL-SCNC: 4 MMOL/L (ref 3.5–5.1)
POTASSIUM SERPL-SCNC: 4 MMOL/L (ref 3.5–5.1)
PROT UR STRIP-MCNC: NEGATIVE MG/DL
Q-T INTERVAL, ECG07: 436 MS
QRS DURATION, ECG06: 94 MS
QTC CALCULATION (BEZET), ECG08: 413 MS
RBC # BLD AUTO: 3.06 M/UL (ref 3.8–5.2)
RBC #/AREA URNS HPF: ABNORMAL /HPF (ref 0–5)
SAMPLES BEING HELD,HOLD: NORMAL
SODIUM SERPL-SCNC: 120 MMOL/L (ref 136–145)
SODIUM SERPL-SCNC: 121 MMOL/L (ref 136–145)
SODIUM SERPL-SCNC: 124 MMOL/L (ref 136–145)
SODIUM UR-SCNC: 13 MMOL/L
SP GR UR REFRACTOMETRY: 1.01 (ref 1–1.03)
TROPONIN I SERPL-MCNC: <0.05 NG/ML
UROBILINOGEN UR QL STRIP.AUTO: 0.2 EU/DL (ref 0.2–1)
VENTRICULAR RATE, ECG03: 54 BPM
WBC # BLD AUTO: 7.8 K/UL (ref 3.6–11)
WBC URNS QL MICRO: ABNORMAL /HPF (ref 0–4)

## 2018-11-18 PROCEDURE — 83935 ASSAY OF URINE OSMOLALITY: CPT

## 2018-11-18 PROCEDURE — C1751 CATH, INF, PER/CENT/MIDLINE: HCPCS

## 2018-11-18 PROCEDURE — 84300 ASSAY OF URINE SODIUM: CPT

## 2018-11-18 PROCEDURE — 71045 X-RAY EXAM CHEST 1 VIEW: CPT

## 2018-11-18 PROCEDURE — 77030029684 HC NEB SM VOL KT MONA -A

## 2018-11-18 PROCEDURE — 77030020365 HC SOL INJ SOD CL 0.9% 50ML

## 2018-11-18 PROCEDURE — 74011000250 HC RX REV CODE- 250: Performed by: INTERNAL MEDICINE

## 2018-11-18 PROCEDURE — B548ZZA ULTRASONOGRAPHY OF SUPERIOR VENA CAVA, GUIDANCE: ICD-10-PCS | Performed by: HOSPITALIST

## 2018-11-18 PROCEDURE — 85027 COMPLETE CBC AUTOMATED: CPT

## 2018-11-18 PROCEDURE — 81001 URINALYSIS AUTO W/SCOPE: CPT

## 2018-11-18 PROCEDURE — 83930 ASSAY OF BLOOD OSMOLALITY: CPT

## 2018-11-18 PROCEDURE — 76937 US GUIDE VASCULAR ACCESS: CPT

## 2018-11-18 PROCEDURE — 93005 ELECTROCARDIOGRAM TRACING: CPT

## 2018-11-18 PROCEDURE — 36415 COLL VENOUS BLD VENIPUNCTURE: CPT

## 2018-11-18 PROCEDURE — 94760 N-INVAS EAR/PLS OXIMETRY 1: CPT

## 2018-11-18 PROCEDURE — 74011250636 HC RX REV CODE- 250/636: Performed by: HOSPITALIST

## 2018-11-18 PROCEDURE — 93306 TTE W/DOPPLER COMPLETE: CPT

## 2018-11-18 PROCEDURE — 94640 AIRWAY INHALATION TREATMENT: CPT

## 2018-11-18 PROCEDURE — 74011250637 HC RX REV CODE- 250/637: Performed by: INTERNAL MEDICINE

## 2018-11-18 PROCEDURE — 02HV33Z INSERTION OF INFUSION DEVICE INTO SUPERIOR VENA CAVA, PERCUTANEOUS APPROACH: ICD-10-PCS | Performed by: HOSPITALIST

## 2018-11-18 PROCEDURE — 84484 ASSAY OF TROPONIN QUANT: CPT

## 2018-11-18 PROCEDURE — 80048 BASIC METABOLIC PNL TOTAL CA: CPT

## 2018-11-18 PROCEDURE — 77030018719 HC DRSG PTCH ANTIMIC J&J -A

## 2018-11-18 PROCEDURE — 65610000003 HC RM ICU SURGICAL

## 2018-11-18 PROCEDURE — 74011250636 HC RX REV CODE- 250/636: Performed by: INTERNAL MEDICINE

## 2018-11-18 PROCEDURE — 82553 CREATINE MB FRACTION: CPT

## 2018-11-18 PROCEDURE — 36569 INSJ PICC 5 YR+ W/O IMAGING: CPT | Performed by: HOSPITALIST

## 2018-11-18 RX ORDER — SODIUM CHLORIDE 0.9 % (FLUSH) 0.9 %
10 SYRINGE (ML) INJECTION EVERY 8 HOURS
Status: DISCONTINUED | OUTPATIENT
Start: 2018-11-18 | End: 2018-11-21 | Stop reason: HOSPADM

## 2018-11-18 RX ORDER — IPRATROPIUM BROMIDE AND ALBUTEROL SULFATE 2.5; .5 MG/3ML; MG/3ML
3 SOLUTION RESPIRATORY (INHALATION)
Status: DISCONTINUED | OUTPATIENT
Start: 2018-11-18 | End: 2018-11-21 | Stop reason: HOSPADM

## 2018-11-18 RX ORDER — SODIUM CHLORIDE 0.9 % (FLUSH) 0.9 %
5-10 SYRINGE (ML) INJECTION EVERY 8 HOURS
Status: DISCONTINUED | OUTPATIENT
Start: 2018-11-18 | End: 2018-11-21 | Stop reason: HOSPADM

## 2018-11-18 RX ORDER — HYDROCODONE POLISTIREX AND CHLORPHENIRAMINE POLISTIREX 10; 8 MG/5ML; MG/5ML
5 SUSPENSION, EXTENDED RELEASE ORAL
Status: DISCONTINUED | OUTPATIENT
Start: 2018-11-18 | End: 2018-11-20

## 2018-11-18 RX ORDER — PANTOPRAZOLE SODIUM 40 MG/1
40 TABLET, DELAYED RELEASE ORAL DAILY
Status: DISCONTINUED | OUTPATIENT
Start: 2018-11-18 | End: 2018-11-21 | Stop reason: HOSPADM

## 2018-11-18 RX ORDER — SODIUM CHLORIDE 9 MG/ML
50 INJECTION, SOLUTION INTRAVENOUS CONTINUOUS
Status: DISPENSED | OUTPATIENT
Start: 2018-11-18 | End: 2018-11-20

## 2018-11-18 RX ORDER — DOXYCYCLINE HYCLATE 100 MG
100 TABLET ORAL 2 TIMES DAILY
Status: DISCONTINUED | OUTPATIENT
Start: 2018-11-18 | End: 2018-11-21 | Stop reason: HOSPADM

## 2018-11-18 RX ORDER — BUDESONIDE 0.5 MG/2ML
500 INHALANT ORAL EVERY 12 HOURS
Status: DISCONTINUED | OUTPATIENT
Start: 2018-11-18 | End: 2018-11-21 | Stop reason: HOSPADM

## 2018-11-18 RX ORDER — HYDROXYCHLOROQUINE SULFATE 200 MG/1
200 TABLET, FILM COATED ORAL 2 TIMES DAILY
Status: DISCONTINUED | OUTPATIENT
Start: 2018-11-18 | End: 2018-11-21 | Stop reason: HOSPADM

## 2018-11-18 RX ORDER — CICLOPIROX OLAMINE 7.7 MG/G
CREAM TOPICAL 2 TIMES DAILY
Status: DISCONTINUED | OUTPATIENT
Start: 2018-11-18 | End: 2018-11-21 | Stop reason: HOSPADM

## 2018-11-18 RX ORDER — GUAIFENESIN 100 MG/5ML
81 LIQUID (ML) ORAL DAILY
Status: DISCONTINUED | OUTPATIENT
Start: 2018-11-18 | End: 2018-11-21 | Stop reason: HOSPADM

## 2018-11-18 RX ORDER — BUDESONIDE 3 MG/1
9 CAPSULE, COATED PELLETS ORAL
Status: DISCONTINUED | OUTPATIENT
Start: 2018-11-18 | End: 2018-11-18

## 2018-11-18 RX ORDER — SODIUM CHLORIDE 0.9 % (FLUSH) 0.9 %
10 SYRINGE (ML) INJECTION EVERY 24 HOURS
Status: DISCONTINUED | OUTPATIENT
Start: 2018-11-18 | End: 2018-11-21 | Stop reason: HOSPADM

## 2018-11-18 RX ORDER — IPRATROPIUM BROMIDE AND ALBUTEROL SULFATE 2.5; .5 MG/3ML; MG/3ML
3 SOLUTION RESPIRATORY (INHALATION)
Status: DISCONTINUED | OUTPATIENT
Start: 2018-11-18 | End: 2018-11-18 | Stop reason: SDUPTHER

## 2018-11-18 RX ORDER — MAGNESIUM SULFATE 1 G/100ML
1 INJECTION INTRAVENOUS ONCE
Status: COMPLETED | OUTPATIENT
Start: 2018-11-18 | End: 2018-11-18

## 2018-11-18 RX ORDER — SODIUM CHLORIDE 0.9 % (FLUSH) 0.9 %
10 SYRINGE (ML) INJECTION AS NEEDED
Status: DISCONTINUED | OUTPATIENT
Start: 2018-11-18 | End: 2018-11-21 | Stop reason: HOSPADM

## 2018-11-18 RX ORDER — PREDNISONE 20 MG/1
40 TABLET ORAL
Status: DISCONTINUED | OUTPATIENT
Start: 2018-11-19 | End: 2018-11-21 | Stop reason: HOSPADM

## 2018-11-18 RX ORDER — LEVOTHYROXINE SODIUM 100 UG/1
100 TABLET ORAL
Status: DISCONTINUED | OUTPATIENT
Start: 2018-11-18 | End: 2018-11-21 | Stop reason: HOSPADM

## 2018-11-18 RX ORDER — SODIUM CHLORIDE 0.9 % (FLUSH) 0.9 %
5-10 SYRINGE (ML) INJECTION AS NEEDED
Status: DISCONTINUED | OUTPATIENT
Start: 2018-11-18 | End: 2018-11-21 | Stop reason: HOSPADM

## 2018-11-18 RX ORDER — CALCIUM CARBONATE 200(500)MG
400 TABLET,CHEWABLE ORAL 2 TIMES DAILY
Status: DISCONTINUED | OUTPATIENT
Start: 2018-11-18 | End: 2018-11-21 | Stop reason: HOSPADM

## 2018-11-18 RX ORDER — BACITRACIN 500 UNIT/G
1 PACKET (EA) TOPICAL AS NEEDED
Status: DISCONTINUED | OUTPATIENT
Start: 2018-11-18 | End: 2018-11-21 | Stop reason: HOSPADM

## 2018-11-18 RX ORDER — MELATONIN
1000 DAILY
Status: DISCONTINUED | OUTPATIENT
Start: 2018-11-18 | End: 2018-11-21 | Stop reason: HOSPADM

## 2018-11-18 RX ORDER — SODIUM CHLORIDE 0.9 % (FLUSH) 0.9 %
20 SYRINGE (ML) INJECTION AS NEEDED
Status: DISCONTINUED | OUTPATIENT
Start: 2018-11-18 | End: 2018-11-21 | Stop reason: HOSPADM

## 2018-11-18 RX ORDER — CLOBETASOL PROPIONATE 0.5 MG/G
OINTMENT TOPICAL DAILY
Status: DISCONTINUED | OUTPATIENT
Start: 2018-11-18 | End: 2018-11-21 | Stop reason: HOSPADM

## 2018-11-18 RX ORDER — ENOXAPARIN SODIUM 100 MG/ML
40 INJECTION SUBCUTANEOUS EVERY 24 HOURS
Status: DISCONTINUED | OUTPATIENT
Start: 2018-11-18 | End: 2018-11-21 | Stop reason: HOSPADM

## 2018-11-18 RX ORDER — TACROLIMUS 1 MG/1
1 CAPSULE ORAL 3 TIMES DAILY
Status: DISCONTINUED | OUTPATIENT
Start: 2018-11-18 | End: 2018-11-19 | Stop reason: DRUGHIGH

## 2018-11-18 RX ORDER — ACETAMINOPHEN 325 MG/1
650 TABLET ORAL
Status: DISCONTINUED | OUTPATIENT
Start: 2018-11-19 | End: 2018-11-21 | Stop reason: HOSPADM

## 2018-11-18 RX ADMIN — HYDROXYCHLOROQUINE SULFATE 200 MG: 200 TABLET, FILM COATED ORAL at 09:01

## 2018-11-18 RX ADMIN — SODIUM CHLORIDE 75 ML/HR: 900 INJECTION, SOLUTION INTRAVENOUS at 14:29

## 2018-11-18 RX ADMIN — PANTOPRAZOLE SODIUM 40 MG: 40 TABLET, DELAYED RELEASE ORAL at 09:01

## 2018-11-18 RX ADMIN — DOXYCYCLINE HYCLATE 100 MG: 100 TABLET, COATED ORAL at 20:14

## 2018-11-18 RX ADMIN — HYDROCODONE POLISTIREX AND CHLORPHENIRAMINE POLISTIREX 5 ML: 10; 8 SUSPENSION, EXTENDED RELEASE ORAL at 11:24

## 2018-11-18 RX ADMIN — Medication 10 ML: at 14:30

## 2018-11-18 RX ADMIN — CLOBETASOL PROPIONATE: 0.5 OINTMENT TOPICAL at 14:27

## 2018-11-18 RX ADMIN — MAGNESIUM SULFATE IN DEXTROSE 1 G: 10 INJECTION, SOLUTION INTRAVENOUS at 06:43

## 2018-11-18 RX ADMIN — Medication 10 ML: at 07:04

## 2018-11-18 RX ADMIN — CALCIUM CARBONATE (ANTACID) CHEW TAB 500 MG 400 MG: 500 CHEW TAB at 09:01

## 2018-11-18 RX ADMIN — HYDROXYCHLOROQUINE SULFATE 200 MG: 200 TABLET, FILM COATED ORAL at 17:28

## 2018-11-18 RX ADMIN — Medication 10 ML: at 23:52

## 2018-11-18 RX ADMIN — BUDESONIDE 500 MCG: 0.5 INHALANT RESPIRATORY (INHALATION) at 20:27

## 2018-11-18 RX ADMIN — ASPIRIN 81 MG CHEWABLE TABLET 81 MG: 81 TABLET CHEWABLE at 09:01

## 2018-11-18 RX ADMIN — SODIUM CHLORIDE 75 ML/HR: 900 INJECTION, SOLUTION INTRAVENOUS at 23:50

## 2018-11-18 RX ADMIN — MULTIPLE VITAMINS W/ MINERALS TAB 1 TABLET: TAB at 09:01

## 2018-11-18 RX ADMIN — TACROLIMUS 1 MG: 1 CAPSULE ORAL at 17:29

## 2018-11-18 RX ADMIN — IPRATROPIUM BROMIDE AND ALBUTEROL SULFATE 3 ML: .5; 3 SOLUTION RESPIRATORY (INHALATION) at 13:38

## 2018-11-18 RX ADMIN — DOXYCYCLINE HYCLATE 100 MG: 100 TABLET, COATED ORAL at 09:01

## 2018-11-18 RX ADMIN — IPRATROPIUM BROMIDE AND ALBUTEROL SULFATE 3 ML: .5; 3 SOLUTION RESPIRATORY (INHALATION) at 07:41

## 2018-11-18 RX ADMIN — TACROLIMUS 1 MG: 1 CAPSULE ORAL at 22:00

## 2018-11-18 RX ADMIN — CALCIUM CARBONATE (ANTACID) CHEW TAB 500 MG 400 MG: 500 CHEW TAB at 17:28

## 2018-11-18 RX ADMIN — IPRATROPIUM BROMIDE AND ALBUTEROL SULFATE 3 ML: .5; 3 SOLUTION RESPIRATORY (INHALATION) at 20:27

## 2018-11-18 RX ADMIN — VITAMIN D, TAB 1000IU (100/BT) 1000 UNITS: 25 TAB at 09:02

## 2018-11-18 RX ADMIN — BUDESONIDE 500 MCG: 0.5 INHALANT RESPIRATORY (INHALATION) at 07:41

## 2018-11-18 RX ADMIN — ENOXAPARIN SODIUM 40 MG: 100 INJECTION SUBCUTANEOUS at 09:00

## 2018-11-18 RX ADMIN — TACROLIMUS 1 MG: 1 CAPSULE ORAL at 09:09

## 2018-11-18 RX ADMIN — LEVOTHYROXINE SODIUM 100 MCG: 100 TABLET ORAL at 07:04

## 2018-11-18 NOTE — PROGRESS NOTES
Hospitalist Progress Note Belle Bergman MD 
Answering service: [phone] OR 36 from in house phone Cell: [phone] Date of Service:  2018 NAME:  Yadira Villar :  1945 MRN:  [id number] Admission Summary:  
Yadira Villar, 75 y.o. female with PMHx significant for COPD, presents ambulatory to the ED with cc of SOB. Patient notes she has been struggling with a COPD flair for the past 5 weeks but notes it has worsened today. She as taken two rounds of prednisone with a zpac and most recently this week doxycycline which just completed today. She denies fevers, chills, vomiting and diarrhea but notes constant chest aching across the front and back of her trunk which worsens with coughing. She is using Qvair TID with albuterol MDI approximately every 4 hours but states she continues to worsen Interval history / Subjective:  
  F/u Pericardial effusion She feels much better now Assessment & Plan:  
 
Pericardial effusion 
-CT chest  Moderate pericardial effusion. Small bilateral effusions, left greater than right, with associated atelectasis 
-no suggestions of tamponade so far, follow Echo 
-Appreciate Cardiology 
-Continue ICU level of care Hyponatremia 
-seems chronic  
-Follow work up 
-Will put the patient on IV fluids, BMP q6 Mild COPD exacerbation 
-Continue Duoneb/Doxycycline. Will put her on short course of oral prednisone 
-She is already feeling well Hypothyroidism 
-on synthroid 
-Follow TFTs Overweight 
-Counseled On Fluconazole/Plaquenil/Tacrolimus 
-Patient claims for her oral infection ? ? (candidiasis and lichen planus) -She is being treated for that at [facility] 
-Will continue home meds History of ulcerative colitis 
-She has not been on Pentasa/Entocort for 9 months 
-Will take her off that for now 
-Outpatient follow up with Dr Yolande Cadena Osteoporosis -Outpatient follow up with Dr Max Files Cardiac diet Code status: FULL CODE 
DVT prophylaxis: Lovenox PTA: home Plan: Follow echo, cardiology Care Plan discussed with: Patient/Family Disposition: TBD Hospital Problems  Date Reviewed: 11/18/2018 Codes Class Noted POA * (Principal) Pericardial effusion ICD-10-CM: I31.3 ICD-9-CM: 423.9  11/18/2018 Unknown Review of Systems: A comprehensive review of systems was negative except for that written in the HPI. Vital Signs:  
 Last 24hrs VS reviewed since prior progress note. Most recent are: 
Visit Vitals /47 Pulse (!) 56 Temp 98.4 °F (36.9 °C) Resp 22 Wt 79.5 kg (175 lb 4.3 oz) SpO2 100% BMI 34.23 kg/m² Intake/Output Summary (Last 24 hours) at 11/18/2018 4282 Last data filed at 11/18/2018 0700 Gross per 24 hour Intake 100 ml Output  Net 100 ml Physical Examination:  
 
 
     
Constitutional:  No acute distress, cooperative, pleasant   
ENT:  Oral mucous moist, oropharynx benign. Neck supple, Resp:  CTA bilaterally. No wheezing/rhonchi/rales. No accessory muscle use CV:  Regular rhythm, normal rate, no murmurs, gallops, rubs GI:  Soft, non distended, non tender. normoactive bowel sounds, no hepatosplenomegaly Musculoskeletal:  No edema, warm, 2+ pulses throughout Neurologic:  Moves all extremities. AAOx3, CN II-XII reviewed Skin:  Good turgor, no rashes or ulcers Data Review:  
 Review and/or order of clinical lab test 
 
 
Labs:  
 
Recent Labs 11/17/18 2120 WBC 10.9 HGB 8.7* HCT 26.8*  
 Recent Labs 11/18/18 
3555 11/17/18 2120 * 122* K 4.0 4.5  
CL 89* 88* CO2 19* 22 BUN 13 16 CREA 0.75 0.91  
GLU 94 102* CA 8.3* 8.7 MG  --  1.6 Recent Labs 11/17/18 2120 SGOT 15 ALT 20 AP 98 TBILI 0.5 TP 6.7 ALB 2.8*  
GLOB 3.9 No results for input(s): INR, PTP, APTT in the last 72 hours. No lab exists for component: INREXT No results for input(s): FE, TIBC, PSAT, FERR in the last 72 hours. No results found for: FOL, RBCF No results for input(s): PH, PCO2, PO2 in the last 72 hours. Recent Labs 11/18/18 
8426 11/17/18 2120 CPK 55 32 CKNDX Cannot be calculated 3.1*  
TROIQ <0.05 <0.05 Lab Results Component Value Date/Time Cholesterol, total 199 06/20/2018 09:32 AM  
 HDL Cholesterol 94 06/20/2018 09:32 AM  
 LDL, calculated 94 06/20/2018 09:32 AM  
 Triglyceride 54 06/20/2018 09:32 AM  
 
No results found for: Fozia Left Lab Results Component Value Date/Time Color YELLOW/STRAW 11/18/2018 06:17 AM  
 Appearance CLEAR 11/18/2018 06:17 AM  
 Specific gravity 1.008 11/18/2018 06:17 AM  
 pH (UA) 5.0 11/18/2018 06:17 AM  
 Protein NEGATIVE  11/18/2018 06:17 AM  
 Glucose NEGATIVE  11/18/2018 06:17 AM  
 Ketone NEGATIVE  11/18/2018 06:17 AM  
 Bilirubin NEGATIVE  11/18/2018 06:17 AM  
 Urobilinogen 0.2 11/18/2018 06:17 AM  
 Nitrites NEGATIVE  11/18/2018 06:17 AM  
 Leukocyte Esterase SMALL (A) 11/18/2018 06:17 AM  
 Epithelial cells FEW 11/18/2018 06:17 AM  
 Bacteria NEGATIVE  11/18/2018 06:17 AM  
 WBC 0-4 11/18/2018 06:17 AM  
 RBC 0-5 11/18/2018 06:17 AM  
 
 
 
Medications Reviewed:  
 
Current Facility-Administered Medications Medication Dose Route Frequency  . PHARMACY TO SUBSTITUTE PER PROTOCOL (Reordered from: acetaminophen (ARTHRITIS PAIN RELIEF) 650 mg CR tablet)    Per Protocol  albuterol-ipratropium (DUO-NEB) 2.5 MG-0.5 MG/3 ML  3 mL Nebulization Q6H RT  
 aspirin chewable tablet 81 mg  81 mg Oral DAILY  budesonide (PULMICORT) 500 mcg/2 ml nebulizer suspension  500 mcg Nebulization Q12H  
 budesonide (ENTOCORT EC) capsule 9 mg  9 mg Oral 7am  
 calcium carbonate (TUMS) chewable tablet 400 mg [elemental]  400 mg Oral BID  chlorpheniramine-HYDROcodone (TUSSIONEX) oral suspension 5 mL  5 mL Oral Q12H PRN  
  cholecalciferol (VITAMIN D3) tablet 1,000 Units  1,000 Units Oral DAILY  ciclopirox (LOPROX) 0.77 % cream   Topical BID  clobetasol (TEMOVATE) 0.05 % ointment   Topical DAILY  doxycycline (VIBRA-TABS) tablet 100 mg  100 mg Oral BID  multivitamin, tx-iron-ca-min (THERA-M w/ IRON) tablet 1 Tab  1 Tab Oral DAILY  hydroxychloroquine (PLAQUENIL) tablet 200 mg  200 mg Oral BID  levothyroxine (SYNTHROID) tablet 100 mcg  100 mcg Oral ACB  pantoprazole (PROTONIX) tablet 40 mg  40 mg Oral DAILY  tacrolimus (PROGRAF) capsule 1 mg  1 mg Oral TID  
 . PHARMACY TO SUBSTITUTE PER PROTOCOL (Reordered from: tacrolimus (PROTOPIC) 0.1 % ointment)    Per Protocol  sodium chloride (NS) flush 5-10 mL  5-10 mL IntraVENous Q8H  
 sodium chloride (NS) flush 5-10 mL  5-10 mL IntraVENous PRN  
 enoxaparin (LOVENOX) injection 40 mg  40 mg SubCUTAneous Q24H  
 albuterol-ipratropium (DUO-NEB) 2.5 MG-0.5 MG/3 ML  3 mL Nebulization Q4H PRN  
 
______________________________________________________________________ EXPECTED LENGTH OF STAY: - - - 
ACTUAL LENGTH OF STAY:          0 Asia Lomeli MD

## 2018-11-18 NOTE — CONSULTS
[place]: Luis Torres  [phone]  Requesting/referring provider: Dr. Myriam Bell   Reason for Consult:pericardial effusion, dyspnea    HPI: Zakiya Sidhu, a 75y.o. year-old who presents for evaluation of respiratory distress. Noted to have pericardial effusion on Ct chest. Noncontrast. Has some chest pain that seem pleuritic, worse with breathing, coughing. [facility] had multiple episodes of bronchitis, etc no prior cardiac evaluation. She is feeling somewhat better this am after diuresis and neb treatments. Enz negative. No exertional chest pain prior to bronchitis. Sodium is very low. Sx mostly pulmonary, COPD, etc but need cardiac evaluation. Last LDL 94    Assessment/Plan:  1. COPD/bronchitis- as per pulmonary/IM  2,. Pericardial effusion- clinically no sx of tamponade, echo pending  3. Dyspnea- needs echo for PAP and EF  4. Body mass index is 34.23 kg/m². work on diet exercise  5. BP stable  6. Low Na concerning    Fhx no early cad  Soc no etoh no tob    She  has a past medical history of Allergic rhinitis, COPD (chronic obstructive pulmonary disease) ([facility] [address] Hypertension, Hypothyroidism, Lichen planus, Menopause, Osteoarthrosis, unspecified whether generalized or localized, unspecified site, Overactive bladder, Phlebitis and thrombophlebitis of lower extremities, unspecified, and Raynaud's syndrome. Cardiovascular ROS: positive for - chest pain and dyspnea on exertion  Respiratory ROS: positive for - cough and shortness of breath  Neurological ROS: no TIA or stroke symptoms  All other systems negative except as above. PE  Vitals:    11/18/18 0515 11/18/18 0600 11/18/18 0700 11/18/18 0743   BP: 111/47 133/46 133/47    Pulse: 62 61 (!) 56    Resp: 20 25 22    Temp:       SpO2: 100% 98% 100% 100%   Weight:        Body mass index is 34.23 kg/m².    General appearance - ill, nad  Mental status - affect appropriate to mood  Eyes - sclera anicteric, moist mucous membranes  Neck - supple, no significant adenopathy  Lymphatics - no  lymphadenopathy  Chest - clear to auscultation, scatt rhonchi throughout  Heart - normal rate, regular rhythm, normal S1, S2, no murmurs, rubs, clicks or gallops  Abdomen - soft, nontender, nondistended, no masses or organomegaly  Back exam - full range of motion, no tenderness  Neurological - cranial nerves II through XII grossly intact, no focal deficit  Musculoskeletal - no muscular tenderness noted, normal strength  Extremities - peripheral pulses normal, no pedal edema  Skin - normal coloration  no rashes    Recent Labs:  Lab Results   Component Value Date/Time    Cholesterol, total 199 06/20/2018 09:32 AM    HDL Cholesterol 94 06/20/2018 09:32 AM    LDL, calculated 94 06/20/2018 09:32 AM    Triglyceride 54 06/20/2018 09:32 AM     Lab Results   Component Value Date/Time    Creatinine 0.75 11/18/2018 06:42 AM     Lab Results   Component Value Date/Time    BUN 13 11/18/2018 06:42 AM     Lab Results   Component Value Date/Time    Potassium 4.0 11/18/2018 06:42 AM     Lab Results   Component Value Date/Time    Hemoglobin A1c 5.5 01/07/2016 11:59 AM     Lab Results   Component Value Date/Time    HGB 8.7 (L) 11/17/2018 09:20 PM     Lab Results   Component Value Date/Time    PLATELET 518 07/90/3470 09:20 PM       Reviewed:  Past Medical History:   Diagnosis Date    Allergic rhinitis     COPD (chronic obstructive pulmonary disease) (HCC)     Hypertension     Hypothyroidism     [place]hen planus     [place] [facility]    Menopause     Osteoarthrosis, unspecified whether generalized or localized, unspecified site     Overactive bladder     Phlebitis and thrombophlebitis of lower extremities, unspecified     Raynaud's syndrome      Social History     Tobacco Use   Smoking Status Former Smoker    Types: Cigarettes   Smokeless Tobacco Never Used   Tobacco Comment    smoked less than 10 yrs and quit 40 +     Social History     Substance and Sexual Activity   Alcohol Use No     Allergies Allergen Reactions    Latex Rash    Keflex [Cephalexin] Rash    Levaquin [Levofloxacin] Rash    Macrobid [Nitrofurantoin Monohyd/M-Cryst] Rash    Sulfa (Sulfonamide Antibiotics) Rash       Current Facility-Administered Medications   Medication Dose Route Frequency    . PHARMACY TO SUBSTITUTE PER PROTOCOL (Reordered from: acetaminophen (ARTHRITIS PAIN RELIEF) 650 mg CR tablet)    Per Protocol    albuterol-ipratropium (DUO-NEB) 2.5 MG-0.5 MG/3 ML  3 mL Nebulization Q6H RT    aspirin chewable tablet 81 mg  81 mg Oral DAILY    budesonide (PULMICORT) 500 mcg/2 ml nebulizer suspension  500 mcg Nebulization Q12H    budesonide (ENTOCORT EC) capsule 9 mg  9 mg Oral 7am    calcium carbonate (TUMS) chewable tablet 400 mg [elemental]  400 mg Oral BID    chlorpheniramine-HYDROcodone (TUSSIONEX) oral suspension 5 mL  5 mL Oral Q12H PRN    cholecalciferol (VITAMIN D3) tablet 1,000 Units  1,000 Units Oral DAILY    ciclopirox (LOPROX) 0.77 % cream   Topical BID    clobetasol (TEMOVATE) 0.05 % ointment   Topical DAILY    doxycycline (VIBRA-TABS) tablet 100 mg  100 mg Oral BID    multivitamin, tx-iron-ca-min (THERA-M w/ IRON) tablet 1 Tab  1 Tab Oral DAILY    hydroxychloroquine (PLAQUENIL) tablet 200 mg  200 mg Oral BID    levothyroxine (SYNTHROID) tablet 100 mcg  100 mcg Oral ACB    pantoprazole (PROTONIX) tablet 40 mg  40 mg Oral DAILY    tacrolimus (PROGRAF) capsule 1 mg  1 mg Oral TID    . PHARMACY TO SUBSTITUTE PER PROTOCOL (Reordered from: tacrolimus (PROTOPIC) 0.1 % ointment)    Per Protocol    sodium chloride (NS) flush 5-10 mL  5-10 mL IntraVENous Q8H    sodium chloride (NS) flush 5-10 mL  5-10 mL IntraVENous PRN    enoxaparin (LOVENOX) injection 40 mg  40 mg SubCUTAneous Q24H    albuterol-ipratropium (DUO-NEB) 2.5 MG-0.5 MG/3 ML  3 mL Nebulization Q4H PRN       Jorge Linares MD  [facility] [place]  [address] [facility] [address]  [address]

## 2018-11-18 NOTE — PROGRESS NOTES
Problem: Falls - Risk of 
Goal: *Absence of Falls Document Avtar Shin Fall Risk and appropriate interventions in the flowsheet. Outcome: Progressing Towards Goal 
Fall Risk Interventions: 
  
 
  
 
Medication Interventions: Evaluate medications/consider consulting pharmacy Elimination Interventions: Call light in reach, Patient to call for help with toileting needs History of Falls Interventions: Door open when patient unattended

## 2018-11-18 NOTE — H&P
[address] HISTORY AND PHYSICAL Alba Santos MR#: [id number] : 1945 ACCOUNT #: [de-identified] ADMIT DATE: 2018 PRIMARY CARE PROVIDER:  Farhan Slade NP. 
 
PRESENTING COMPLAINT:  Patient is a transfer from [facility] for shortness of breath and pericardial effusion. HISTORY OF PRESENT ILLNESS:  The patient is a 50-year-old female with history of COPD and hypertension, osteoarthritis, Raynaud's syndrome and hypothyroidism who was transferred from [facility] for pericardial effusion and dyspnea. Patient presented to the emergency room at [facility] with complaints of worsening shortness of breath. The patient reported that she was treated for COPD exacerbation for the past 5 weeks, but according to patient, her symptoms have continued to worsen despite the treatments. The patient has taken 2 rounds of prednisone with a Z-RUPA and most recently this week doxycycline, which the patient completed yesterday. Patient reports cough that is nonproductive. Shortness of breath is said to have worsened, associated with chest tightness. Patient reported chest pain last week for which she was seen at the emergency room twice. According to the patient it was reported her chest pain worse as a result of a persistent cough. Cough is associated with diffuse wheezing. Patient has no report of palpitations, trauma or fall. She denied PND, orthopnea. Patient also denied lower extremity pain or swelling. There is no nausea, vomiting, abdominal pain, constipation or diarrhea. The patient denied lightheadedness, dizziness, narrow vision, double vision, syncopal episode or seizures. She has no headache, lightheadedness, dizziness, blurry vision, double vision, syncopal episode or seizures. Patient has no neck pain, neck stiffness or confusion. She denied back pain or flank pain. She also denies dysuria, frequency, hematuria, urethral discharge or vaginal discharge. The patient reports generalized weakness, but denies focal weakness, numbness, tingling sensation, abnormal sensation. There is no report of loss of appetite, dysphagia or odynophagia present. On presentation to the emergency room at [facility].  The patient was given 4 rounds of bronchodilators. A CT of the chest without contrast showed moderate bilateral pleural effusion. EKG showed changes with flattening of T waves. The patient was transferred to our facility for further evaluation and treatment. PAST MEDICAL HISTORY: 
1. COPD. 2.  Hypertension. 3.  Hypothyroidism. 4.  Lesion Ophthalmus. 5.  Osteoarthritis. 6.  Overactive bladder. 7.  Raynaud's syndrome. 8.  Phlebitis and thrombophlebitis of lower extremities, unspecified. PAST SURGICAL HISTORY: 
1. Appendectomy. 2.  Hysterectomy. 3.  . 4.  Bilateral Tubal ligation. 5.  Tonsillectomy. 6.  Mohs procedures of left lower leg. 7.  Oophorectomy. 8.  Bladder sling.  
 
MEDICATIONS:  Doxycycline 100 mg 2 times daily, chlorpheniramine/hydrocodone 5 mg every 12 hours, beclomethasone, QVAR 2 puff inhalation daily, ibandronate 150 mg once a month, lidocaine 2% solution take 10 mL by mouth as needed for pain, lisinopril 40 mg daily, levothyroxine 100 mcg daily before breakfast, amlodipine 10 mg daily, Anoro Ellipta 1 inhalation once a day, albuterol 2 puff inhalation every 6 hours as needed, triamcinolone acetonide 0.1% topical cream applied to affected area 2 times daily, tacrolimus 1 mg 3 times daily, tacrolimus 0.1% ointment apply to affected area daily in the morning, clobetasol 0.05% ointment apply to affected area daily at night, fluconazole 150 mg every 7 days as per patient report, hydroxychloroquine 200 mg 2 times daily, Budesonide 3 capsules by mouth, augmented betamethasone dipropionate 0.05% ointment b.i.d. to lesions on legs and arms,  calcium carbonate 1-2 tabs by mouth 2 times daily, acetaminophen (arthritis) 650 mg every 8 hours, naproxen-diphenhydramine to 220/25 mg, aspirin 81 mg daily, folic mesalamine 228 mg 2 times daily as needed, mirabegron 25 mg daily, omeprazole 20 mg daily, cholecalciferol 1000 units daily. ALLERGIES:  LATEX, KEFLEX, LEVAQUIN, MACROBID AND SULFA. SOCIAL HISTORY:  Patient is , lives with spouse. She is a former smoker. She quit less than 10 years ago. She denies alcohol or recreational drug use. FAMILY HISTORY:  Significant for stroke in the mother, dementia in the mother, brain tumor in the father, COPD in the brother, cancer in the niece. REVIEW OF SYSTEMS:  More than 12 systems were reviewed and the pertinent positives are in the history of present illness, all others are negative. PHYSICAL EXAMINATION: 
GENERAL:  Patient seen lying in bed on oxygen via nasal cannula. VITAL SIGNS:  Pulse is 62, blood pressure 111/47, MAP 68, respirations 20, pulse ox 100%. HEAD, EAR, NOSE AND THROAT:  Atraumatic, normocephalic. Anicteric, not pale, not jaundiced. Extraocular muscles intact. Pupils bilaterally equal, round. NECK:  Supple, no JVD, no carotid bruit. CARDIOVASCULAR:  Heart sounds 1 and 2, regular rate and rhythm. No gallop, no friction rub. RESPIRATION:  Decreased air entry bilaterally, expiratory wheezing noted posteriorly. No rales. ABDOMEN:  Soft, nontender. Bowel sounds normoactive. NEUROLOGIC:  Alert, oriented x3. Cranial nerves II-XII intact. SKIN:  Warm, dry, normal skin color, normal skin turgor. PSYCHIATRIC:  Normal mood, normal affect. BACK:  No CVA tenderness. EXTREMITIES:  1+ pitting edema, bilateral lower extremities. DIAGNOSTIC TESTS:  EKG showed normal sinus rhythm at 66 beats per minute, diffuse T-wave flattening. LABORATORY DATA:  WBC 10.9, hemoglobin 8.7, hematocrit 26.8, platelet 982. Sodium 122, potassium 4.5, chloride 88, CO2 22, glucose 102, BUN 16, creatinine 0.91, calcium 8.7, magnesium 1.6, ALT 20, AST 15, alkaline phosphatase 98, CK-MB 1, troponin negative. . DIAGNOSTIC DATA: Chest x-ray showed trace bilateral pleural effusions. CT of the chest without contrast showed moderate pericardial effusion. Small bilateral effusions. Left greater than right, with associated atelectasis. IMPRESSION: 
1. Worsening shortness of breath. 2.  Pericardial effusion. 3.  Abnormal EKG. 4.  Severe chronic obstructive pulmonary disease. 5.  Raynaud's syndrome. 6.  Overactive bladder. 7.  Osteoarthritis. 8.  Lesion ophthalmus. 9.  Hypothyroidism. 10.  Hypertension. 11.  Hyponatremia/electrolyte abnormality PLAN: 
1. To admit patient to the intensive care unit under hospitalist service. 2.  Serial cardiac enzymes. 3.  Echocardiogram stat. 4.  Cardiology consult. I discussed the patient with on-call Cardiology, Dr. Natasha Rao and she agreed with a stat echocardiogram and serial cardiac enzymes. 5.  No diuretics or fluids indicated at this time as discussed with Cardiology. 6.  DuoNeb nebulizer treatments scheduled and as needed. 7.  Continuous pulse ox. 8.  Oxygen supplementation. 9.  Hold hydrochlorothiazide for now. 10.  Patient may benefit from IV fluid after echocardiogram. 
11.  Monitor electrolytes. 12.  Extensive patient education was done by bedside. 13.  Deep venous thrombosis prophylaxis, Lovenox. 14.  Monitor vital signs including blood pressure as per unit protocol. 15.  I will restart appropriate home medications. Hold antihypertensives for now. 12.  Fall precautions, skin care precautions. Bed rest. 
17.  I discussed advance directives with the patient and the spouse is the next of kin. 18.  CODE: IS FULL CODE. 19.  Further management will depend on the patient's clinical progression and further evaluation by attending physician, result of tests and recommendation by specialist. 
 
 
MD SOFIYA Oden/ 
D: 11/18/2018 05:52    
T: 11/18/2018 10:14 
JOB #: [id number]

## 2018-11-18 NOTE — PROCEDURES
PICC Placement Note. Order received. PICC procedure , risks, benefits and complications reviewed with the patient. Questions answered. Madi Alcaraz signed informed consent for bedside placement of PICC line. PRE-PROCEDURE VERIFICATION  Correct Procedure: yes  Correct Site:  yes  Temperature: Temp: 97.5 °F (36.4 °C), Temperature Source: Temp Source: Oral  Recent Labs     11/18/18  0642 11/17/18  2120   BUN 13 16   CREA 0.75 0.91   PLT  --  289   WBC  --  10.9     Allergies: Latex; Keflex [cephalexin]; Levaquin [levofloxacin]; Macrobid [nitrofurantoin monohyd/m-cryst]; and Sulfa (sulfonamide antibiotics)  Education materials, including PICC Booklet, for PICC Care given to patient: yes. See Patient Education activity for further details. PROCEDURE DETAIL  A triple lumen PICC line was started for vascular access, desire for reliable access and nurse/physician request. The following documentation is in addition to the PICC properties in the lines/airways flowsheet : The vein was accessed with a 20g IV catheter using ultrasound guidance and a guidewire was easily thread. Modified Seldinger Technique was used to thread the PICC and the tip direction was determined with a PICC tip  device  Lot #: [id number]  Was xylocaine 1% used intradermally:  yes  Catheter Length: 37 (cm)  Vein Selection for PICC:right basilic  Central Line Bundle followed yes  Complication Related to Insertion: none    The placement was verified by X-ray:  The x-ray results state the tip location is on the right side and the tip is in the distal superior vena cava. Report given to [facility]. RN    Line is okay to use.

## 2018-11-18 NOTE — PROGRESS NOTES
0800 Bedside shift change report given to Xiomara Marshall (oncoming nurse) by Misael Kim (offgoing nurse). Report included the following information SBAR, Kardex, MAR and Cardiac Rhythm Junctional rhythm. 0900 Dr. Carmen Palma at bedside discussed plan of care and need for PICC w/ patient. 1330 PICC line placed, xray at bedside. 1407 PICC cleared for use. IV Maintenance fluids started, labs drawn. 1700 Uneventful shift. Pt resting in room.  staying [employer]. 1945 Bedside shift change report given to [facility] (oncoming nurse) by Xiomara Marshall (offgoing nurse). Report included the following information SBAR, Kardex, MAR and Cardiac Rhythm Junctional Rhythm .

## 2018-11-18 NOTE — PROGRESS NOTES
0206- Report received from [facility] at [address] Updated on Pt condition, vitals, history, labs, SBAR 
 
0415- Pt arrived on unit. Hospitalist Dr. Myrna Green contacted. Justin Green at bedside. 1767- Dr. Boby Minion called, spoke with Dr. Myrna Green. Hospitalist placing orders. 0832- Dr. Boby Mcgill at bedside. Updated. Stated to contact Echo and have them perform her echo first. 
 
0750- Echo at bedside. 0800- Bedside and Verbal shift change report given to [address] (oncoming nurse) by Ag Stark RN (offgoing nurse). Report included the following information SBAR, Procedure Summary, Intake/Output, Recent Results and Cardiac Rhythm NSR.

## 2018-11-19 ENCOUNTER — APPOINTMENT (OUTPATIENT)
Dept: NUCLEAR MEDICINE | Age: 73
DRG: 315 | End: 2018-11-19
Attending: INTERNAL MEDICINE
Payer: MEDICARE

## 2018-11-19 LAB
ANION GAP SERPL CALC-SCNC: 11 MMOL/L (ref 5–15)
ANION GAP SERPL CALC-SCNC: 8 MMOL/L (ref 5–15)
ATTENDING PHYSICIAN, CST07: NORMAL
BACTERIA SPEC CULT: NORMAL
BACTERIA SPEC CULT: NORMAL
BNP SERPL-MCNC: 940 PG/ML (ref 0–125)
BUN SERPL-MCNC: 10 MG/DL (ref 6–20)
BUN SERPL-MCNC: 10 MG/DL (ref 6–20)
BUN/CREAT SERPL: 14 (ref 12–20)
BUN/CREAT SERPL: 15 (ref 12–20)
CALCIUM SERPL-MCNC: 8.5 MG/DL (ref 8.5–10.1)
CALCIUM SERPL-MCNC: 8.5 MG/DL (ref 8.5–10.1)
CHLORIDE SERPL-SCNC: 94 MMOL/L (ref 97–108)
CHLORIDE SERPL-SCNC: 96 MMOL/L (ref 97–108)
CO2 SERPL-SCNC: 20 MMOL/L (ref 21–32)
CO2 SERPL-SCNC: 21 MMOL/L (ref 21–32)
COMMENT, HOLDF: NORMAL
CREAT SERPL-MCNC: 0.66 MG/DL (ref 0.55–1.02)
CREAT SERPL-MCNC: 0.7 MG/DL (ref 0.55–1.02)
CRP SERPL HS-MCNC: >9.5 MG/L
DIAGNOSIS, 93000: NORMAL
DUKE TM SCORE RESULT, CST14: NORMAL
DUKE TREADMILL SCORE, CST13: NORMAL
ECG INTERP BEFORE EX, CST11: NORMAL
ECG INTERP DURING EX, CST12: NORMAL
ERYTHROCYTE [SEDIMENTATION RATE] IN BLOOD: 73 MM/HR (ref 0–30)
FUNCTIONAL CAPACITY, CST17: NORMAL
GLUCOSE SERPL-MCNC: 118 MG/DL (ref 65–100)
GLUCOSE SERPL-MCNC: 92 MG/DL (ref 65–100)
KNOWN CARDIAC CONDITION, CST08: NORMAL
MAX. DIASTOLIC BP, CST04: 69 MMHG
MAX. HEART RATE, CST05: 86 BPM
MAX. SYSTOLIC BP, CST03: 161 MMHG
OVERALL BP RESPONSE TO EXERCISE, CST16: NORMAL
OVERALL HR RESPONSE TO EXERCISE, CST15: NORMAL
PEAK EX METS, CST10: 1 METS
POTASSIUM SERPL-SCNC: 4.1 MMOL/L (ref 3.5–5.1)
POTASSIUM SERPL-SCNC: 4.7 MMOL/L (ref 3.5–5.1)
PROTOCOL NAME, CST01: NORMAL
REASON FOR TERMINATION: 74 BPM
SAMPLES BEING HELD,HOLD: NORMAL
SERVICE CMNT-IMP: NORMAL
SODIUM SERPL-SCNC: 123 MMOL/L (ref 136–145)
SODIUM SERPL-SCNC: 127 MMOL/L (ref 136–145)
T3FREE SERPL-MCNC: 1.7 PG/ML (ref 2.2–4)
TEST INDICATION, CST09: NORMAL
TIME IN EXERCISE PHASE, CST02: NORMAL
TSH SERPL DL<=0.05 MIU/L-ACNC: 2.95 UIU/ML (ref 0.36–3.74)

## 2018-11-19 PROCEDURE — 85652 RBC SED RATE AUTOMATED: CPT

## 2018-11-19 PROCEDURE — 84443 ASSAY THYROID STIM HORMONE: CPT

## 2018-11-19 PROCEDURE — 84481 FREE ASSAY (FT-3): CPT

## 2018-11-19 PROCEDURE — 93017 CV STRESS TEST TRACING ONLY: CPT

## 2018-11-19 PROCEDURE — 74011000250 HC RX REV CODE- 250: Performed by: INTERNAL MEDICINE

## 2018-11-19 PROCEDURE — 36415 COLL VENOUS BLD VENIPUNCTURE: CPT

## 2018-11-19 PROCEDURE — 74011250636 HC RX REV CODE- 250/636: Performed by: INTERNAL MEDICINE

## 2018-11-19 PROCEDURE — 94640 AIRWAY INHALATION TREATMENT: CPT

## 2018-11-19 PROCEDURE — 86038 ANTINUCLEAR ANTIBODIES: CPT

## 2018-11-19 PROCEDURE — 86141 C-REACTIVE PROTEIN HS: CPT

## 2018-11-19 PROCEDURE — 94760 N-INVAS EAR/PLS OXIMETRY 1: CPT

## 2018-11-19 PROCEDURE — 74011250637 HC RX REV CODE- 250/637: Performed by: HOSPITALIST

## 2018-11-19 PROCEDURE — 74011000272 HC RX REV CODE- 272: Performed by: HOSPITALIST

## 2018-11-19 PROCEDURE — 80048 BASIC METABOLIC PNL TOTAL CA: CPT

## 2018-11-19 PROCEDURE — 65660000000 HC RM CCU STEPDOWN

## 2018-11-19 PROCEDURE — 74011636637 HC RX REV CODE- 636/637: Performed by: HOSPITALIST

## 2018-11-19 PROCEDURE — 74011250637 HC RX REV CODE- 250/637: Performed by: INTERNAL MEDICINE

## 2018-11-19 PROCEDURE — A9500 TC99M SESTAMIBI: HCPCS

## 2018-11-19 PROCEDURE — 77030027138 HC INCENT SPIROMETER -A

## 2018-11-19 PROCEDURE — 83880 ASSAY OF NATRIURETIC PEPTIDE: CPT

## 2018-11-19 RX ORDER — SODIUM CHLORIDE 0.9 % (FLUSH) 0.9 %
20 SYRINGE (ML) INJECTION
Status: COMPLETED | OUTPATIENT
Start: 2018-11-19 | End: 2018-11-19

## 2018-11-19 RX ADMIN — CALCIUM CARBONATE (ANTACID) CHEW TAB 500 MG 400 MG: 500 CHEW TAB at 17:26

## 2018-11-19 RX ADMIN — CICLOPIROX OLAMINE: 7.7 CREAM TOPICAL at 17:26

## 2018-11-19 RX ADMIN — BUDESONIDE 500 MCG: 0.5 INHALANT RESPIRATORY (INHALATION) at 20:32

## 2018-11-19 RX ADMIN — SODIUM CHLORIDE 50 ML/HR: 900 INJECTION, SOLUTION INTRAVENOUS at 20:01

## 2018-11-19 RX ADMIN — PANTOPRAZOLE SODIUM 40 MG: 40 TABLET, DELAYED RELEASE ORAL at 09:17

## 2018-11-19 RX ADMIN — TACROLIMUS 1 MG: 1 CAPSULE ORAL at 09:25

## 2018-11-19 RX ADMIN — IPRATROPIUM BROMIDE AND ALBUTEROL SULFATE 3 ML: .5; 3 SOLUTION RESPIRATORY (INHALATION) at 20:32

## 2018-11-19 RX ADMIN — TACROLIMUS 5 ML: 1 CAPSULE ORAL at 18:37

## 2018-11-19 RX ADMIN — CLOBETASOL PROPIONATE: 0.5 OINTMENT TOPICAL at 09:22

## 2018-11-19 RX ADMIN — ACETAMINOPHEN 650 MG: 325 TABLET ORAL at 22:05

## 2018-11-19 RX ADMIN — IPRATROPIUM BROMIDE AND ALBUTEROL SULFATE 3 ML: .5; 3 SOLUTION RESPIRATORY (INHALATION) at 02:50

## 2018-11-19 RX ADMIN — TACROLIMUS 5 ML: 1 CAPSULE ORAL at 22:06

## 2018-11-19 RX ADMIN — ASPIRIN 81 MG CHEWABLE TABLET 81 MG: 81 TABLET CHEWABLE at 09:17

## 2018-11-19 RX ADMIN — BUDESONIDE 500 MCG: 0.5 INHALANT RESPIRATORY (INHALATION) at 08:24

## 2018-11-19 RX ADMIN — HYDROXYCHLOROQUINE SULFATE 200 MG: 200 TABLET, FILM COATED ORAL at 09:17

## 2018-11-19 RX ADMIN — MULTIPLE VITAMINS W/ MINERALS TAB 1 TABLET: TAB at 09:17

## 2018-11-19 RX ADMIN — LEVOTHYROXINE SODIUM 100 MCG: 100 TABLET ORAL at 06:50

## 2018-11-19 RX ADMIN — DOXYCYCLINE HYCLATE 100 MG: 100 TABLET, COATED ORAL at 22:05

## 2018-11-19 RX ADMIN — IPRATROPIUM BROMIDE AND ALBUTEROL SULFATE 3 ML: .5; 3 SOLUTION RESPIRATORY (INHALATION) at 08:24

## 2018-11-19 RX ADMIN — Medication 10 ML: at 22:06

## 2018-11-19 RX ADMIN — REGADENOSON 0.4 MG: 0.08 INJECTION, SOLUTION INTRAVENOUS at 14:41

## 2018-11-19 RX ADMIN — HYDROXYCHLOROQUINE SULFATE 200 MG: 200 TABLET, FILM COATED ORAL at 17:26

## 2018-11-19 RX ADMIN — VITAMIN D, TAB 1000IU (100/BT) 1000 UNITS: 25 TAB at 09:17

## 2018-11-19 RX ADMIN — ENOXAPARIN SODIUM 40 MG: 100 INJECTION SUBCUTANEOUS at 06:14

## 2018-11-19 RX ADMIN — CALCIUM CARBONATE (ANTACID) CHEW TAB 500 MG 400 MG: 500 CHEW TAB at 09:17

## 2018-11-19 RX ADMIN — DOXYCYCLINE HYCLATE 100 MG: 100 TABLET, COATED ORAL at 09:17

## 2018-11-19 RX ADMIN — HYDROCODONE POLISTIREX AND CHLORPHENIRAMINE POLISTIREX 5 ML: 10; 8 SUSPENSION, EXTENDED RELEASE ORAL at 22:05

## 2018-11-19 RX ADMIN — Medication 20 ML: at 14:41

## 2018-11-19 RX ADMIN — PREDNISONE 40 MG: 20 TABLET ORAL at 09:17

## 2018-11-19 NOTE — PROGRESS NOTES
Problem: Falls - Risk of 
Goal: *Absence of Falls Document Li Luther Fall Risk and appropriate interventions in the flowsheet. Outcome: Progressing Towards Goal 
Fall Risk Interventions: 
  
 
  
 
Medication Interventions: Evaluate medications/consider consulting pharmacy Elimination Interventions: Call light in reach, Patient to call for help with toileting needs, Toileting schedule/hourly rounds History of Falls Interventions: Evaluate medications/consider consulting pharmacy, Door open when patient unattended Pt to call before getting OOB

## 2018-11-19 NOTE — PROGRESS NOTES
0800 Bedside shift change report given to Sandy Sawyer (oncoming nurse) by Héctor Garrison (offgoing nurse). Report included the following information SBAR, Kardex and Cardiac Rhythm NSR.  
 
1000 PT NPO  
 
1100 Bedside shift change report given to [facility] (oncoming nurse) by Sandy Sawyer (offgoing nurse). Report included the following information SBAR, Kardex, Med Rec Status and Cardiac Rhythm NSR.

## 2018-11-19 NOTE — PROGRESS NOTES
1240: TRANSFER - IN REPORT: 
 
Verbal report received from [facility] RN(name) on Trudy Locker  being received from CVICU(unit) for routine progression of care Report consisted of patients Situation, Background, Assessment and  
Recommendations(SBAR). Information from the following report(s) SBAR, Kardex, Procedure Summary, Intake/Output, MAR and Recent Results was reviewed with the receiving nurse. Opportunity for questions and clarification was provided. Assessment completed upon patients arrival to unit and care assumed. 1900: PVR shows 45 mL after voiding 220 mL. Will continue to monitor. 1930: Bedside shift change report given to [place] (oncoming nurse) by Sergey Belle RN (offgoing nurse). Report included the following information SBAR, Kardex, Procedure Summary, Intake/Output, MAR and Recent Results.

## 2018-11-19 NOTE — PROGRESS NOTES
[place]: David Kelly 
[phone] Requesting/referring provider: Dr. Blaire Gracia Reason for Consult:pericardial effusion, dyspnea HPI: Augusta Barros, a 75y.o. year-old who presented for evaluation of respiratory distress. Noted to have pericardial effusion on chest CT but only moderate pericardial effusion on echo. Had some chest pain that seem pleuritic, worse with breathing, coughing. Has had multiple episodes of bronchitis, etc but no prior cardiac evaluation. She is feeling somewhat better today after diuresis and neb treatments. She reports improvement with dyspnea but continues to have cough. Chest CT ok. No palpitations or exertional chest pain prior to bronchitis. Discussed proceeding with ischemic evaluation today and she is agreeable. Assessment/Plan: 
1. COPD/bronchitis- as per pulmonary/IM 2. Moderate pericardial effusion by TTE - no sx of tamponade, no need for intervention at this time 3. Dyspnea - will proceed with nuclear stress test today to assess for ischemia, TTE ok with moderate pericardial effusion, no ischemic changes on ECG, troponin negative  
-last LDL 94 
4. Body mass index is 34.44 kg/m². 5. Hyponatremia - per IM 6. Lichen planus/candida - followed by MD at [facility], management per IM Echo 11/18 - LV mildly dilated with LVEF 55 % to 60 %, no WMA, mild MR, mild TR, dilated IVC, moderate pericardial effusion without signs of tamponade, no hemodynamic compromise, effusion circumferential to the heart, a portion of organization was noted along the 
rv free wall, otherwise the effusion wasfree flowing and echo lucent. Fhx no early cad Soc no etoh no tob She  has a past medical history of Allergic rhinitis, COPD (chronic obstructive pulmonary disease) ([address] Hypertension, Hypothyroidism, Lichen planus, Menopause, Osteoarthrosis, unspecified whether generalized or localized, unspecified site, Overactive bladder, Phlebitis and thrombophlebitis of lower extremities, unspecified, and Raynaud's syndrome. Cardiovascular ROS: negative for exertional chest pain or palpitations Respiratory ROS: positive for - cough and shortness of breath Neurological ROS: no TIA or stroke symptoms All other systems negative except as above. PE 
Vitals:  
 11/19/18 0820 11/19/18 0900 11/19/18 1000 11/19/18 1100 BP:  149/55 132/46 134/49 Pulse:  74 70 73 Resp:  27 20 15 Temp:      
SpO2: 99% 100% 100% 98% Weight:      
 Body mass index is 34.44 kg/m². General appearance - ill, nad Mental status - affect appropriate to mood Eyes - sclera anicteric, moist mucous membranes Neck - supple, no carotid bruits Lymphatics - not assessed Chest - clear to auscultation bilaterally Heart - normal rate, regular rhythm, normal S1, S2, no murmurs, rubs, clicks or gallops Abdomen - soft, nontender, nondistended Back exam - full range of motion, no tenderness Neurological - cranial nerves II through XII grossly intact, no focal deficit Musculoskeletal - no muscular tenderness noted, normal strength Extremities - peripheral pulses normal, no pedal edema Skin - normal coloration  no rashes Telemetry: NSR Recent Labs: 
Lab Results Component Value Date/Time Cholesterol, total 199 06/20/2018 09:32 AM  
 HDL Cholesterol 94 06/20/2018 09:32 AM  
 LDL, calculated 94 06/20/2018 09:32 AM  
 Triglyceride 54 06/20/2018 09:32 AM  
 
Lab Results Component Value Date/Time Creatinine 0.70 11/19/2018 05:35 AM  
 
Lab Results Component Value Date/Time BUN 10 11/19/2018 05:35 AM  
 
Lab Results Component Value Date/Time Potassium 4.1 11/19/2018 05:35 AM  
 
Lab Results Component Value Date/Time Hemoglobin A1c 5.5 01/07/2016 11:59 AM  
 
Lab Results Component Value Date/Time HGB 7.8 (L) 11/18/2018 02:33 PM  
 
Lab Results Component Value Date/Time PLATELET 572 09/06/8892 02:33 PM  
 
 
Reviewed: 
Past Medical History: Diagnosis Date  Allergic rhinitis  COPD (chronic obstructive pulmonary disease) ([facility])  Hypertension  Hypothyroidism  Lichen planus   
 seen at [facility]  Menopause  Osteoarthrosis, unspecified whether generalized or localized, unspecified site  Overactive bladder  Phlebitis and thrombophlebitis of lower extremities, unspecified  Raynaud's syndrome Social History Tobacco Use Smoking Status Former Smoker  Types: Cigarettes Smokeless Tobacco Never Used Tobacco Comment  
 smoked less than 10 yrs and quit 40 + Social History Substance and Sexual Activity Alcohol Use No  
 
Allergies Allergen Reactions  Latex Rash  Keflex [Cephalexin] Rash  Levaquin [Levofloxacin] Rash  Macrobid [Nitrofurantoin Monohyd/M-Cryst] Rash  Sulfa (Sulfonamide Antibiotics) Rash Current Facility-Administered Medications Medication Dose Route Frequency  regadenoson (LEXISCAN) injection 0.4 mg  0.4 mg IntraVENous RAD ONCE  
 saline peripheral flush soln 20 mL  20 mL InterCATHeter RAD ONCE  
 acetaminophen (TYLENOL) tablet 650 mg  650 mg Oral Q8H PRN  
 albuterol-ipratropium (DUO-NEB) 2.5 MG-0.5 MG/3 ML  3 mL Nebulization Q6H RT  
 aspirin chewable tablet 81 mg  81 mg Oral DAILY  budesonide (PULMICORT) 500 mcg/2 ml nebulizer suspension  500 mcg Nebulization Q12H  calcium carbonate (TUMS) chewable tablet 400 mg [elemental]  400 mg Oral BID  chlorpheniramine-HYDROcodone (TUSSIONEX) oral suspension 5 mL  5 mL Oral Q12H PRN  cholecalciferol (VITAMIN D3) tablet 1,000 Units  1,000 Units Oral DAILY  ciclopirox (LOPROX) 0.77 % cream   Topical BID  clobetasol (TEMOVATE) 0.05 % ointment   Topical DAILY  doxycycline (VIBRA-TABS) tablet 100 mg  100 mg Oral BID  multivitamin, tx-iron-ca-min (THERA-M w/ IRON) tablet 1 Tab  1 Tab Oral DAILY  hydroxychloroquine (PLAQUENIL) tablet 200 mg  200 mg Oral BID  
  levothyroxine (SYNTHROID) tablet 100 mcg  100 mcg Oral ACB  pantoprazole (PROTONIX) tablet 40 mg  40 mg Oral DAILY  tacrolimus (PROGRAF) capsule 1 mg  1 mg Oral TID  
 . PHARMACY TO SUBSTITUTE PER PROTOCOL (Reordered from: tacrolimus (PROTOPIC) 0.1 % ointment)    Per Protocol  sodium chloride (NS) flush 5-10 mL  5-10 mL IntraVENous Q8H  
 sodium chloride (NS) flush 5-10 mL  5-10 mL IntraVENous PRN  
 enoxaparin (LOVENOX) injection 40 mg  40 mg SubCUTAneous Q24H  
 albuterol-ipratropium (DUO-NEB) 2.5 MG-0.5 MG/3 ML  3 mL Nebulization Q4H PRN  
 0.9% sodium chloride infusion  75 mL/hr IntraVENous CONTINUOUS  predniSONE (DELTASONE) tablet 40 mg  40 mg Oral DAILY WITH BREAKFAST  sodium chloride (NS) flush 20 mL  20 mL InterCATHeter PRN  
 sodium chloride (NS) flush 10 mL  10 mL InterCATHeter Q24H  
 sodium chloride (NS) flush 10 mL  10 mL InterCATHeter PRN  
 sodium chloride (NS) flush 10 mL  10 mL InterCATHeter Q8H  
 bacitracin 500 unit/gram packet 1 Packet  1 Packet Topical PRN Sheba Young MD 
[facility] [place] [address], [address]

## 2018-11-19 NOTE — PROGRESS NOTES
Bedside SBAR report received from [facility] and [facility]. Labs and plan of care reviewed and gtts verified at this time. 1125: Dr. Jeremi Del Rosario at bedside evaluating pt. Updated on pt condition and discussed plan of care. Patient ok transfer out of ICU.  
 
1220: TRANSFER - OUT REPORT: 
 
Verbal report given to [facility] KaylaIleana(name) on Sujata Serrano  being transferred to CVSU(unit) for routine progression of care Report consisted of patients Situation, Background, Assessment and  
Recommendations(SBAR). Information from the following report(s) SBAR, Kardex, Intake/Output, MAR, Recent Results and Cardiac Rhythm NSR was reviewed with the receiving nurse. Lines: PICC Triple Lumen 68/73/53 Basilic;Right (Active) Central Line Being Utilized Yes 11/19/2018  8:00 AM  
Criteria for Appropriate Use Limited/no vessel suitable for conventional peripheral access 11/19/2018  8:00 AM  
Phlebitis Assessment 0 11/19/2018  8:00 AM  
Infiltration Assessment 0 11/19/2018  8:00 AM  
Date of Last Dressing Change 11/18/18 11/19/2018  8:00 AM  
Dressing Status Clean, dry, & intact 11/19/2018  8:00 AM  
Dressing Type Disk with Chlorhexadine gluconate (CHG); Transparent 11/19/2018  8:00 AM  
Action Taken Open ports on tubing capped 11/19/2018  8:00 AM  
Hub Color/Line Status Red;Flushed;Capped 11/19/2018  8:00 AM  
Positive Blood Return (Site #1) Yes 11/19/2018  8:00 AM  
Hub Color/Line Status Gray;Flushed;Capped 11/19/2018  8:00 AM  
Positive Blood Return (Site #2) Yes 11/19/2018  8:00 AM  
Hub Color/Line Status White; Infusing 11/19/2018  8:00 AM  
Positive Blood Return (Site #3) Yes 11/18/2018  1:30 PM  
Alcohol Cap Used Yes 11/19/2018  8:00 AM  
  
 
Opportunity for questions and clarification was provided. Patient transported with: 
 Monitor Patient's medications from home Registered Nurse

## 2018-11-19 NOTE — PROGRESS NOTES
NUTRITION Nutrition screening referral was triggered based on results obtained during nursing admission assessment for 14-23# wt loss. The patient's chart was reviewed and nutrition assessment is not indicated at this time. No significant wt loss noted and appetite has been good/fair. Wt Readings from Last 10 Encounters:  
11/19/18 80 kg (176 lb 5.9 oz)  
11/17/18 78.5 kg (173 lb 1 oz)  
11/11/18 77.1 kg (170 lb) 10/30/18 78.5 kg (173 lb) 10/26/18 80.8 kg (178 lb 3.2 oz) 10/18/18 79.6 kg (175 lb 6.4 oz) 09/21/18 81.2 kg (179 lb 0.2 oz) 06/18/18 82.4 kg (181 lb 9.6 oz) 03/01/18 79.4 kg (175 lb) 12/13/17 81.6 kg (180 lb) Plan to see patient for rescreen as indicated. Thank you.   
 
Estelita Levi RD

## 2018-11-19 NOTE — PROGRESS NOTES
2000: Received report from [facility]. MIV rate verified. Pt on room air, o2 sats: %. Pt dyspneic at rest and dyspneic with exertion. Pt up to bedside commode with 1 assist for line management. Lung sounds diminished in the bases and clear in upper lobes, heart sounds: distant s1s2. No c/o of pain with the exception of pain in chest bilaterally when pt coughs. 0000: Of note, pt SOB when walking from bedside commode to bed. O2 sats remained 100% but pt having hard time catching her breath. Lung sounds remain diminished in bases and clear in upper lobes, heart tones: distant s1s2. No c/o pain at this time. 0200: Pt self converted into sinus lora-NSR in the 60-70s. 0800: Bedside and Verbal shift change report given to  CARSON Triplett      (oncoming nurse).   Report included the following information including SBAR, ER report, Cardiac rhythm: junctional.

## 2018-11-19 NOTE — PROGRESS NOTES
Hospitalist Progress Note Lucia Penn MD 
Answering service: [phone] from in house phone Cell: [phone] Date of Service:  2018 NAME:  Erma Hernandez :  1945 MRN:  [id number] Admission Summary:  
The patient is a 40-year-old female with history of COPD and hypertension, osteoarthritis, Raynaud's syndrome and hypothyroidism who was transferred from [facility] for pericardial effusion and dyspnea. Patient presented to the emergency room at [facility] with complaints of worsening shortness of breath. The patient reported that she was treated for COPD exacerbation for the past 5 weeks, but according to patient, her symptoms have continued to worsen despite the treatments. The patient has taken 2 rounds of prednisone with a Z-RUPA and most recently this week doxycycline, which the patient completed yesterday. Patient reports cough that is nonproductive. Shortness of breath is said to have worsened, associated with chest tightness. Patient reported chest pain last week for which she was seen at the emergency room twice. According to the patient it was reported her chest pain worse as a result of a persistent cough. Cough is associated with diffuse wheezing. Patient has no report of palpitations, trauma or fall. She denied PND, orthopnea. Patient also denied lower extremity pain or swelling. There is no nausea, vomiting, abdominal pain, constipation or diarrhea. The patient denied lightheadedness, dizziness, narrow vision, double vision, syncopal episode or seizures. She has no headache, lightheadedness, dizziness, blurry vision, double vision, syncopal episode or seizures. Patient has no neck pain, neck stiffness or confusion. She denied back pain or flank pain.   She also denies dysuria, frequency, hematuria, urethral discharge or vaginal discharge. The patient reports generalized weakness, but denies focal weakness, numbness, tingling sensation, abnormal sensation. There is no report of loss of appetite, dysphagia or odynophagia present. 
  
On presentation to the emergency room at [facility].  The patient was given 4 rounds of bronchodilators. A CT of the chest without contrast showed moderate bilateral pleural effusion. EKG showed changes with flattening of T waves. The patient was transferred to our facility for further evaluation and treatment. Interval history / Subjective:  
  F/u Pericardial effusion She feels much better now Assessment & Plan:  
 
Pericardial effusion 
-CT chest 11/17 Moderate pericardial effusion. Small bilateral effusions, left greater than right, with associated atelectasis 
-no suggestions of tamponade so far 
-Echo EF 55-60% with no RWMA 
-Appreciate Cardiology 
-Plans for stress test today, folow 
-The patient does not need ICU level of care, transfer to tele Hyponatremia 
-seems acute on chronic, sec to ?dehydration 
-S Osm 251, Urine osm 110 with urine sodium 13 
-minimal improvement in sodium level.  
-I think the patient is still hypovolemic and needs fluids but will consult Nephrology Mild COPD exacerbation 
-Continue Duoneb/Doxycycline. On short course of oral prednisone 
-On Tussionex for cough 
-She is already feeling well Hypothyroidism 
-on synthroid 
-Follow TFTs Overweight 
-Counseled On Fluconazole/Plaquenil/Tacrolimus 
-Patient claims for her oral infection ? ? (candidiasis and lichen planus) -She is being treated for that at [facility] 
-Will continue home meds History of ulcerative colitis 
-She has not been on Pentasa/Entocort for 9 months 
-Taken off those for now 
-Outpatient follow up with Dr Ayanna Loomis Osteoporosis 
-Outpatient follow up with Dr Joseph Garcia Cardiac diet Code status: FULL CODE 
 DVT prophylaxis: Lovenox PTA: home Plan: Follow cardiology, nephrology Care Plan discussed with: Patient/Family Disposition: TBD Hospital Problems  Date Reviewed: 11/18/2018 Codes Class Noted POA * (Principal) Pericardial effusion ICD-10-CM: I31.3 ICD-9-CM: 423.9  11/18/2018 Unknown Review of Systems: A comprehensive review of systems was negative except for that written in the HPI. Vital Signs:  
 Last 24hrs VS reviewed since prior progress note. Most recent are: 
Visit Vitals /49 Pulse 73 Temp 98 °F (36.7 °C) Resp 15 Wt 80 kg (176 lb 5.9 oz) SpO2 98% BMI 34.44 kg/m² Intake/Output Summary (Last 24 hours) at 11/19/2018 1130 Last data filed at 11/19/2018 1100 Gross per 24 hour Intake 3528.75 ml Output 4500 ml Net -971.25 ml Physical Examination:  
 
 
     
Constitutional:  No acute distress, cooperative, pleasant   
ENT:  Oral mucous moist, oropharynx benign. Neck supple, Resp:  CTA bilaterally. No wheezing/rhonchi/rales. No accessory muscle use CV:  Regular rhythm, normal rate, no murmurs, gallops, rubs GI:  Soft, non distended, non tender. normoactive bowel sounds, no hepatosplenomegaly Musculoskeletal:  No edema, warm, 2+ pulses throughout Neurologic:  Moves all extremities. AAOx3, CN II-XII reviewed Skin:  Good turgor, no rashes or ulcers Data Review:  
 Review and/or order of clinical lab test 
 
 
Labs:  
 
Recent Labs 11/18/18 
1433 11/17/18 
2120 WBC 7.8 10.9 HGB 7.8* 8.7* HCT 24.5* 26.8*  
 289 Recent Labs 11/19/18 
0535 11/18/18 
1934 11/18/18 
1433  11/17/18 
2120 * 124* 120*   < > 122* K 4.1 4.0 3.9   < > 4.5 CL 94* 92* 88*   < > 88* CO2 21 22 21   < > 22 BUN 10 11 13   < > 16 CREA 0.70 0.62 0.67   < > 0.91  
GLU 92 103* 109*   < > 102* CA 8.5 7.5* 7.8*   < > 8.7 MG  --   --   --   --  1.6  
 < > = values in this interval not displayed. Recent Labs 11/17/18 2120 SGOT 15 ALT 20 AP 98 TBILI 0.5 TP 6.7 ALB 2.8*  
GLOB 3.9 No results for input(s): INR, PTP, APTT in the last 72 hours. No lab exists for component: INREXT, INREXT No results for input(s): FE, TIBC, PSAT, FERR in the last 72 hours. No results found for: FOL, RBCF No results for input(s): PH, PCO2, PO2 in the last 72 hours. Recent Labs 11/18/18 
1934 11/18/18 
1927 11/18/18 
1433 11/18/18 
2514 CPK  --  64 49 55 CKNDX  --  2.2 Cannot be calculated Cannot be calculated TROIQ <0.05  --  <0.05 <0.05 Lab Results Component Value Date/Time Cholesterol, total 199 06/20/2018 09:32 AM  
 HDL Cholesterol 94 06/20/2018 09:32 AM  
 LDL, calculated 94 06/20/2018 09:32 AM  
 Triglyceride 54 06/20/2018 09:32 AM  
 
No results found for: [facility] Lab Results Component Value Date/Time Color YELLOW/STRAW 11/18/2018 06:17 AM  
 Appearance CLEAR 11/18/2018 06:17 AM  
 Specific gravity 1.008 11/18/2018 06:17 AM  
 pH (UA) 5.0 11/18/2018 06:17 AM  
 Protein NEGATIVE  11/18/2018 06:17 AM  
 Glucose NEGATIVE  11/18/2018 06:17 AM  
 Ketone NEGATIVE  11/18/2018 06:17 AM  
 Bilirubin NEGATIVE  11/18/2018 06:17 AM  
 Urobilinogen 0.2 11/18/2018 06:17 AM  
 Nitrites NEGATIVE  11/18/2018 06:17 AM  
 Leukocyte Esterase SMALL (A) 11/18/2018 06:17 AM  
 Epithelial cells FEW 11/18/2018 06:17 AM  
 Bacteria NEGATIVE  11/18/2018 06:17 AM  
 WBC 0-4 11/18/2018 06:17 AM  
 RBC 0-5 11/18/2018 06:17 AM  
 
 
 
Medications Reviewed:  
 
Current Facility-Administered Medications Medication Dose Route Frequency  regadenoson (LEXISCAN) injection 0.4 mg  0.4 mg IntraVENous RAD ONCE  
 saline peripheral flush soln 20 mL  20 mL InterCATHeter RAD ONCE  
 acetaminophen (TYLENOL) tablet 650 mg  650 mg Oral Q8H PRN  
 albuterol-ipratropium (DUO-NEB) 2.5 MG-0.5 MG/3 ML  3 mL Nebulization Q6H RT  
 aspirin chewable tablet 81 mg  81 mg Oral DAILY  budesonide (PULMICORT) 500 mcg/2 ml nebulizer suspension  500 mcg Nebulization Q12H  calcium carbonate (TUMS) chewable tablet 400 mg [elemental]  400 mg Oral BID  chlorpheniramine-HYDROcodone (TUSSIONEX) oral suspension 5 mL  5 mL Oral Q12H PRN  cholecalciferol (VITAMIN D3) tablet 1,000 Units  1,000 Units Oral DAILY  ciclopirox (LOPROX) 0.77 % cream   Topical BID  clobetasol (TEMOVATE) 0.05 % ointment   Topical DAILY  doxycycline (VIBRA-TABS) tablet 100 mg  100 mg Oral BID  multivitamin, tx-iron-ca-min (THERA-M w/ IRON) tablet 1 Tab  1 Tab Oral DAILY  hydroxychloroquine (PLAQUENIL) tablet 200 mg  200 mg Oral BID  levothyroxine (SYNTHROID) tablet 100 mcg  100 mcg Oral ACB  pantoprazole (PROTONIX) tablet 40 mg  40 mg Oral DAILY  tacrolimus (PROGRAF) capsule 1 mg  1 mg Oral TID  
 . PHARMACY TO SUBSTITUTE PER PROTOCOL (Reordered from: tacrolimus (PROTOPIC) 0.1 % ointment)    Per Protocol  sodium chloride (NS) flush 5-10 mL  5-10 mL IntraVENous Q8H  
 sodium chloride (NS) flush 5-10 mL  5-10 mL IntraVENous PRN  
 enoxaparin (LOVENOX) injection 40 mg  40 mg SubCUTAneous Q24H  
 albuterol-ipratropium (DUO-NEB) 2.5 MG-0.5 MG/3 ML  3 mL Nebulization Q4H PRN  
 0.9% sodium chloride infusion  75 mL/hr IntraVENous CONTINUOUS  predniSONE (DELTASONE) tablet 40 mg  40 mg Oral DAILY WITH BREAKFAST  sodium chloride (NS) flush 20 mL  20 mL InterCATHeter PRN  
 sodium chloride (NS) flush 10 mL  10 mL InterCATHeter Q24H  
 sodium chloride (NS) flush 10 mL  10 mL InterCATHeter PRN  
 sodium chloride (NS) flush 10 mL  10 mL InterCATHeter Q8H  
 bacitracin 500 unit/gram packet 1 Packet  1 Packet Topical PRN  
 
______________________________________________________________________ EXPECTED LENGTH OF STAY: - - - 
ACTUAL LENGTH OF STAY:          1 Teofilo Barksdale MD

## 2018-11-19 NOTE — CONSULTS
Assessment:  Hyponatremia: Improving. Urine Na could suggest poor solute intake but dilute urine osmolality suggests excessive free water intake and appropriate aquaresis in the setting of hyponatremia. Some underlying urinary incontinence-> Myrbetriq can sometimes cause urinary retention. Pericardial effusion: moderate. No tamponade physiology. Conservative management    HTN: stable    COPD exacerbation    Plan/Recommendations:  Educated patient and family about hyponatremia  Encouraged her to increase solute intake/limit free water  Decrease IV NS to 50cc/hr  Strict I/Os,avoid nephrotoxins  PVR Bladder scan  Am labs    Discussed with patient/family    Thanks for the consultation. Renal service will follow patient with you. Please contact me with any questions or concerns. Initial Consult note         Patient name: Ivory Addison  MR no: [id number]  Date of admission: 11/18/2018  Date of consultation: 11/19/2018  Requested by: Dr. Amanda Gillis  Reason for consult: Hyponatremia    Patient seen and examined. History obtained from patient and chart review. Relevant labs, data and notes reviewed. HPI: Ivory Addison is a 75 y.o. female with PMH significant for COPD, Overactive Bladder, Hypothyroidism, Lichen Planus transferred to [facility] from [facility] on 11/17/18 after being diagnosed with pericardial effusion on CT chest. Moderate pericardial effusion with taponade physiology on echo here. Noted on admission to have a serum Na 122-> no improvement after being placed on IV NS yesterday but now Na improved to 127. Patient denies any prior hx of hyponatremia. Admits she has had very poor intake over last month due to issues with SOB/COPD exacerbations? Lost nearly 20lbs. Drinking mainly water. No n/v/d. Denies any NSAID use.  Nephrology consulted to evaluate and manage hyponatremia    PMH:  Past Medical History:   Diagnosis Date    Allergic rhinitis     COPD (chronic obstructive pulmonary disease) (HCC)     Hypertension     Hypothyroidism     Lichen planus     seen at [facility] Menopause     Osteoarthrosis, unspecified whether generalized or localized, unspecified site     Overactive bladder     Phlebitis and thrombophlebitis of lower extremities, unspecified     Raynaud's syndrome      PSH:  Past Surgical History:   Procedure Laterality Date    HX APPENDECTOMY      HX GI      colitis    HX GYN      HYSTERECTOMY//BTL    HX HEENT      TONSILLECTOMY    HX HYSTERECTOMY      HX MOHS PROCEDURES  2018    left lower leg    HX OOPHORECTOMY      HX UROLOGICAL      BLADDER SLING       Social history:   Social History     Tobacco Use    Smoking status: Former Smoker     Types: Cigarettes    Smokeless tobacco: Never Used    Tobacco comment: smoked less than 10 yrs and quit 40 +   Substance Use Topics    Alcohol use: No    Drug use: No       Family history:  Not contibutory    Allergies   Allergen Reactions    Latex Rash    Keflex [Cephalexin] Rash    Levaquin [Levofloxacin] Rash    Sulfa (Sulfonamide Antibiotics) Rash    Macrobid [Nitrofurantoin Monohyd/M-Cryst] Rash       Current Facility-Administered Medications   Medication Dose Route Frequency Last Dose    tacrolimus (PROGRAF) 0.01 mg in sterile water 5 mL oral mouth rinse  5 mL Oral TID      acetaminophen (TYLENOL) tablet 650 mg  650 mg Oral Q8H PRN      albuterol-ipratropium (DUO-NEB) 2.5 MG-0.5 MG/3 ML  3 mL Nebulization Q6H RT 3 mL at 18 0824    aspirin chewable tablet 81 mg  81 mg Oral DAILY 81 mg at 18 0917    budesonide (PULMICORT) 500 mcg/2 ml nebulizer suspension  500 mcg Nebulization Q12H 500 mcg at 18 0824    calcium carbonate (TUMS) chewable tablet 400 mg [elemental]  400 mg Oral  mg at 18 0917    chlorpheniramine-HYDROcodone (TUSSIONEX) oral suspension 5 mL  5 mL Oral Q12H PRN 5 mL at 18 1124    cholecalciferol (VITAMIN D3) tablet 1,000 Units 1,000 Units Oral DAILY 1,000 Units at 11/19/18 0917    ciclopirox (LOPROX) 0.77 % cream   Topical BID      clobetasol (TEMOVATE) 0.05 % ointment   Topical DAILY      doxycycline (VIBRA-TABS) tablet 100 mg  100 mg Oral  mg at 11/19/18 0917    multivitamin, tx-iron-ca-min (THERA-M w/ IRON) tablet 1 Tab  1 Tab Oral DAILY 1 Tab at 11/19/18 0917    hydroxychloroquine (PLAQUENIL) tablet 200 mg  200 mg Oral  mg at 11/19/18 0917    levothyroxine (SYNTHROID) tablet 100 mcg  100 mcg Oral  mcg at 11/19/18 0650    pantoprazole (PROTONIX) tablet 40 mg  40 mg Oral DAILY 40 mg at 11/19/18 0917    . PHARMACY TO SUBSTITUTE PER PROTOCOL (Reordered from: tacrolimus (PROTOPIC) 0.1 % ointment)    Per Protocol      sodium chloride (NS) flush 5-10 mL  5-10 mL IntraVENous Q8H 10 mL at 11/18/18 2352    sodium chloride (NS) flush 5-10 mL  5-10 mL IntraVENous PRN      enoxaparin (LOVENOX) injection 40 mg  40 mg SubCUTAneous Q24H 40 mg at 11/19/18 0614    albuterol-ipratropium (DUO-NEB) 2.5 MG-0.5 MG/3 ML  3 mL Nebulization Q4H PRN      0.9% sodium chloride infusion  100 mL/hr IntraVENous CONTINUOUS 100 mL/hr at 11/19/18 1232    predniSONE (DELTASONE) tablet 40 mg  40 mg Oral DAILY WITH BREAKFAST 40 mg at 11/19/18 0917    sodium chloride (NS) flush 20 mL  20 mL InterCATHeter PRN      sodium chloride (NS) flush 10 mL  10 mL InterCATHeter Q24H 10 mL at 11/18/18 1430    sodium chloride (NS) flush 10 mL  10 mL InterCATHeter PRN      sodium chloride (NS) flush 10 mL  10 mL InterCATHeter Q8H 10 mL at 11/18/18 2352    bacitracin 500 unit/gram packet 1 Packet  1 Packet Topical PRN         ROS (besides HPI):    General: No fever. + weight changes  ENT: No hearing loss or visual changes  Cardiovascular: No Chest pain  Pulmonary: +SOB  GI: No abdominal pain. No Nausea/Vomiting/Diarrhea. No blood in stool  : No blood in urine. No foamy or cloudy urine  Musculoskeletal: No joint swelling or redness.  No morning stiffness  Endocrine: no cold or heat intolerance  Psych: denies anxiety or depression  Neuro: No light headedness or dizziness    Objective   Visit Vitals  /54 (BP 1 Location: Left arm, BP Patient Position: At rest)   Pulse 77   Temp 97.7 °F (36.5 °C)   Resp 20   Wt 80 kg (176 lb 5.9 oz)   SpO2 97%   BMI 34.44 kg/m²       Physical Exam:    Gen: NAD    HEENT: AT/NC, EOMI, moist mucous membrane, no scleral icterus    Neck: no JVD, no cervical lymphadenopathy, no carotid bruit    Lungs/Chest wall: No accessory muscle use. Clear to auscultation    Cardiovascular: Normal S1/S2, normal rate, regular rhythm. Abdomen: soft, NT, ND, BS+, no HSM    Ext: no clubbing or cyanosis. No edema    Skin: warm and dry. No rashes    : no CVA tenderness    CNS: alert awake. Answers appropriately.      Labs/Data:    Lab Results   Component Value Date/Time    Sodium 127 (L) 11/19/2018 12:14 PM    Potassium 4.7 11/19/2018 12:14 PM    Chloride 96 (L) 11/19/2018 12:14 PM    CO2 20 (L) 11/19/2018 12:14 PM    Anion gap 11 11/19/2018 12:14 PM    Glucose 118 (H) 11/19/2018 12:14 PM    BUN 10 11/19/2018 12:14 PM    Creatinine 0.66 11/19/2018 12:14 PM    BUN/Creatinine ratio 15 11/19/2018 12:14 PM    GFR est AA >60 11/19/2018 12:14 PM    GFR est non-AA >60 11/19/2018 12:14 PM    Calcium 8.5 11/19/2018 12:14 PM       Lab Results   Component Value Date/Time    WBC 7.8 11/18/2018 02:33 PM    HGB 7.8 (L) 11/18/2018 02:33 PM    HCT 24.5 (L) 11/18/2018 02:33 PM    PLATELET 082 98/80/0936 02:33 PM    MCV 80.1 11/18/2018 02:33 PM       Urine analysis:   Results for orders placed or performed in visit on 12/13/17   AMB POC URINALYSIS DIP STICK AUTO W/O MICRO     Status: None   Result Value Ref Range Status    Color (UA POC) Yellow  Final    Clarity (UA POC) Clear  Final    Glucose (UA POC) Negative Negative Final    Bilirubin (UA POC) Negative Negative Final    Ketones (UA POC) Negative Negative Final    Specific gravity (UA POC) 1.015 1.001 - 1.035 Final    Blood (UA POC) Trace Negative Final    pH (UA POC) 6.0 4.6 - 8.0 Final    Protein (UA POC) Negative Negative Final    Urobilinogen (UA POC) 0.2 mg/dL 0.2 - 1 Final    Nitrites (UA POC) Negative Negative Final    Leukocyte esterase (UA POC) 1+ Negative Final         Urine sodium: 13  Urine Osm:110    No components found for: SPEP, UPEP  No results found for: PUQ, PROTU2, PROTU1, BJP1, CPE1, IMEL1, MET2  No results found for: MCACR, MCA1, MCA2, MCA3, MCAU, MCAU2, MCALPOCT      Intake/Output Summary (Last 24 hours) at 11/19/2018 1703  Last data filed at 11/19/2018 1624  Gross per 24 hour   Intake 1925 ml   Output 3800 ml   Net -1875 ml       Wt Readings from Last 3 Encounters:   11/19/18 80 kg (176 lb 5.9 oz)   11/17/18 78.5 kg (173 lb 1 oz)   11/11/18 77.1 kg (170 lb)       Signed by:  Reilly Lyn MD  Nephrology and Hypertension  [facility]

## 2018-11-20 LAB
ANA SER QL: NEGATIVE
ANION GAP SERPL CALC-SCNC: 12 MMOL/L (ref 5–15)
BASOPHILS # BLD: 0 K/UL (ref 0–0.1)
BASOPHILS NFR BLD: 0 % (ref 0–1)
BUN SERPL-MCNC: 14 MG/DL (ref 6–20)
BUN/CREAT SERPL: 23 (ref 12–20)
CALCIUM SERPL-MCNC: 8.3 MG/DL (ref 8.5–10.1)
CHLORIDE SERPL-SCNC: 98 MMOL/L (ref 97–108)
CO2 SERPL-SCNC: 18 MMOL/L (ref 21–32)
CREAT SERPL-MCNC: 0.61 MG/DL (ref 0.55–1.02)
DIFFERENTIAL METHOD BLD: ABNORMAL
EOSINOPHIL # BLD: 0 K/UL (ref 0–0.4)
EOSINOPHIL NFR BLD: 0 % (ref 0–7)
ERYTHROCYTE [DISTWIDTH] IN BLOOD BY AUTOMATED COUNT: 15.9 % (ref 11.5–14.5)
GLUCOSE SERPL-MCNC: 123 MG/DL (ref 65–100)
HCT VFR BLD AUTO: 26 % (ref 35–47)
HGB BLD-MCNC: 8.1 G/DL (ref 11.5–16)
IMM GRANULOCYTES # BLD: 0.1 K/UL (ref 0–0.04)
IMM GRANULOCYTES NFR BLD AUTO: 1 % (ref 0–0.5)
LYMPHOCYTES # BLD: 0.3 K/UL (ref 0.8–3.5)
LYMPHOCYTES NFR BLD: 3 % (ref 12–49)
MAGNESIUM SERPL-MCNC: 2.3 MG/DL (ref 1.6–2.4)
MCH RBC QN AUTO: 25.3 PG (ref 26–34)
MCHC RBC AUTO-ENTMCNC: 31.2 G/DL (ref 30–36.5)
MCV RBC AUTO: 81.3 FL (ref 80–99)
MONOCYTES # BLD: 0.6 K/UL (ref 0–1)
MONOCYTES NFR BLD: 7 % (ref 5–13)
NEUTS SEG # BLD: 7.5 K/UL (ref 1.8–8)
NEUTS SEG NFR BLD: 89 % (ref 32–75)
NRBC # BLD: 0 K/UL (ref 0–0.01)
NRBC BLD-RTO: 0 PER 100 WBC
PHOSPHATE SERPL-MCNC: 2.9 MG/DL (ref 2.6–4.7)
PLATELET # BLD AUTO: 315 K/UL (ref 150–400)
PLATELET COMMENTS,PCOM: ABNORMAL
PMV BLD AUTO: 9.4 FL (ref 8.9–12.9)
POTASSIUM SERPL-SCNC: 3.9 MMOL/L (ref 3.5–5.1)
RBC # BLD AUTO: 3.2 M/UL (ref 3.8–5.2)
RBC MORPH BLD: ABNORMAL
SODIUM SERPL-SCNC: 128 MMOL/L (ref 136–145)
WBC # BLD AUTO: 8.5 K/UL (ref 3.6–11)

## 2018-11-20 PROCEDURE — 36415 COLL VENOUS BLD VENIPUNCTURE: CPT

## 2018-11-20 PROCEDURE — 74011250637 HC RX REV CODE- 250/637: Performed by: HOSPITALIST

## 2018-11-20 PROCEDURE — 74011000272 HC RX REV CODE- 272: Performed by: HOSPITALIST

## 2018-11-20 PROCEDURE — 84100 ASSAY OF PHOSPHORUS: CPT

## 2018-11-20 PROCEDURE — 74011250637 HC RX REV CODE- 250/637: Performed by: INTERNAL MEDICINE

## 2018-11-20 PROCEDURE — 65270000032 HC RM SEMIPRIVATE

## 2018-11-20 PROCEDURE — 85025 COMPLETE CBC W/AUTO DIFF WBC: CPT

## 2018-11-20 PROCEDURE — 74011250636 HC RX REV CODE- 250/636: Performed by: INTERNAL MEDICINE

## 2018-11-20 PROCEDURE — 74011250637 HC RX REV CODE- 250/637: Performed by: NURSE PRACTITIONER

## 2018-11-20 PROCEDURE — 74011636637 HC RX REV CODE- 636/637: Performed by: HOSPITALIST

## 2018-11-20 PROCEDURE — 36592 COLLECT BLOOD FROM PICC: CPT

## 2018-11-20 PROCEDURE — 74011000250 HC RX REV CODE- 250: Performed by: INTERNAL MEDICINE

## 2018-11-20 PROCEDURE — 83735 ASSAY OF MAGNESIUM: CPT

## 2018-11-20 PROCEDURE — 94640 AIRWAY INHALATION TREATMENT: CPT

## 2018-11-20 PROCEDURE — 80048 BASIC METABOLIC PNL TOTAL CA: CPT

## 2018-11-20 PROCEDURE — 94664 DEMO&/EVAL PT USE INHALER: CPT

## 2018-11-20 PROCEDURE — 77010033678 HC OXYGEN DAILY

## 2018-11-20 RX ORDER — AMLODIPINE BESYLATE 5 MG/1
5 TABLET ORAL DAILY
Status: DISCONTINUED | OUTPATIENT
Start: 2018-11-21 | End: 2018-11-20

## 2018-11-20 RX ORDER — AMLODIPINE BESYLATE 5 MG/1
5 TABLET ORAL DAILY
Status: DISCONTINUED | OUTPATIENT
Start: 2018-11-20 | End: 2018-11-21 | Stop reason: HOSPADM

## 2018-11-20 RX ORDER — HYDROCODONE POLISTIREX AND CHLORPHENIRAMINE POLISTIREX 10; 8 MG/5ML; MG/5ML
5 SUSPENSION, EXTENDED RELEASE ORAL
Status: DISCONTINUED | OUTPATIENT
Start: 2018-11-20 | End: 2018-11-21 | Stop reason: HOSPADM

## 2018-11-20 RX ORDER — FUROSEMIDE 10 MG/ML
20 INJECTION INTRAMUSCULAR; INTRAVENOUS ONCE
Status: COMPLETED | OUTPATIENT
Start: 2018-11-20 | End: 2018-11-20

## 2018-11-20 RX ADMIN — TACROLIMUS 5 ML: 1 CAPSULE ORAL at 09:29

## 2018-11-20 RX ADMIN — TACROLIMUS 5 ML: 1 CAPSULE ORAL at 21:01

## 2018-11-20 RX ADMIN — ASPIRIN 81 MG CHEWABLE TABLET 81 MG: 81 TABLET CHEWABLE at 09:18

## 2018-11-20 RX ADMIN — ACETAMINOPHEN 650 MG: 325 TABLET ORAL at 06:56

## 2018-11-20 RX ADMIN — BUDESONIDE 500 MCG: 0.5 INHALANT RESPIRATORY (INHALATION) at 07:47

## 2018-11-20 RX ADMIN — IPRATROPIUM BROMIDE AND ALBUTEROL SULFATE 3 ML: .5; 3 SOLUTION RESPIRATORY (INHALATION) at 02:10

## 2018-11-20 RX ADMIN — AMLODIPINE BESYLATE 5 MG: 5 TABLET ORAL at 14:44

## 2018-11-20 RX ADMIN — HYDROCODONE POLISTIREX AND CHLORPHENIRAMINE POLISTIREX 5 ML: 10; 8 SUSPENSION, EXTENDED RELEASE ORAL at 12:03

## 2018-11-20 RX ADMIN — Medication 10 ML: at 13:50

## 2018-11-20 RX ADMIN — PREDNISONE 40 MG: 20 TABLET ORAL at 09:18

## 2018-11-20 RX ADMIN — HYDROXYCHLOROQUINE SULFATE 200 MG: 200 TABLET, FILM COATED ORAL at 17:39

## 2018-11-20 RX ADMIN — IPRATROPIUM BROMIDE AND ALBUTEROL SULFATE 3 ML: .5; 3 SOLUTION RESPIRATORY (INHALATION) at 21:54

## 2018-11-20 RX ADMIN — Medication 10 ML: at 21:04

## 2018-11-20 RX ADMIN — Medication 10 ML: at 21:05

## 2018-11-20 RX ADMIN — CALCIUM CARBONATE (ANTACID) CHEW TAB 500 MG 400 MG: 500 CHEW TAB at 09:18

## 2018-11-20 RX ADMIN — CLOBETASOL PROPIONATE: 0.5 OINTMENT TOPICAL at 09:21

## 2018-11-20 RX ADMIN — MULTIPLE VITAMINS W/ MINERALS TAB 1 TABLET: TAB at 09:18

## 2018-11-20 RX ADMIN — Medication 10 ML: at 13:49

## 2018-11-20 RX ADMIN — Medication 10 ML: at 06:56

## 2018-11-20 RX ADMIN — DOXYCYCLINE HYCLATE 100 MG: 100 TABLET, COATED ORAL at 21:01

## 2018-11-20 RX ADMIN — BUDESONIDE 500 MCG: 0.5 INHALANT RESPIRATORY (INHALATION) at 21:54

## 2018-11-20 RX ADMIN — CALCIUM CARBONATE (ANTACID) CHEW TAB 500 MG 400 MG: 500 CHEW TAB at 17:40

## 2018-11-20 RX ADMIN — IPRATROPIUM BROMIDE AND ALBUTEROL SULFATE 3 ML: .5; 3 SOLUTION RESPIRATORY (INHALATION) at 07:47

## 2018-11-20 RX ADMIN — VITAMIN D, TAB 1000IU (100/BT) 1000 UNITS: 25 TAB at 09:18

## 2018-11-20 RX ADMIN — TACROLIMUS 5 ML: 1 CAPSULE ORAL at 16:21

## 2018-11-20 RX ADMIN — PANTOPRAZOLE SODIUM 40 MG: 40 TABLET, DELAYED RELEASE ORAL at 09:18

## 2018-11-20 RX ADMIN — ENOXAPARIN SODIUM 40 MG: 100 INJECTION SUBCUTANEOUS at 06:57

## 2018-11-20 RX ADMIN — HYDROXYCHLOROQUINE SULFATE 200 MG: 200 TABLET, FILM COATED ORAL at 09:18

## 2018-11-20 RX ADMIN — FUROSEMIDE 20 MG: 10 INJECTION, SOLUTION INTRAMUSCULAR; INTRAVENOUS at 13:48

## 2018-11-20 RX ADMIN — Medication 10 ML: at 09:19

## 2018-11-20 RX ADMIN — ACETAMINOPHEN 650 MG: 325 TABLET ORAL at 16:21

## 2018-11-20 RX ADMIN — IPRATROPIUM BROMIDE AND ALBUTEROL SULFATE 3 ML: .5; 3 SOLUTION RESPIRATORY (INHALATION) at 14:40

## 2018-11-20 RX ADMIN — LEVOTHYROXINE SODIUM 100 MCG: 100 TABLET ORAL at 06:56

## 2018-11-20 RX ADMIN — DOXYCYCLINE HYCLATE 100 MG: 100 TABLET, COATED ORAL at 09:18

## 2018-11-20 NOTE — PROGRESS NOTES
TRANSFER - OUT REPORT: 
 
Verbal report given to Iveth rn(name) on Mark Bernard  being transferred to [facility] rn(unit) for routine progression of care Report consisted of patients Situation, Background, Assessment and  
Recommendations(SBAR). Information from the following report(s) SBAR, Kardex, Procedure Summary, Intake/Output, MAR, Med Rec Status and Cardiac Rhythm NSR was reviewed with the receiving nurse. Lines: PICC Triple Lumen 02/09/44 Basilic;Right (Active) Central Line Being Utilized Yes 11/20/2018 12:31 PM  
Criteria for Appropriate Use Limited/no vessel suitable for conventional peripheral access 11/20/2018 12:31 PM  
Site Assessment Clean, dry, & intact 11/20/2018 12:31 PM  
Phlebitis Assessment 1 11/20/2018 12:31 PM  
Infiltration Assessment 0 11/20/2018 12:31 PM  
Date of Last Dressing Change 11/18/18 11/20/2018 12:31 PM  
Dressing Status Clean, dry, & intact 11/20/2018 12:31 PM  
Dressing Type Disk with Chlorhexadine gluconate (CHG); Transparent 11/20/2018 12:31 PM  
Action Taken Open ports on tubing capped 11/19/2018  8:00 AM  
Hub Color/Line Status Red;Capped 11/20/2018 12:31 PM  
Positive Blood Return (Site #1) Yes 11/20/2018 12:31 PM  
Hub Color/Line Status Gray;Capped;Flushed 11/20/2018 12:31 PM  
Positive Blood Return (Site #2) Yes 11/20/2018 12:31 PM  
Hub Color/Line Status White;Flushed;Capped 11/20/2018 12:31 PM  
Positive Blood Return (Site #3) Yes 11/20/2018 12:31 PM  
Alcohol Cap Used Yes 11/20/2018 12:31 PM  
  
 
Opportunity for questions and clarification was provided. Patient transported with: 
 Patient's medications from home Tech

## 2018-11-20 NOTE — PROGRESS NOTES
Bedside and Verbal shift change report given to Sai Foster RN (oncoming nurse) by CARSON Cohen (offgoing nurse). Report included the following information SBAR, Kardex, Intake/Output, MAR, Med Rec Status and Cardiac Rhythm NSR.

## 2018-11-20 NOTE — PROGRESS NOTES
Physical Therapy 11.20.18 Order received, chart reviewed. Spoke with RN who stated patient safely up ad ilene. Completed PT order as evaluation not indicated. Thank you.  
 
Magdalene Guerrero Check, PT, DPT

## 2018-11-20 NOTE — PROGRESS NOTES
Patient name: Sanna Bernard  MRN: [id number] Nephrology Progress note: 
 
Assessment: Hyponatremia:Symptomatic at presentation. Improving. Etiology not completely clear. Hx points towards poor solute intake. Low Urine Na suggests poor solute intake but patient did not respond to isotonic saline. Dilute urine osmolality suggests excessive free water intake . Some underlying urinary incontinence-> Myrbetriq can sometimes cause urinary retention.  
  
Pericardial effusion: moderate. No tamponade physiology. Conservative management 
  
HTN: stable 
  
COPD exacerbation Plan/Recommendations: 
IV Lasix 20mg x1 dose Stop IV NS tonight Encouraged her to increase solute intake/limit free water Avoid urinary retention Can consider discharge tomorrow if Na continues to improve Am labs Subjective: 
Still has HARRIS/SOB. Reports passing more urine. ROS:  
No nausea, no vomiting No chest pain, + shortness of breath Exam: 
Visit Vitals /47 (BP 1 Location: Left arm, BP Patient Position: At rest) Pulse 78 Temp 97.5 °F (36.4 °C) Resp 18 Wt 80 kg (176 lb 5.9 oz) SpO2 98% BMI 34.44 kg/m² Wt Readings from Last 3 Encounters:  
11/19/18 80 kg (176 lb 5.9 oz)  
11/17/18 78.5 kg (173 lb 1 oz)  
11/11/18 77.1 kg (170 lb) Intake/Output Summary (Last 24 hours) at 11/20/2018 1248 Last data filed at 11/20/2018 1000 Gross per 24 hour Intake 1944.17 ml Output 900 ml Net 1044.17 ml Gen: NAD HEENT: No icterus, mmm, no oral exudate, AT/NC 
 Neck: No JVD. No cervical lymphadenopathy. No carotid bruit Lungs/Chest wall: Bibasilar crackles. No accessory muscle use. Cardiovascular: Regular rate, normal rhythm. Abdomen/: Soft, NT, ND, BS+. Ext: Trace peripheral edema CNS: alert and awake. Answers appropriately Current Facility-Administered Medications Medication Dose Route Frequency Last Dose  furosemide (LASIX) injection 20 mg  20 mg IntraVENous ONCE    
 tacrolimus (PROGRAF) 0.01 mg in sterile water 5 mL oral mouth rinse  5 mL Oral TID 5 mL at 11/20/18 0929  acetaminophen (TYLENOL) tablet 650 mg  650 mg Oral Q8H  mg at 11/20/18 1082  albuterol-ipratropium (DUO-NEB) 2.5 MG-0.5 MG/3 ML  3 mL Nebulization Q6H RT 3 mL at 11/20/18 0747  aspirin chewable tablet 81 mg  81 mg Oral DAILY 81 mg at 11/20/18 5647  budesonide (PULMICORT) 500 mcg/2 ml nebulizer suspension  500 mcg Nebulization Q12H 500 mcg at 11/20/18 0747  calcium carbonate (TUMS) chewable tablet 400 mg [elemental]  400 mg Oral  mg at 11/20/18 0714  chlorpheniramine-HYDROcodone (TUSSIONEX) oral suspension 5 mL  5 mL Oral Q12H PRN 5 mL at 11/20/18 1203  cholecalciferol (VITAMIN D3) tablet 1,000 Units  1,000 Units Oral DAILY 1,000 Units at 11/20/18 4149  ciclopirox (LOPROX) 0.77 % cream   Topical BID  clobetasol (TEMOVATE) 0.05 % ointment   Topical DAILY  doxycycline (VIBRA-TABS) tablet 100 mg  100 mg Oral  mg at 11/20/18 4300  multivitamin, tx-iron-ca-min (THERA-M w/ IRON) tablet 1 Tab  1 Tab Oral DAILY 1 Tab at 11/20/18 6001  hydroxychloroquine (PLAQUENIL) tablet 200 mg  200 mg Oral  mg at 11/20/18 9086  levothyroxine (SYNTHROID) tablet 100 mcg  100 mcg Oral  mcg at 11/20/18 5311  pantoprazole (PROTONIX) tablet 40 mg  40 mg Oral DAILY 40 mg at 11/20/18 8412  . PHARMACY TO SUBSTITUTE PER PROTOCOL (Reordered from: tacrolimus (PROTOPIC) 0.1 % ointment)    Per Protocol  sodium chloride (NS) flush 5-10 mL  5-10 mL IntraVENous Q8H 10 mL at 11/20/18 0919  
 sodium chloride (NS) flush 5-10 mL  5-10 mL IntraVENous PRN    
 enoxaparin (LOVENOX) injection 40 mg  40 mg SubCUTAneous Q24H 40 mg at 11/20/18 5613  albuterol-ipratropium (DUO-NEB) 2.5 MG-0.5 MG/3 ML  3 mL Nebulization Q4H PRN    
 0.9% sodium chloride infusion  50 mL/hr IntraVENous CONTINUOUS 50 mL/hr at 11/19/18 2001  predniSONE (DELTASONE) tablet 40 mg  40 mg Oral DAILY WITH BREAKFAST 40 mg at 11/20/18 6595  sodium chloride (NS) flush 20 mL  20 mL InterCATHeter PRN    
 sodium chloride (NS) flush 10 mL  10 mL InterCATHeter Q24H 10 mL at 11/18/18 1430  
 sodium chloride (NS) flush 10 mL  10 mL InterCATHeter PRN    
 sodium chloride (NS) flush 10 mL  10 mL InterCATHeter Q8H 10 mL at 11/20/18 9110  bacitracin 500 unit/gram packet 1 Packet  1 Packet Topical PRN Labs/Data: 
 
Lab Results Component Value Date/Time WBC 8.5 11/20/2018 03:35 AM  
 HGB 8.1 (L) 11/20/2018 03:35 AM  
 HCT 26.0 (L) 11/20/2018 03:35 AM  
 PLATELET 703 50/33/7820 03:35 AM  
 MCV 81.3 11/20/2018 03:35 AM  
 
 
Lab Results Component Value Date/Time Sodium 128 (L) 11/20/2018 03:35 AM  
 Potassium 3.9 11/20/2018 03:35 AM  
 Chloride 98 11/20/2018 03:35 AM  
 CO2 18 (L) 11/20/2018 03:35 AM  
 Anion gap 12 11/20/2018 03:35 AM  
 Glucose 123 (H) 11/20/2018 03:35 AM  
 BUN 14 11/20/2018 03:35 AM  
 Creatinine 0.61 11/20/2018 03:35 AM  
 BUN/Creatinine ratio 23 (H) 11/20/2018 03:35 AM  
 GFR est AA >60 11/20/2018 03:35 AM  
 GFR est non-AA >60 11/20/2018 03:35 AM  
 Calcium 8.3 (L) 11/20/2018 03:35 AM  
 
 
Patient seen and examined. Chart reviewed. Labs, data and other pertinent notes reviewed in last 24 hrs. Discussed with patient, Dr. Arnav Cooley and RN Signed by: 
Kiera Morales MD

## 2018-11-20 NOTE — PROGRESS NOTES
.. TRANSFER - IN REPORT: 
 
Verbal report received from NamrataRN(name) on Brigitte Alvarado  being received from [facility](unit) for routine progression of care Report consisted of patients Situation, Background, Assessment and  
Recommendations(SBAR). Information from the following report(s) SBAR, Kardex, Procedure Summary and MAR was reviewed with the receiving nurse. Opportunity for questions and clarification was provided. Assessment completed upon patients arrival to unit and care assumed.

## 2018-11-20 NOTE — PROGRESS NOTES
1930 - Bedside and Verbal shift change report given to Humberto Roque RN (oncoming nurse) by Ashley Dolan (offgoing nurse). Report included the following information SBAR, Kardex, Intake/Output, MAR, Accordion, Recent Results, Med Rec Status and Cardiac Rhythm NSR.  
 
0000 - Bedside and Verbal shift change report given to [address] [place] [address],[address] (oncoming nurse) by Humberto Roque RN (offgoing nurse). Report included the following information SBAR, Kardex, Intake/Output, MAR, Accordion, Recent Results, Med Rec Status and Cardiac Rhythm NSR.

## 2018-11-20 NOTE — PROGRESS NOTES
[facility] [place]: Paula Hall 
[phone] HPI: Ya Harvey, a 75y.o. year-old who presented for evaluation of respiratory distress. Noted to have pericardial effusion on chest CT but only moderate pericardial effusion on echo. Had some chest pain that seem pleuritic, worse with breathing, coughing. Has had multiple episodes of bronchitis, etc but no prior cardiac evaluation. She is feeling somewhat better today after diuresis and neb treatments. She reports improvement with dyspnea but continues to have cough. Still has some dyspnea with exertion. No palpitations or exertional chest pain prior to bronchitis. Assessment/Plan: She requests pulmonary consultation, placed order. She is breathing better, no chest pain. Questions lupus- milagros negative, follows with Dr. Bandar Dangelo. We will sign off at this point. Please feel free to contact us for any future problems. Thank you for allowing us to participate in the care of Ya Harvey. Will arrange OP follow up in our office with an echocardiogram in 3 months 1. COPD/bronchitis- as per pulmonary/IM 2. Moderate pericardial effusion by TTE - no sx of tamponade, no need for intervention at this time 3. Dyspnea - troponin and ECG negative for ischemia, no ischemia noted on nuclear stress test, TTE showed normal LVEF and moderate pericardial effusion 4. Body mass index is 34.44 kg/m². 5. Hyponatremia - per nephrology 6. Lichen planus/candida - followed by MD at [facility], management per IM 7. HTN - some elevated BP readings, recommend resuming home HTN medications slowly, will restart amlodipine at 5mg daily today Nuc Stress 11/18 - no ischemia, LVEF 66% Echo 11/18 - LV mildly dilated with LVEF 55 % to 60 %, no WMA, mild MR, mild TR, dilated IVC, moderate pericardial effusion without signs of tamponade, no hemodynamic compromise, effusion circumferential to the heart, a portion of organization was noted along the rv free wall, otherwise the effusion wasfree flowing and echo lucent. Fhx no early cad Soc no etoh no tob She  has a past medical history of Allergic rhinitis, COPD (chronic obstructive pulmonary disease) ([address] Hypertension, Hypothyroidism, Lichen planus, Menopause, Osteoarthrosis, unspecified whether generalized or localized, unspecified site, Overactive bladder, Phlebitis and thrombophlebitis of lower extremities, unspecified, and Raynaud's syndrome. Cardiovascular ROS: negative for exertional chest pain or palpitations Respiratory ROS: positive for - cough and shortness of breath Neurological ROS: no TIA or stroke symptoms All other systems negative except as above. PE 
Vitals:  
 11/20/18 0805 11/20/18 1153 11/20/18 1440 11/20/18 1529 BP: 139/47 159/47  141/52 Pulse: 76 78  91 Resp: 18 18 18 Temp: 97.6 °F (36.4 °C) 97.5 °F (36.4 °C)  97.7 °F (36.5 °C) SpO2: 100% 98% 100% 100% Weight:      
 Body mass index is 34.44 kg/m². General appearance - ill, nad Mental status - affect appropriate to mood Eyes - sclera anicteric, moist mucous membranes Neck - supple, no carotid bruits Lymphatics - not assessed Chest - scattered wheezing bilaterally Heart - normal rate, regular rhythm, normal S1, S2, no murmurs, rubs, clicks or gallops Abdomen - soft, nontender, nondistended Back exam - full range of motion, no tenderness Neurological - cranial nerves II through XII grossly intact, no focal deficit Musculoskeletal - no muscular tenderness noted, normal strength Extremities - peripheral pulses normal, no pedal edema Skin - normal coloration  no rashes Telemetry: NSR Recent Labs: 
Lab Results Component Value Date/Time Cholesterol, total 199 06/20/2018 09:32 AM  
 HDL Cholesterol 94 06/20/2018 09:32 AM  
 LDL, calculated 94 06/20/2018 09:32 AM  
 Triglyceride 54 06/20/2018 09:32 AM  
 
Lab Results Component Value Date/Time Creatinine 0.61 11/20/2018 03:35 AM  
 
Lab Results Component Value Date/Time BUN 14 11/20/2018 03:35 AM  
 
Lab Results Component Value Date/Time Potassium 3.9 11/20/2018 03:35 AM  
 
Lab Results Component Value Date/Time Hemoglobin A1c 5.5 01/07/2016 11:59 AM  
 
Lab Results Component Value Date/Time HGB 8.1 (L) 11/20/2018 03:35 AM  
 
Lab Results Component Value Date/Time PLATELET 050 16/74/1691 03:35 AM  
 
 
Reviewed: 
Past Medical History:  
Diagnosis Date  Allergic rhinitis  COPD (chronic obstructive pulmonary disease) ([facility])  Hypertension  Hypothyroidism  Lichen planus   
 seen at [facility] [place] [facility]  Menopause  Osteoarthrosis, unspecified whether generalized or localized, unspecified site  Overactive bladder  Phlebitis and thrombophlebitis of lower extremities, unspecified  Raynaud's syndrome Social History Tobacco Use Smoking Status Former Smoker  Types: Cigarettes Smokeless Tobacco Never Used Tobacco Comment  
 smoked less than 10 yrs and quit 40 + Social History Substance and Sexual Activity Alcohol Use No  
 
Allergies Allergen Reactions  Latex Rash  Keflex [Cephalexin] Rash  Levaquin [Levofloxacin] Rash  Sulfa (Sulfonamide Antibiotics) Rash  Macrobid [Nitrofurantoin Monohyd/M-Cryst] Rash Current Facility-Administered Medications Medication Dose Route Frequency  chlorpheniramine-HYDROcodone (TUSSIONEX) oral suspension 5 mL  5 mL Oral Q8H PRN  
 amLODIPine (NORVASC) tablet 5 mg  5 mg Oral DAILY  tacrolimus (PROGRAF) 0.01 mg in sterile water 5 mL oral mouth rinse  5 mL Oral TID  acetaminophen (TYLENOL) tablet 650 mg  650 mg Oral Q8H PRN  
 albuterol-ipratropium (DUO-NEB) 2.5 MG-0.5 MG/3 ML  3 mL Nebulization Q6H RT  
 aspirin chewable tablet 81 mg  81 mg Oral DAILY  budesonide (PULMICORT) 500 mcg/2 ml nebulizer suspension  500 mcg Nebulization Q12H  calcium carbonate (TUMS) chewable tablet 400 mg [elemental]  400 mg Oral BID  cholecalciferol (VITAMIN D3) tablet 1,000 Units  1,000 Units Oral DAILY  ciclopirox (LOPROX) 0.77 % cream   Topical BID  clobetasol (TEMOVATE) 0.05 % ointment   Topical DAILY  doxycycline (VIBRA-TABS) tablet 100 mg  100 mg Oral BID  multivitamin, tx-iron-ca-min (THERA-M w/ IRON) tablet 1 Tab  1 Tab Oral DAILY  hydroxychloroquine (PLAQUENIL) tablet 200 mg  200 mg Oral BID  levothyroxine (SYNTHROID) tablet 100 mcg  100 mcg Oral ACB  pantoprazole (PROTONIX) tablet 40 mg  40 mg Oral DAILY  . PHARMACY TO SUBSTITUTE PER PROTOCOL (Reordered from: tacrolimus (PROTOPIC) 0.1 % ointment)    Per Protocol  sodium chloride (NS) flush 5-10 mL  5-10 mL IntraVENous Q8H  
 sodium chloride (NS) flush 5-10 mL  5-10 mL IntraVENous PRN  
 enoxaparin (LOVENOX) injection 40 mg  40 mg SubCUTAneous Q24H  
 albuterol-ipratropium (DUO-NEB) 2.5 MG-0.5 MG/3 ML  3 mL Nebulization Q4H PRN  
 0.9% sodium chloride infusion  50 mL/hr IntraVENous CONTINUOUS  predniSONE (DELTASONE) tablet 40 mg  40 mg Oral DAILY WITH BREAKFAST  sodium chloride (NS) flush 20 mL  20 mL InterCATHeter PRN  
 sodium chloride (NS) flush 10 mL  10 mL InterCATHeter Q24H  
 sodium chloride (NS) flush 10 mL  10 mL InterCATHeter PRN  
 sodium chloride (NS) flush 10 mL  10 mL InterCATHeter Q8H  
 bacitracin 500 unit/gram packet 1 Packet  1 Packet Topical PRN Tom Gomez MD 
[facility] [address], [address]

## 2018-11-20 NOTE — PROGRESS NOTES
Hospitalist Progress Note Raquel Gibbs MD 
Answering service: [phone] OR [phone] from in house phone Cell: [phone] Date of Service:  2018 NAME:  Abe Alcaraz :  1945 MRN:  [id number] Admission Summary:  
 
69-year-old female with history of COPD and hypertension, osteoarthritis, Raynaud's syndrome and hypothyroidism who was transferred from [facility] Emergency Room for pericardial effusion and dyspnea. Patient presented to the emergency room at [facility] [place], [place] with complaints of worsening shortness of breath. The patient reported that she was treated for COPD exacerbation for the past 5 weeks, but according to patient, her symptoms have continued to worsen despite the treatments. The patient has taken 2 rounds of prednisone with a Z-RUPA and most recently this week doxycycline, which the patient completed yesterday. Patient reports cough that is nonproductive. Shortness of breath is said to have worsened, associated with chest tightness. Patient reported chest pain last week for which she was seen at the emergency room twice. According to the patient it was reported her chest pain worse as a result of a persistent cough. Cough is associated with diffuse wheezing.   
  
On presentation to the emergency room at [facility].  The patient was given 4 rounds of bronchodilators. A CT of the chest without contrast showed moderate bilateral pleural effusion. EKG showed changes with flattening of T waves. The patient was transferred to our facility for further evaluation and treatment. Interval history / Subjective:  
 
F/u Pericardial effusion and hyponatremia Patient feels \"better\" this AM. Was eager to leave, but discussed her low sodium. Discussed with Nephro. Plan for Lasix and sodium intake. Hopeful for d/c tomorrow. Assessment & Plan: Pericardial effusion 
-CT chest 11/17 - Moderate pericardial effusion. Small bilateral effusions, left greater than right, with associated atelectasis 
-no suggestions of tamponade so far 
-Echo EF 55-60% with no RWMA 
-Appreciate Cardiology - no further testing 
-Stress test normal 
 
Hyponatremia 
-seems acute on chronic, sec to too much free water intake and low solute intake 
-S Osm 251, Urine osm 110 with urine sodium 13 
-minimal improvement in sodium level Mild COPD exacerbation, improving 
-Continue Duoneb/Doxycycline. On short course of oral prednisone, as well 
-On Tussionex for cough Hypothyroidism 
-on synthroid 
-will need repeat TFTs when acute illness resolves Overweight 
-Counseled On Fluconazole/Plaquenil/Tacrolimus 
-Patient claims for her oral infection ? ? (candidiasis and lichen planus) -She is being treated for that at [facility] 
-Will continue home meds History of ulcerative colitis 
-She has not been on Pentasa/Entocort for 9 months 
-Taken off those for now 
-Outpatient follow up with Dr. Rema Marin Osteoporosis 
-Outpatient follow up with Dr. Trev Groves Code status: FULL CODE 
DVT prophylaxis: Lovenox PTA: home Care Plan discussed with: Patient/Family Disposition: home tomorrow Hospital Problems  Date Reviewed: 11/18/2018 Codes Class Noted POA * (Principal) Pericardial effusion ICD-10-CM: I31.3 ICD-9-CM: 423.9  11/18/2018 Unknown Review of Systems: A comprehensive review of systems was negative except for that written in the HPI. Vital Signs:  
 Last 24hrs VS reviewed since prior progress note. Most recent are: 
Visit Vitals /47 (BP 1 Location: Left arm, BP Patient Position: At rest) Pulse 76 Temp 97.6 °F (36.4 °C) Resp 18 Wt 80 kg (176 lb 5.9 oz) SpO2 100% BMI 34.44 kg/m² Intake/Output Summary (Last 24 hours) at 11/20/2018 0815 Last data filed at 11/20/2018 9415 Gross per 24 hour Intake 1929.17 ml  
 Output 600 ml Net 1329.17 ml Physical Examination:  
 
 
     
Constitutional:  No acute distress, cooperative, pleasant   
ENT:  Neck supple Resp:  Crackles in bases. No accessory muscle use CV:  Regular rhythm, normal rate, no murmur GI:  Soft, non distended, non tender, normoactive bowel sounds Musculoskeletal:  Trace edema, warm Neurologic:  Moves all extremities. AAOx3 Skin:  Good turgor, no rashes or ulcers Data Review:  
 Review and/or order of clinical lab test 
 
 
Labs:  
 
Recent Labs  
  11/20/18 0335 11/18/18 
1433 WBC 8.5 7.8 HGB 8.1* 7.8* HCT 26.0* 24.5*  
 257 Recent Labs  
  11/20/18 
0335 11/19/18 
1214 11/19/18 
0535  11/17/18 2120 * 127* 123*   < > 122* K 3.9 4.7 4.1   < > 4.5 CL 98 96* 94*   < > 88* CO2 18* 20* 21   < > 22 BUN 14 10 10   < > 16 CREA 0.61 0.66 0.70   < > 0.91  
* 118* 92   < > 102* CA 8.3* 8.5 8.5   < > 8.7 MG 2.3  --   --   --  1.6 PHOS 2.9  --   --   --   --   
 < > = values in this interval not displayed. Recent Labs 11/17/18 2120 SGOT 15 ALT 20 AP 98 TBILI 0.5 TP 6.7 ALB 2.8*  
GLOB 3.9 No results for input(s): INR, PTP, APTT in the last 72 hours. No lab exists for component: INREXT, INREXT No results for input(s): FE, TIBC, PSAT, FERR in the last 72 hours. No results found for: FOL, RBCF No results for input(s): PH, PCO2, PO2 in the last 72 hours. Recent Labs 11/18/18 
1934 11/18/18 
1927 11/18/18 
1433 11/18/18 
3223 CPK  --  64 49 55 CKNDX  --  2.2 Cannot be calculated Cannot be calculated TROIQ <0.05  --  <0.05 <0.05 Lab Results Component Value Date/Time Cholesterol, total 199 06/20/2018 09:32 AM  
 HDL Cholesterol 94 06/20/2018 09:32 AM  
 LDL, calculated 94 06/20/2018 09:32 AM  
 Triglyceride 54 06/20/2018 09:32 AM  
 
No results found for: Danielle Taylor Lab Results Component Value Date/Time  Color YELLOW/STRAW 11/18/2018 06:17 AM  
 Appearance CLEAR 11/18/2018 06:17 AM  
 Specific gravity 1.008 11/18/2018 06:17 AM  
 pH (UA) 5.0 11/18/2018 06:17 AM  
 Protein NEGATIVE  11/18/2018 06:17 AM  
 Glucose NEGATIVE  11/18/2018 06:17 AM  
 Ketone NEGATIVE  11/18/2018 06:17 AM  
 Bilirubin NEGATIVE  11/18/2018 06:17 AM  
 Urobilinogen 0.2 11/18/2018 06:17 AM  
 Nitrites NEGATIVE  11/18/2018 06:17 AM  
 Leukocyte Esterase SMALL (A) 11/18/2018 06:17 AM  
 Epithelial cells FEW 11/18/2018 06:17 AM  
 Bacteria NEGATIVE  11/18/2018 06:17 AM  
 WBC 0-4 11/18/2018 06:17 AM  
 RBC 0-5 11/18/2018 06:17 AM  
 
 
 
Medications Reviewed:  
 
Current Facility-Administered Medications Medication Dose Route Frequency  tacrolimus (PROGRAF) 0.01 mg in sterile water 5 mL oral mouth rinse  5 mL Oral TID  acetaminophen (TYLENOL) tablet 650 mg  650 mg Oral Q8H PRN  
 albuterol-ipratropium (DUO-NEB) 2.5 MG-0.5 MG/3 ML  3 mL Nebulization Q6H RT  
 aspirin chewable tablet 81 mg  81 mg Oral DAILY  budesonide (PULMICORT) 500 mcg/2 ml nebulizer suspension  500 mcg Nebulization Q12H  calcium carbonate (TUMS) chewable tablet 400 mg [elemental]  400 mg Oral BID  chlorpheniramine-HYDROcodone (TUSSIONEX) oral suspension 5 mL  5 mL Oral Q12H PRN  cholecalciferol (VITAMIN D3) tablet 1,000 Units  1,000 Units Oral DAILY  ciclopirox (LOPROX) 0.77 % cream   Topical BID  clobetasol (TEMOVATE) 0.05 % ointment   Topical DAILY  doxycycline (VIBRA-TABS) tablet 100 mg  100 mg Oral BID  multivitamin, tx-iron-ca-min (THERA-M w/ IRON) tablet 1 Tab  1 Tab Oral DAILY  hydroxychloroquine (PLAQUENIL) tablet 200 mg  200 mg Oral BID  levothyroxine (SYNTHROID) tablet 100 mcg  100 mcg Oral ACB  pantoprazole (PROTONIX) tablet 40 mg  40 mg Oral DAILY  . PHARMACY TO SUBSTITUTE PER PROTOCOL (Reordered from: tacrolimus (PROTOPIC) 0.1 % ointment)    Per Protocol  sodium chloride (NS) flush 5-10 mL  5-10 mL IntraVENous Q8H  
  sodium chloride (NS) flush 5-10 mL  5-10 mL IntraVENous PRN  
 enoxaparin (LOVENOX) injection 40 mg  40 mg SubCUTAneous Q24H  
 albuterol-ipratropium (DUO-NEB) 2.5 MG-0.5 MG/3 ML  3 mL Nebulization Q4H PRN  
 0.9% sodium chloride infusion  50 mL/hr IntraVENous CONTINUOUS  predniSONE (DELTASONE) tablet 40 mg  40 mg Oral DAILY WITH BREAKFAST  sodium chloride (NS) flush 20 mL  20 mL InterCATHeter PRN  
 sodium chloride (NS) flush 10 mL  10 mL InterCATHeter Q24H  
 sodium chloride (NS) flush 10 mL  10 mL InterCATHeter PRN  
 sodium chloride (NS) flush 10 mL  10 mL InterCATHeter Q8H  
 bacitracin 500 unit/gram packet 1 Packet  1 Packet Topical PRN  
 
______________________________________________________________________ EXPECTED LENGTH OF STAY: 2d 19h ACTUAL LENGTH OF STAY:          2 Milan Ibanez MD

## 2018-11-20 NOTE — ACP (ADVANCE CARE PLANNING)
AMD received from pt. AMD copied, placed on chart, and RN notified of AMD placement on chart. AMD does not include designation of a healthcare agent.      Benjamin Robbins RN, BSN  Care Management Department

## 2018-11-20 NOTE — PROGRESS NOTES
Bedside shift change report given to [address] [place] [address] (oncoming nurse) by Sai Foster  (offgoing nurse). Report included the following information SBAR, Kardex, Procedure Summary, Intake/Output, MAR, Recent Results and Cardiac Rhythm NSR.

## 2018-11-20 NOTE — PROGRESS NOTES
Notified RN that pt would like [facility] visit. RN to place order. Lara Alvarez RN, BSN Care Management Department

## 2018-11-20 NOTE — PROGRESS NOTES
Problem: Chronic Obstructive Pulmonary Disease (COPD) Goal: *Oxygen saturation during activity within specified parameters Outcome: Progressing Towards Goal 
WDL for patient Goal: *Able to remain out of bed as prescribed Outcome: Resolved/Met Date Met: 11/20/18 Up ad ilene Goal: *Optimize nutritional status Outcome: Progressing Towards Goal 
Regular cardiac diet

## 2018-11-20 NOTE — PROGRESS NOTES
Rounded on [employer] patients and provided Anointing of the Sick  at request of patient Fr. Yessenia Christopher

## 2018-11-20 NOTE — PROGRESS NOTES
Reason for Admission: Pericardial Effusion RRAT Score:  12- LOW Plan for utilizing home health: TBD Likelihood of Readmission: LOW Transition of Care Plan:  CM at bedside to discuss CM role and assess for pt needs. CM verified demographic information. Pt currently lives with spouse in a one-story private residence and has family support from spouse and children. Pt has a new NP (CM will update information in chart) and has seen practitioner within the past week and a half. Pt currently has DME at home including a nebulizer, shower chair, and ramp- mainly from a previous knee replacement surgery. Pt and spouse report no concerns for discharge at this time. CM offered pt H2H visit due to COPD and pt agreeable if pt eligible for services and location. Pt gave AMD to CM to place on chart. CM made a copy of AMD, placed on chart, and notified RN that AMD was placed on the chart. CM will await further orders if any need should arise with patient. Tye Finnegan RN, BSN Care Management Department Care Management Interventions PCP Verified by CM: Yes(Monika Milian, NP- Seen within last week and a half. ) MyChart Signup: No 
Discharge Durable Medical Equipment: No(Nebulizer, Shower Chair, Ramp- Most DME from previous knee replacement) Physical Therapy Consult: Yes Speech Therapy Consult: No 
Current Support Network: Lives with Spouse, Other(Spouse and Children) Plan discussed with Pt/Family/Caregiver:  Yes

## 2018-11-21 VITALS
WEIGHT: 176.37 LBS | RESPIRATION RATE: 16 BRPM | TEMPERATURE: 98.6 F | BODY MASS INDEX: 34.44 KG/M2 | DIASTOLIC BLOOD PRESSURE: 75 MMHG | OXYGEN SATURATION: 97 % | HEART RATE: 81 BPM | SYSTOLIC BLOOD PRESSURE: 155 MMHG

## 2018-11-21 LAB
ANION GAP SERPL CALC-SCNC: 10 MMOL/L (ref 5–15)
BASOPHILS # BLD: 0 K/UL (ref 0–0.1)
BASOPHILS NFR BLD: 0 % (ref 0–1)
BUN SERPL-MCNC: 10 MG/DL (ref 6–20)
BUN/CREAT SERPL: 15 (ref 12–20)
CALCIUM SERPL-MCNC: 8.2 MG/DL (ref 8.5–10.1)
CHLORIDE SERPL-SCNC: 100 MMOL/L (ref 97–108)
CO2 SERPL-SCNC: 23 MMOL/L (ref 21–32)
CREAT SERPL-MCNC: 0.65 MG/DL (ref 0.55–1.02)
DIFFERENTIAL METHOD BLD: ABNORMAL
EOSINOPHIL # BLD: 0 K/UL (ref 0–0.4)
EOSINOPHIL NFR BLD: 0 % (ref 0–7)
ERYTHROCYTE [DISTWIDTH] IN BLOOD BY AUTOMATED COUNT: 15.9 % (ref 11.5–14.5)
GLUCOSE SERPL-MCNC: 106 MG/DL (ref 65–100)
HCT VFR BLD AUTO: 24.5 % (ref 35–47)
HGB BLD-MCNC: 7.7 G/DL (ref 11.5–16)
IMM GRANULOCYTES # BLD: 0.1 K/UL (ref 0–0.04)
IMM GRANULOCYTES NFR BLD AUTO: 1 % (ref 0–0.5)
LYMPHOCYTES # BLD: 0.6 K/UL (ref 0.8–3.5)
LYMPHOCYTES NFR BLD: 6 % (ref 12–49)
MAGNESIUM SERPL-MCNC: 2.1 MG/DL (ref 1.6–2.4)
MCH RBC QN AUTO: 25.3 PG (ref 26–34)
MCHC RBC AUTO-ENTMCNC: 31.4 G/DL (ref 30–36.5)
MCV RBC AUTO: 80.6 FL (ref 80–99)
MONOCYTES # BLD: 0.9 K/UL (ref 0–1)
MONOCYTES NFR BLD: 10 % (ref 5–13)
NEUTS SEG # BLD: 7.6 K/UL (ref 1.8–8)
NEUTS SEG NFR BLD: 82 % (ref 32–75)
NRBC # BLD: 0 K/UL (ref 0–0.01)
NRBC BLD-RTO: 0 PER 100 WBC
PLATELET # BLD AUTO: 348 K/UL (ref 150–400)
PMV BLD AUTO: 9.3 FL (ref 8.9–12.9)
POTASSIUM SERPL-SCNC: 4 MMOL/L (ref 3.5–5.1)
RBC # BLD AUTO: 3.04 M/UL (ref 3.8–5.2)
SODIUM SERPL-SCNC: 133 MMOL/L (ref 136–145)
WBC # BLD AUTO: 9.3 K/UL (ref 3.6–11)

## 2018-11-21 PROCEDURE — 83735 ASSAY OF MAGNESIUM: CPT

## 2018-11-21 PROCEDURE — 74011250637 HC RX REV CODE- 250/637: Performed by: INTERNAL MEDICINE

## 2018-11-21 PROCEDURE — 85025 COMPLETE CBC W/AUTO DIFF WBC: CPT

## 2018-11-21 PROCEDURE — 74011250636 HC RX REV CODE- 250/636: Performed by: INTERNAL MEDICINE

## 2018-11-21 PROCEDURE — 94640 AIRWAY INHALATION TREATMENT: CPT

## 2018-11-21 PROCEDURE — 80048 BASIC METABOLIC PNL TOTAL CA: CPT

## 2018-11-21 PROCEDURE — 74011250637 HC RX REV CODE- 250/637: Performed by: NURSE PRACTITIONER

## 2018-11-21 PROCEDURE — 74011000250 HC RX REV CODE- 250: Performed by: INTERNAL MEDICINE

## 2018-11-21 PROCEDURE — 36415 COLL VENOUS BLD VENIPUNCTURE: CPT

## 2018-11-21 PROCEDURE — 74011636637 HC RX REV CODE- 636/637: Performed by: HOSPITALIST

## 2018-11-21 RX ORDER — PREDNISONE 20 MG/1
40 TABLET ORAL
Qty: 4 TAB | Refills: 0 | Status: SHIPPED | OUTPATIENT
Start: 2018-11-22 | End: 2018-11-24

## 2018-11-21 RX ADMIN — PREDNISONE 40 MG: 20 TABLET ORAL at 07:02

## 2018-11-21 RX ADMIN — CLOBETASOL PROPIONATE: 0.5 OINTMENT TOPICAL at 09:13

## 2018-11-21 RX ADMIN — ASPIRIN 81 MG CHEWABLE TABLET 81 MG: 81 TABLET CHEWABLE at 09:12

## 2018-11-21 RX ADMIN — IPRATROPIUM BROMIDE AND ALBUTEROL SULFATE 3 ML: .5; 3 SOLUTION RESPIRATORY (INHALATION) at 02:00

## 2018-11-21 RX ADMIN — Medication 10 ML: at 06:28

## 2018-11-21 RX ADMIN — BUDESONIDE 500 MCG: 0.5 INHALANT RESPIRATORY (INHALATION) at 08:27

## 2018-11-21 RX ADMIN — DOXYCYCLINE HYCLATE 100 MG: 100 TABLET, COATED ORAL at 07:02

## 2018-11-21 RX ADMIN — LEVOTHYROXINE SODIUM 100 MCG: 100 TABLET ORAL at 07:02

## 2018-11-21 RX ADMIN — VITAMIN D, TAB 1000IU (100/BT) 1000 UNITS: 25 TAB at 09:12

## 2018-11-21 RX ADMIN — AMLODIPINE BESYLATE 5 MG: 5 TABLET ORAL at 09:12

## 2018-11-21 RX ADMIN — PANTOPRAZOLE SODIUM 40 MG: 40 TABLET, DELAYED RELEASE ORAL at 09:12

## 2018-11-21 RX ADMIN — ENOXAPARIN SODIUM 40 MG: 100 INJECTION SUBCUTANEOUS at 05:00

## 2018-11-21 RX ADMIN — MULTIPLE VITAMINS W/ MINERALS TAB 1 TABLET: TAB at 09:12

## 2018-11-21 RX ADMIN — IPRATROPIUM BROMIDE AND ALBUTEROL SULFATE 3 ML: .5; 3 SOLUTION RESPIRATORY (INHALATION) at 08:26

## 2018-11-21 RX ADMIN — ACETAMINOPHEN 650 MG: 325 TABLET ORAL at 09:04

## 2018-11-21 RX ADMIN — HYDROXYCHLOROQUINE SULFATE 200 MG: 200 TABLET, FILM COATED ORAL at 09:12

## 2018-11-21 RX ADMIN — CALCIUM CARBONATE (ANTACID) CHEW TAB 500 MG 400 MG: 500 CHEW TAB at 09:12

## 2018-11-21 RX ADMIN — HYDROCODONE POLISTIREX AND CHLORPHENIRAMINE POLISTIREX 5 ML: 10; 8 SUSPENSION, EXTENDED RELEASE ORAL at 09:04

## 2018-11-21 NOTE — DISCHARGE SUMMARY
Discharge Summary       PATIENT ID: Omar Boone  MRN: [id number]   YOB: 1945    DATE OF ADMISSION: 11/18/2018  4:14 AM    DATE OF DISCHARGE: 11/21/2018  PRIMARY CARE PROVIDER: CHUCKY Zacarias     DISCHARGING PROVIDER: Graciela Sheehan MD    To contact this individual call [phone] and ask the  to page. If unavailable ask to be transferred the Adult Hospitalist Department. CONSULTATIONS: IP CONSULT TO CARDIOLOGY  IP CONSULT TO NEPHROLOGY  IP CONSULT TO INTENSIVIST    PROCEDURES/SURGERIES: * No surgery found *    ADMITTING [address] [place] [address] COURSE:     Per prior note, 65-year-old female with history of COPD and hypertension, osteoarthritis, Raynaud's syndrome and hypothyroidism who was transferred from [facility] Emergency Room for pericardial effusion and dyspnea.  Patient presented to the emergency room at [facility] with complaints of worsening shortness of breath.  The patient reported that she was treated for COPD exacerbation for the past 5 weeks, but according to patient, her symptoms have continued to worsen despite the treatments.  The patient has taken 2 rounds of prednisone with a Z-RUPA and most recently this week doxycycline, which the patient completed yesterday. Alphonse Mckeon reports cough that is nonproductive.  Shortness of breath is said to have worsened, associated with chest tightness.      Found to have pericardial effusion without tamponade, mild COPD exacerbation, and hyponatremia. Cardiology and Nephrology evaluated. Planned for repeat ECHO and Cards follow-up. Na now improved. To complete 2 more days of steroids for COPD. Instructed to see PCP. DISCHARGE DIAGNOSES / PLAN:      Pericardial effusion  -CT chest 11/17 - Moderate pericardial effusion.  Small bilateral effusions, left greater than right, with associated atelectasis  -no suggestions of tamponade  -Echo EF 55-60% with no RWMA  -Appreciate Cardiology - no further testing. Outpatient follow-up arranged  -Stress test normal     Hyponatremia  -seems acute on chronic, secondary to too much free water intake and low solute intake  -S Osm 251, Urine osm 110 with urine sodium 13  -Na on discharge 133     Mild COPD exacerbation, improved  -Continue Duoneb/Doxycycline (10 day course).  On short course of oral prednisone, as well - 2 more days  -On Tussionex for cough     Hypothyroidism  -on Synthroid  -will need repeat TFTs when acute illness resolves     Overweight  -Counseled     On Fluconazole/Plaquenil/Tacrolimus  -Patient claims for her oral infection ? (candidiasis and lichen planus)  -She is being treated for that at [address] [place] [address] [address] continue home meds     History of ulcerative colitis  -She has not been on Pentasa/Entocort for 9 months  -Taken off those for now  -Outpatient follow up with Dr. Erin Avila     Osteoporosis  -Outpatient follow up with Dr. Josefina Chu       PENDING TEST RESULTS:   At the time of discharge the following test results are still pending: None    FOLLOW UP APPOINTMENTS:    Follow-up Information     Follow up With Specialties Details Why Contact Info    [employer] [facility] Visit [facility] [phone] [facility]    Otto Troy MD Cardiology On 2/20/2019 10:00 AM for echo, 11:00 AM for appointment  [place] [address]  [address] Sender, NP Nurse Practitioner Schedule an appointment as soon as possible for a visit Hospital PCP f/u appointment on Tuesday November 27,2018 @ 1:30 p.m. [address]  [address]   [phone]           ADDITIONAL CARE RECOMMENDATIONS:     DIET: Regular Diet, fluid restricted - patient counseled    ACTIVITY: Activity as tolerated    DISCHARGE MEDICATIONS:  Current Discharge Medication List      START taking these medications    Details   predniSONE (DELTASONE) 20 mg tablet Take 40 mg by mouth daily (with breakfast) for 2 days. Qty: 4 Tab, Refills: 0         CONTINUE these medications which have NOT CHANGED    Details   doxycycline (VIBRAMYCIN) 100 mg capsule Take 1 Cap by mouth two (2) times a day for 10 days. Qty: 20 Cap, Refills: 0    Associated Diagnoses: Acute sinusitis, recurrence not specified, unspecified location; COPD exacerbation (HCC)      chlorpheniramine-HYDROcodone (TUSSIONEX PENNKINETIC ER) 10-8 mg/5 mL suspension Take 5 mL by mouth every twelve (12) hours as needed for Cough. Max Daily Amount: 10 mL. Qty: 115 mL, Refills: 0    Associated Diagnoses: Cough      beclomethasone (QVAR) 80 mcg/actuation aero Take 2 Puffs by inhalation. ibandronate (BONIVA) 150 mg tablet Take 150 mg by mouth. Once a month      lisinopril (PRINIVIL, ZESTRIL) 40 mg tablet TAKE 1 TABLET BY MOUTH EVERY DAY  Qty: 90 Tab, Refills: 1      levothyroxine (SYNTHROID) 100 mcg tablet TAKE 1 TABLET BY MOUTH DAILY BEFORE BREAKFAST. Qty: 90 Tab, Refills: 1    Associated Diagnoses: Encounter for immunization      amLODIPine (NORVASC) 10 mg tablet TAKE 1 TABLET BY MOUTH DAILY. Qty: 90 Tab, Refills: 1      ANORO ELLIPTA 62.5-25 mcg/actuation inhaler USE 1 INHALATION ONCE A DAY  Refills: 5      albuterol (PROVENTIL HFA, VENTOLIN HFA, PROAIR HFA) 90 mcg/actuation inhaler Take 2 Puffs by inhalation. tacrolimus (PROGRAF) 1 mg capsule Take 1 mg by mouth three (3) times daily. Contents of 1 cap diluted in 500 ml of distilled water: 5 ml swish and spit tid      fluconazole (DIFLUCAN) 150 mg tablet Take 150 mg by mouth every seven (7) days. For oral lichen planus      hydroxychloroquine (PLAQUENIL) 200 mg tablet Take 200 mg by mouth two (2) times a day. budesonide (ENTOCORT EC) 3 mg capsule Take 3 Caps by mouth every morning. Taking 3 tabs in am  Qty: 270 Cap, Refills: 1    Associated Diagnoses: Colitis      calcium carbonate (TUMS) 200 mg calcium (500 mg) chew Take 2 Tabs by mouth two (2) times a day. naproxen-diphenhydramine (ALEVE PM) 220-25 mg tab Take  by mouth daily. Pt takes 2 tabs      aspirin 81 mg chewable tablet Take 81 mg by mouth daily. FOLIC ACID/MULTIVIT-MINERALS (ADULT MULTIVITAMIN GUMMIES PO) Take  by mouth.      mirabegron (MYRBETRIQ) 25 mg Tb24 Take 50 mg by mouth daily. Associated Diagnoses: Overactive bladder      omeprazole (PRILOSEC) 20 mg capsule Take 20 mg by mouth daily. cholecalciferol (VITAMIN D3) 1,000 unit tablet Take 1,000 Units by mouth daily. Indications: VITAMIN D DEFICIENCY    Associated Diagnoses: Vitamin D deficiency      lidocaine (XYLOCAINE) 2 % solution Take 10 mL by mouth as needed for Pain. Qty: 1 Bottle, Refills: 1      ciclopirox (LOPROX) 0.77 % topical cream Apply  to affected area two (2) times a day. triamcinolone acetonide (KENALOG) 0.1 % topical cream Apply  to affected area two (2) times a day. use thin layer      tacrolimus (PROTOPIC) 0.1 % ointment Apply  to affected area daily. qday in the am-alternate with clobetasol/orabase      clobetasol (TEMOVATE) 0.05 % ointment Apply  to affected area daily. qday in the am-alternate with tacrolimus      augmented betamethasone dipropionate (DIPROLENE-AF) 0.05 % ointment Use BID to lesions on legs and arms  Qty: 45 g, Refills: 1    Associated Diagnoses: Eczema, unspecified type      acetaminophen (ARTHRITIS PAIN RELIEF) 650 mg CR tablet Take 650 mg by mouth every eight (8) hours. STOP taking these medications       mesalamine (PENTASA) 500 mg CR capsule Comments:   Reason for Stopping:                 NOTIFY YOUR PHYSICIAN FOR ANY OF THE FOLLOWING:   Fever over 101 degrees for 24 hours. Chest pain, shortness of breath, fever, chills, nausea, vomiting, diarrhea, change in mentation, falling, weakness, bleeding. Severe pain or pain not relieved by medications. Or, any other signs or symptoms that you may have questions about.     DISPOSITION:    Home With:   OT  PT  HH  RN       Long term SNF/Inpatient Rehab    Independent/assisted living    Hospice    Other:       PATIENT CONDITION AT DISCHARGE:     Functional status    Poor     Deconditioned     Independent      Cognition     Lucid     Forgetful     Dementia      Catheters/lines (plus indication)    [place]     PICC     PEG     None      Code status     Full code     DNR      PHYSICAL EXAMINATION AT DISCHARGE:    Constitutional:  No acute distress, cooperative, pleasant    ENT:  Neck supple   Resp:  CTAB. No accessory muscle use   CV:  Regular rhythm, normal rate, no murmur    GI:  Soft, non distended, non tender, normoactive bowel sounds    Musculoskeletal:  No edema, warm    Neurologic:  Moves all extremities. AAOx3                         Skin:  Good turgor, no rashes or ulcers      CHRONIC MEDICAL DIAGNOSES:  Problem List as of 11/21/2018 Date Reviewed: 11/18/2018          Codes Class Noted - Resolved    * (Principal) Pericardial effusion ICD-10-CM: I31.3  ICD-9-CM: 423.9  11/18/2018 - Present        Severe obesity (BMI 35.0-39. 9) ICD-10-CM: E66.01  ICD-9-CM: 278.01  6/18/2018 - Present        Lichen planus GSH-23-FU: L43.9  ICD-9-CM: 697.0  Unknown - Present    Overview Signed 2/6/2017  9:04 AM by Lorraine Wilkerson     seen at [facility]             Primary osteoarthritis of both knees ICD-10-CM: M17.0  ICD-9-CM: 715.16  11/16/2015 - Present        Chronic back pain ICD-10-CM: M54.9, G89.29  ICD-9-CM: 724.5, 338.29  11/16/2015 - Present        Other secondary kyphosis, thoracic region ICD-10-CM: M40.14  ICD-9-CM: 737.41  11/16/2015 - Present        Vitamin D deficiency ICD-10-CM: E55.9  ICD-9-CM: 268.9  11/16/2015 - Present        Pitting edema ICD-10-CM: R60.9  ICD-9-CM: 782.3  11/16/2015 - Present        Primary osteoarthritis of right hand ICD-10-CM: M19.041  ICD-9-CM: 715.14  11/16/2015 - Present        Osteopenia ICD-10-CM: M85.80  ICD-9-CM: 733.90  9/8/2015 - Present        Insomnia ICD-10-CM: G47.00  ICD-9-CM: 780.52  3/21/2014 - Present Colitis ICD-10-CM: K52.9  ICD-9-CM: 558.9  1/9/2014 - Present        Osteoarthritis ICD-10-CM: M19.90  ICD-9-CM: 715.90  Unknown - Present        Hypertension ICD-10-CM: I10  ICD-9-CM: 401.9  Unknown - Present        Hypothyroidism ICD-10-CM: E03.9  ICD-9-CM: 244.9  Unknown - Present        Allergic rhinitis ICD-10-CM: J30.9  ICD-9-CM: 477.9  Unknown - Present        Overactive bladder ICD-10-CM: N32.81  ICD-9-CM: 596.51  Unknown - Present              Greater than 30 minutes were spent with the patient on counseling and coordination of care    Signed:   Carline Napoles MD  11/21/2018  10:42 AM

## 2018-11-21 NOTE — PROGRESS NOTES
Verbal shift change report given to General Broderick (oncoming nurse) by Tiara Bryson (offgoing nurse). Report included the following information SBAR.

## 2018-11-21 NOTE — DISCHARGE INSTRUCTIONS
Discharge Instructions       PATIENT ID: Heike Valenzuela  MRN: [id number]   YOB: 1945    DATE OF ADMISSION: 11/18/2018  4:14 AM    DATE OF DISCHARGE: 11/21/2018    PRIMARY CARE PROVIDER: CHUCKY Page     ATTENDING PHYSICIAN: Pankaj Ghotra MD  DISCHARGING PROVIDER: Mahesh Wood MD    To contact this individual call [phone] and ask the  to page. If unavailable ask to be transferred the Adult Hospitalist Department. DISCHARGE DIAGNOSES pericardial effusion,     CONSULTATIONS: IP CONSULT TO CARDIOLOGY  IP CONSULT TO NEPHROLOGY  IP CONSULT TO INTENSIVIST    PROCEDURES/SURGERIES: * No surgery found *    PENDING TEST RESULTS:   At the time of discharge the following test results are still pending: None    FOLLOW UP APPOINTMENTS:   Follow-up Information     Follow up With Specialties Details Why Contact Info    [address] Visit [facility] [phone] [facility]    Bernie Mendosa MD Cardiology On 2/20/2019 10:00 AM for echo, 11:00 AM for appointment  [place] [address]  [address]      Denisse Godinez [place] Physician Assistant Schedule an appointment as soon as possible for a visit  [address] [facility]  [phone]             ADDITIONAL CARE RECOMMENDATIONS:     You came in with shortness of breath and were found to have a mild COPD flare and fluid around your heart called a pericardial effusion. This was not enough fluid to cause any problems or constriction on your heart. The Cardiology team evaluated you. You need to follow-up with them for a repeat ultrasound. You were also found to have low salt and this has improved. The kidney doctor saw you. It is important to limit the amount of water intake you ingest daily and continue to eat salt in your foods.  It is advisable you get your salt level re-checked by your PCP within the next week.    For your mild COPD exacerbation you should take two more days of prednisone. Please also finish your course of doxycyline which is to complete on 11/25. DIET: Regular    ACTIVITY: Activity as tolerated    DISCHARGE MEDICATIONS:   See Medication Reconciliation Form    · It is important that you take the medication exactly as they are prescribed. · Keep your medication in the bottles provided by the pharmacist and keep a list of the medication names, dosages, and times to be taken in your wallet. · Do not take other medications without consulting your doctor. NOTIFY YOUR PHYSICIAN FOR ANY OF THE FOLLOWING:   Fever over 101 degrees for 24 hours. Chest pain, shortness of breath, fever, chills, nausea, vomiting, diarrhea, change in mentation, falling, weakness, bleeding. Severe pain or pain not relieved by medications. Or, any other signs or symptoms that you may have questions about. DISPOSITION:    Home With:   OT  PT  HH  RN       SNF/Inpatient Rehab/LTAC    Independent/assisted living    Hospice    Other:     Signed:   Miguelito Beasley MD  11/21/2018  8:02 AM     Learning About Pericardial Effusion  What is pericardial effusion? Pericardial effusion is a buildup of too much fluid in the sac around your heart. This sac is called the pericardium. Normally, there is a small amount of fluid between this sac and your heart. This fluid surrounds and helps cushion your heart. Extra fluid can be caused by many things, including pericarditis (inflammation of the sac), heart attack, surgery, kidney failure, infection, some cancers, and certain diseases such as lupus. Sometimes the cause is not known. What happens when you have pericardial effusion? How long it takes for the amount of fluid in your pericardium to get back to normal will depend on what caused the extra fluid and what treatment you have. If a lot of fluid builds up quickly, it can cause increased pressure on your heart.  This pressure is called cardiac tamponade. It is an emergency that can reduce the heart's ability to pump blood. In some people, pericardial effusion comes back and must be treated again. What are the symptoms? Symptoms depend on how much fluid there is and how fast the fluid builds up. Symptoms may include:  · Chest pain. · Trouble breathing. Some people have no symptoms. How is it diagnosed? You will have an echocardiogram (\"echo\"). This is a test that lets your doctor see how much fluid is in your pericardium and how your heart is working. You also may have tests such as a chest X-ray, EKG, or CT scan. Your doctor may want to take a small sample of the fluid around your heart for testing. This may help find the cause of the extra fluid. How is it treated? If there is only a small amount of extra fluid in your pericardium, you may not need treatment. The extra fluid may go away on its own. Treatment depends on the cause of the extra fluid, the amount of fluid, and your symptoms. Options include:  · Medicine to treat the cause of the effusion, if the cause is known. · Pericardiocentesis. This is a procedure that uses a needle and a tube to drain the fluid. You will get a shot of anesthetic to numb the skin and deeper tissues. You will be awake for the procedure. · Surgery to open a section of the pericardium to drain the fluid. You will get anesthesia that makes you sleep during the surgery. Follow-up care is a key part of your treatment and safety. Be sure to make and go to all appointments, and call your doctor if you are having problems. It's also a good idea to know your test results and keep a list of the medicines you take. Where can you learn more? Go to http://mihaela-yosef.info/. Enter V956 in the search box to learn more about \"Learning About Pericardial Effusion. \"  Current as of: December 6, 2017  Content Version: 11.8  © 3741-2839 Healthwise, [facility].  Care instructions adapted under license by [employer] (which disclaims liability or warranty for this information). If you have questions about a medical condition or this instruction, always ask your healthcare professional. [address] any warranty or liability for your use of this information.

## 2018-11-21 NOTE — PROGRESS NOTES
Hospital PCP f/u appointment on Tuesday November 27,2018 @ 1:30 p.m., with CHUCKY Johnson. Added to AVS. Natan Macdonald [facility]

## 2018-11-21 NOTE — PROGRESS NOTES
Patient name: Trudy Rubio  MRN: [id number] Nephrology Progress note: 
 
Assessment: Hyponatremia:Symptomatic at presentation. Improving. Up to 133 today. S/p IV Lasix/NS combination. Etiology not completely clear. Hx points towards poor solute intake. Low Urine Na suggests poor solute intake but patient did not respond to isotonic saline. Dilute urine osmolality suggests excessive free water intake . Some underlying urinary incontinence-> Myrbetriq can sometimes cause urinary retention.  
  
Pericardial effusion: moderate. No tamponade physiology. Conservative management 
  
HTN: stable 
  
COPD exacerbation Plan/Recommendations: 
Stable for discharge today. PRN follow up if hyponatremia persists-> can see us at our [address] [place] [address] office Close f/u with PCP May benefit from a low dose Lasix to help with facilitate aquaresis Encouraged her to increase solute intake/limit free water Avoid urinary retention Subjective: 
\" I get to go home\". Feels better. Mild cough persists ROS:  
No nausea, no vomiting No chest pain, + shortness of breath Exam: 
Visit Vitals /75 (BP 1 Location: Left arm, BP Patient Position: At rest) Pulse 81 Temp 98.6 °F (37 °C) Resp 16 Wt 80 kg (176 lb 5.9 oz) SpO2 97% BMI 34.44 kg/m² Wt Readings from Last 3 Encounters:  
11/19/18 80 kg (176 lb 5.9 oz)  
11/17/18 78.5 kg (173 lb 1 oz)  
11/11/18 77.1 kg (170 lb) Intake/Output Summary (Last 24 hours) at 11/21/2018 6111 Last data filed at 11/20/2018 5672 Gross per 24 hour Intake 60 ml Output 2000 ml Net -1940 ml Gen: NAD HEENT: No icterus, mmm, no oral exudate, AT/NC Lungs/Chest wall: CTAB/L. No accessory muscle use. Cardiovascular: Regular rate, normal rhythm. Abdomen/: Soft, NT, ND, BS+. Ext: Trace peripheral edema CNS: alert and awake. Answers appropriately Current Facility-Administered Medications Medication Dose Route Frequency Last Dose  chlorpheniramine-HYDROcodone (TUSSIONEX) oral suspension 5 mL  5 mL Oral Q8H PRN 5 mL at 11/21/18 0904  amLODIPine (NORVASC) tablet 5 mg  5 mg Oral DAILY 5 mg at 11/21/18 2733  tacrolimus (PROGRAF) 0.01 mg in sterile water 5 mL oral mouth rinse  5 mL Oral TID 5 mL at 11/20/18 2101  acetaminophen (TYLENOL) tablet 650 mg  650 mg Oral Q8H  mg at 11/21/18 9895  albuterol-ipratropium (DUO-NEB) 2.5 MG-0.5 MG/3 ML  3 mL Nebulization Q6H RT 3 mL at 11/21/18 0826  
 aspirin chewable tablet 81 mg  81 mg Oral DAILY 81 mg at 11/21/18 3460  budesonide (PULMICORT) 500 mcg/2 ml nebulizer suspension  500 mcg Nebulization Q12H 500 mcg at 11/21/18 0827  
 calcium carbonate (TUMS) chewable tablet 400 mg [elemental]  400 mg Oral  mg at 11/21/18 1606  cholecalciferol (VITAMIN D3) tablet 1,000 Units  1,000 Units Oral DAILY 1,000 Units at 11/21/18 5185  ciclopirox (LOPROX) 0.77 % cream   Topical BID  clobetasol (TEMOVATE) 0.05 % ointment   Topical DAILY  doxycycline (VIBRA-TABS) tablet 100 mg  100 mg Oral  mg at 11/21/18 5665  multivitamin, tx-iron-ca-min (THERA-M w/ IRON) tablet 1 Tab  1 Tab Oral DAILY 1 Tab at 11/21/18 3222  hydroxychloroquine (PLAQUENIL) tablet 200 mg  200 mg Oral  mg at 11/21/18 3389  levothyroxine (SYNTHROID) tablet 100 mcg  100 mcg Oral  mcg at 11/21/18 0177  pantoprazole (PROTONIX) tablet 40 mg  40 mg Oral DAILY 40 mg at 11/21/18 0056  . PHARMACY TO SUBSTITUTE PER PROTOCOL (Reordered from: tacrolimus (PROTOPIC) 0.1 % ointment)    Per Protocol  sodium chloride (NS) flush 5-10 mL  5-10 mL IntraVENous Q8H 10 mL at 11/21/18 1096  sodium chloride (NS) flush 5-10 mL  5-10 mL IntraVENous PRN    
 enoxaparin (LOVENOX) injection 40 mg  40 mg SubCUTAneous Q24H 40 mg at 11/21/18 0500  
 albuterol-ipratropium (DUO-NEB) 2.5 MG-0.5 MG/3 ML  3 mL Nebulization Q4H PRN  predniSONE (DELTASONE) tablet 40 mg  40 mg Oral DAILY WITH BREAKFAST 40 mg at 11/21/18 2914  sodium chloride (NS) flush 20 mL  20 mL InterCATHeter PRN    
 sodium chloride (NS) flush 10 mL  10 mL InterCATHeter Q24H 10 mL at 11/20/18 1349  
 sodium chloride (NS) flush 10 mL  10 mL InterCATHeter PRN    
 sodium chloride (NS) flush 10 mL  10 mL InterCATHeter Q8H 10 mL at 11/21/18 7013  bacitracin 500 unit/gram packet 1 Packet  1 Packet Topical PRN Labs/Data: 
 
Lab Results Component Value Date/Time WBC 9.3 11/21/2018 05:04 AM  
 HGB 7.7 (L) 11/21/2018 05:04 AM  
 HCT 24.5 (L) 11/21/2018 05:04 AM  
 PLATELET 760 40/12/5329 05:04 AM  
 MCV 80.6 11/21/2018 05:04 AM  
 
 
Lab Results Component Value Date/Time Sodium 133 (L) 11/21/2018 05:04 AM  
 Potassium 4.0 11/21/2018 05:04 AM  
 Chloride 100 11/21/2018 05:04 AM  
 CO2 23 11/21/2018 05:04 AM  
 Anion gap 10 11/21/2018 05:04 AM  
 Glucose 106 (H) 11/21/2018 05:04 AM  
 BUN 10 11/21/2018 05:04 AM  
 Creatinine 0.65 11/21/2018 05:04 AM  
 BUN/Creatinine ratio 15 11/21/2018 05:04 AM  
 GFR est AA >60 11/21/2018 05:04 AM  
 GFR est non-AA >60 11/21/2018 05:04 AM  
 Calcium 8.2 (L) 11/21/2018 05:04 AM  
 
 
Patient seen and examined. Chart reviewed. Labs, data and other pertinent notes reviewed in last 24 hrs. Discussed with patient/ Signed by: 
Rey Padilla MD

## 2018-11-21 NOTE — PROGRESS NOTES
..Bedside shift change report given to CARSON Lazaro (oncoming nurse) by Autumn Hernandez (offgoing nurse). Report included the following information SBAR, Kardex and MAR.

## 2018-11-23 ENCOUNTER — HOME HEALTH ADMISSION (OUTPATIENT)
Dept: HOME HEALTH SERVICES | Facility: HOME HEALTH | Age: 73
End: 2018-11-23

## 2019-02-02 DIAGNOSIS — Z23 ENCOUNTER FOR IMMUNIZATION: ICD-10-CM

## 2019-02-04 RX ORDER — LEVOTHYROXINE SODIUM 100 UG/1
TABLET ORAL
Qty: 90 TAB | Refills: 1 | Status: SHIPPED | OUTPATIENT
Start: 2019-02-04 | End: 2019-08-09 | Stop reason: SDUPTHER

## 2019-02-04 RX ORDER — AMLODIPINE BESYLATE 10 MG/1
TABLET ORAL
Qty: 90 TAB | Refills: 1 | Status: SHIPPED | OUTPATIENT
Start: 2019-02-04 | End: 2019-08-11 | Stop reason: SDUPTHER

## 2019-02-04 RX ORDER — LISINOPRIL 40 MG/1
TABLET ORAL
Qty: 90 TAB | Refills: 1 | Status: SHIPPED | OUTPATIENT
Start: 2019-02-04 | End: 2019-08-14 | Stop reason: SDUPTHER

## 2019-03-27 ENCOUNTER — OFFICE VISIT (OUTPATIENT)
Dept: CARDIOLOGY CLINIC | Age: 74
End: 2019-03-27

## 2019-03-27 VITALS
HEIGHT: 60 IN | WEIGHT: 159 LBS | SYSTOLIC BLOOD PRESSURE: 122 MMHG | DIASTOLIC BLOOD PRESSURE: 80 MMHG | RESPIRATION RATE: 16 BRPM | BODY MASS INDEX: 31.22 KG/M2

## 2019-03-27 DIAGNOSIS — I31.39 PERICARDIAL EFFUSION: Primary | ICD-10-CM

## 2019-03-27 DIAGNOSIS — E78.5 DYSLIPIDEMIA: ICD-10-CM

## 2019-03-27 DIAGNOSIS — J44.9 CHRONIC OBSTRUCTIVE PULMONARY DISEASE, UNSPECIFIED COPD TYPE (HCC): ICD-10-CM

## 2019-03-27 DIAGNOSIS — R06.09 DOE (DYSPNEA ON EXERTION): ICD-10-CM

## 2019-03-27 DIAGNOSIS — I10 ESSENTIAL HYPERTENSION: ICD-10-CM

## 2019-03-27 RX ORDER — BETAMETHASONE DIPROPIONATE 0.5 MG/G
CREAM TOPICAL 2 TIMES DAILY
COMMUNITY
End: 2019-11-19

## 2019-03-27 RX ORDER — BISMUTH SUBSALICYLATE 262 MG
1 TABLET,CHEWABLE ORAL DAILY
COMMUNITY

## 2019-03-27 NOTE — PROGRESS NOTES
[facility]  [phone]    HPI: Ruben De Leon, a 75y.o. year-old who presents for evaluation of pericardial effusion. She was hospitalized in 11/18 and echo at that time showed a moderate pericardial effusion   She is here today to follow up on that  Her echo today showed resolution of her pericardial effusion, normal LVEF, normal PASP  She says she has been feeling ok, has baseline dyspnea with exertion related to her COPD  She denies any orthopnea or PND  No chest pain or palpitations  No dizziness or syncope  Walks 5 days/week for about 30 minutes without symptoms  Has varicose veins, wears compresion stockings  Takes ASA most days for pain, Hgb ok on labs earlier this month    Assessment/Plan:  1. COPD - management per Dr. [facility]  2. Moderate pericardial effusion by TTE in 11/18 - no sx of tamponade, today shows resolution of pericardial effusion   -initial effusion? Viral/inflammatory in etiology   3. Dyspnea - no ischemia noted on nuclear stress test in 11/18, TTE today showed normal LVEF, likely related to her COPD   4. Body mass index is 34.44 kg/m². 5. Lichen planus/candida - followed by MD at [facility]   6. HTN - well controlled on amlodipine 10mg daily and lisinopril 40mg daily   8.  ?Lupus - ZACH negative, followed by Dr. Heather Dawson     Echo 3/19 - (prelim), no pericardial effusion, normal LVEF, PASP normal    Nuc Stress 11/18 - no ischemia, LVEF 66%  Echo 11/18 - LV mildly dilated with LVEF 55 % to 60 %, no WMA, mild MR, mild TR, dilated IVC, moderate pericardial effusion without signs of tamponade, no hemodynamic compromise, effusion circumferential to the heart, a portion of organization was noted along the rv free wall, otherwise the effusion wasfree flowing and echo lucent.     Fhx no early cad  Soc no etoh no tob    She  has a past medical history of Allergic rhinitis, COPD (chronic obstructive pulmonary disease) ([address] Hypertension, Hypothyroidism, Lichen planus, Menopause, Osteoarthrosis, unspecified whether generalized or localized, unspecified site, Overactive bladder, Phlebitis and thrombophlebitis of lower extremities, unspecified, and Raynaud's syndrome. Cardiovascular ROS: no chest pain, baseline dyspnea on exertion  Respiratory ROS: no cough or wheezing  Neurological ROS: no TIA or stroke symptoms  All other systems negative except as above. PE  Vitals:    03/27/19 0937   BP: 122/80   Resp: 16   Weight: 159 lb (72.1 kg)   Height: 5' (1.524 m)    Body mass index is 31.05 kg/m².    General appearance - alert, well appearing, and in no distress  Mental status - affect appropriate to mood  Eyes - sclera anicteric, moist mucous membranes  Neck - supple  Lymphatics - not assessed  Chest - clear to auscultation, no wheezes, rales or rhonchi  Heart - normal rate, regular rhythm, normal S1, S2, no murmurs, rubs, clicks or gallops  Abdomen - soft, nontender, nondistended  Back exam - full range of motion, no tenderness  Neurological - cranial nerves II through XII grossly intact, no focal deficit  Musculoskeletal - no muscular tenderness noted, normal strength  Extremities - peripheral pulses normal, 1+ LE edema  Skin - normal coloration  no rashes    Recent Labs:  Lab Results   Component Value Date/Time    Cholesterol, total 204 (H) 03/22/2019 08:38 AM    HDL Cholesterol 91 03/22/2019 08:38 AM    LDL, calculated 101 (H) 03/22/2019 08:38 AM    Triglyceride 60 03/22/2019 08:38 AM     Lab Results   Component Value Date/Time    Creatinine 0.57 03/22/2019 08:38 AM     Lab Results   Component Value Date/Time    BUN 13 03/22/2019 08:38 AM     Lab Results   Component Value Date/Time    Potassium 4.7 03/22/2019 08:38 AM     Lab Results   Component Value Date/Time    Hemoglobin A1c 5.5 01/07/2016 11:59 AM     Lab Results   Component Value Date/Time    HGB 11.2 03/22/2019 08:38 AM     Lab Results   Component Value Date/Time    PLATELET 766 77/06/8861 08:38 AM       Reviewed:  Past Medical History:   Diagnosis Date    Allergic rhinitis     COPD (chronic obstructive pulmonary disease) (HCC)     Hypertension     Hypothyroidism     Lichen planus     [facility]    Menopause     Osteoarthrosis, unspecified whether generalized or localized, unspecified site     Overactive bladder     Phlebitis and thrombophlebitis of lower extremities, unspecified     Raynaud's syndrome      Social History     Tobacco Use   Smoking Status Former Smoker    Types: Cigarettes   Smokeless Tobacco Never Used   Tobacco Comment    smoked less than 10 yrs and quit 40 +     Social History     Substance and Sexual Activity   Alcohol Use No     Allergies   Allergen Reactions    Latex Rash    Keflex [Cephalexin] Rash    Levaquin [Levofloxacin] Rash    Sulfa (Sulfonamide Antibiotics) Rash    Macrobid [Nitrofurantoin Monohyd/M-Cryst] Rash       Current Outpatient Medications   Medication Sig    mirabegron ER (MYRBETRIQ) 50 mg ER tablet Take 50 mg by mouth daily.  acetaminophen/diphenhydramine (TYLENOL PM PO) Take  by mouth.  multivitamin (ONE A DAY) tablet Take 1 Tab by mouth daily.  augmented betamethasone dipropionate (DIPROLENE-AF) 0.05 % topical cream Apply  to affected area two (2) times a day.  levothyroxine (SYNTHROID) 100 mcg tablet TAKE 1 TABLET BY MOUTH EVERY DAY BEFORE BREAKFAST    lisinopril (PRINIVIL, ZESTRIL) 40 mg tablet TAKE 1 TABLET BY MOUTH EVERY DAY    amLODIPine (NORVASC) 10 mg tablet TAKE 1 TABLET BY MOUTH EVERY DAY    guaifenesin (MUCINEX PO) Take  by mouth.  acetaminophen (ARTHRITIS PAIN RELIEF) 650 mg TbER Take 1 Tab by mouth nightly as needed.  ibandronate (BONIVA) 150 mg tablet Take 150 mg by mouth. Once a month    ANORO ELLIPTA 62.5-25 mcg/actuation inhaler USE 1 INHALATION ONCE A DAY    albuterol (PROVENTIL HFA, VENTOLIN HFA, PROAIR HFA) 90 mcg/actuation inhaler Take 2 Puffs by inhalation.     tacrolimus (PROGRAF) 1 mg capsule Take 1 mg by mouth three (3) times daily. Contents of 1 cap diluted in 500 ml of distilled water: 5 ml swish and spit tid    tacrolimus (PROTOPIC) 0.1 % ointment Apply  to affected area daily. qday in the am-alternate with clobetasol/orabase    clobetasol (TEMOVATE) 0.05 % ointment Apply  to affected area daily. qday in the am-alternate with tacrolimus    fluconazole (DIFLUCAN) 150 mg tablet Take 150 mg by mouth every seven (7) days. For oral lichen planus    hydroxychloroquine (PLAQUENIL) 200 mg tablet Take 200 mg by mouth two (2) times a day.  budesonide (ENTOCORT EC) 3 mg capsule Take 3 Caps by mouth every morning. Taking 3 tabs in am (Patient taking differently: Take 9 mg by mouth every morning. Taking 3 tabs in am)    calcium carbonate (TUMS) 200 mg calcium (500 mg) chew Take 2 Tabs by mouth two (2) times a day.  aspirin 81 mg chewable tablet Take 81 mg by mouth daily.  omeprazole (PRILOSEC) 20 mg capsule Take 20 mg by mouth daily.  cholecalciferol (VITAMIN D3) 1,000 unit tablet Take 1,000 Units by mouth daily. Indications: VITAMIN D DEFICIENCY     No current facility-administered medications for this visit.         Sabi Flores MD  [facility] [address] [place] [address] [address], [address] [facility] [address]  [facility]  [phone]

## 2019-03-28 ENCOUNTER — TELEPHONE (OUTPATIENT)
Dept: CARDIOLOGY CLINIC | Age: 74
End: 2019-03-28

## 2019-03-28 NOTE — TELEPHONE ENCOUNTER
----- Message from Bailey Gambino MD sent at 3/28/2019  8:36 AM EDT -----  Discussed at OV    No call to patient regarding echo results since discussed with Dr. Nan Chaney previously.

## 2019-04-22 DIAGNOSIS — M85.80 OSTEOPENIA WITH HIGH RISK OF FRACTURE: ICD-10-CM

## 2019-04-23 RX ORDER — IBANDRONATE SODIUM 150 MG/1
150 TABLET, FILM COATED ORAL
Qty: 12 TAB | Refills: 0 | Status: SHIPPED | OUTPATIENT
Start: 2019-04-23 | End: 2019-07-22

## 2019-04-23 NOTE — TELEPHONE ENCOUNTER
Ask patient if PCP is prescribing because no need to see me or have me prescribe since she lives so far

## 2019-08-09 DIAGNOSIS — Z23 ENCOUNTER FOR IMMUNIZATION: ICD-10-CM

## 2019-08-12 RX ORDER — LEVOTHYROXINE SODIUM 100 UG/1
TABLET ORAL
Qty: 90 TAB | Refills: 1 | Status: SHIPPED | OUTPATIENT
Start: 2019-08-12 | End: 2020-02-03

## 2019-08-14 RX ORDER — LISINOPRIL 40 MG/1
TABLET ORAL
Qty: 90 TAB | Refills: 1 | Status: SHIPPED | OUTPATIENT
Start: 2019-08-14 | End: 2020-02-10

## 2019-08-22 ENCOUNTER — OFFICE VISIT (OUTPATIENT)
Dept: RHEUMATOLOGY | Age: 74
End: 2019-08-22

## 2019-08-22 ENCOUNTER — HOSPITAL ENCOUNTER (OUTPATIENT)
Dept: LAB | Age: 74
Discharge: HOME | End: 2019-08-22
Payer: MEDICARE

## 2019-08-22 VITALS
SYSTOLIC BLOOD PRESSURE: 156 MMHG | WEIGHT: 163 LBS | DIASTOLIC BLOOD PRESSURE: 70 MMHG | TEMPERATURE: 97.9 F | HEART RATE: 61 BPM | RESPIRATION RATE: 18 BRPM | BODY MASS INDEX: 32 KG/M2 | HEIGHT: 60 IN

## 2019-08-22 DIAGNOSIS — M89.9 DISORDER OF BONE: ICD-10-CM

## 2019-08-22 DIAGNOSIS — R93.89 ABNORMAL FINDINGS ON DIAGNOSTIC IMAGING OF OTHER SPECIFIED BODY STRUCTURES: ICD-10-CM

## 2019-08-22 DIAGNOSIS — M85.80 OSTEOPENIA WITH HIGH RISK OF FRACTURE: Primary | ICD-10-CM

## 2019-08-22 PROCEDURE — 83970 ASSAY OF PARATHORMONE: CPT

## 2019-08-22 PROCEDURE — 84165 PROTEIN E-PHORESIS SERUM: CPT

## 2019-08-22 PROCEDURE — 82306 VITAMIN D 25 HYDROXY: CPT

## 2019-08-22 PROCEDURE — 80069 RENAL FUNCTION PANEL: CPT

## 2019-08-22 PROCEDURE — 84443 ASSAY THYROID STIM HORMONE: CPT

## 2019-08-22 NOTE — PROGRESS NOTES
REASON FOR VISIT    This is the a follow up visit for Ms. Shannon Ngo for     ICD-10-CM   1. Osteopenia with high risk of fracture M85.80     Osteoppenia Historical Synopsis    Height loss since age 26 (at least two inches): 2 inches  Fracture history includes: distal legs/ankle  Family history of hip fracture: none  Fall Risk: no    Daily calcium intake is 1200 mg  Daily vitamin D intake is 1000 IU     Smoking history: 10 years duration (quit 40 years ago)  Alcohol consumption: none  Prednisone history: none    Exercise: yes    Previous work-up for osteoporosis includes the following:  DEXA Scan: 3/09/2018  Vitamin 25OH D level: 40.5 (11/16/2015)  PTH: None  TSH: 0.981 (3/22/2019)    Therapy History includes:  Current osteoporosis therapy includes: Boniva (9/2015 to 822/2019)  Prior osteoporosis therapy includes: none  The following osteoporosis therapy have been ineffective: Boniva   The following osteoporosis therapy were stopped because of side effects: none    HISTORY OF PRESENT ILLNESS      Ms. Brigitte Larkin presents for follow up. On her last visit on 3/01/2018, I continued Boniva and ordered a repeat DXA. I explained that since she lives far, her PCP may continue her treatment plan. I reviewed her DXA which showed worsening bone mineral density. She denies interval falls or fractures. I reviewed [facility] Records by Dr. Meghan Freed for oral lichen planus and candidiasis and is on plaquenil 200mg BID, fluconazole 150mg weekly, tacrolimus ointment and swish and spit, and clobetasol ointment. She complains of pains in her hands with use from osteoarthritis.     She denies fever, weight loss, vision loss, ankle swelling, dry cough, dyspnea, nausea, vomiting, dysphagia, abdominal pain, black or bloody stool, fall since last visit, rash, easy bruising and increased thirst.    REVIEW OF SYSTEMS    A comprehensive review of systems was performed and pertinent results are documented in the HPI, review of systems is otherwise non-contributory. PAST MEDICAL HISTORY    She has a past medical history of Allergic rhinitis, COPD (chronic obstructive pulmonary disease) ([address] Hypertension, Hypothyroidism, Lichen planus, Menopause, Osteoarthrosis, unspecified whether generalized or localized, unspecified site, Overactive bladder, Phlebitis and thrombophlebitis of lower extremities, unspecified, and Raynaud's syndrome. FAMILY HISTORY    Her family history includes COPD in her brother; Cancer in her father and niece; Dementia in her mother; Stroke in her mother. SOCIAL HISTORY    She reports that she has quit smoking. Her smoking use included cigarettes. She has never used smokeless tobacco. She reports that she does not drink alcohol or use drugs. MEDICATIONS    Current Outpatient Medications   Medication Sig Dispense Refill    lisinopril (PRINIVIL, ZESTRIL) 40 mg tablet TAKE 1 TABLET BY MOUTH EVERY DAY 90 Tab 1    levothyroxine (SYNTHROID) 100 mcg tablet TAKE 1 TABLET BY MOUTH EVERY DAY BEFORE BREAKFAST 90 Tab 1    amLODIPine (NORVASC) 10 mg tablet TAKE 1 TABLET BY MOUTH EVERY DAY 90 Tab 3    mirabegron ER (MYRBETRIQ) 50 mg ER tablet Take 1 Tab by mouth daily. 90 Tab 3    Nebulizer Accessories kit Use as directed. 1 Kit 0    Nebulizer & Compressor machine Use as directed 1 Each 0    multivitamin (ONE A DAY) tablet Take 1 Tab by mouth daily.  augmented betamethasone dipropionate (DIPROLENE-AF) 0.05 % topical cream Apply  to affected area two (2) times a day.  guaifenesin (MUCINEX PO) Take  by mouth.  acetaminophen (ARTHRITIS PAIN RELIEF) 650 mg TbER Take 1 Tab by mouth nightly as needed. 30 Tab 0    ANORO ELLIPTA 62.5-25 mcg/actuation inhaler USE 1 INHALATION ONCE A DAY  5    albuterol (PROVENTIL HFA, VENTOLIN HFA, PROAIR HFA) 90 mcg/actuation inhaler Take 2 Puffs by inhalation.  tacrolimus (PROGRAF) 1 mg capsule Take 1 mg by mouth three (3) times daily.  Contents of 1 cap diluted in 500 ml of distilled water: 5 ml swish and spit tid      tacrolimus (PROTOPIC) 0.1 % ointment Apply  to affected area daily. qday in the am-alternate with clobetasol/orabase      clobetasol (TEMOVATE) 0.05 % ointment Apply  to affected area daily. qday in the am-alternate with tacrolimus      fluconazole (DIFLUCAN) 150 mg tablet Take 150 mg by mouth every seven (7) days. For oral lichen planus      calcium carbonate (TUMS) 200 mg calcium (500 mg) chew Take 2 Tabs by mouth two (2) times a day.  aspirin 81 mg chewable tablet Take 81 mg by mouth daily.  omeprazole (PRILOSEC) 20 mg capsule Take 20 mg by mouth daily.  cholecalciferol (VITAMIN D3) 1,000 unit tablet Take 1,000 Units by mouth daily. Indications: VITAMIN D DEFICIENCY      acetaminophen/diphenhydramine (TYLENOL PM PO) Take  by mouth.  budesonide (ENTOCORT EC) 3 mg capsule Take 3 Caps by mouth every morning. Taking 3 tabs in am (Patient taking differently: Take 9 mg by mouth every morning. Taking 3 tabs in am) 270 Cap 1       ALLERGIES    Allergies   Allergen Reactions    Latex Rash    Keflex [Cephalexin] Rash    Levaquin [Levofloxacin] Rash    Sulfa (Sulfonamide Antibiotics) Rash    Macrobid [Nitrofurantoin Monohyd/M-Cryst] Rash       PHYSICAL EXAMINATION    Visit Vitals  /70   Pulse 61   Temp 97.9 °F (36.6 °C)   Resp 18   Ht 5' (1.524 m)   Wt 163 lb (73.9 kg)   BMI 31.83 kg/m²     Body mass index is 31.83 kg/m². General: Patient is alert, oriented x 3, not in acute distress    HEENT:   Conjunctiva are not injected and appear moist, there is no alopecia    Chest:  Breathing comfortably at room air    Extremities:  Free of clubbing, cyanosis, edema, extremities well perfused. Neurological exam:  Mscle strength is full in upper and lower extremities     Skin exam:  There are no rashes, no tophi, no psoriasis, no active Raynaud's, no livedo reticularis, no periungual erythema.     Musculoskeletal exam:  A comprehensive musculoskeletal exam was performed for all joints of each upper and lower extremity and assessed for swelling, tenderness and range of motion. Bilateral Kusum and [place] nodes    DATA REVIEW    Laboratory     Recent laboratory results were reviewed, summarized, and discussed with the patient. Imaging    Musculoskeletal Ultrasound    None    Radiographs    Thoracic Spine 11/16/2015: No thoracic malalignment. Mild multilevel degenerative disc disease without evidence for acute fracture.      CT Imaging    None    MR Imaging    None    DXA     DXA 3/09/2018: (excluded distal radius) lumbar spine L1-L4 T score -1.9 (BMD 1.261 g/cm2), left femoral neck T score: -1.2 (0.718 g/cm2), left total hip T score: -0.7 (0.852 g/cm2), right femoral neck T score: -1.1 (0.732 g/cm2), right total hip T score: -0.5 (0.884 g/cm2). FRAX score N/A % probability in 10 years for major osteoporotic fracture and N/A % 10 year probability of hip fracture. DXA 9/02/2015:  lumbar spine L1-L4 T score 0.9 (BMD 1.149 g/cm2),  Left femoral neck T score: -1.0 (0.741 g/cm2) and right femoral neck T score -1.1 (0.727g/cm2). ASSESSMENT AND PLAN    1) Osteopenia with High Risk. Her most recent DXA on 3/09/2019 scan showed lumbar spine L1-L4 T score -1.9 (BMD 1.261 g/cm2), left femoral neck T score: -1.2 (0.718 g/cm2), She has been on Boniva since 9/2015 and has actually worsened per DXA. I will therefore stop Boniva and start Prolia. Due to the long distance (4 hours round trip), I recommend she continue to receive Prolia infusion orders by her PCP who will need to write the order annually to be done at [address] [place] [address]. She does not need to see me for this. 2) Bilateral Hand Osteoarthritis. This was an active issue today. She was asked to use OTC analgesic. The patient voiced understanding of the aforementioned assessment and plan. Summary of plan was provided in the After Visit Summary patient instructions. TODAY'S ORDERS    Orders Placed This Encounter    TSH REFLEX TO T4    VITAMIN D, 25 HYDROXY    PTH INTACT    PROTEIN ELECTROPHORESIS W/ REFLX LU    RENAL FUNCTION PANEL     Future Appointments   Date Time Provider Anthony Drake   11/26/2019  8:10 AM Teddy Johnson, JOHNNIE [address] Tobi Morris MD, [address] [facility]    Adult Rheumatology   Rheumatology Ultrasound Certified  [facility]  [facility], [address]   Phone [phone]  Fax [phone]

## 2019-08-22 NOTE — PATIENT INSTRUCTIONS
I will stop Boniva and start Prolia, which is a every 6 month injection under the skin. Your PCP Kit Arredondo may continue prescribing it for you to accommodate your distance travel    Denosumab (By injection)   Denosumab ([id number])  Treats osteoporosis, bone cancer, hypercalcemia, and other bone problems in patients who have cancer. Brand Name(s): Prolia, Xgeva   There may be other brand names for this medicine. When This Medicine Should Not Be Used: This medicine is not right for everyone. You should not receive it if you had an allergic reaction to denosumab or if you are pregnant. How to Use This Medicine:   Injectable  · A doctor or other health professional will give you this medicine. This medicine is usually given as a shot under the skin of your upper arm, upper thigh, or stomach. · This medicine should come with a Medication Guide. Ask your pharmacist for a copy if you do not have one. · Missed dose: Call your doctor or pharmacist for instructions. Drugs and Foods to Avoid:   Ask your doctor or pharmacist before using any other medicine, including over-the-counter medicines, vitamins, and herbal products. · Do not use Prolia® and Xgeva® together. They contain the same medicine. · Some medicines can affect how denosumab works. Tell your doctor if you are also using medicine that weakens your immune system, including a steroid or cancer medicine. Warnings While Using This Medicine:   · This medicine may cause birth defects if either partner is using it during conception or pregnancy. Tell your doctor right away if you or your partner becomes pregnant. Use an effective form of birth control. Women who are being treated with Osman Sermon should continue using birth control for at least 5 months after the last dose. · Tell your doctor if you are breastfeeding, or if you have kidney disease, diabetes, gum disease, or an allergy to latex.  Tell your doctor if you have problems with your thyroid, parathyroid, or digestive system. · This medicine may cause the following problems:  ¨ Low calcium levels in your blood  ¨ Increased risk of broken thigh bone  ¨ Increased risk of infections  ¨ Serious skin reactions  ¨ Severe bone, joint, or muscle pain  · This medicine can cause jaw problems. You must have regular dental exams while you are being treated with this medicine. Tell your dentist that you are using this medicine. Practice good oral hygiene. · Do not suddenly stop using Prolia® without checking first with your doctor. Doing so may increase risk for more fractures. Talk to your doctor about other medicine that you can take. · Your doctor will do lab tests at regular visits to check on the effects of this medicine. Keep all appointments. Possible Side Effects While Using This Medicine:   Call your doctor right away if you notice any of these side effects:  · Allergic reaction: Itching or hives, swelling in your face or hands, swelling or tingling in your mouth or throat, chest tightness, trouble breathing  · Blistering, peeling, red skin rash  · Chest pain, fast or uneven heartbeat, trouble breathing  · Fever, chills, cough, sore throat, body aches  · Lightheadedness, dizziness, fainting  · Muscle spasms or twitching, numbness or tingling in your fingers, toes, or lips  · Pain or burning during urination, change in how much or how often you urinate  · Pain, swelling, heavy feeling, or numbness in your mouth or jaw, loose teeth or other teeth problems  · Severe bone, joint, or muscle pain  · Unusual pain in your thigh, groin, or hip  If you notice these less serious side effects, talk with your doctor:   · Diarrhea, nausea  · Redness, pain, itching, burning, swelling, or a lump under your skin where the shot was given  · Tiredness or weakness  If you notice other side effects that you think are caused by this medicine, tell your doctor. Call your doctor for medical advice about side effects. You may report side effects to FDA at 1-814-TIQ-1890  © 2017 [facility] Information is for End User's use only and may not be sold, redistributed or otherwise used for commercial purposes. The above information is an  only. It is not intended as medical advice for individual conditions or treatments. Talk to your doctor, nurse or pharmacist before following any medical regimen to see if it is safe and effective for you.

## 2019-08-22 NOTE — Clinical Note
Ainsley Hernandez will submit Prolia order for her which needs to be renewed annually. Please do that for her. She no longer needs to see me due to distance and I'm confident that you can manage her osteopenia just as well as I can. Thank you.  Sejal Calix
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
## 2019-08-27 LAB
25(OH)D3+25(OH)D2 SERPL-MCNC: 42 NG/ML (ref 30–100)
ALBUMIN SERPL ELPH-MCNC: 4.2 G/DL (ref 2.9–4.4)
ALBUMIN SERPL-MCNC: 4.6 G/DL (ref 3.5–4.8)
ALBUMIN/GLOB SERPL: 1.8 {RATIO} (ref 0.7–1.7)
ALPHA1 GLOB SERPL ELPH-MCNC: 0.2 G/DL (ref 0–0.4)
ALPHA2 GLOB SERPL ELPH-MCNC: 0.7 G/DL (ref 0.4–1)
B-GLOBULIN SERPL ELPH-MCNC: 0.8 G/DL (ref 0.7–1.3)
BUN SERPL-MCNC: 15 MG/DL (ref 8–27)
BUN/CREAT SERPL: 22 (ref 12–28)
CALCIUM SERPL-MCNC: 9.4 MG/DL (ref 8.7–10.3)
CHLORIDE SERPL-SCNC: 99 MMOL/L (ref 96–106)
CO2 SERPL-SCNC: 22 MMOL/L (ref 20–29)
CREAT SERPL-MCNC: 0.69 MG/DL (ref 0.57–1)
GAMMA GLOB SERPL ELPH-MCNC: 0.5 G/DL (ref 0.4–1.8)
GLOBULIN SER CALC-MCNC: 2.3 G/DL (ref 2.2–3.9)
GLUCOSE SERPL-MCNC: 75 MG/DL (ref 65–99)
M PROTEIN SERPL ELPH-MCNC: ABNORMAL G/DL
PHOSPHATE SERPL-MCNC: 3.4 MG/DL (ref 2.5–4.5)
PLEASE NOTE, 011150: ABNORMAL
POTASSIUM SERPL-SCNC: 5.1 MMOL/L (ref 3.5–5.2)
PROT PATTERN SERPL ELPH-IMP: ABNORMAL
PROT SERPL-MCNC: 6.5 G/DL (ref 6–8.5)
PTH-INTACT SERPL-MCNC: 96 PG/ML (ref 15–65)
SODIUM SERPL-SCNC: 137 MMOL/L (ref 134–144)
TSH SERPL DL<=0.005 MIU/L-ACNC: 1.59 UIU/ML (ref 0.45–4.5)

## 2019-12-10 PROBLEM — H25.12 AGE-RELATED NUCLEAR CATARACT, LEFT EYE: Chronic | Status: ACTIVE | Noted: 2019-12-10

## 2019-12-11 PROBLEM — H25.12 AGE-RELATED NUCLEAR CATARACT, LEFT EYE: Chronic | Status: RESOLVED | Noted: 2019-12-10 | Resolved: 2019-12-11

## 2020-02-02 DIAGNOSIS — Z23 ENCOUNTER FOR IMMUNIZATION: ICD-10-CM

## 2020-02-03 RX ORDER — LEVOTHYROXINE SODIUM 100 UG/1
TABLET ORAL
Qty: 90 TAB | Refills: 1 | Status: SHIPPED | OUTPATIENT
Start: 2020-02-03 | End: 2020-08-03

## 2020-03-15 ENCOUNTER — PATIENT MESSAGE (OUTPATIENT)
Dept: FAMILY MEDICINE CLINIC | Age: 75
End: 2020-03-15

## 2020-03-15 DIAGNOSIS — E55.9 VITAMIN D DEFICIENCY: ICD-10-CM

## 2020-03-15 DIAGNOSIS — E03.9 ACQUIRED HYPOTHYROIDISM: ICD-10-CM

## 2020-03-15 DIAGNOSIS — Z78.0 POSTMENOPAUSAL: Primary | ICD-10-CM

## 2020-03-15 DIAGNOSIS — M85.80 OSTEOPENIA, UNSPECIFIED LOCATION: ICD-10-CM

## 2020-03-15 DIAGNOSIS — M81.0 AGE-RELATED OSTEOPOROSIS WITHOUT CURRENT PATHOLOGICAL FRACTURE: ICD-10-CM

## 2020-08-03 RX ORDER — AMLODIPINE BESYLATE 10 MG/1
TABLET ORAL
Qty: 90 TAB | Refills: 3 | Status: SHIPPED | OUTPATIENT
Start: 2020-08-03 | End: 2021-07-26

## 2020-08-20 NOTE — PROGRESS NOTES
Let's try steroids since her last episode was an asthma exacerbation.  Also, i'm going to start her on singulair and symbicort to see if that will help prevent asthma exacerbations.  She may not need to be on those meds forever, but for right now, if they work, would be better than what she's going through now.  Make sure to brush teeth/tongue after using symbicort inhaler.   Problem: Falls - Risk of 
Goal: *Absence of Falls Document Balaji Serratoer Fall Risk and appropriate interventions in the flowsheet. Outcome: Progressing Towards Goal 
Fall Risk Interventions: 
  
 
  
 
Medication Interventions: Evaluate medications/consider consulting pharmacy History of Falls Interventions: Consult care management for discharge planning

## 2020-08-21 LAB
25(OH)D3+25(OH)D2 SERPL-MCNC: 43 NG/ML (ref 30–100)
ALBUMIN SERPL-MCNC: 4.5 G/DL (ref 3.7–4.7)
ALBUMIN/GLOB SERPL: 2.8 {RATIO} (ref 1.2–2.2)
ALP SERPL-CCNC: 64 IU/L (ref 39–117)
ALT SERPL-CCNC: 14 IU/L (ref 0–32)
AST SERPL-CCNC: 17 IU/L (ref 0–40)
BASOPHILS # BLD AUTO: 0 X10E3/UL (ref 0–0.2)
BASOPHILS NFR BLD AUTO: 1 %
BILIRUB SERPL-MCNC: 0.5 MG/DL (ref 0–1.2)
BUN SERPL-MCNC: 13 MG/DL (ref 8–27)
BUN/CREAT SERPL: 20 (ref 12–28)
CALCIUM SERPL-MCNC: 9.7 MG/DL (ref 8.7–10.3)
CHLORIDE SERPL-SCNC: 95 MMOL/L (ref 96–106)
CO2 SERPL-SCNC: 25 MMOL/L (ref 20–29)
CREAT SERPL-MCNC: 0.66 MG/DL (ref 0.57–1)
EOSINOPHIL # BLD AUTO: 0.2 X10E3/UL (ref 0–0.4)
EOSINOPHIL NFR BLD AUTO: 4 %
ERYTHROCYTE [DISTWIDTH] IN BLOOD BY AUTOMATED COUNT: 12.4 % (ref 11.7–15.4)
GLOBULIN SER CALC-MCNC: 1.6 G/DL (ref 1.5–4.5)
GLUCOSE SERPL-MCNC: 85 MG/DL (ref 65–99)
HCT VFR BLD AUTO: 38.1 % (ref 34–46.6)
HGB BLD-MCNC: 13.1 G/DL (ref 11.1–15.9)
IMM GRANULOCYTES # BLD AUTO: 0 X10E3/UL (ref 0–0.1)
IMM GRANULOCYTES NFR BLD AUTO: 0 %
LYMPHOCYTES # BLD AUTO: 0.6 X10E3/UL (ref 0.7–3.1)
LYMPHOCYTES NFR BLD AUTO: 14 %
MAGNESIUM SERPL-MCNC: 1.8 MG/DL (ref 1.6–2.3)
MCH RBC QN AUTO: 31.5 PG (ref 26.6–33)
MCHC RBC AUTO-ENTMCNC: 34.4 G/DL (ref 31.5–35.7)
MCV RBC AUTO: 92 FL (ref 79–97)
MONOCYTES # BLD AUTO: 0.4 X10E3/UL (ref 0.1–0.9)
MONOCYTES NFR BLD AUTO: 10 %
NEUTROPHILS # BLD AUTO: 3 X10E3/UL (ref 1.4–7)
NEUTROPHILS NFR BLD AUTO: 71 %
PHOSPHATE SERPL-MCNC: 4.5 MG/DL (ref 3–4.3)
PLATELET # BLD AUTO: 198 X10E3/UL (ref 150–450)
POTASSIUM SERPL-SCNC: 4.6 MMOL/L (ref 3.5–5.2)
PROT SERPL-MCNC: 6.1 G/DL (ref 6–8.5)
RBC # BLD AUTO: 4.16 X10E6/UL (ref 3.77–5.28)
SODIUM SERPL-SCNC: 133 MMOL/L (ref 134–144)
SPECIMEN STATUS REPORT, ROLRST: NORMAL
TSH SERPL DL<=0.005 MIU/L-ACNC: 1.43 UIU/ML (ref 0.45–4.5)
WBC # BLD AUTO: 4.1 X10E3/UL (ref 3.4–10.8)

## 2020-08-24 NOTE — PROGRESS NOTES
Please inform patient of labs - blood counts were great. Sugar, liver, kidneys were good. Sodium was slightly low (133, normal is 134) but nothing to be worried about right now. Calcium, vitamin D, magnesium were normal. Phosphorus slightly elevated, re-check at next lab draw. We will be back in touch with DEXA results.

## 2020-10-06 PROBLEM — H25.11 AGE-RELATED NUCLEAR CATARACT, RIGHT EYE: Chronic | Status: ACTIVE | Noted: 2020-10-06

## 2020-10-07 PROBLEM — H25.11 AGE-RELATED NUCLEAR CATARACT, RIGHT EYE: Chronic | Status: RESOLVED | Noted: 2020-10-06 | Resolved: 2020-10-07

## 2021-02-01 RX ORDER — LISINOPRIL 40 MG/1
TABLET ORAL
Qty: 90 TAB | Refills: 3 | Status: SHIPPED | OUTPATIENT
Start: 2021-02-01 | End: 2022-01-24

## 2021-03-11 ENCOUNTER — PATIENT MESSAGE (OUTPATIENT)
Dept: FAMILY MEDICINE CLINIC | Age: 76
End: 2021-03-11

## 2021-03-11 DIAGNOSIS — L30.9 ECZEMA, UNSPECIFIED TYPE: Primary | ICD-10-CM

## 2021-03-12 RX ORDER — CLOBETASOL PROPIONATE 0.5 MG/G
OINTMENT TOPICAL DAILY
Qty: 15 G | Refills: 0 | Status: SHIPPED | OUTPATIENT
Start: 2021-03-12 | End: 2021-04-29

## 2021-07-07 ENCOUNTER — OFFICE VISIT (OUTPATIENT)
Dept: FAMILY MEDICINE CLINIC | Age: 76
End: 2021-07-07
Payer: MEDICARE

## 2021-07-07 VITALS
SYSTOLIC BLOOD PRESSURE: 160 MMHG | HEIGHT: 60 IN | WEIGHT: 152.8 LBS | RESPIRATION RATE: 28 BRPM | HEART RATE: 76 BPM | OXYGEN SATURATION: 99 % | BODY MASS INDEX: 30 KG/M2 | TEMPERATURE: 97.7 F | DIASTOLIC BLOOD PRESSURE: 82 MMHG

## 2021-07-07 DIAGNOSIS — L30.9 ECZEMA, UNSPECIFIED TYPE: ICD-10-CM

## 2021-07-07 DIAGNOSIS — R35.0 URINARY FREQUENCY: Primary | ICD-10-CM

## 2021-07-07 DIAGNOSIS — J44.9 CHRONIC OBSTRUCTIVE PULMONARY DISEASE, UNSPECIFIED COPD TYPE (HCC): ICD-10-CM

## 2021-07-07 DIAGNOSIS — E87.1 HYPONATREMIA: ICD-10-CM

## 2021-07-07 DIAGNOSIS — I10 ESSENTIAL HYPERTENSION: ICD-10-CM

## 2021-07-07 DIAGNOSIS — R30.0 DYSURIA: ICD-10-CM

## 2021-07-07 LAB
BILIRUB UR QL STRIP: NEGATIVE
GLUCOSE UR-MCNC: NEGATIVE MG/DL
KETONES P FAST UR STRIP-MCNC: NEGATIVE MG/DL
PH UR STRIP: 6 [PH] (ref 4.6–8)
PROT UR QL STRIP: NEGATIVE
SP GR UR STRIP: 1.01 (ref 1–1.03)
UA UROBILINOGEN AMB POC: NORMAL (ref 0.2–1)
URINALYSIS CLARITY POC: CLEAR
URINALYSIS COLOR POC: YELLOW
URINE BLOOD POC: NEGATIVE
URINE LEUKOCYTES POC: NEGATIVE
URINE NITRITES POC: NEGATIVE

## 2021-07-07 PROCEDURE — G8536 NO DOC ELDER MAL SCRN: HCPCS | Performed by: NURSE PRACTITIONER

## 2021-07-07 PROCEDURE — G8432 DEP SCR NOT DOC, RNG: HCPCS | Performed by: NURSE PRACTITIONER

## 2021-07-07 PROCEDURE — 1101F PT FALLS ASSESS-DOCD LE1/YR: CPT | Performed by: NURSE PRACTITIONER

## 2021-07-07 PROCEDURE — G8417 CALC BMI ABV UP PARAM F/U: HCPCS | Performed by: NURSE PRACTITIONER

## 2021-07-07 PROCEDURE — 1090F PRES/ABSN URINE INCON ASSESS: CPT | Performed by: NURSE PRACTITIONER

## 2021-07-07 PROCEDURE — 81003 URINALYSIS AUTO W/O SCOPE: CPT | Performed by: NURSE PRACTITIONER

## 2021-07-07 PROCEDURE — G8427 DOCREV CUR MEDS BY ELIG CLIN: HCPCS | Performed by: NURSE PRACTITIONER

## 2021-07-07 PROCEDURE — G8754 DIAS BP LESS 90: HCPCS | Performed by: NURSE PRACTITIONER

## 2021-07-07 PROCEDURE — G8753 SYS BP > OR = 140: HCPCS | Performed by: NURSE PRACTITIONER

## 2021-07-07 PROCEDURE — G8399 PT W/DXA RESULTS DOCUMENT: HCPCS | Performed by: NURSE PRACTITIONER

## 2021-07-07 PROCEDURE — 99214 OFFICE O/P EST MOD 30 MIN: CPT | Performed by: NURSE PRACTITIONER

## 2021-07-07 PROCEDURE — 36415 COLL VENOUS BLD VENIPUNCTURE: CPT | Performed by: NURSE PRACTITIONER

## 2021-07-08 LAB
POTASSIUM SERPL-SCNC: 4.5 MMOL/L (ref 3.5–5.1)
SODIUM SERPL-SCNC: 132 MMOL/L (ref 136–145)

## 2021-07-11 NOTE — PROGRESS NOTES
Subjective: Edgard Lindquist is a 67 y.o. 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 9) E66.01    Pericardial effusion I31.3         COMPLIANT WITH MEDICATION:   HTN; Denies chest pain, dyspnea, palpitations, headache and blurred vision. Blood pressure elevated today. Wound Check left arm healing hx of eczema. Had red bumps on left arm healing.      depression screening addressed not at risk    abuse screening addressed denies    learning assessment addressed reviewed nurses notes    fall risk addressed not at risk    HM: addressed    ROS:  Gen: denies fever, chills, fatigue, weight loss, weight gain  HEENT:denies blurry vision, nasal congestion, sore throat  Resp: denies dypsnea, cough, wheezing  CV: denies chest pain radiating to the jaws or arms, palpitations, lower extremity edema  Abd: denies nausea, vomiting, diarrhea, constipation  Neuro: denies numbness/tingling  Endo: denies polyuria, polydipsia, heat/cold intolerance  Heme: no lymphadenopathy    Allergies   Allergen Reactions    Latex Rash    Keflex [Cephalexin] Rash    Levaquin [Levofloxacin] Rash    Sulfa (Sulfonamide Antibiotics) Rash    Macrobid [Nitrofurantoin Monohyd/M-Cryst] Rash         Current Outpatient Medications:     Myrbetriq 50 mg ER tablet, TAKE 1 TABLET BY MOUTH EVERY DAY, Disp: 90 Tablet, Rfl: 3    clobetasoL (TEMOVATE) 0.05 % ointment, APPLY TO AFFECTED AREA EVERY DAY, Disp: 15 g, Rfl: 3    ciclopirox (Ciclodan) 0.77 % topical cream, Apply  to affected area two (2) times a day., Disp: 15 g, Rfl: 5    etodolac (LODINE) 400 mg tablet, Take 400 mg by mouth two (2) times daily as needed for Pain., Disp: 60 Tab, Rfl: 0    lisinopriL (PRINIVIL, ZESTRIL) 40 mg tablet, TAKE 1 TABLET BY MOUTH EVERY DAY, Disp: 90 Tab, Rfl: 3    benzonatate (TESSALON) 200 mg capsule, TAKE 1 CAPSULE BY MOUTH THREE TIMES A DAY AS NEEDED FOR COUGH FOR UP TO 7 DAYS, Disp: 90 Cap, Rfl: 1    montelukast (SINGULAIR) 10 mg tablet, TAKE 1 TABLET BY MOUTH EVERY DAY, Disp: , Rfl:     amLODIPine (NORVASC) 10 mg tablet, TAKE 1 TABLET BY MOUTH EVERY DAY, Disp: 90 Tab, Rfl: 3    levothyroxine (SYNTHROID) 100 mcg tablet, TAKE 1 TABLET BY MOUTH EVERY DAY BEFORE BREAKFAST, Disp: 90 Tab, Rfl: 3    albuterol-ipratropium (DUO-NEB) 2.5 mg-0.5 mg/3 ml nebu, 3 mL by Nebulization route every four (4) hours as needed for Wheezing, Shortness of Breath or Cough. , Disp: 30 Nebule, Rfl: 0    diclofenac (VOLTAREN) 0.1 % ophthalmic solution, INSTILL 1 DROP IN BOTH EYES 4 TIMES A DAY (STARTING 12/08/19), Disp: , Rfl: 3    betamethasone valerate (VALISONE) 0.1 % topical cream, APPLY 1 (ONE) APPLICATION TWICE A DAY TO LEGS, Disp: , Rfl: 1    denosumab (PROLIA) 60 mg/mL injection, 60 mg by SubCUTAneous route., Disp: , Rfl:     hydroxychloroquine (PLAQUENIL) 200 mg tablet, TAKE 1 TABLET BY MOUTH TWICE A DAY, Disp: , Rfl: 1    Nebulizer Accessories kit, Use as directed., Disp: 1 Kit, Rfl: 0    Nebulizer & Compressor machine, Use as directed, Disp: 1 Each, Rfl: 0    multivitamin (ONE A DAY) tablet, Take 1 Tab by mouth daily. , Disp: , Rfl:     guaifenesin (MUCINEX PO), Take  by mouth., Disp: , Rfl:     acetaminophen (ARTHRITIS PAIN RELIEF) 650 mg TbER, Take 1 Tab by mouth nightly as needed. , Disp: 30 Tab, Rfl: 0    ANORO ELLIPTA 62.5-25 mcg/actuation inhaler, USE 1 INHALATION ONCE A DAY, Disp: , Rfl: 5    albuterol (PROVENTIL HFA, VENTOLIN HFA, PROAIR HFA) 90 mcg/actuation inhaler, Take 2 Puffs by inhalation. , Disp: , Rfl:     tacrolimus (PROGRAF) 1 mg capsule, Take 1 mg by mouth three (3) times daily. Contents of 1 cap diluted in 500 ml of distilled water: 5 ml swish and spit tid, Disp: , Rfl:     tacrolimus (PROTOPIC) 0.1 % ointment, Apply  to affected area daily. qday in the am-alternate with clobetasol/orabase, Disp: , Rfl:     clobetasol (TEMOVATE) 0.05 % ointment, Apply  to affected area daily. qday in the am-alternate with tacrolimus, Disp: , Rfl:     fluconazole (DIFLUCAN) 150 mg tablet, Take 150 mg by mouth every seven (7) days. For oral lichen planus, Disp: , Rfl:     budesonide (ENTOCORT EC) 3 mg capsule, Take 3 Caps by mouth every morning. Taking 3 tabs in am (Patient taking differently: Take 9 mg by mouth every morning. Taking 3 tabs in am), Disp: 270 Cap, Rfl: 1    calcium carbonate (TUMS) 200 mg calcium (500 mg) chew, Take 1 Tab by mouth daily. , Disp: , Rfl:     aspirin 81 mg chewable tablet, Take 81 mg by mouth daily. , Disp: , Rfl:     omeprazole (PRILOSEC) 20 mg capsule, Take 20 mg by mouth daily. , Disp: , Rfl:     cholecalciferol (VITAMIN D3) 1,000 unit tablet, Take 1,000 Units by mouth daily.  Indications: VITAMIN D DEFICIENCY, Disp: , Rfl:     Past Medical History:   Diagnosis Date    Allergic rhinitis     COPD (chronic obstructive pulmonary disease) (HCC)     Hypertension     Hypothyroidism     Lichen planus     [facility]    Menopause     Osteoarthrosis, unspecified whether generalized or localized, unspecified site     Overactive bladder     Phlebitis and thrombophlebitis of lower extremities, unspecified     Raynaud's syndrome        Past Surgical History:   Procedure Laterality Date    HX APPENDECTOMY      HX GI      colitis    HX GYN      HYSTERECTOMY//BTL    HX HEENT TONSILLECTOMY    HX HYSTERECTOMY      HX MOHS PROCEDURES  06/13/2018    left lower leg    HX OOPHORECTOMY      HX UROLOGICAL      BLADDER SLING       Social History     Tobacco Use   Smoking Status Former Smoker    Types: Cigarettes   Smokeless Tobacco Never Used   Tobacco Comment    smoked less than 10 yrs and quit 40 +       Social History     Socioeconomic History    Marital status:      Spouse name: Not on file    Number of children: Not on file    Years of education: Not on file    Highest education level: Not on file   Tobacco Use    Smoking status: Former Smoker     Types: Cigarettes    Smokeless tobacco: Never Used    Tobacco comment: smoked less than 10 yrs and quit 40 +   Vaping Use    Vaping Use: Never used   Substance and Sexual Activity    Alcohol use: No    Drug use: No    Sexual activity: Not Currently     Partners: Male   Other Topics Concern     Service No    Blood Transfusions No    Caffeine Concern No    Occupational Exposure No    Hobby Hazards No    Sleep Concern No    Stress Concern No    Weight Concern No    Special Diet No    Back Care No    Exercise Yes    Bike Helmet No    Seat Belt Yes    Self-Exams No     Social Determinants of Health     Financial Resource Strain:     Difficulty of Paying Living Expenses:    Food Insecurity:     Worried About Running Out of Food in the Last Year:     Ran Out of Food in the Last Year:    Transportation Needs:     Lack of Transportation (Medical):      Lack of Transportation (Non-Medical):    Physical Activity:     Days of Exercise per Week:     Minutes of Exercise per Session:    Stress:     Feeling of Stress :    Social Connections:     Frequency of Communication with Friends and Family:     Frequency of Social Gatherings with Friends and Family:     Attends [employer] Services:     Active Member of Clubs or Organizations:     Attends Club or Organization Meetings:     Marital Status:        Family History   Problem Relation Age of Onset    Stroke Mother     Dementia Mother     Cancer Father         BRAIN TUMOR    COPD Brother         emphsema    Cancer Niece          Objective:     Visit Vitals  BP (!) 160/82 (BP 1 Location: Left upper arm, BP Patient Position: Sitting)   Pulse 76   Temp 97.7 °F (36.5 °C) (Temporal)   Resp 28   Ht 5' (1.524 m)   Wt 152 lb 12.8 oz (69.3 kg)   SpO2 99%   BMI 29.84 kg/m²     Body mass index is 29.84 kg/m². General: Alert and oriented. No acute distress. Well nourished  HEENT :  Ears:TMs are normal. Canals are clear. Eyes: pupils equal, round, react to light and accommodation. Extra ocular movements intact. Nose: patent. Mouth and throat is clear. Neck:supple full range of motion no thyromegaly. Trachea midline, No carotid bruits. No significant lymphadenopathy  Lungs[de-identified] clear to auscultation without wheezes, rales, or rhonchi. Heart :RRR, S1 & S2 are normal intensity. No murmur; no gallop  Abdomen: bowel sounds active. No tenderness, guarding, rebound, masses, hepatic or spleen enlargement  Back: no CVA tenderness. Extremities: without clubbing, cyanosis, or edema  Pulses: radial and femoral pulses are normal  Neuro: HMF intact. Cranial nerves II through XII grossly normal.  Motor: is 5 over 5 and symmetrical.   Deep tendon reflexes: +2 equal  Psych:appropriate behavior, mood, affect and judgement.    Results for orders placed or performed in visit on 07/07/21   SODIUM   Result Value Ref Range    Sodium 132 (L) 136 - 145 mmol/L   POTASSIUM   Result Value Ref Range    Potassium 4.5 3.5 - 5.1 mmol/L   AMB POC URINALYSIS DIP STICK AUTO W/O MICRO   Result Value Ref Range    Color (UA POC) Yellow     Clarity (UA POC) Clear     Glucose (UA POC) Negative Negative    Bilirubin (UA POC) Negative Negative    Ketones (UA POC) Negative Negative    Specific gravity (UA POC) 1.010 1.001 - 1.035    Blood (UA POC) Negative Negative    pH (UA POC) 6.0 4.6 - 8.0    Protein (UA POC) Negative Negative    Urobilinogen (UA POC) 0.2 mg/dL 0.2 - 1    Nitrites (UA POC) Negative Negative    Leukocyte esterase (UA POC) Negative Negative       Results for orders placed or performed in visit on 07/07/21   SODIUM   Result Value Ref Range    Sodium 132 (L) 136 - 145 mmol/L   POTASSIUM   Result Value Ref Range    Potassium 4.5 3.5 - 5.1 mmol/L   AMB POC URINALYSIS DIP STICK AUTO W/O MICRO   Result Value Ref Range    Color (UA POC) Yellow     Clarity (UA POC) Clear     Glucose (UA POC) Negative Negative    Bilirubin (UA POC) Negative Negative    Ketones (UA POC) Negative Negative    Specific gravity (UA POC) 1.010 1.001 - 1.035    Blood (UA POC) Negative Negative    pH (UA POC) 6.0 4.6 - 8.0    Protein (UA POC) Negative Negative    Urobilinogen (UA POC) 0.2 mg/dL 0.2 - 1    Nitrites (UA POC) Negative Negative    Leukocyte esterase (UA POC) Negative Negative       Assessment/ Plan:     1. Urinary frequency  A within normal limits     - AMB POC URINALYSIS DIP STICK AUTO W/O MICRO  - POTASSIUM; Future  - SODIUM; Future  - COLLECTION VENOUS BLOOD,VENIPUNCTURE; Future  - COLLECTION VENOUS BLOOD,VENIPUNCTURE  - SODIUM  - POTASSIUM    2. Dysuria  Irritation not urethral external    We  Discussed trying zinc oxide /desitin or a& d ointment . 3. Chronic obstructive pulmonary disease, unspecified COPD type ([facility] [address]  At baseline   - POTASSIUM; Future  - SODIUM; Future  - COLLECTION VENOUS BLOOD,VENIPUNCTURE; Future  - COLLECTION VENOUS BLOOD,VENIPUNCTURE  - SODIUM  - POTASSIUM    4. Eczema, unspecified type    No change to medical management     5. Hyponatremia    - POTASSIUM; Future  - SODIUM; Future  - COLLECTION VENOUS BLOOD,VENIPUNCTURE; Future  - COLLECTION VENOUS BLOOD,VENIPUNCTURE  - SODIUM  - POTASSIUM    6. Essential hypertension  BP elevated discussed checking BP at home and bringing readings to next appointment .        Orders Placed This Encounter    COLLECTION VENOUS BLOOD,VENIPUNCTURE     Standing Status:   Future     Number of Occurrences:   1     Standing Expiration Date:   8/7/2021    POTASSIUM     Standing Status:   Future     Number of Occurrences:   1     Standing Expiration Date:   8/7/2021    SODIUM     Standing Status:   Future     Number of Occurrences:   1     Standing Expiration Date:   7/7/2022    AMB POC URINALYSIS DIP STICK AUTO W/O MICRO     AMB POC URINALYSIS DIP STICK AUTO W/O         Verbal and written instructions (see AVS) provided. Patient expresses understanding of diagnosis and treatment plan. There are no preventive care reminders to display for this patient.             Macho Canales, DANILO-C

## 2021-07-26 DIAGNOSIS — Z23 ENCOUNTER FOR IMMUNIZATION: ICD-10-CM

## 2021-07-26 RX ORDER — AMLODIPINE BESYLATE 10 MG/1
TABLET ORAL
Qty: 90 TABLET | Refills: 3 | Status: SHIPPED | OUTPATIENT
Start: 2021-07-26 | End: 2021-08-25 | Stop reason: SDUPTHER

## 2021-07-26 RX ORDER — LEVOTHYROXINE SODIUM 100 UG/1
TABLET ORAL
Qty: 90 TABLET | Refills: 3 | Status: SHIPPED | OUTPATIENT
Start: 2021-07-26 | End: 2021-08-25 | Stop reason: SDUPTHER

## 2021-07-30 ENCOUNTER — TELEPHONE (OUTPATIENT)
Dept: FAMILY MEDICINE CLINIC | Age: 76
End: 2021-07-30

## 2021-07-30 NOTE — TELEPHONE ENCOUNTER
----- Message from Shital Montes sent at 7/30/2021 11:14 AM EDT -----  Regarding: NP Milian/Referral  Referral    Caller (first and last name if not the patient or from practice): pt      Caller's relationship to patient (if not from a practice): self      Name of caller (if calling from a practice): n/a      Name of practice: [facility]       Specialist's title, first, and last name: in [facility] Phone Number:       Fax number: [phone]       Date and time of appointment:  8/13/21 at 0900      Reason for appointment: Hearing test       Details to clarify the request:       Shital Montes

## 2021-08-18 ENCOUNTER — TELEPHONE (OUTPATIENT)
Dept: FAMILY MEDICINE CLINIC | Age: 76
End: 2021-08-18

## 2021-08-18 NOTE — TELEPHONE ENCOUNTER
----- Message from Cortez Jarrett sent at 8/18/2021  9:50 AM EDT -----  Regarding: [facility]/Telephone  Contact: [phone]  General Message/Vendor Calls    Caller's first and last name: N/A      Reason for call: Prescription the third vaccine. Callback required yes/no and why: Yes/Confirm      Best contact number(s):[phone]      Details to clarify the request: Patient would like to get a prescription for the third vaccine. Patients  has an appointment this morning at 10:00 a.m. Patient would like to have the office give the prescription to him while he is there.         Cortez Jarrett

## 2021-08-25 ENCOUNTER — OFFICE VISIT (OUTPATIENT)
Dept: FAMILY MEDICINE CLINIC | Age: 76
End: 2021-08-25
Payer: MEDICARE

## 2021-08-25 VITALS
BODY MASS INDEX: 28.47 KG/M2 | HEIGHT: 60 IN | DIASTOLIC BLOOD PRESSURE: 50 MMHG | HEART RATE: 55 BPM | SYSTOLIC BLOOD PRESSURE: 150 MMHG | WEIGHT: 145 LBS | TEMPERATURE: 97.5 F | OXYGEN SATURATION: 99 %

## 2021-08-25 DIAGNOSIS — J32.0 CHRONIC MAXILLARY SINUSITIS: ICD-10-CM

## 2021-08-25 DIAGNOSIS — M81.0 OSTEOPOROSIS WITHOUT CURRENT PATHOLOGICAL FRACTURE, UNSPECIFIED OSTEOPOROSIS TYPE: ICD-10-CM

## 2021-08-25 DIAGNOSIS — Z23 ENCOUNTER FOR IMMUNIZATION: ICD-10-CM

## 2021-08-25 DIAGNOSIS — R13.10 DYSPHAGIA, UNSPECIFIED TYPE: ICD-10-CM

## 2021-08-25 DIAGNOSIS — E03.9 ACQUIRED HYPOTHYROIDISM: ICD-10-CM

## 2021-08-25 DIAGNOSIS — R39.15 URINARY URGENCY: ICD-10-CM

## 2021-08-25 DIAGNOSIS — M85.80 OSTEOPENIA WITH HIGH RISK OF FRACTURE: ICD-10-CM

## 2021-08-25 DIAGNOSIS — I10 ESSENTIAL HYPERTENSION: Primary | ICD-10-CM

## 2021-08-25 LAB
BILIRUB UR QL STRIP: NEGATIVE
CALCIUM SERPL-MCNC: 9.3 MG/DL (ref 8.5–10.1)
GLUCOSE UR-MCNC: NEGATIVE MG/DL
KETONES P FAST UR STRIP-MCNC: NEGATIVE MG/DL
PH UR STRIP: 5 [PH] (ref 4.6–8)
PROT UR QL STRIP: NEGATIVE
SP GR UR STRIP: 1 (ref 1–1.03)
UA UROBILINOGEN AMB POC: NORMAL (ref 0.2–1)
URINALYSIS CLARITY POC: CLEAR
URINALYSIS COLOR POC: YELLOW
URINE BLOOD POC: NEGATIVE
URINE LEUKOCYTES POC: NORMAL
URINE NITRITES POC: NEGATIVE

## 2021-08-25 PROCEDURE — 81003 URINALYSIS AUTO W/O SCOPE: CPT | Performed by: NURSE PRACTITIONER

## 2021-08-25 PROCEDURE — 36415 COLL VENOUS BLD VENIPUNCTURE: CPT | Performed by: NURSE PRACTITIONER

## 2021-08-25 PROCEDURE — 99214 OFFICE O/P EST MOD 30 MIN: CPT | Performed by: NURSE PRACTITIONER

## 2021-08-25 RX ORDER — CICLOPIROX OLAMINE 7.7 MG/G
CREAM TOPICAL 2 TIMES DAILY
Qty: 15 G | Refills: 5 | Status: SHIPPED | OUTPATIENT
Start: 2021-08-25

## 2021-08-25 RX ORDER — AZITHROMYCIN 250 MG/1
TABLET, FILM COATED ORAL
Qty: 6 TABLET | Refills: 0 | Status: SHIPPED | OUTPATIENT
Start: 2021-08-25 | End: 2022-02-22 | Stop reason: ALTCHOICE

## 2021-08-25 RX ORDER — LEVOTHYROXINE SODIUM 100 UG/1
100 TABLET ORAL
Qty: 90 TABLET | Refills: 3 | Status: SHIPPED | OUTPATIENT
Start: 2021-08-25 | End: 2022-07-08

## 2021-08-25 RX ORDER — AMLODIPINE BESYLATE 10 MG/1
10 TABLET ORAL DAILY
Qty: 90 TABLET | Refills: 3 | Status: SHIPPED | OUTPATIENT
Start: 2021-08-25

## 2021-08-25 RX ORDER — FLUCONAZOLE 150 MG/1
150 TABLET ORAL
Qty: 12 TABLET | Refills: 3 | Status: SHIPPED | OUTPATIENT
Start: 2021-08-25 | End: 2022-02-22 | Stop reason: ALTCHOICE

## 2021-08-25 NOTE — PROGRESS NOTES
1. Have you been to the ER, urgent care clinic since your last visit? Hospitalized since your last visit? No    2. Have you seen or consulted any other health care providers outside of the [address] [place] [address] since your last visit? Include any pap smears or colon screening.  No      Chief Complaint   Patient presents with    Hypertension     checkup    Thyroid Problem         Visit Vitals  BP (!) 150/50 (BP 1 Location: Left upper arm, BP Patient Position: Sitting, BP Cuff Size: Adult)   Pulse (!) 55   Temp 97.5 °F (36.4 °C) (Temporal)   Ht 5' (1.524 m)   Wt 145 lb (65.8 kg)   SpO2 99%   BMI 28.32 kg/m²       Pain Scale: 5/10  Pain Location: Back (low)

## 2021-08-28 LAB
BACTERIA SPEC CULT: ABNORMAL
CC UR VC: ABNORMAL
SERVICE CMNT-IMP: ABNORMAL

## 2021-08-29 NOTE — PROGRESS NOTES
MS Maxim Muñoz has e coli UTI . She is on azithromycin due to allergies. Please call her to check to see if she is feeling better.

## 2021-08-31 DIAGNOSIS — M85.80 OSTEOPENIA WITH HIGH RISK OF FRACTURE: ICD-10-CM

## 2021-08-31 RX ORDER — HYDROCORTISONE SODIUM SUCCINATE 100 MG/2ML
100 INJECTION, POWDER, FOR SOLUTION INTRAMUSCULAR; INTRAVENOUS AS NEEDED
Status: CANCELLED | OUTPATIENT
Start: 2021-08-31

## 2021-08-31 RX ORDER — ONDANSETRON 2 MG/ML
8 INJECTION INTRAMUSCULAR; INTRAVENOUS AS NEEDED
Status: CANCELLED | OUTPATIENT
Start: 2021-08-31

## 2021-08-31 RX ORDER — DIPHENHYDRAMINE HYDROCHLORIDE 50 MG/ML
25 INJECTION, SOLUTION INTRAMUSCULAR; INTRAVENOUS AS NEEDED
Status: CANCELLED
Start: 2021-08-31

## 2021-08-31 RX ORDER — EPINEPHRINE 1 MG/ML
0.3 INJECTION, SOLUTION, CONCENTRATE INTRAVENOUS AS NEEDED
Status: CANCELLED | OUTPATIENT
Start: 2021-08-31

## 2021-08-31 RX ORDER — ACETAMINOPHEN 325 MG/1
650 TABLET ORAL AS NEEDED
Status: CANCELLED
Start: 2021-08-31

## 2021-08-31 RX ORDER — ALBUTEROL SULFATE 0.83 MG/ML
2.5 SOLUTION RESPIRATORY (INHALATION) AS NEEDED
Status: CANCELLED
Start: 2021-08-31

## 2021-08-31 RX ORDER — DIPHENHYDRAMINE HYDROCHLORIDE 50 MG/ML
50 INJECTION, SOLUTION INTRAMUSCULAR; INTRAVENOUS AS NEEDED
Status: CANCELLED
Start: 2021-08-31

## 2021-08-31 NOTE — PROGRESS NOTES
Subjective: Johanna Green is a 67 y.o. 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 9) E66.01    Pericardial effusion I31.3         COMPLIANT WITH MEDICATION:   HTN; Denies chest pain, dyspnea, palpitations, headache and blurred vision. Blood pressure not normotensive. BP trending down with amlodipine 10 mg po daily. Acquired hypothyroidism; taking levothyroxine 100 mcg. Chronic maxillary sinusitis; requests referral to ENT to address. Long term concern. Having difficulty swallowing the drainage , food and drink intermittently. We discussed having ENT specialist address the drainage  concern to improve the overall condition. Urinary urgency; with back pain ; Intermittent symptoms get worse then a little better with increase fluids. Check UA today. depression screening addressed not at risk    abuse screening addressed denies    learning assessment addressed reviewed nurses notes    fall risk addressed not at risk    HM: addressed due for Medicare Wellness soon.      ROS:  Gen: denies fever, chills, fatigue, weight loss, weight gain  HEENT:denies blurry vision, nasal congestion, sore throat  Resp: denies dypsnea, cough, wheezing  CV: denies chest pain radiating to the jaws or arms, palpitations, lower extremity edema  Abd: denies nausea, vomiting, diarrhea, constipation  Neuro: denies numbness/tingling  Endo: denies polyuria, polydipsia, heat/cold intolerance  Heme: no lymphadenopathy    Allergies   Allergen Reactions    Latex Rash    Keflex [Cephalexin] Rash    Levaquin [Levofloxacin] Rash    Sulfa (Sulfonamide Antibiotics) Rash    Macrobid [Nitrofurantoin Monohyd/M-Cryst] Rash         Current Outpatient Medications:     amLODIPine (NORVASC) 10 mg tablet, Take 1 Tablet by mouth daily. , Disp: 90 Tablet, Rfl: 3    levothyroxine (SYNTHROID) 100 mcg tablet, Take 1 Tablet by mouth Daily (before breakfast). , Disp: 90 Tablet, Rfl: 3    ciclopirox (Ciclodan) 0.77 % topical cream, Apply  to affected area two (2) times a day. PRN, Disp: 15 g, Rfl: 5    fluconazole (DIFLUCAN) 150 mg tablet, Take 1 Tablet by mouth every seven (7) days. For oral lichen planus, Disp: 12 Tablet, Rfl: 3    azithromycin (ZITHROMAX) 250 mg tablet, Take 2 tablets today, then take 1 tablet daily, Disp: 6 Tablet, Rfl: 0    Myrbetriq 50 mg ER tablet, TAKE 1 TABLET BY MOUTH EVERY DAY, Disp: 90 Tablet, Rfl: 3    clobetasoL (TEMOVATE) 0.05 % ointment, APPLY TO AFFECTED AREA EVERY DAY, Disp: 15 g, Rfl: 3    lisinopriL (PRINIVIL, ZESTRIL) 40 mg tablet, TAKE 1 TABLET BY MOUTH EVERY DAY, Disp: 90 Tab, Rfl: 3    benzonatate (TESSALON) 200 mg capsule, TAKE 1 CAPSULE BY MOUTH THREE TIMES A DAY AS NEEDED FOR COUGH FOR UP TO 7 DAYS, Disp: 90 Cap, Rfl: 1    montelukast (SINGULAIR) 10 mg tablet, TAKE 1 TABLET BY MOUTH EVERY DAY, Disp: , Rfl:     albuterol-ipratropium (DUO-NEB) 2.5 mg-0.5 mg/3 ml nebu, 3 mL by Nebulization route every four (4) hours as needed for Wheezing, Shortness of Breath or Cough. , Disp: 30 Nebule, Rfl: 0    diclofenac (VOLTAREN) 0.1 % ophthalmic solution, INSTILL 1 DROP IN BOTH EYES 4 TIMES A DAY (STARTING 12/08/19), Disp: , Rfl: 3    betamethasone valerate (VALISONE) 0.1 % topical cream, PRN, Disp: , Rfl: 1    denosumab (PROLIA) 60 mg/mL injection, 60 mg by SubCUTAneous route., Disp: , Rfl:     hydroxychloroquine (PLAQUENIL) 200 mg tablet, TAKE 1 TABLET BY MOUTH TWICE A DAY, Disp: , Rfl: 1    Nebulizer Accessories kit, Use as directed., Disp: 1 Kit, Rfl: 0    Nebulizer & Compressor machine, Use as directed, Disp: 1 Each, Rfl: 0    multivitamin (ONE A DAY) tablet, Take 1 Tab by mouth daily. , Disp: , Rfl:     guaifenesin (MUCINEX PO), Take  by mouth., Disp: , Rfl:     acetaminophen (ARTHRITIS PAIN RELIEF) 650 mg TbER, Take 1 Tab by mouth nightly as needed. , Disp: 30 Tab, Rfl: 0    ANORO ELLIPTA 62.5-25 mcg/actuation inhaler, USE 1 INHALATION ONCE A DAY, Disp: , Rfl: 5    albuterol (PROVENTIL HFA, VENTOLIN HFA, PROAIR HFA) 90 mcg/actuation inhaler, Take 2 Puffs by inhalation. , Disp: , Rfl:     tacrolimus (PROGRAF) 1 mg capsule, Take 1 mg by mouth three (3) times daily. Contents of 1 cap diluted in 500 ml of distilled water: 5 ml swish and spit tid, Disp: , Rfl:     tacrolimus (PROTOPIC) 0.1 % ointment, Apply  to affected area daily. qday in the am-alternate with clobetasol/orabase PRN, Disp: , Rfl:     clobetasol (TEMOVATE) 0.05 % ointment, Apply  to affected area daily. qday in the am-alternate with tacrolimus PRN, Disp: , Rfl:     budesonide (ENTOCORT EC) 3 mg capsule, Take 3 Caps by mouth every morning. Taking 3 tabs in am (Patient taking differently: Take 9 mg by mouth every morning. Taking 3 tabs in am), Disp: 270 Cap, Rfl: 1    calcium carbonate (TUMS) 200 mg calcium (500 mg) chew, Take 1 Tab by mouth daily. , Disp: , Rfl:     aspirin 81 mg chewable tablet, Take 81 mg by mouth daily. , Disp: , Rfl:     omeprazole (PRILOSEC) 20 mg capsule, Take 20 mg by mouth daily. , Disp: , Rfl:     cholecalciferol (VITAMIN D3) 1,000 unit tablet, Take 1,000 Units by mouth daily. Indications: VITAMIN D DEFICIENCY, Disp: , Rfl:     etodolac (LODINE) 400 mg tablet, Take 400 mg by mouth two (2) times daily as needed for Pain.  (Patient not taking: Reported on 2021), Disp: 61 Tab, Rfl: 0    Past Medical History:   Diagnosis Date    Allergic rhinitis     COPD (chronic obstructive pulmonary disease) (HCC)     Hypertension     Hypothyroidism     Lichen planus     [facility]    Menopause     Osteoarthrosis, unspecified whether generalized or localized, unspecified site     Overactive bladder     Phlebitis and thrombophlebitis of lower extremities, unspecified     Raynaud's syndrome        Past Surgical History:   Procedure Laterality Date    HX APPENDECTOMY      HX GI      colitis    HX GYN      HYSTERECTOMY//BTL    HX HEENT      TONSILLECTOMY    HX HYSTERECTOMY      HX MOHS PROCEDURES  2018    left lower leg    HX OOPHORECTOMY      HX UROLOGICAL      BLADDER SLING       Social History     Tobacco Use   Smoking Status Former Smoker    Types: Cigarettes   Smokeless Tobacco Never Used   Tobacco Comment    smoked less than 10 yrs and quit 36 +       Social History     Socioeconomic History    Marital status:      Spouse name: Not on file    Number of children: Not on file    Years of education: Not on file    Highest education level: Not on file   Tobacco Use    Smoking status: Former Smoker     Types: Cigarettes    Smokeless tobacco: Never Used    Tobacco comment: smoked less than 10 yrs and quit 40 +   Vaping Use    Vaping Use: Never used   Substance and Sexual Activity    Alcohol use: No    Drug use: No    Sexual activity: Not Currently     Partners: Male   Other Topics Concern     Service No    Blood Transfusions No    Caffeine Concern No    Occupational Exposure No    Hobby Hazards No    Sleep Concern No    Stress Concern No    Weight Concern No    Special Diet No    Back Care No    Exercise Yes    Bike Helmet No    Seat Belt Yes    Self-Exams No     Social Determinants of Health     Financial Resource Strain:     Difficulty of Paying Living Expenses:    Food Insecurity:     Worried About Running Out of Food in the Last Year:    [address] in the Last Year:    Transportation Needs:     Lack of Transportation (Medical):  Lack of Transportation (Non-Medical):    Physical Activity:     Days of Exercise per Week:     Minutes of Exercise per Session:    Stress:     Feeling of Stress :    Social Connections:     Frequency of Communication with Friends and Family:     Frequency of Social Gatherings with Friends and Family:     Attends Confucianism Services:     Active Member of Clubs or Organizations:     Attends Club or Organization Meetings:     Marital Status:        Family History   Problem Relation Age of Onset    Stroke Mother     Dementia Mother     Cancer Father         BRAIN TUMOR    COPD Brother         emphsema    Cancer Niece          Objective:     Visit Vitals  BP (!) 150/50 (BP 1 Location: Left upper arm, BP Patient Position: Sitting, BP Cuff Size: Adult)   Pulse (!) 55   Temp 97.5 °F (36.4 °C) (Temporal)   Ht 5' (1.524 m)   Wt 145 lb (65.8 kg)   SpO2 99%   BMI 28.32 kg/m²     Body mass index is 28.32 kg/m². General: Alert and oriented. No acute distress. Well nourished  HEENT :  Ears:TMs are normal. Canals are clear. Eyes: pupils equal, round, react to light and accommodation. Extra ocular movements intact. Nose: patent. Mouth and throat is clear. Neck:supple full range of motion no thyromegaly. Trachea midline, No carotid bruits. No significant lymphadenopathy  Lungs[de-identified] clear to auscultation without wheezes, rales, or rhonchi. Heart :RRR, S1 & S2 are normal intensity. No murmur; no gallop  Abdomen: bowel sounds active. No tenderness, guarding, rebound, masses, hepatic or spleen enlargement  Back: (+) CVA tenderness. Extremities: without clubbing, cyanosis, or edema  Pulses: radial and femoral pulses are normal  Neuro: HMF intact.  Cranial nerves II through XII grossly normal.  Motor: is 5 over 5 and symmetrical.   Deep tendon reflexes: +2 equal  Psych:appropriate behavior, mood, affect and judgement.    Results for orders placed or performed in visit on 08/25/21   CULTURE, URINE    Specimen: Clean catch; Urine   Result Value Ref Range    Special Requests: NO SPECIAL REQUESTS      [place] Count >100,000  COLONIES/mL        Culture result: ESCHERICHIA COLI (A)         Susceptibility    Escherichia coli - CHARMAINE     Amikacin ($)* <=2 Susceptible ug/mL      * Susceptible     Ampicillin ($)* >=32 Resistant ug/mL      * Resistant     Ampicillin/sulbactam ($)* >=32 Resistant ug/mL      * Resistant     Cefazolin ($)* 16 Susceptible ug/mL      * Susceptible     Cefepime ($$)* <=1 Susceptible ug/mL      * Susceptible     Cefoxitin* <=4 Susceptible ug/mL      * Susceptible     Ceftazidime ($)* <=1 Susceptible ug/mL      * Susceptible     Ceftriaxone ($)* <=1 Susceptible ug/mL      * Susceptible     Ciprofloxacin ($) Value in next row Susceptible ug/mL      <=0.25SUSCEPTIBLE     Gentamicin ($)* Value in next row Susceptible ug/mL      <=0.25SUSCEPTIBLE      * Susceptible     Levofloxacin ($)* Value in next row Susceptible ug/mL      <=0.25SUSCEPTIBLE      * Susceptible**FDA INTERPRETATION REFLECTED, REFER TO CLSI FOR ALTERNATE INTERPRETATIONS.**     Meropenem ($$)* Value in next row Susceptible ug/mL      <=0.25SUSCEPTIBLE      * Susceptible     Nitrofurantoin* Value in next row Susceptible ug/mL      <=0.25SUSCEPTIBLE      * Susceptible     Piperacillin/Tazobac ($)* Value in next row Susceptible ug/mL      <=0.25SUSCEPTIBLE      * Susceptible     Tobramycin ($)* Value in next row Susceptible ug/mL      <=0.25SUSCEPTIBLE      * Susceptible     Trimeth/Sulfa* Value in next row Susceptible ug/mL      <=0.25SUSCEPTIBLE      * Susceptible   CALCIUM   Result Value Ref Range    Calcium 9.3 8.5 - 10.1 MG/DL   AMB POC URINALYSIS DIP STICK AUTO W/O MICRO   Result Value Ref Range    Color (UA POC) Yellow     Clarity (UA POC) Clear     Glucose (UA POC) Negative Negative    Bilirubin (UA POC) Negative Negative    Ketones (UA POC) Negative Negative    Specific gravity (UA POC) 1.005 1.001 - 1.035    Blood (UA POC) Negative Negative    pH (UA POC) 5.0 4.6 - 8.0    Protein (UA POC) Negative Negative    Urobilinogen (UA POC) 0.2 mg/dL 0.2 - 1    Nitrites (UA POC) Negative Negative    Leukocyte esterase (UA POC) 3+ Negative       Results for orders placed or performed in visit on 08/25/21   CULTURE, URINE    Specimen: Clean catch; Urine   Result Value Ref Range    Special Requests: NO SPECIAL REQUESTS      [place] Count >100,000  COLONIES/mL        Culture result: ESCHERICHIA COLI (A)         Susceptibility    Escherichia coli - CHARMAINE     Amikacin ($)* <=2 Susceptible ug/mL      * Susceptible     Ampicillin ($)* >=32 Resistant ug/mL      * Resistant     Ampicillin/sulbactam ($)* >=32 Resistant ug/mL      * Resistant     Cefazolin ($)* 16 Susceptible ug/mL      * Susceptible     Cefepime ($$)* <=1 Susceptible ug/mL      * Susceptible     Cefoxitin* <=4 Susceptible ug/mL      * Susceptible     Ceftazidime ($)* <=1 Susceptible ug/mL      * Susceptible     Ceftriaxone ($)* <=1 Susceptible ug/mL      * Susceptible     Ciprofloxacin ($) Value in next row Susceptible ug/mL      <=0.25SUSCEPTIBLE     Gentamicin ($)* Value in next row Susceptible ug/mL      <=0.25SUSCEPTIBLE      * Susceptible     Levofloxacin ($)* Value in next row Susceptible ug/mL      <=0.25SUSCEPTIBLE      * Susceptible**FDA INTERPRETATION REFLECTED, REFER TO CLSI FOR ALTERNATE INTERPRETATIONS.**     Meropenem ($$)* Value in next row Susceptible ug/mL      <=0.25SUSCEPTIBLE      * Susceptible     Nitrofurantoin* Value in next row Susceptible ug/mL      <=0.25SUSCEPTIBLE      * Susceptible     Piperacillin/Tazobac ($)* Value in next row Susceptible ug/mL      <=0.25SUSCEPTIBLE      * Susceptible     Tobramycin ($)* Value in next row Susceptible ug/mL      <=0.25SUSCEPTIBLE      * Susceptible     Trimeth/Sulfa* Value in next row Susceptible ug/mL <=0.25SUSCEPTIBLE      * Susceptible   CALCIUM   Result Value Ref Range    Calcium 9.3 8.5 - 10.1 MG/DL   AMB POC URINALYSIS DIP STICK AUTO W/O MICRO   Result Value Ref Range    Color (UA POC) Yellow     Clarity (UA POC) Clear     Glucose (UA POC) Negative Negative    Bilirubin (UA POC) Negative Negative    Ketones (UA POC) Negative Negative    Specific gravity (UA POC) 1.005 1.001 - 1.035    Blood (UA POC) Negative Negative    pH (UA POC) 5.0 4.6 - 8.0    Protein (UA POC) Negative Negative    Urobilinogen (UA POC) 0.2 mg/dL 0.2 - 1    Nitrites (UA POC) Negative Negative    Leukocyte esterase (UA POC) 3+ Negative       Assessment/ Plan:     1. acquired hypothyroidism   Renew   - levothyroxine (SYNTHROID) 100 mcg tablet; Take 1 Tablet by mouth Daily (before breakfast). Dispense: 90 Tablet; Refill: 3    2. Chronic maxillary sinusitis    - REFERRAL TO ENT-OTOLARYNGOLOGY    3. Dysphagia, unspecified type  - REFERRAL TO ENT-OTOLARYNGOLOGY    4. Osteoporosis without current pathological fracture, unspecified osteoporosis type  Continue Prolia every 6 months. Calcium level today  - CALCIUM; Future  - COLLECTION VENOUS BLOOD,VENIPUNCTURE; Future  - COLLECTION VENOUS BLOOD,VENIPUNCTURE  - CALCIUM    5. Urinary urgency  With back pain   - AMB POC URINALYSIS DIP STICK AUTO W/O MICRO  - CULTURE, URINE; Future  - CULTURE, URINE    6. Essential hypertension  BP elevated coming down significantly every visit. renew amlodipine 10 mg po daily.             Orders Placed This Encounter    COLLECTION VENOUS BLOOD,VENIPUNCTURE     Standing Status:   Future     Number of Occurrences:   1     Standing Expiration Date:   9/25/2021    CULTURE, URINE     Standing Status:   Future     Number of Occurrences:   1     Standing Expiration Date:   2/25/2022    CALCIUM     Standing Status:   Future     Number of Occurrences:   1     Standing Expiration Date:   8/25/2022    E[facility] [address] [facility]     Referral Priority:   Routine Referral Type:   Consultation     Referral Reason:   Specialty Services Required     Referred to Provider:   He Ferrer MD     Requested Specialty:   Otolaryngology     Number of Visits Requested:   1    AMB POC URINALYSIS DIP STICK AUTO W/O MICRO     AMB POC URINALYSIS DIP STICK AUTO W/O    amLODIPine (NORVASC) 10 mg tablet     Sig: Take 1 Tablet by mouth daily. Dispense:  90 Tablet     Refill:  3    levothyroxine (SYNTHROID) 100 mcg tablet     Sig: Take 1 Tablet by mouth Daily (before breakfast). Dispense:  90 Tablet     Refill:  3    ciclopirox (Ciclodan) 0.77 % topical cream     Sig: Apply  to affected area two (2) times a day. PRN     Dispense:  15 g     Refill:  5    fluconazole (DIFLUCAN) 150 mg tablet     Sig: Take 1 Tablet by mouth every seven (7) days. For oral lichen planus     Dispense:  12 Tablet     Refill:  3    azithromycin (ZITHROMAX) 250 mg tablet     Sig: Take 2 tablets today, then take 1 tablet daily     Dispense:  6 Tablet     Refill:  0         Verbal and written instructions (see AVS) provided. Patient expresses understanding of diagnosis and treatment plan.     Health Maintenance Due   Topic Date Due    COVID-19 Vaccine (3 - Moderna risk 3-dose series) 04/28/2021    Medicare Yearly Exam  09/30/2021               CRIS Bishop

## 2021-09-02 ENCOUNTER — HOSPITAL ENCOUNTER (OUTPATIENT)
Dept: MAMMOGRAPHY | Age: 76
Discharge: HOME | End: 2021-09-02
Payer: MEDICARE

## 2021-09-02 ENCOUNTER — HOSPITAL ENCOUNTER (OUTPATIENT)
Dept: INFUSION THERAPY | Age: 76
Discharge: HOME | End: 2021-09-02
Payer: MEDICARE

## 2021-09-02 VITALS
TEMPERATURE: 97.7 F | SYSTOLIC BLOOD PRESSURE: 147 MMHG | RESPIRATION RATE: 18 BRPM | HEART RATE: 50 BPM | DIASTOLIC BLOOD PRESSURE: 65 MMHG | WEIGHT: 147.49 LBS | BODY MASS INDEX: 28.8 KG/M2 | OXYGEN SATURATION: 98 %

## 2021-09-02 DIAGNOSIS — Z12.31 VISIT FOR SCREENING MAMMOGRAM: ICD-10-CM

## 2021-09-02 DIAGNOSIS — M85.80 OSTEOPENIA WITH HIGH RISK OF FRACTURE: Primary | ICD-10-CM

## 2021-09-02 PROCEDURE — 74011250636 HC RX REV CODE- 250/636: Performed by: NURSE PRACTITIONER

## 2021-09-02 PROCEDURE — 96372 THER/PROPH/DIAG INJ SC/IM: CPT

## 2021-09-02 PROCEDURE — 77063 BREAST TOMOSYNTHESIS BI: CPT

## 2021-09-02 RX ORDER — HYDROCORTISONE SODIUM SUCCINATE 100 MG/2ML
100 INJECTION, POWDER, FOR SOLUTION INTRAMUSCULAR; INTRAVENOUS AS NEEDED
Status: CANCELLED | OUTPATIENT
Start: 2022-03-02

## 2021-09-02 RX ORDER — DIPHENHYDRAMINE HYDROCHLORIDE 50 MG/ML
50 INJECTION, SOLUTION INTRAMUSCULAR; INTRAVENOUS AS NEEDED
Status: CANCELLED
Start: 2022-03-02

## 2021-09-02 RX ORDER — ALBUTEROL SULFATE 0.83 MG/ML
2.5 SOLUTION RESPIRATORY (INHALATION) AS NEEDED
Status: CANCELLED
Start: 2022-03-02

## 2021-09-02 RX ORDER — DIPHENHYDRAMINE HYDROCHLORIDE 50 MG/ML
25 INJECTION, SOLUTION INTRAMUSCULAR; INTRAVENOUS AS NEEDED
Status: CANCELLED
Start: 2022-03-02

## 2021-09-02 RX ORDER — EPINEPHRINE 1 MG/ML
0.3 INJECTION, SOLUTION, CONCENTRATE INTRAVENOUS AS NEEDED
Status: CANCELLED | OUTPATIENT
Start: 2022-03-02

## 2021-09-02 RX ORDER — ACETAMINOPHEN 325 MG/1
650 TABLET ORAL AS NEEDED
Status: CANCELLED
Start: 2022-03-02

## 2021-09-02 RX ORDER — ONDANSETRON 2 MG/ML
8 INJECTION INTRAMUSCULAR; INTRAVENOUS AS NEEDED
Status: CANCELLED | OUTPATIENT
Start: 2022-03-02

## 2021-09-02 RX ADMIN — DENOSUMAB 60 MG: 60 INJECTION SUBCUTANEOUS at 13:28

## 2021-09-03 NOTE — PROGRESS NOTES
Mammogram negative   No suspicious masses or calcifications are identified. Benign-appearing calcifications are noted within both breasts. There has been no  significant change.

## 2021-09-08 ENCOUNTER — CLINICAL SUPPORT (OUTPATIENT)
Dept: FAMILY MEDICINE CLINIC | Age: 76
End: 2021-09-08
Payer: MEDICARE

## 2021-09-08 VITALS
DIASTOLIC BLOOD PRESSURE: 60 MMHG | TEMPERATURE: 97.4 F | BODY MASS INDEX: 28.98 KG/M2 | SYSTOLIC BLOOD PRESSURE: 130 MMHG | WEIGHT: 147.6 LBS | RESPIRATION RATE: 18 BRPM | OXYGEN SATURATION: 99 % | HEIGHT: 60 IN | HEART RATE: 67 BPM

## 2021-09-08 DIAGNOSIS — S51.812A SKIN TEAR OF LEFT FOREARM WITHOUT COMPLICATION, INITIAL ENCOUNTER: ICD-10-CM

## 2021-09-08 DIAGNOSIS — Z09 FOLLOW UP: Primary | ICD-10-CM

## 2021-09-08 DIAGNOSIS — Z23 NEEDS FLU SHOT: ICD-10-CM

## 2021-09-08 DIAGNOSIS — N39.0 URINARY TRACT INFECTION WITHOUT HEMATURIA, SITE UNSPECIFIED: ICD-10-CM

## 2021-09-08 LAB
BILIRUB UR QL STRIP: NEGATIVE
GLUCOSE UR-MCNC: NEGATIVE MG/DL
KETONES P FAST UR STRIP-MCNC: NEGATIVE MG/DL
PH UR STRIP: 5.5 [PH] (ref 4.6–8)
PROT UR QL STRIP: NEGATIVE
SP GR UR STRIP: 1.01 (ref 1–1.03)
UA UROBILINOGEN AMB POC: NORMAL (ref 0.2–1)
URINALYSIS CLARITY POC: NORMAL
URINALYSIS COLOR POC: NORMAL
URINE BLOOD POC: NEGATIVE
URINE LEUKOCYTES POC: NORMAL
URINE NITRITES POC: NEGATIVE

## 2021-09-08 PROCEDURE — 99214 OFFICE O/P EST MOD 30 MIN: CPT | Performed by: FAMILY MEDICINE

## 2021-09-08 PROCEDURE — 90694 VACC AIIV4 NO PRSRV 0.5ML IM: CPT | Performed by: FAMILY MEDICINE

## 2021-09-08 PROCEDURE — 96372 THER/PROPH/DIAG INJ SC/IM: CPT | Performed by: FAMILY MEDICINE

## 2021-09-08 PROCEDURE — G0008 ADMIN INFLUENZA VIRUS VAC: HCPCS | Performed by: FAMILY MEDICINE

## 2021-09-08 RX ORDER — CEFTRIAXONE 250 MG/8ML
1 INJECTION, POWDER, FOR SOLUTION INTRAMUSCULAR; INTRAVENOUS ONCE
Qty: 4 EACH | Refills: 0
Start: 2021-09-08 | End: 2021-09-08

## 2021-09-08 NOTE — PROGRESS NOTES
Chief Complaint   Patient presents with    Bladder Infection     f/u still burning and itching    Wound Check      Friday out door cushion bind      Pt reports that she is still having urinary issues and significant burning. Pt reports that she often feels as though she has trouble initiating urination, feels like she has to go, but has to work to start, with significant discomfort. Patient also injured her left forearm on a piece of outdoor storage equipment, is concerned since the wound has continued bleeding despite it being 3 to 4 days since the injury occurred. Patient agrees to a flu shot today. Subjective: (As above and below)     Chief Complaint   Patient presents with    Bladder Infection     f/u still burning and itching    Wound Check      Friday out door cushion bind      she is a 67y.o. year old female who presents for evaluation. Reviewed PmHx, RxHx, FmHx, SocHx, AllgHx and updated in chart. Review of Systems - negative except as listed above    Objective:     Vitals:    09/08/21 0934   BP: 130/60   Pulse: 67   Resp: 18   Temp: 97.4 °F (36.3 °C)   TempSrc: Temporal   SpO2: 99%   Weight: 147 lb 9.6 oz (67 kg)   Height: 5' (1.524 m)     Physical Examination: General appearance - alert, well appearing, and in no distress  Mental status - normal mood, behavior, speech, dress, motor activity, and thought processes  Ears - bilateral TM's and external ear canals normal  Chest - clear to auscultation, no wheezes, rales or rhonchi, symmetric air entry  Heart - normal rate, regular rhythm, normal S1, S2, no murmurs, rubs, clicks or gallops  Musculoskeletal - no joint tenderness, deformity or swelling  Skin - 3.5 cm shallow linear skin tear with scant oozing blood on the left upper forearm, just below 2 stitches that were placed by her dermatologist.     Assessment/ Plan:   1. Follow up  - AMB POC URINALYSIS DIP STICK AUTO W/O MICRO    2.  Needs flu shot  - FLU (FLUAD QUAD INFLUENZA VACCINE,QUAD,ADJUVANTED)    3. Urinary tract infection without hematuria, site unspecified  -Reviewed Keflex allergy at length, patient reports mild hives only. Will give ceftriaxone today to attempt to clear recurrent infection symptoms. Patient monitored in office for 15 minutes and counseled on how to treat potential latent reaction, including Benadryl and when to follow-up for appropriate or additional medical care  - CEFTRIAXONE SODIUM INJECTION  MG  - CA THER/PROPH/DIAG INJECTION, SUBCUT/IM     4. Skin tear of left forearm without complication, initial encounter  Steri-Strips placed over wound with good approximation, wound care reviewed. No signs of infection at this time    I have discussed the diagnosis with the patient and the intended plan as seen in the above orders. The patient has received an after-visit summary and questions were answered concerning future plans.      Medication Side Effects and Warnings were discussed with patient: yes  Patient Labs were reviewed: yes  Patient Past Records were reviewed:  yes    Molina Velazquez M.D.

## 2021-09-08 NOTE — PROGRESS NOTES
1. Have you been to the ER, urgent care clinic since your last visit? Hospitalized since your last visit? No    2. Have you seen or consulted any other health care providers outside of the [address] [place] [address] since your last visit? Include any pap smears or colon screening.  No      Chief Complaint   Patient presents with    Bladder Infection     f/u still burning and itching         Visit Vitals  /60 (BP 1 Location: Right upper arm, BP Patient Position: Sitting, BP Cuff Size: Adult)   Pulse 67   Temp 97.4 °F (36.3 °C) (Temporal)   Resp 18   Ht 5' (1.524 m)   Wt 147 lb 9.6 oz (67 kg)   SpO2 99%   BMI 28.83 kg/m²       Pain Scale: 6/10  Pain Location: (on urination)

## 2021-09-11 LAB
BACTERIA SPEC CULT: ABNORMAL
CC UR VC: ABNORMAL
SERVICE CMNT-IMP: ABNORMAL

## 2021-09-12 NOTE — PROGRESS NOTES
Please advise pt that urine culture showed significant bacteria still present, recommend taking five days of Cipro in addition to rocephin injection given in office. Lease advise if pt agrees.

## 2021-09-13 RX ORDER — CIPROFLOXACIN 500 MG/1
500 TABLET ORAL 2 TIMES DAILY
Qty: 10 TABLET | Refills: 0 | Status: SHIPPED | OUTPATIENT
Start: 2021-09-13 | End: 2021-09-18

## 2021-09-15 DIAGNOSIS — J32.0 CHRONIC MAXILLARY SINUSITIS: Primary | ICD-10-CM

## 2021-09-27 ENCOUNTER — CLINICAL SUPPORT (OUTPATIENT)
Dept: FAMILY MEDICINE CLINIC | Age: 76
End: 2021-09-27
Payer: MEDICARE

## 2021-09-27 DIAGNOSIS — Z09 FOLLOW UP: Primary | ICD-10-CM

## 2021-09-27 DIAGNOSIS — Z87.440 HISTORY OF RECURRENT UTIS: ICD-10-CM

## 2021-09-27 LAB
BILIRUB UR QL STRIP: NEGATIVE
GLUCOSE UR-MCNC: NEGATIVE MG/DL
KETONES P FAST UR STRIP-MCNC: NEGATIVE MG/DL
PH UR STRIP: 5.5 [PH] (ref 4.6–8)
PROT UR QL STRIP: NEGATIVE
SP GR UR STRIP: 1.01 (ref 1–1.03)
UA UROBILINOGEN AMB POC: NORMAL (ref 0.2–1)
URINALYSIS CLARITY POC: NORMAL
URINALYSIS COLOR POC: YELLOW
URINE BLOOD POC: NEGATIVE
URINE LEUKOCYTES POC: NEGATIVE
URINE NITRITES POC: NEGATIVE

## 2021-09-27 PROCEDURE — 81003 URINALYSIS AUTO W/O SCOPE: CPT | Performed by: NURSE PRACTITIONER

## 2021-09-27 NOTE — PROGRESS NOTES
Patient presented to office stating she was told to do a UA fu after 5 days of extended antibiotic, stated also she does feel some improvement, but not complete a 100%. Per Maldonado Cevallos a UA can be done. Ms Guille Stoll was informed, per Maldonado Cevallos, she will not need any continuance of an antibiotic medication at this time. Ms Guille Stoll stated OK good and thanks.

## 2021-10-26 ENCOUNTER — TRANSCRIBE ORDER (OUTPATIENT)
Dept: SCHEDULING | Age: 76
End: 2021-10-26

## 2021-10-26 DIAGNOSIS — R13.10 DYSPHAGIA: Primary | ICD-10-CM

## 2021-10-27 ENCOUNTER — HOSPITAL ENCOUNTER (OUTPATIENT)
Dept: GENERAL RADIOLOGY | Age: 76
Discharge: HOME | End: 2021-10-27
Attending: OTOLARYNGOLOGY
Payer: MEDICARE

## 2021-10-27 DIAGNOSIS — R13.10 DYSPHAGIA: ICD-10-CM

## 2021-10-27 PROCEDURE — 74220 X-RAY XM ESOPHAGUS 1CNTRST: CPT

## 2022-01-24 RX ORDER — LISINOPRIL 40 MG/1
TABLET ORAL
Qty: 90 TABLET | Refills: 3 | Status: SHIPPED | OUTPATIENT
Start: 2022-01-24

## 2022-02-22 ENCOUNTER — OFFICE VISIT (OUTPATIENT)
Dept: FAMILY MEDICINE CLINIC | Age: 77
End: 2022-02-22
Payer: MEDICARE

## 2022-02-22 VITALS
SYSTOLIC BLOOD PRESSURE: 138 MMHG | RESPIRATION RATE: 17 BRPM | HEART RATE: 66 BPM | HEIGHT: 61 IN | BODY MASS INDEX: 28.37 KG/M2 | TEMPERATURE: 97.6 F | DIASTOLIC BLOOD PRESSURE: 60 MMHG | OXYGEN SATURATION: 98 % | WEIGHT: 150.3 LBS

## 2022-02-22 DIAGNOSIS — I10 ESSENTIAL HYPERTENSION: ICD-10-CM

## 2022-02-22 DIAGNOSIS — Z00.00 MEDICARE ANNUAL WELLNESS VISIT, SUBSEQUENT: Primary | ICD-10-CM

## 2022-02-22 DIAGNOSIS — E55.9 VITAMIN D DEFICIENCY: ICD-10-CM

## 2022-02-22 DIAGNOSIS — L84 CORN OF FOOT: ICD-10-CM

## 2022-02-22 DIAGNOSIS — G47.01 INSOMNIA DUE TO MEDICAL CONDITION: ICD-10-CM

## 2022-02-22 DIAGNOSIS — M85.80 OSTEOPENIA WITH HIGH RISK OF FRACTURE: ICD-10-CM

## 2022-02-22 DIAGNOSIS — E03.9 ACQUIRED HYPOTHYROIDISM: ICD-10-CM

## 2022-02-22 DIAGNOSIS — N32.81 OAB (OVERACTIVE BLADDER): ICD-10-CM

## 2022-02-22 DIAGNOSIS — J44.9 CHRONIC OBSTRUCTIVE PULMONARY DISEASE, UNSPECIFIED COPD TYPE (HCC): ICD-10-CM

## 2022-02-22 PROBLEM — E66.01 SEVERE OBESITY (BMI 35.0-39.9): Status: RESOLVED | Noted: 2018-06-18 | Resolved: 2022-02-22

## 2022-02-22 PROCEDURE — 99214 OFFICE O/P EST MOD 30 MIN: CPT | Performed by: NURSE PRACTITIONER

## 2022-02-22 PROCEDURE — G0439 PPPS, SUBSEQ VISIT: HCPCS | Performed by: NURSE PRACTITIONER

## 2022-02-22 NOTE — PROGRESS NOTES
Chief Complaint   Patient presents with    Annual Wellness Visit       HPI:    Canelo Gaston is a 67 y.o. female here today for her check up and AWV. HTN: Compliant with meds, amlodipine, lisinopril. COPD: Sees pulmonary Q6 months, Dr. Yenifer Contreras. Budesonide changed to Flovent, remains on Anoro. Stable, intermittent cough but tolerable. Oral lichen planus/candidiasis: Followed by Dr. Maria Elena Morocho in [place]. Also with hx of SCC, BCC. Osteopenia: Failed Boniva, bone density in 3/2019 showed worsening osteopenia. Remains on Prolia. Next DEXA August 2022. New issues: Continues with OAB symptoms, up frequently during the night. On Myrbetriq. She also wishes to discuss insomnia and a lesion to the bottom of her R foot. Allergies   Allergen Reactions    Latex Rash    Keflex [Cephalexin] Rash    Levaquin [Levofloxacin] Rash    Sulfa (Sulfonamide Antibiotics) Rash    Macrobid [Nitrofurantoin Monohyd/M-Cryst] Rash       Current Outpatient Medications   Medication Sig    OTHER Prevagen    lisinopriL (PRINIVIL, ZESTRIL) 40 mg tablet TAKE 1 TABLET BY MOUTH EVERY DAY    amLODIPine (NORVASC) 10 mg tablet Take 1 Tablet by mouth daily.  levothyroxine (SYNTHROID) 100 mcg tablet Take 1 Tablet by mouth Daily (before breakfast).  ciclopirox (Ciclodan) 0.77 % topical cream Apply  to affected area two (2) times a day. PRN    Myrbetriq 50 mg ER tablet TAKE 1 TABLET BY MOUTH EVERY DAY    clobetasoL (TEMOVATE) 0.05 % ointment APPLY TO AFFECTED AREA EVERY DAY    montelukast (SINGULAIR) 10 mg tablet TAKE 1 TABLET BY MOUTH EVERY DAY    betamethasone valerate (VALISONE) 0.1 % topical cream PRN    denosumab (PROLIA) 60 mg/mL injection 60 mg by SubCUTAneous route.  hydroxychloroquine (PLAQUENIL) 200 mg tablet TAKE 1 TABLET BY MOUTH TWICE A DAY    multivitamin (ONE A DAY) tablet Take 1 Tab by mouth daily.  guaifenesin (MUCINEX PO) Take  by mouth.     acetaminophen (ARTHRITIS PAIN RELIEF) 650 mg TbER Take 1 Tab by mouth nightly as needed.  ANORO ELLIPTA 62.5-25 mcg/actuation inhaler USE 1 INHALATION ONCE A DAY    albuterol (PROVENTIL HFA, VENTOLIN HFA, PROAIR HFA) 90 mcg/actuation inhaler Take 2 Puffs by inhalation.  clobetasol (TEMOVATE) 0.05 % ointment Apply  to affected area daily. qday in the am-alternate with tacrolimus PRN    calcium carbonate (TUMS) 200 mg calcium (500 mg) chew Take 1 Tab by mouth daily.  aspirin 81 mg chewable tablet Take 81 mg by mouth daily.  omeprazole (PRILOSEC) 20 mg capsule Take 20 mg by mouth daily.  cholecalciferol (VITAMIN D3) 1,000 unit tablet Take 1,000 Units by mouth daily. Indications: VITAMIN D DEFICIENCY     No current facility-administered medications for this visit. Past Medical History:   Diagnosis Date    Allergic rhinitis     COPD (chronic obstructive pulmonary disease) (HCC)     Hypertension     Hypothyroidism     Lichen planus     [facility]    Menopause     Osteoarthrosis, unspecified whether generalized or localized, unspecified site     Overactive bladder     Phlebitis and thrombophlebitis of lower extremities, unspecified     Raynaud's syndrome        Family History   Problem Relation Age of Onset    Stroke Mother     Dementia Mother     Cancer Father         BRAIN TUMOR    COPD Brother         emphsema    Cancer Niece        ROS:  Denies fever, chills, cough, chest pain, SOB,  nausea, vomiting, diarrhea, dysuria. Denies rashes, wounds, arthrlagias, weakness, numbness, visual changes, depression. Denies wt loss, wt gain, hemoptysis, hematochezia or melena. Patient is not experiencing chest pain radiating to the jaw and/or down the arms.     Physical Examination:    /60 (BP 1 Location: Right arm, BP Patient Position: Sitting, BP Cuff Size: Adult long)   Pulse 66   Temp 97.6 °F (36.4 °C) (Temporal)   Resp 17   Ht 5' 1\" (1.549 m)   Wt 150 lb 4.8 oz (68.2 kg)   SpO2 98%   BMI 28.40 kg/m²     Wt Readings from Last 3 Encounters:   02/22/22 150 lb 4.8 oz (68.2 kg)   09/08/21 147 lb 9.6 oz (67 kg)   09/02/21 147 lb 7.8 oz (66.9 kg)     General: WDWN Female in NAD  Chest:  Respirations even and unlabored, lungs CTA throughout. Cardiac: RRR no clicks, murmurs, gallops, or rubs  Extremities:  No cyanosis, clubbing or edema. Musculoskeletal:  Normal strength and ROM throughout. Neurologic:  Ambulatory without assistance, CN 2-12 grossly intact. Skin: intact and warm to the touch. 1.5 cm corn to plantar aspect of R foot, tender. Lymphadenopathy: no cervical or supraclavicular nodes  Psych: Pleasant and appropriate. Judgment normal.     ASSESSMENT AND PLAN:       ICD-10-CM ICD-9-CM    1. Medicare annual wellness visit, subsequent  Z00.00 V70.0    2. Chronic obstructive pulmonary disease, unspecified COPD type ([facility] [address]  J44.9 496    3. Osteopenia with high risk of fracture  M85.80 733.90 VITAMIN D, 25 HYDROXY      MAGNESIUM   4. Vitamin D deficiency  E55.9 268.9 COLLECTION VENOUS BLOOD,VENIPUNCTURE      VITAMIN D, 25 HYDROXY   5. Essential hypertension  I10 401.9 COLLECTION VENOUS BLOOD,VENIPUNCTURE      CBC WITH AUTOMATED DIFF      LIPID PANEL      METABOLIC PANEL, COMPREHENSIVE      TSH 3RD GENERATION   6. Acquired hypothyroidism  E03.9 244.9 COLLECTION VENOUS BLOOD,VENIPUNCTURE      TSH 3RD GENERATION   7. Hammett of foot  L84 700    8. Insomnia due to medical condition  G47.01 327.01    9. OAB (overactive bladder)  N32.81 596.51       See below AWV. Discussed OAB, contact information provided for Dr. Cristobal Swanson. Chronic labs as above. Discussed corn, recommended OTC corn pads and gentle exfoliation. Consider podiatry evaluation if no improvement. Discussed insomnia which is really interrupted sleep due to urinary frequency. I recommended Melatonin 1-5 mg or PRN Tylenol PM. BP great. Check thyroid function today. Patient aware of plan of care and verbalized understanding. Questions answered.  RTC in 6-12 months pending labs, or sooner if needed.     Onesimo Pike NP

## 2022-02-22 NOTE — PATIENT INSTRUCTIONS
Medicare Wellness Visit, Female     The best way to live healthy is to have a lifestyle where you eat a well-balanced diet, exercise regularly, limit alcohol use, and quit all forms of tobacco/nicotine, if applicable. Regular preventive services are another way to keep healthy. Preventive services (vaccines, screening tests, monitoring & exams) can help personalize your care plan, which helps you manage your own care. Screening tests can find health problems at the earliest stages, when they are easiest to treat. [facility] follows the current, evidence-based guidelines published by the [facility] ([facility]) when recommending preventive services for our patients. Because we follow these guidelines, sometimes recommendations change over time as research supports it. (For example, mammograms used to be recommended annually. Even though Medicare will still pay for an annual mammogram, the newer guidelines recommend a mammogram every two years for women of average risk). Of course, you and your doctor may decide to screen more often for some diseases, based on your risk and your co-morbidities (chronic disease you are already diagnosed with). Preventive services for you include:  - Medicare offers their members a free annual wellness visit, which is time for you and your primary care provider to discuss and plan for your preventive service needs. Take advantage of this benefit every year!  -All adults over the age of 72 should receive the recommended pneumonia vaccines. Current USPSTF guidelines recommend a series of two vaccines for the best pneumonia protection.   -All adults should have a flu vaccine yearly and a tetanus vaccine every 10 years.   -All adults age 48 and older should receive the shingles vaccines (series of two vaccines).       -All adults age 38-68 who are overweight should have a diabetes screening test once every three years.   -All adults born between [birth year] and 1965 should be screened once for Hepatitis C.  -Other screening tests and preventive services for persons with diabetes include: an eye exam to screen for diabetic retinopathy, a kidney function test, a foot exam, and stricter control over your cholesterol.   -Cardiovascular screening for adults with routine risk involves an electrocardiogram (ECG) at intervals determined by your doctor.   -Colorectal cancer screenings should be done for adults age 54-65 with no increased risk factors for colorectal cancer. There are a number of acceptable methods of screening for this type of cancer. Each test has its own benefits and drawbacks. Discuss with your doctor what is most appropriate for you during your annual wellness visit. The different tests include: colonoscopy (considered the best screening method), a fecal occult blood test, a fecal DNA test, and sigmoidoscopy.    -A bone mass density test is recommended when a woman turns 65 to screen for osteoporosis. This test is only recommended one time, as a screening. Some providers will use this same test as a disease monitoring tool if you already have osteoporosis. -Breast cancer screenings are recommended every other year for women of normal risk, age 54-69.  -Cervical cancer screenings for women over age 72 are only recommended with certain risk factors. Here is a list of your current Health Maintenance items (your personalized list of preventive services) with a due date:  Health Maintenance Due   Topic Date Due    Annual Well Visit  09/30/2021            Corns and Calluses: Care Instructions  Your Care Instructions  Corns and calluses are areas of thick, hard, dead skin. They form to protect your skin from injury. Corns usually form where toes rub together. Calluses often form on the hands or feet. They may form wherever the skin rubs against something, such as shoes.   In most cases, you can take steps at home to care for a corn or callus. Follow-up care is a key part of your treatment and safety. Be sure to make and go to all appointments, and call your doctor if you are having problems. It's also a good idea to know your test results and keep a list of the medicines you take. How can you care for yourself at home? · Wear shoes and other footwear that fit correctly and are roomy. This will reduce rubbing and give corns or calluses time to heal.  · Use protective pads, such as moleskin, to cushion the callus or corn. · Soften the corn or callus and remove the dead skin by using over-the-counter products such as salicylic acid. If you have a condition that causes problems with blood flow (such as peripheral vascular disease) or loss of feeling in your feet (such as diabetes), talk to your doctor before you try any home treatment. · Soak your corn or callus in warm water, and then use a pumice stone to rub dead skin away. · Wash your feet regularly, and rub lotion into your feet while they are still moist. Dry skin can cause a callus to crack and bleed. · Never cut the corn or callus yourself, especially if you have problems with blood flow to your legs or feet. When should you call for help? Call your doctor now or seek immediate medical care if:    · You have signs of infection, such as:  ? Increased pain, swelling, warmth, or redness around the corn or callus. ? Red streaks leading from the corn or callus. ? Pus draining from the corn or callus. ? A fever. Watch closely for changes in your health, and be sure to contact your doctor if:    · You do not get better as expected. Where can you learn more? Go to http://www.gray.com/  Enter R244 in the search box to learn more about \"Corns and Calluses: Care Instructions. \"  Current as of: March 3, 2021               Content Version: 13.0  © 5984-1398 [employer].    Care instructions adapted under license by [employer] (which disclaims liability or warranty for this information). If you have questions about a medical condition or this instruction, always ask your healthcare professional. [address] any warranty or liability for your use of this information.

## 2022-02-22 NOTE — PROGRESS NOTES
1. Have you been to the ER, urgent care clinic since your last visit? Hospitalized since your last visit? No    2. Have you seen or consulted any other health care providers outside of the [address] [place] [address] since your last visit? Include any pap smears or colon screening. No   This is the Subsequent Medicare Annual Wellness Exam, performed 12 months or more after the Initial AWV or the last Subsequent AWV    I have reviewed the patient's medical history in detail and updated the computerized patient record. Assessment/Plan   Education and counseling provided:  Are appropriate based on today's review and evaluation    1. Medicare annual wellness visit, subsequent  2. Chronic obstructive pulmonary disease, unspecified COPD type ([facility] [address]  3. Osteopenia with high risk of fracture  -     VITAMIN D, 25 HYDROXY; Future  -     MAGNESIUM; Future  4. Vitamin D deficiency  -     COLLECTION VENOUS BLOOD,VENIPUNCTURE; Future  -     VITAMIN D, 25 HYDROXY; Future  5. Essential hypertension  -     COLLECTION VENOUS BLOOD,VENIPUNCTURE; Future  -     CBC WITH AUTOMATED DIFF; Future  -     LIPID PANEL; Future  -     METABOLIC PANEL, COMPREHENSIVE; Future  -     TSH 3RD GENERATION; Future  6. Acquired hypothyroidism  -     COLLECTION VENOUS BLOOD,VENIPUNCTURE; Future  -     TSH 3RD GENERATION; Future  7. Corn of foot  8. Insomnia due to medical condition  9. OAB (overactive bladder)       Depression Risk Factor Screening:     3 most recent PHQ Screens 2/22/2022   PHQ Not Done -   Little interest or pleasure in doing things Not at all   Feeling down, depressed, irritable, or hopeless Not at all   Total Score PHQ 2 0       Alcohol & Drug Abuse Risk Screen    Do you average more than 1 drink per night or more than 7 drinks a week:  No    On any one occasion in the past three months have you have had more than 3 drinks containing alcohol:  No          Functional Ability and Level of Safety:    Hearing: Hearing is good. Activities of Daily Living: The home contains: handrails and grab bars  Patient does total self care      Ambulation: with no difficulty     Fall Risk:  Fall Risk Assessment, last 12 mths 2/22/2022   Able to walk? Yes   Fall in past 12 months? 0   Do you feel unsteady? 0   Are you worried about falling 0   Is TUG test greater than 12 seconds? -   Is the gait abnormal? -   Number of falls in past 12 months -   Fall with injury? -      Abuse Screen:  Patient is not abused       Cognitive Screening    Has your family/caregiver stated any concerns about your memory: no    Cognitive Screening: Normal - MMSE (Mini Mental Status Exam)    Health Maintenance Due     There are no preventive care reminders to display for this patient.     Patient Care Team   Patient Care Team:  Pranay Figueroa NP as PCP - General (Nurse Practitioner)  Pranay Figueroa NP as PCP - [facility] Abi Sheehanled Provider  Lilia Gorman NP (Nurse Practitioner)  Gayathri Moss MD as Physician (Cardiology)    History     Patient Active Problem List   Diagnosis Code    Osteoarthritis M19.90    Essential hypertension I10    Hypothyroidism E03.9    Allergic rhinitis J30.9    Overactive bladder N32.81    Colitis K52.9    Insomnia G47.00    Osteopenia with high risk of fracture M85.80    Primary osteoarthritis of both knees M17.0    Chronic back pain M54.9, G89.29    Other secondary kyphosis, 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    Vitamin D deficiency E55.9    Pitting edema R60.9    Primary osteoarthritis of right hand N68.286    Lichen planus G78.6    Pericardial effusion I31.3     Past Medical History:   Diagnosis Date    Allergic rhinitis     COPD (chronic obstructive pulmonary disease) ([address]     Hypertension     Hypothyroidism     [place] [facility]    Menopause     Osteoarthrosis, unspecified whether generalized or localized, unspecified site     Overactive bladder     Phlebitis and thrombophlebitis of lower extremities, unspecified     Raynaud's syndrome       Past Surgical History:   Procedure Laterality Date    HX APPENDECTOMY      HX GI      colitis    HX GYN      HYSTERECTOMY//BTL    HX HEENT      TONSILLECTOMY    HX HYSTERECTOMY      HX MOHS PROCEDURES  2018    left lower leg    HX OOPHORECTOMY      HX UROLOGICAL      BLADDER SLING     Current Outpatient Medications   Medication Sig Dispense Refill    OTHER Prevagen      lisinopriL (PRINIVIL, ZESTRIL) 40 mg tablet TAKE 1 TABLET BY MOUTH EVERY DAY 90 Tablet 3    amLODIPine (NORVASC) 10 mg tablet Take 1 Tablet by mouth daily. 90 Tablet 3    levothyroxine (SYNTHROID) 100 mcg tablet Take 1 Tablet by mouth Daily (before breakfast). 90 Tablet 3    ciclopirox (Ciclodan) 0.77 % topical cream Apply  to affected area two (2) times a day. PRN 15 g 5    Myrbetriq 50 mg ER tablet TAKE 1 TABLET BY MOUTH EVERY DAY 90 Tablet 3    clobetasoL (TEMOVATE) 0.05 % ointment APPLY TO AFFECTED AREA EVERY DAY 15 g 3    montelukast (SINGULAIR) 10 mg tablet TAKE 1 TABLET BY MOUTH EVERY DAY      betamethasone valerate (VALISONE) 0.1 % topical cream PRN  1    denosumab (PROLIA) 60 mg/mL injection 60 mg by SubCUTAneous route.  hydroxychloroquine (PLAQUENIL) 200 mg tablet TAKE 1 TABLET BY MOUTH TWICE A DAY  1    multivitamin (ONE A DAY) tablet Take 1 Tab by mouth daily.  guaifenesin (MUCINEX PO) Take  by mouth.  acetaminophen (ARTHRITIS PAIN RELIEF) 650 mg TbER Take 1 Tab by mouth nightly as needed. 30 Tab 0    ANORO ELLIPTA 62.5-25 mcg/actuation inhaler USE 1 INHALATION ONCE A DAY  5    albuterol (PROVENTIL HFA, VENTOLIN HFA, PROAIR HFA) 90 mcg/actuation inhaler Take 2 Puffs by inhalation.  clobetasol (TEMOVATE) 0.05 % ointment Apply  to affected area daily. qday in the am-alternate with tacrolimus PRN      calcium carbonate (TUMS) 200 mg calcium (500 mg) chew Take 1 Tab by mouth daily.       aspirin 81 mg chewable tablet Take 81 mg by mouth daily.  omeprazole (PRILOSEC) 20 mg capsule Take 20 mg by mouth daily.  cholecalciferol (VITAMIN D3) 1,000 unit tablet Take 1,000 Units by mouth daily.  Indications: VITAMIN D DEFICIENCY       Allergies   Allergen Reactions    Latex Rash    Keflex [Cephalexin] Rash    Levaquin [Levofloxacin] Rash    Sulfa (Sulfonamide Antibiotics) Rash    Macrobid [Nitrofurantoin Monohyd/M-Cryst] Rash       Family History   Problem Relation Age of Onset    Stroke Mother     Dementia Mother     Cancer Father         BRAIN TUMOR    COPD Brother         emphsema    Cancer Niece      Social History     Tobacco Use    Smoking status: Former Smoker     Types: Cigarettes    Smokeless tobacco: Never Used    Tobacco comment: smoked less than 10 yrs and quit 40 +   Substance Use Topics    Alcohol use: No       Anahy Monae

## 2022-02-23 LAB
25(OH)D3 SERPL-MCNC: 48.9 NG/ML (ref 30–100)
ALBUMIN SERPL-MCNC: 4.2 G/DL (ref 3.5–5)
ALBUMIN/GLOB SERPL: 1.8 {RATIO} (ref 1.1–2.2)
ALP SERPL-CCNC: 81 U/L (ref 45–117)
ALT SERPL-CCNC: 25 U/L (ref 12–78)
ANION GAP SERPL CALC-SCNC: 4 MMOL/L (ref 5–15)
AST SERPL-CCNC: 18 U/L (ref 15–37)
BASOPHILS # BLD: 0 K/UL (ref 0–0.1)
BASOPHILS NFR BLD: 1 % (ref 0–1)
BILIRUB SERPL-MCNC: 0.5 MG/DL (ref 0.2–1)
BUN SERPL-MCNC: 15 MG/DL (ref 6–20)
BUN/CREAT SERPL: 22 (ref 12–20)
CALCIUM SERPL-MCNC: 9.3 MG/DL (ref 8.5–10.1)
CHLORIDE SERPL-SCNC: 102 MMOL/L (ref 97–108)
CHOLEST SERPL-MCNC: 186 MG/DL
CO2 SERPL-SCNC: 26 MMOL/L (ref 21–32)
CREAT SERPL-MCNC: 0.67 MG/DL (ref 0.55–1.02)
DIFFERENTIAL METHOD BLD: ABNORMAL
EOSINOPHIL # BLD: 0.2 K/UL (ref 0–0.4)
EOSINOPHIL NFR BLD: 4 % (ref 0–7)
ERYTHROCYTE [DISTWIDTH] IN BLOOD BY AUTOMATED COUNT: 12.9 % (ref 11.5–14.5)
GLOBULIN SER CALC-MCNC: 2.4 G/DL (ref 2–4)
GLUCOSE SERPL-MCNC: 88 MG/DL (ref 65–100)
HCT VFR BLD AUTO: 38.5 % (ref 35–47)
HDLC SERPL-MCNC: 99 MG/DL
HDLC SERPL: 1.9 {RATIO} (ref 0–5)
HGB BLD-MCNC: 12.4 G/DL (ref 11.5–16)
IMM GRANULOCYTES # BLD AUTO: 0 K/UL (ref 0–0.04)
IMM GRANULOCYTES NFR BLD AUTO: 0 % (ref 0–0.5)
LDLC SERPL CALC-MCNC: 77.8 MG/DL (ref 0–100)
LYMPHOCYTES # BLD: 0.7 K/UL (ref 0.8–3.5)
LYMPHOCYTES NFR BLD: 14 % (ref 12–49)
MAGNESIUM SERPL-MCNC: 2 MG/DL (ref 1.6–2.4)
MCH RBC QN AUTO: 32 PG (ref 26–34)
MCHC RBC AUTO-ENTMCNC: 32.2 G/DL (ref 30–36.5)
MCV RBC AUTO: 99.2 FL (ref 80–99)
MONOCYTES # BLD: 0.5 K/UL (ref 0–1)
MONOCYTES NFR BLD: 11 % (ref 5–13)
NEUTS SEG # BLD: 3.4 K/UL (ref 1.8–8)
NEUTS SEG NFR BLD: 70 % (ref 32–75)
NRBC # BLD: 0 K/UL (ref 0–0.01)
NRBC BLD-RTO: 0 PER 100 WBC
PLATELET # BLD AUTO: 233 K/UL (ref 150–400)
PMV BLD AUTO: 10.5 FL (ref 8.9–12.9)
POTASSIUM SERPL-SCNC: 4.4 MMOL/L (ref 3.5–5.1)
PROT SERPL-MCNC: 6.6 G/DL (ref 6.4–8.2)
RBC # BLD AUTO: 3.88 M/UL (ref 3.8–5.2)
RBC MORPH BLD: ABNORMAL
SODIUM SERPL-SCNC: 132 MMOL/L (ref 136–145)
TRIGL SERPL-MCNC: 46 MG/DL (ref ?–150)
TSH SERPL DL<=0.05 MIU/L-ACNC: 0.44 UIU/ML (ref 0.36–3.74)
VLDLC SERPL CALC-MCNC: 9.2 MG/DL
WBC # BLD AUTO: 4.8 K/UL (ref 3.6–11)

## 2022-03-03 ENCOUNTER — HOSPITAL ENCOUNTER (OUTPATIENT)
Dept: INFUSION THERAPY | Age: 77
Discharge: HOME | End: 2022-03-03
Payer: MEDICARE

## 2022-03-03 VITALS
TEMPERATURE: 96.9 F | RESPIRATION RATE: 20 BRPM | HEART RATE: 50 BPM | SYSTOLIC BLOOD PRESSURE: 157 MMHG | OXYGEN SATURATION: 99 % | DIASTOLIC BLOOD PRESSURE: 63 MMHG

## 2022-03-03 DIAGNOSIS — M85.80 OSTEOPENIA WITH HIGH RISK OF FRACTURE: Primary | ICD-10-CM

## 2022-03-03 PROCEDURE — 96372 THER/PROPH/DIAG INJ SC/IM: CPT

## 2022-03-03 PROCEDURE — 74011250636 HC RX REV CODE- 250/636: Performed by: NURSE PRACTITIONER

## 2022-03-03 RX ORDER — DIPHENHYDRAMINE HYDROCHLORIDE 50 MG/ML
50 INJECTION, SOLUTION INTRAMUSCULAR; INTRAVENOUS AS NEEDED
Status: CANCELLED
Start: 2022-09-01

## 2022-03-03 RX ORDER — DIPHENHYDRAMINE HYDROCHLORIDE 50 MG/ML
25 INJECTION, SOLUTION INTRAMUSCULAR; INTRAVENOUS AS NEEDED
Status: CANCELLED
Start: 2022-09-01

## 2022-03-03 RX ORDER — ALBUTEROL SULFATE 0.83 MG/ML
2.5 SOLUTION RESPIRATORY (INHALATION) AS NEEDED
Status: CANCELLED
Start: 2022-09-01

## 2022-03-03 RX ORDER — ACETAMINOPHEN 325 MG/1
650 TABLET ORAL AS NEEDED
Status: CANCELLED
Start: 2022-09-01

## 2022-03-03 RX ORDER — HYDROCORTISONE SODIUM SUCCINATE 100 MG/2ML
100 INJECTION, POWDER, FOR SOLUTION INTRAMUSCULAR; INTRAVENOUS AS NEEDED
Status: CANCELLED | OUTPATIENT
Start: 2022-09-01

## 2022-03-03 RX ORDER — EPINEPHRINE 1 MG/ML
0.3 INJECTION, SOLUTION, CONCENTRATE INTRAVENOUS AS NEEDED
Status: CANCELLED | OUTPATIENT
Start: 2022-09-01

## 2022-03-03 RX ORDER — ONDANSETRON 2 MG/ML
8 INJECTION INTRAMUSCULAR; INTRAVENOUS AS NEEDED
Status: CANCELLED | OUTPATIENT
Start: 2022-09-01

## 2022-03-03 RX ADMIN — DENOSUMAB 60 MG: 60 INJECTION SUBCUTANEOUS at 13:52

## 2022-03-03 NOTE — PROGRESS NOTES
[facility] Progress Note    Date: March 3, 2022    Name: Yoli Edmondson    MRN: [id number]         : 1945      Ms. Delio López was assessed and education was provided. Ms. Kacie Quijano vitals were reviewed and patient was observed for 5 minutes prior to treatment. Visit Vitals  BP (!) 157/63 (BP 1 Location: Left arm, BP Patient Position: Sitting)   Pulse (!) 50   Temp 96.9 °F (36.1 °C)   Resp 20   SpO2 99%     Lab results from  reviewed and noted to be within treatment parameters. Ca 9.3. Prolia 60 mg was administered subcutaneous in  Left arm. Band-aid applied to site without swelling or pain. Ms. Delio López tolerated well, and had no complaints. Patient armband removed and shredded. Ms. Delio López was discharged from [place] [address] in stable condition at 1350. She is to return on  at 1400 for her next appointment.     Hannah Miller RN  March 3, 2022  2:09 PM

## 2022-03-18 PROBLEM — I31.39 PERICARDIAL EFFUSION: Status: ACTIVE | Noted: 2018-11-18

## 2022-04-01 ENCOUNTER — APPOINTMENT (OUTPATIENT)
Dept: CT IMAGING | Age: 77
End: 2022-04-01
Attending: FAMILY MEDICINE
Payer: MEDICARE

## 2022-04-01 ENCOUNTER — HOSPITAL ENCOUNTER (EMERGENCY)
Age: 77
Discharge: HOME | End: 2022-04-01
Attending: FAMILY MEDICINE
Payer: MEDICARE

## 2022-04-01 VITALS
RESPIRATION RATE: 18 BRPM | BODY MASS INDEX: 28.32 KG/M2 | WEIGHT: 150 LBS | HEART RATE: 65 BPM | OXYGEN SATURATION: 100 % | HEIGHT: 61 IN | SYSTOLIC BLOOD PRESSURE: 163 MMHG | TEMPERATURE: 97.7 F | DIASTOLIC BLOOD PRESSURE: 53 MMHG

## 2022-04-01 DIAGNOSIS — M25.559 HIP PAIN: ICD-10-CM

## 2022-04-01 DIAGNOSIS — S20.222A BACK CONTUSION, LEFT, INITIAL ENCOUNTER: ICD-10-CM

## 2022-04-01 DIAGNOSIS — S09.90XA INJURY OF HEAD, INITIAL ENCOUNTER: Primary | ICD-10-CM

## 2022-04-01 DIAGNOSIS — S01.01XA LACERATION OF SCALP, INITIAL ENCOUNTER: ICD-10-CM

## 2022-04-01 PROCEDURE — 72125 CT NECK SPINE W/O DYE: CPT

## 2022-04-01 PROCEDURE — 75810000293 HC SIMP/SUPERF WND  RPR

## 2022-04-01 PROCEDURE — 70450 CT HEAD/BRAIN W/O DYE: CPT

## 2022-04-01 PROCEDURE — 99284 EMERGENCY DEPT VISIT MOD MDM: CPT

## 2022-04-01 RX ORDER — HYDROCORTISONE 25 MG/G
OINTMENT TOPICAL
COMMUNITY
Start: 2022-03-31

## 2022-04-01 NOTE — ED PROVIDER NOTES
[address] [place] [address]   EMERGENCY DEPARTMENT          Date: 4/1/2022  Patient: Michael Cortes MRN: [id number]  SSN: [id number]    YOB: 1945  Age: 67 y.o. Sex: female      PCP: Kay Bolton NP  The patient arrived by private car and is accompanied by spouse. History of Presenting Illness     Chief Complaint   Patient presents with    Fall       History Provided By: Patient    HPI: Michael Cortes is a 67 y.o. female, pmhx COPD, hypertension, hypothyroidism, oral lichen planus, osteoarthritis, Raynaud's syndrome, who presents ambulatory to the ED c/o head injury. Patient was getting out of bed when she slipped and fell striking the left side of her forehead on a piece of furniture. She also struck her left hip. She noted some bleeding from this for laceration which was treated at home with powder that stops bleeding. She had a headache which is improving. No nausea no vomiting. No bleeding or discharge in ears or nose more than usual.  Patient states she has chronic rhinorrhea clear in color which is unchanged tonight. She has mild neck pain. No visual disturbances noted. No weakness in the arms or legs. Patient states that she was unsteady on her feet after this occurred. She has tightness in her chest, worse with inspiration, but this has been present for quite a while and she feels that this is secondary to her COPD and is unchanged tonight. She does not have any rib pain. She has no pain in the arms. She has chronic back pain which is unchanged. She has no pain in the abdomen or lower extremities other than mild pain in the left hip that she does not feel that she needs to have imaged tonight. Patient is swelling of both lower extremities which is chronic and unchanged. Patient is on Plaquenil, tacrolimus and Prograf for oral lichen planus.   Past History     Past Medical History:   Diagnosis Date    Allergic rhinitis     COPD (chronic obstructive pulmonary disease) (HCC)     Hypertension     Hypothyroidism     Lichen planus     [facility] Menopause     Osteoarthrosis, unspecified whether generalized or localized, unspecified site     Overactive bladder     Phlebitis and thrombophlebitis of lower extremities, unspecified     Raynaud's syndrome        Past Surgical History:   Procedure Laterality Date    HX APPENDECTOMY      HX GI      colitis    HX GYN      HYSTERECTOMY//BTL    HX HEENT      TONSILLECTOMY    HX HYSTERECTOMY      HX MOHS PROCEDURES  2018    left lower leg    HX OOPHORECTOMY      HX UROLOGICAL      BLADDER SLING       Family History   Problem Relation Age of Onset    Stroke Mother     Dementia Mother     Cancer Father         BRAIN TUMOR    COPD Brother         emphsema    Cancer Niece        Social History     Tobacco Use    Smoking status: Former Smoker     Types: Cigarettes    Smokeless tobacco: Never Used    Tobacco comment: smoked less than 10 yrs and quit 40 +   Vaping Use    Vaping Use: Never used   Substance Use Topics    Alcohol use: No    Drug use: No       Allergies   Allergen Reactions    Latex Rash    Keflex [Cephalexin] Rash    Levaquin [Levofloxacin] Rash    Sulfa (Sulfonamide Antibiotics) Rash    Macrobid [Nitrofurantoin Monohyd/M-Cryst] Rash       Current Outpatient Medications   Medication Sig Dispense Refill    hydrocortisone (HYTONE) 2.5 % ointment       OTHER Prevagen      lisinopriL (PRINIVIL, ZESTRIL) 40 mg tablet TAKE 1 TABLET BY MOUTH EVERY DAY 90 Tablet 3    amLODIPine (NORVASC) 10 mg tablet Take 1 Tablet by mouth daily. 90 Tablet 3    levothyroxine (SYNTHROID) 100 mcg tablet Take 1 Tablet by mouth Daily (before breakfast). 90 Tablet 3    ciclopirox (Ciclodan) 0.77 % topical cream Apply  to affected area two (2) times a day.  PRN 15 g 5    Myrbetriq 50 mg ER tablet TAKE 1 TABLET BY MOUTH EVERY DAY 90 Tablet 3    clobetasoL (TEMOVATE) 0.05 % ointment APPLY TO AFFECTED AREA EVERY DAY 15 g 3    montelukast (SINGULAIR) 10 mg tablet TAKE 1 TABLET BY MOUTH EVERY DAY      betamethasone valerate (VALISONE) 0.1 % topical cream PRN  1    denosumab (PROLIA) 60 mg/mL injection 60 mg by SubCUTAneous route.  hydroxychloroquine (PLAQUENIL) 200 mg tablet TAKE 1 TABLET BY MOUTH TWICE A DAY  1    multivitamin (ONE A DAY) tablet Take 1 Tab by mouth daily.  guaifenesin (MUCINEX PO) Take  by mouth.  acetaminophen (ARTHRITIS PAIN RELIEF) 650 mg TbER Take 1 Tab by mouth nightly as needed. 30 Tab 0    albuterol (PROVENTIL HFA, VENTOLIN HFA, PROAIR HFA) 90 mcg/actuation inhaler Take 2 Puffs by inhalation.  calcium carbonate (TUMS) 200 mg calcium (500 mg) chew Take 1 Tab by mouth daily.  aspirin 81 mg chewable tablet Take 81 mg by mouth daily.  omeprazole (PRILOSEC) 20 mg capsule Take 20 mg by mouth daily.  cholecalciferol (VITAMIN D3) 1,000 unit tablet Take 1,000 Units by mouth daily. Indications: VITAMIN D DEFICIENCY           Review of Systems     Review of Systems   All other systems reviewed and are negative. Physical Exam     Physical Exam  Vitals and nursing note reviewed. Constitutional:       General: She is not in acute distress. Appearance: Normal appearance. She is not ill-appearing or toxic-appearing. HENT:      Head: Normocephalic. Comments: Superficial abrasion or laceration noted to left frontotemporal scalp just inside the hairline, no bony deformity palpated be controlled at the time of arrival here     Right Ear: Tympanic membrane, ear canal and external ear normal.      Left Ear: Tympanic membrane, ear canal and external ear normal.      Nose: Rhinorrhea present. Comments: Patient states that clear rhinorrhea is chronic and unchanged over the last several days and is not new tonight.      Mouth/Throat:      Mouth: Mucous membranes are moist.      Pharynx: No oropharyngeal exudate or posterior oropharyngeal erythema. Eyes:      Extraocular Movements: Extraocular movements intact. Conjunctiva/sclera: Conjunctivae normal.      Pupils: Pupils are equal, round, and reactive to light. Neck:      Comments: Mild tenderness to palpation over C5-6, no deformity noted, mild paraspinous muscle spasm noted. Cardiovascular:      Rate and Rhythm: Normal rate and regular rhythm. Heart sounds: Normal heart sounds. No murmur heard. No friction rub. No gallop. Pulmonary:      Effort: Pulmonary effort is normal. No respiratory distress. Breath sounds: Normal breath sounds. Chest:      Chest wall: No tenderness. Abdominal:      General: There is no distension. Palpations: Abdomen is soft. Tenderness: There is no abdominal tenderness. There is no right CVA tenderness, left CVA tenderness, guarding or rebound. Musculoskeletal:         General: No swelling or tenderness. Normal range of motion. Cervical back: Normal range of motion and neck supple. No rigidity. Right lower leg: Edema present. Left lower leg: Edema present. Comments: 2+ pedal edema bilaterally, which patient states is chronic and unchanged tonight. Pain to palpation of left hip but minimal pain with movement. Patient did not want imaging of this area. She also had a bruise to the left back parallel to the paraspinous muscles approximately 10 cm long and 3 to 4 cm wide. She stated that she had no pain to palpation of the lumbar spine or ribs in this area. There was pain to palpation with a bruise. She stated that the bruise is uncomfortable as if someone palpated. Skin:     General: Skin is warm and dry. Capillary Refill: Capillary refill takes less than 2 seconds. Findings: Bruising present. Comments: Abrasion or superficial laceration as noted above, patient has scattered bruising on her arms and trunk   Neurological:      General: No focal deficit present.       Mental Status: She is alert and oriented to person, place, and time. Cranial Nerves: No cranial nerve deficit. Sensory: No sensory deficit. Motor: No weakness. Coordination: Coordination normal.   Psychiatric:         Mood and Affect: Mood normal.         Behavior: Behavior normal.         Thought Content: Thought content normal.         Judgment: Judgment normal.       Diagnostic Study Results     Labs -   No results found for this or any previous visit (from the past 48 hour(s)). Radiologic Studies -   CT Results  (Last 48 hours)               04/01/22 0234  CT HEAD WO CONT Final result    Impression:      No acute process. Narrative:  INDICATION: left sided head injury       EXAM:  HEAD CT WITHOUT CONTRAST       COMPARISON: None       TECHNIQUE:  Routine noncontrast axial head CT was performed. Sagittal and   coronal reconstructions were generated. CT dose reduction was achieved through use of a standardized protocol tailored   for this examination and automatic exposure control for dose modulation. FINDINGS:       Ventricles: Midline, no hydrocephalus. Intracranial Hemorrhage: None. Brain Parenchyma/Brainstem: Normal for age. Basal Cisterns: Normal.   Paranasal Sinuses: Visualized sinuses are clear. Additional Comments: N/A.           04/01/22 0234  CT SPINE CERV WO CONT Final result    Impression:      No acute fracture. Narrative:  INDICATION: lower cervical pain after fall       COMPARISON: None. TECHNIQUE:   Noncontrast axial CT imaging of the cervical spine was performed. Coronal and sagittal reconstructions were obtained. CT dose reduction was achieved through use of a standardized protocol tailored   for this examination and automatic exposure control for dose modulation. FINDINGS:       There is normal alignment of the cervical spine. There is no acute fracture or   subluxation. Craniocervical junction is intact. 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 [place] There is no prevertebral soft tissue swelling. Multilevel degenerative   changes throughout the cervical spine most notable at C3-4 and C4-5. CT HEAD WO CONT   Final Result      No acute process. CT SPINE CERV WO CONT   Final Result      No acute fracture. Procedures     Wound Closure by Adhesive    Date/Time: 4/1/2022 3:51 AM  Performed by: Nessa Guardado MD  Authorized by: Nessa Guardado MD     Consent:     Consent obtained:  Verbal    Consent given by:  Patient    Risks discussed:  Infection and pain    Alternatives discussed:  No treatment  Anesthesia (see MAR for exact dosages): Anesthesia method:  None  Laceration details:     Location:  Scalp    Scalp location:  L temporal    Length (cm):  0.5  Repair type:     Repair type:  Simple  Skin repair:     Repair method:  Tissue adhesive  Post-procedure details:     Dressing:  Open (no dressing)    Patient tolerance of procedure: Tolerated well, no immediate complications          Medical Decision Making     Records Reviewed: Nursing Notes, Old Medical Records and Previous Laboratory Studies    Vital Signs  Patient Vitals for the past 24 hrs:   Temp Pulse Resp BP SpO2   04/01/22 0215 97.7 °F (36.5 °C) 65 18 (!) 163/53 100 %       Pulse Oximetry Analysis - 100% on RA      I am the first provider for this patient.     I reviewed the vital signs, available nursing notes, past medical history, past surgical history, family history and social history, performed a physical exam and reviewed xrays from this visit    MDM  Number of Diagnoses or Management Options  Back contusion, left, initial encounter: new, no workup  Hip pain: new, no workup  Injury of head, initial encounter: new, needed workup  Laceration of scalp, initial encounter: new, needed workup     Amount and/or Complexity of Data Reviewed  Tests in the radiology section of CPT®: ordered and reviewed  Obtain history from someone other than the patient: yes ()  Review and summarize past medical records: yes    Risk of Complications, Morbidity, and/or Mortality  Presenting problems: moderate  Diagnostic procedures: moderate  Management options: moderate  General comments: Differential includes subdural, epidural, laceration, intracranial hemorrhage, fracture, contusion    Patient Progress  Patient progress: stable        ED Course     Initial assessment performed. The patients presenting problems have been discussed, and they are in agreement with the care plan formulated and outlined with them. I have encouraged them to ask questions as they arise throughout their visit. Orders Placed This Encounter    WOUND CLOSURE BY ADHESIVE    CT HEAD WO CONT    CT SPINE CERV WO CONT    hydrocortisone (HYTONE) 2.5 % ointment       MEDICATIONS GIVEN:    Medications - No data to display      Diagnosis     Clinical Impression:   1. Injury of head, initial encounter    2. Laceration of scalp, initial encounter    3. Back contusion, left, initial encounter    4. Hip pain            Disposition       Orders Placed This Encounter    WOUND CLOSURE BY ADHESIVE    CT HEAD WO CONT    CT SPINE CERV WO CONT    hydrocortisone (HYTONE) 2.5 % ointment         MEDICATIONS GIVEN:    No current facility-administered medications for this encounter. Current Outpatient Medications   Medication Sig    hydrocortisone (HYTONE) 2.5 % ointment     OTHER Prevagen    lisinopriL (PRINIVIL, ZESTRIL) 40 mg tablet TAKE 1 TABLET BY MOUTH EVERY DAY    amLODIPine (NORVASC) 10 mg tablet Take 1 Tablet by mouth daily.  levothyroxine (SYNTHROID) 100 mcg tablet Take 1 Tablet by mouth Daily (before breakfast).  ciclopirox (Ciclodan) 0.77 % topical cream Apply  to affected area two (2) times a day.  PRN    Myrbetriq 50 mg ER tablet TAKE 1 TABLET BY MOUTH EVERY DAY    clobetasoL (TEMOVATE) 0.05 % ointment APPLY TO AFFECTED AREA EVERY DAY    montelukast (SINGULAIR) 10 mg tablet TAKE 1 TABLET BY MOUTH EVERY DAY    betamethasone valerate (VALISONE) 0.1 % topical cream PRN    denosumab (PROLIA) 60 mg/mL injection 60 mg by SubCUTAneous route.  hydroxychloroquine (PLAQUENIL) 200 mg tablet TAKE 1 TABLET BY MOUTH TWICE A DAY    multivitamin (ONE A DAY) tablet Take 1 Tab by mouth daily.  guaifenesin (MUCINEX PO) Take  by mouth.  acetaminophen (ARTHRITIS PAIN RELIEF) 650 mg TbER Take 1 Tab by mouth nightly as needed.  albuterol (PROVENTIL HFA, VENTOLIN HFA, PROAIR HFA) 90 mcg/actuation inhaler Take 2 Puffs by inhalation.  calcium carbonate (TUMS) 200 mg calcium (500 mg) chew Take 1 Tab by mouth daily.  aspirin 81 mg chewable tablet Take 81 mg by mouth daily.  omeprazole (PRILOSEC) 20 mg capsule Take 20 mg by mouth daily.  cholecalciferol (VITAMIN D3) 1,000 unit tablet Take 1,000 Units by mouth daily. Indications: VITAMIN D DEFICIENCY       Patient Vitals for the past 8 hrs:   Temp Pulse Resp BP SpO2   04/01/22 0215 97.7 °F (36.5 °C) 65 18 (!) 163/53 100 %       Medications - No data to display    Discharge note:    I have reviewed all pertinent and currently available diagnostic test results for this visit including, but not limited to, labs, xrays, and EKGs. I have reviewed all pertinent and currently available medical records. My plan of care and further evaluation and/or disposition is based on these results, as well as the initial, and subsequent, history and physical exam, as well as any additional complaints during the visit. Pt has been re-examined, appears to be stable states and have no new complaints. Patient has nontoxic appearance and condition is stable for discharge. , x-rays, diagnosis, follow up plan and return instructions have been reviewed and discussed with the patient and/or family. Pt and/or family were instructed on symptoms that may arise after discharge requiring re-evaluation by a physician.  Pt and/or family have had the opportunity to ask questions about their care. Patient and/or family verbalized understanding and agreement with care plan, follow up and return instructions. Patient and/or family agree to return if their symptoms are not improving or immediately if they have any change in their condition. I have also put together some discharge instructions for the patient that include: 1) educational information regarding their diagnosis, 2) how to care for their diagnosis at home, as well a 3) list of reasons why they would want to return to the ED prior to their follow-up appointment, should their condition change as well as instructions to return to the ED should sx worsen at any time. Vital signs stable for discharge. Geovany Schneider MD      Disposition     Clinical Impression:     ICD-10-CM ICD-9-CM    1. Injury of head, initial encounter  S09.90XA 959.01    2. Laceration of scalp, initial encounter  S01. 01XA 873.0    3. Back contusion, left, initial encounter  S20.222A 922.31    4. Hip pain  M25.559 719.45          PLAN:  1. Discharge home in stable condition  2. Current Discharge Medication List        3. Follow-up Information     Follow up With Specialties Details Why Contact Gonzalo Kim NP Nurse Practitioner In 5 days Follow up todays symptoms. if not improving [address]  [phone]          4. Discharged    Return to ED if worse    Please note, this dictation was completed with [employer], the [employer] voice recognition software. Quite often unanticipated grammatical, syntax, homophones, and other interpretive errors are inadvertently transcribed by the computer software. Please disregard these errors. Please excuse any errors that have escaped final proof reading.

## 2022-04-01 NOTE — DISCHARGE INSTRUCTIONS
Tylenol every 4 hours as needed for pain. Ice to areas that hurt 20 minutes to the hour while awake for 1 day then heat. Follow instructions as noted on the head injury information sheet, return for any changes. Follow-up with MD if not improving within the next 4 to 5 days. Return for any neurologic changes within the next couple of months as we discussed. Return for any evidence of infection from the laceration. Follow instructions as noted on the adhesion information sheet. Do not apply creams or other covering to the wound as it may make the glue come up early.

## 2022-04-01 NOTE — ED TRIAGE NOTES
Pt fell @0030 and hit head, no visible injury.  Also reports lt hip pain-able to ambulate w/o difficulty

## 2022-04-15 ENCOUNTER — VIRTUAL VISIT (OUTPATIENT)
Dept: FAMILY MEDICINE CLINIC | Age: 77
End: 2022-04-15
Payer: MEDICARE

## 2022-04-15 DIAGNOSIS — J01.00 ACUTE NON-RECURRENT MAXILLARY SINUSITIS: Primary | ICD-10-CM

## 2022-04-15 PROCEDURE — G2025 DIS SITE TELE SVCS RHC/FQHC: HCPCS | Performed by: NURSE PRACTITIONER

## 2022-04-15 RX ORDER — DOXYCYCLINE 100 MG/1
100 CAPSULE ORAL 2 TIMES DAILY
Qty: 20 CAPSULE | Refills: 0 | Status: SHIPPED | OUTPATIENT
Start: 2022-04-15 | End: 2022-04-25

## 2022-04-15 RX ORDER — PROMETHAZINE HYDROCHLORIDE AND DEXTROMETHORPHAN HYDROBROMIDE 6.25; 15 MG/5ML; MG/5ML
5 SYRUP ORAL
Qty: 180 ML | Refills: 0 | Status: SHIPPED | OUTPATIENT
Start: 2022-04-15 | End: 2022-04-22

## 2022-04-15 NOTE — PROGRESS NOTES
Beatrice Villegas is a 67 y.o. female who was seen by synchronous (real-time) audio-video technology on 4/15/2022. This encounter was conducted via D[employer]. me. Consent:  Beatrice Villegas, who was evaluated through a synchronous (real-time) audio-video encounter, and/or her healthcare decision maker, is aware that it is a billable service, which includes applicable co-pays, with coverage as determined by her insurance carrier. She provided verbal consent to proceed and patient identification was verified. This visit was conducted pursuant to the emergency declaration under the [facility], [address] [place] [address] authority and the [employer] and [employer] General Act. A caregiver was present when appropriate. Ability to conduct physical exam was limited. The patient was located at home in a state where the provider was licensed to provide care. --Allen Bains NP on 4/15/2022 at 10:14 AM    I was in the office while conducting this encounter. [id number]  Subjective:   HPI: Beatrice Villegas was seen for Cold Symptoms    The patient is seen with a 1 week hx of sinus congestion, pressure, and tightness in her chest. + sick contacts. Afebrile. She has been using her inhalers and her Lawence Race with some relief but symptoms have worsened over the past 3-4 days. Allergies   Allergen Reactions    Latex Rash    Keflex [Cephalexin] Rash    Levaquin [Levofloxacin] Rash    Sulfa (Sulfonamide Antibiotics) Rash    Macrobid [Nitrofurantoin Monohyd/M-Cryst] Rash       Current Outpatient Medications   Medication Sig    doxycycline (VIBRAMYCIN) 100 mg capsule Take 1 Capsule by mouth two (2) times a day for 10 days.  promethazine-dextromethorphan (PROMETHAZINE-DM) 6.25-15 mg/5 mL syrup Take 5 mL by mouth every four (4) hours as needed for Cough for up to 7 days. May cause sedation.     Myrbetriq 50 mg ER tablet TAKE 1 TABLET BY MOUTH EVERY DAY    hydrocortisone (HYTONE) 2.5 % ointment     OTHER Prevagen    lisinopriL (PRINIVIL, ZESTRIL) 40 mg tablet TAKE 1 TABLET BY MOUTH EVERY DAY    amLODIPine (NORVASC) 10 mg tablet Take 1 Tablet by mouth daily.  levothyroxine (SYNTHROID) 100 mcg tablet Take 1 Tablet by mouth Daily (before breakfast).  ciclopirox (Ciclodan) 0.77 % topical cream Apply  to affected area two (2) times a day. PRN    clobetasoL (TEMOVATE) 0.05 % ointment APPLY TO AFFECTED AREA EVERY DAY    montelukast (SINGULAIR) 10 mg tablet TAKE 1 TABLET BY MOUTH EVERY DAY    betamethasone valerate (VALISONE) 0.1 % topical cream PRN    denosumab (PROLIA) 60 mg/mL injection 60 mg by SubCUTAneous route.  hydroxychloroquine (PLAQUENIL) 200 mg tablet TAKE 1 TABLET BY MOUTH TWICE A DAY    multivitamin (ONE A DAY) tablet Take 1 Tab by mouth daily.  guaifenesin (MUCINEX PO) Take  by mouth.  acetaminophen (ARTHRITIS PAIN RELIEF) 650 mg TbER Take 1 Tab by mouth nightly as needed.  albuterol (PROVENTIL HFA, VENTOLIN HFA, PROAIR HFA) 90 mcg/actuation inhaler Take 2 Puffs by inhalation.  calcium carbonate (TUMS) 200 mg calcium (500 mg) chew Take 1 Tab by mouth daily.  aspirin 81 mg chewable tablet Take 81 mg by mouth daily.  omeprazole (PRILOSEC) 20 mg capsule Take 20 mg by mouth daily.  cholecalciferol (VITAMIN D3) 1,000 unit tablet Take 1,000 Units by mouth daily. Indications: VITAMIN D DEFICIENCY     No current facility-administered medications for this visit.        Past Medical History:   Diagnosis Date    Allergic rhinitis     COPD (chronic obstructive pulmonary disease) (HCC)     Hypertension     Hypothyroidism     Lichen planus     [facility] at [facility] Menopause     Osteoarthrosis, unspecified whether generalized or localized, unspecified site     Overactive bladder     Phlebitis and thrombophlebitis of lower extremities, unspecified     Raynaud's syndrome        Family History   Problem Relation Age of Onset    Stroke Mother     Dementia Mother     Cancer Father         BRAIN TUMOR    COPD Brother         emphsema    Cancer Niece        ROS:  Denies fever, chills,  + cough, chest pain, SOB,  nausea, vomiting, diarrhea, dysuria. Denies rashes, wounds, arthralgias, weakness, numbness, visual changes, depression. Denies wt loss, wt gain, hemoptysis, hematochezia or melena. Patient is not experiencing chest pain radiating to the jaw and/or down the arms. PHYSICAL EXAMINATION:    Vital Signs: (As obtained by patient/caregiver at home)  There were no vitals taken for this visit. Constitutional: [x] Appears well-developed and well-nourished [x] No apparent distress      [] Abnormal -     Mental status: [x] Alert and awake  [x] Oriented to person/place/time [x] Able to follow commands    [] Abnormal -     Eyes:   EOM    [x]  Normal    [] Abnormal -   Sclera  [x]  Normal    [] Abnormal -          Discharge [x]  None visible   [] Abnormal -     HENT: [x] Normocephalic, atraumatic  [] Abnormal -   [x] Mouth/Throat: Mucous membranes are moist  Audibly congested  External Ears [x] Normal  [] Abnormal -    Neck: [x] No visualized mass [] Abnormal -     Pulmonary/Chest: [x] Respiratory effort normal   [x] No visualized signs of difficulty breathing or respiratory distress        [x] Abnormal - coughing - dry     Musculoskeletal:   [x] Normal gait with no signs of ataxia         [x] Normal range of motion of neck        [] Abnormal -     Neurological:        [x] No Facial Asymmetry (Cranial nerve 7 motor function) (limited exam due to video visit)          [x] No gaze palsy        [] Abnormal -          Skin:        [x] No significant exanthematous lesions or discoloration noted on facial skin         [] Abnormal -            Psychiatric:       [x] Normal Affect [] Abnormal -        [x] No Hallucinations    Other pertinent observable physical exam findings:-        Assessment & Plan:       ICD-10-CM ICD-9-CM    1.  Acute non-recurrent maxillary sinusitis  J01.00 461.0 doxycycline (VIBRAMYCIN) 100 mg capsule      promethazine-dextromethorphan (PROMETHAZINE-DM) 6.25-15 mg/5 mL syrup     Due to duration of symptoms, tx as above. Increase fluids, rest, use OTC Tylenol and/or Motrin as directed on package insert for aches/fever. May use OTC antihistamines/decongestants as directed. Call if no improvement in 5-7 days. Patient aware of plan of care and verbalized understanding. Questions answered. RTC PRN. We discussed the expected course, resolution and complications of the diagnosis(es) in detail. Medication risks, benefits, costs, interactions, and alternatives were discussed as indicated. I advised her to contact the office if her condition worsens, changes or fails to improve as anticipated. She expressed understanding with the diagnosis(es) and plan. Pursuant to the emergency declaration under the [facility], [facility] waiver authority and the [employer] Act, this Virtual  Visit was conducted, with patient's consent, to reduce the patient's risk of exposure to COVID-19 and provide continuity of care for an established patient. Services were provided through a video synchronous discussion virtually to substitute for in-person clinic visit.     Denisha Luna NP

## 2022-04-15 NOTE — PATIENT INSTRUCTIONS
Upper Respiratory Infection (Cold): Care Instructions  Your Care Instructions     An upper respiratory infection, or URI, is an infection of the nose, sinuses, or throat. URIs are spread by coughs, sneezes, and direct contact. The common cold is the most frequent kind of URI. The flu and sinus infections are other kinds of URIs. Almost all URIs are caused by viruses. Antibiotics won't cure them. But you can treat most infections with home care. This may include drinking lots of fluids and taking over-the-counter pain medicine. You will probably feel better in 4 to 10 days. The doctor has checked you carefully, but problems can develop later. If you notice any problems or new symptoms, get medical treatment right away. Follow-up care is a key part of your treatment and safety. Be sure to make and go to all appointments, and call your doctor if you are having problems. It's also a good idea to know your test results and keep a list of the medicines you take. How can you care for yourself at home? · To prevent dehydration, drink plenty of fluids. Choose water and other clear liquids until you feel better. If you have kidney, heart, or liver disease and have to limit fluids, talk with your doctor before you increase the amount of fluids you drink. · Take an over-the-counter pain medicine, such as acetaminophen (Tylenol), ibuprofen (Advil, Motrin), or naproxen (Aleve). Read and follow all instructions on the label. · Before you use cough and cold medicines, check the label. These medicines may not be safe for young children or for people with certain health problems. · Be careful when taking over-the-counter cold or flu medicines and Tylenol at the same time. Many of these medicines have acetaminophen, which is Tylenol. Read the labels to make sure that you are not taking more than the recommended dose. Too much acetaminophen (Tylenol) can be harmful.   · Get plenty of rest.  · Do not smoke or allow others to smoke around you. If you need help quitting, talk to your doctor about stop-smoking programs and medicines. These can increase your chances of quitting for good. When should you call for help? Call 911 anytime you think you may need emergency care. For example, call if:    · You have severe trouble breathing. Call your doctor now or seek immediate medical care if:    · You seem to be getting much sicker.     · You have new or worse trouble breathing.     · You have a new or higher fever.     · You have a new rash. Watch closely for changes in your health, and be sure to contact your doctor if:    · You have a new symptom, such as a sore throat, an earache, or sinus pain.     · You cough more deeply or more often, especially if you notice more mucus or a change in the color of your mucus.     · You do not get better as expected. Where can you learn more? Go to http://www.gray.com/  Enter K520 in the search box to learn more about \"Upper Respiratory Infection (Cold): Care Instructions. \"  Current as of: July 6, 2021               Content Version: 13.2  © 2006-2022 Healthwise, Incorporated. Care instructions adapted under license by [employer] (which disclaims liability or warranty for this information). If you have questions about a medical condition or this instruction, always ask your healthcare professional. [address] any warranty or liability for your use of this information.

## 2022-04-15 NOTE — PROGRESS NOTES
Chief Complaint   Patient presents with    Cold Symptoms     1. Have you been to the ER, urgent care clinic since your last visit? Hospitalized since your last visit? No    2. Have you seen or consulted any other health care providers outside of the [address] [place] [address] since your last visit? Include any pap smears or colon screening. No  Abuse Screening Questionnaire 4/15/2022   Do you ever feel afraid of your partner? N   Are you in a relationship with someone who physically or mentally threatens you? N   Is it safe for you to go home?  Y     Learning Assessment 4/15/2022   PRIMARY LEARNER Patient   HIGHEST LEVEL OF EDUCATION - PRIMARY LEARNER  GRADUATED HIGH SCHOOL OR GED   BARRIERS PRIMARY LEARNER NONE   CO-LEARNER CAREGIVER No   PRIMARY LANGUAGE ENGLISH   LEARNER PREFERENCE PRIMARY READING     -     -   ANSWERED BY Patient   RELATIONSHIP SELF     3 most recent PHQ Screens 4/15/2022   PHQ Not Done -   Little interest or pleasure in doing things Not at all   Feeling down, depressed, irritable, or hopeless Not at all   Total Score PHQ 2 0

## 2022-06-22 ENCOUNTER — VIRTUAL VISIT (OUTPATIENT)
Dept: FAMILY MEDICINE CLINIC | Age: 77
End: 2022-06-22
Payer: MEDICARE

## 2022-06-22 DIAGNOSIS — U07.1 COVID-19: Primary | ICD-10-CM

## 2022-06-22 PROCEDURE — G2025 DIS SITE TELE SVCS RHC/FQHC: HCPCS

## 2022-06-22 RX ORDER — BENZONATATE 200 MG/1
CAPSULE ORAL
COMMUNITY
Start: 2022-04-06

## 2022-06-22 NOTE — PATIENT INSTRUCTIONS
10 Things to Do When You Have COVID-19      Talk to your doctor about treatment. They might have you take medicine to help prevent serious illness. Stay home. Don't go to school, work, or public areas. And don't use public transportation, ride-shares, or taxis unless you have no choice. Leave your home only if you need to get medical care. But call the doctor's office first so they know you're coming. And wear a mask. Ask before leaving isolation. Follow your doctor's advice about when it is safe for you to leave isolation. Wear a mask when you are around other people. It can help stop the spread of the virus. Limit contact with people in your home. If possible, stay in a separate bedroom and use a separate bathroom. Cover your mouth and nose with a tissue when you cough or sneeze. Then throw the tissue in the trash right away. Wash your hands often, especially after you cough or sneeze. Use soap and water, and scrub for at least 20 seconds. If soap and water aren't available, use an alcohol-based hand . Don't share personal household items. These include bedding, towels, cups and glasses, and eating utensils. Clean and disinfect your home every day. Use household  or disinfectant wipes or sprays. If needed, take acetaminophen (Tylenol) or ibuprofen (Advil, Motrin) to relieve fever and body aches. Read and follow all instructions on the label. Current as of: March 26, 2021               Content Version: 13.2  © 2006-2022 Healthwise, Incorporated. Care instructions adapted under license by [employer] (which disclaims liability or warranty for this information). If you have questions about a medical condition or this instruction, always ask your healthcare professional. [place] [address] any warranty or liability for your use of this information.

## 2022-06-22 NOTE — PROGRESS NOTES
Mehreen Stout (: 1945) is a 67 y.o. female, established patient, here for evaluation of the following chief complaint(s):   Positive For Covid-19 (this morning, requesting antiviral injection)       ASSESSMENT/PLAN:  Below is the assessment and plan developed based on review of pertinent history, labs, studies, and medications. 1. COVID-19  -     nirmatrelvir-ritonavir (PAXLOVID) 150 mg x 2- 100 mg tablet; Take 3 Tablets by mouth every twelve (12) hours for 5 days. , Normal, Disp-1 Box, R-0     Paxlovid per patient request.    Return if symptoms worsen or fail to improve. SUBJECTIVE/OBJECTIVE:  Mehreen Stout endorses nasal congestion, headache, and fatigue onset yesterday; she was at a family gathering over the weekend and family members tested positive for COVID. She performed a home COVID test which was positive. She denies fever, chills, SOB, CP, palpitations. Review of Systems   Constitutional: Positive for fatigue. Negative for chills and fever. HENT: Positive for congestion and sore throat. Respiratory: Positive for cough. Negative for shortness of breath. Cardiovascular: Negative for chest pain and palpitations. Skin: Negative. Negative for rash. Neurological: Positive for headaches.         [INSTRUCTIONS:  \"[x]\" Indicates a positive item  \"[]\" Indicates a negative item  -- DELETE ALL ITEMS NOT EXAMINED]    Constitutional: [x] Appears well-developed and well-nourished [x] No apparent distress      [] Abnormal -     Mental status: [x] Alert and awake  [x] Oriented to person/place/time [x] Able to follow commands    [] Abnormal -     Eyes:   EOM    [x]  Normal    [] Abnormal -   Sclera  [x]  Normal    [] Abnormal -          Discharge [x]  None visible   [] Abnormal -     HENT: [x] Normocephalic, atraumatic  [] Abnormal -   [x] Mouth/Throat: Mucous membranes are moist    External Ears [x] Normal  [] Abnormal -    Neck: [x] No visualized mass [] Abnormal - Pulmonary/Chest: [x] Respiratory effort normal   [x] No visualized signs of difficulty breathing or respiratory distress        [] Abnormal -      Musculoskeletal:   [x] Normal gait with no signs of ataxia         [x] Normal range of motion of neck        [] Abnormal -     Neurological:        [x] No Facial Asymmetry (Cranial nerve 7 motor function) (limited exam due to video visit)          [x] No gaze palsy        [] Abnormal -          Skin:        [x] No significant exanthematous lesions or discoloration noted on facial skin         [] Abnormal -            Psychiatric:       [x] Normal Affect [] Abnormal -        [x] No Hallucinations    Other pertinent observable physical exam findings:-        On this date 06/22/2022 I have spent 15 minutes reviewing previous notes, test results and face to face (virtual) with the patient discussing the diagnosis and importance of compliance with the treatment plan as well as documenting on the day of the visit. Ilan Pierce, was evaluated through a synchronous (real-time) audio-video encounter. The patient (or guardian if applicable) is aware that this is a billable service, which includes applicable co-pays. This Virtual Visit was conducted with patient's (and/or legal guardian's) consent. The visit was conducted pursuant to the emergency declaration under the [facility] [address] authority and the [employer] Act. Patient identification was verified, and a caregiver was present when appropriate. The patient was located at: Home: [place]  The provider was located at: Facility ([facility]): [address] [id number]       An electronic signature was used to authenticate this note.   -- James Mendez NP

## 2022-06-22 NOTE — PROGRESS NOTES
1. \"Have you been to the ER, urgent care clinic since your last visit? [facility] Head injury, 04 or 05/2022  Hospitalized since your last visit? \" No    2. \"Have you seen or consulted any other health care providers outside of the [address] [place] [address] since your last visit? \" No     3. For patients aged 39-70: Has the patient had a colonoscopy / FIT/ Cologuard? NA - based on age      If the patient is female:    4. For patients aged 41-77: Has the patient had a mammogram within the past 2 years? NA - based on age or sex      11. For patients aged 21-65: Has the patient had a pap smear?  NA - based on age or sex

## 2022-07-08 DIAGNOSIS — Z23 ENCOUNTER FOR IMMUNIZATION: ICD-10-CM

## 2022-07-08 RX ORDER — LEVOTHYROXINE SODIUM 100 UG/1
TABLET ORAL
Qty: 90 TABLET | Refills: 3 | Status: SHIPPED | OUTPATIENT
Start: 2022-07-08 | End: 2022-09-02 | Stop reason: SDUPTHER

## 2022-07-18 ENCOUNTER — PATIENT MESSAGE (OUTPATIENT)
Dept: FAMILY MEDICINE CLINIC | Age: 77
End: 2022-07-18

## 2022-07-18 DIAGNOSIS — Z78.0 POSTMENOPAUSAL: Primary | ICD-10-CM

## 2022-07-20 ENCOUNTER — TRANSCRIBE ORDER (OUTPATIENT)
Dept: REGISTRATION | Age: 77
End: 2022-07-20

## 2022-07-20 ENCOUNTER — HOSPITAL ENCOUNTER (OUTPATIENT)
Dept: GENERAL RADIOLOGY | Age: 77
Discharge: HOME | End: 2022-07-20
Payer: MEDICARE

## 2022-07-20 DIAGNOSIS — R06.00 DYSPNEA: ICD-10-CM

## 2022-07-20 DIAGNOSIS — R06.00 DYSPNEA: Primary | ICD-10-CM

## 2022-07-20 PROCEDURE — 71046 X-RAY EXAM CHEST 2 VIEWS: CPT

## 2022-08-22 ENCOUNTER — HOSPITAL ENCOUNTER (OUTPATIENT)
Dept: ULTRASOUND IMAGING | Age: 77
Discharge: HOME | End: 2022-08-22
Payer: MEDICARE

## 2022-08-22 ENCOUNTER — HOSPITAL ENCOUNTER (OUTPATIENT)
Dept: GENERAL RADIOLOGY | Age: 77
Discharge: HOME | End: 2022-08-22
Payer: MEDICARE

## 2022-08-22 DIAGNOSIS — I10 ESSENTIAL HYPERTENSION: ICD-10-CM

## 2022-08-22 DIAGNOSIS — I73.9 PERIPHERAL VASCULAR DISEASE, UNSPECIFIED (HCC): ICD-10-CM

## 2022-08-22 DIAGNOSIS — I83.93 VARICOSE VEINS OF BOTH LOWER EXTREMITIES, UNSPECIFIED WHETHER COMPLICATED: ICD-10-CM

## 2022-08-22 DIAGNOSIS — M85.80 OSTEOPENIA WITH HIGH RISK OF FRACTURE: Primary | ICD-10-CM

## 2022-08-22 DIAGNOSIS — E55.9 VITAMIN D DEFICIENCY: ICD-10-CM

## 2022-08-22 DIAGNOSIS — Z78.0 POSTMENOPAUSAL: ICD-10-CM

## 2022-08-22 PROCEDURE — 77080 DXA BONE DENSITY AXIAL: CPT

## 2022-08-22 PROCEDURE — 93923 UPR/LXTR ART STDY 3+ LVLS: CPT

## 2022-08-22 PROCEDURE — 93970 EXTREMITY STUDY: CPT

## 2022-08-22 NOTE — PROGRESS NOTES
Please call  Lacey Didier. Her bone density scan shows improvement in her bones with the exception of her L wrist which has worsened slightly. Good news. I recommend she continue with the Prolia.

## 2022-08-22 NOTE — PROGRESS NOTES
Sp/w patient, gave results and recommendations. Pt states that she was just at the hospital and had \"scans of veins and arteries done\" but there was no order for the \"blood\". She asked me to pass that information along.  KT

## 2022-08-23 LAB
LEFT ABI: 1.13
LEFT ARM BP: 138 MMHG
LEFT CALF PRESSURE: 163 MMHG
LEFT HIGH THIGH PRESSURE: 207 MMHG
LEFT LOW THIGH PRESSURE: 168 MMHG
LEFT POSTERIOR TIBIAL: 156 MMHG
RIGHT ABI: 1.1
RIGHT ARM BP: 138 MMHG
RIGHT CALF PRESSURE: 152 MMHG
RIGHT HIGH THIGH PRESSURE: 207 MMHG
RIGHT LOW THIGH PRESSURE: 172 MMHG
RIGHT POSTERIOR TIBIAL: 145 MMHG
VAS LEFT DORSALIS PEDIS BP: 152 MMHG
VAS RIGHT DORSALIS PEDIS BP: 152 MMHG

## 2022-09-01 ENCOUNTER — HOSPITAL ENCOUNTER (OUTPATIENT)
Dept: INFUSION THERAPY | Age: 77
Discharge: HOME | End: 2022-09-01
Payer: MEDICARE

## 2022-09-01 VITALS
HEART RATE: 48 BPM | WEIGHT: 141 LBS | BODY MASS INDEX: 27.68 KG/M2 | RESPIRATION RATE: 18 BRPM | TEMPERATURE: 98 F | SYSTOLIC BLOOD PRESSURE: 178 MMHG | DIASTOLIC BLOOD PRESSURE: 68 MMHG | OXYGEN SATURATION: 98 % | HEIGHT: 60 IN

## 2022-09-01 DIAGNOSIS — M85.80 OSTEOPENIA WITH HIGH RISK OF FRACTURE: Primary | ICD-10-CM

## 2022-09-01 LAB
ALBUMIN SERPL-MCNC: 4 G/DL (ref 3.5–5)
ALBUMIN/GLOB SERPL: 1.5 {RATIO} (ref 1.1–2.2)
ALP SERPL-CCNC: 70 U/L (ref 45–117)
ALT SERPL-CCNC: 18 U/L (ref 12–78)
ANION GAP SERPL CALC-SCNC: 9 MMOL/L (ref 5–15)
AST SERPL-CCNC: 18 U/L (ref 15–37)
BASOPHILS # BLD: 0 K/UL (ref 0–0.1)
BASOPHILS NFR BLD: 1 % (ref 0–1)
BILIRUB SERPL-MCNC: 0.6 MG/DL (ref 0.2–1)
BUN SERPL-MCNC: 19 MG/DL (ref 6–20)
BUN/CREAT SERPL: 27 (ref 12–20)
CALCIUM SERPL-MCNC: 9.6 MG/DL (ref 8.5–10.1)
CHLORIDE SERPL-SCNC: 94 MMOL/L (ref 97–108)
CHOLEST SERPL-MCNC: 201 MG/DL
CO2 SERPL-SCNC: 26 MMOL/L (ref 21–32)
CREAT SERPL-MCNC: 0.7 MG/DL (ref 0.55–1.02)
DIFFERENTIAL METHOD BLD: NORMAL
EOSINOPHIL # BLD: 0.2 K/UL (ref 0–0.4)
EOSINOPHIL NFR BLD: 4 % (ref 0–7)
ERYTHROCYTE [DISTWIDTH] IN BLOOD BY AUTOMATED COUNT: 12.2 % (ref 11.5–14.5)
GLOBULIN SER CALC-MCNC: 2.7 G/DL (ref 2–4)
GLUCOSE SERPL-MCNC: 82 MG/DL (ref 65–100)
HCT VFR BLD AUTO: 37.1 % (ref 35–47)
HDLC SERPL-MCNC: 106 MG/DL
HDLC SERPL: 1.9 {RATIO} (ref 0–5)
HGB BLD-MCNC: 12.7 G/DL (ref 11.5–16)
IMM GRANULOCYTES # BLD AUTO: 0 K/UL (ref 0–0.04)
IMM GRANULOCYTES NFR BLD AUTO: 0 % (ref 0–0.5)
LDLC SERPL CALC-MCNC: 88.2 MG/DL (ref 0–100)
LYMPHOCYTES # BLD: 1 K/UL (ref 0.8–3.5)
LYMPHOCYTES NFR BLD: 18 % (ref 12–49)
MAGNESIUM SERPL-MCNC: 1.5 MG/DL (ref 1.6–2.4)
MCH RBC QN AUTO: 31.4 PG (ref 26–34)
MCHC RBC AUTO-ENTMCNC: 34.2 G/DL (ref 30–36.5)
MCV RBC AUTO: 91.6 FL (ref 80–99)
MONOCYTES # BLD: 0.5 K/UL (ref 0–1)
MONOCYTES NFR BLD: 8 % (ref 5–13)
NEUTS SEG # BLD: 3.8 K/UL (ref 1.8–8)
NEUTS SEG NFR BLD: 69 % (ref 32–75)
NRBC # BLD: 0 K/UL (ref 0–0.01)
NRBC BLD-RTO: 0 PER 100 WBC
PHOSPHATE SERPL-MCNC: 5 MG/DL (ref 2.6–4.7)
PLATELET # BLD AUTO: 216 K/UL (ref 150–400)
PMV BLD AUTO: 10.1 FL (ref 8.9–12.9)
POTASSIUM SERPL-SCNC: 4.6 MMOL/L (ref 3.5–5.1)
PROT SERPL-MCNC: 6.7 G/DL (ref 6.4–8.2)
RBC # BLD AUTO: 4.05 M/UL (ref 3.8–5.2)
SODIUM SERPL-SCNC: 129 MMOL/L (ref 136–145)
TRIGL SERPL-MCNC: 34 MG/DL (ref ?–150)
TSH SERPL DL<=0.05 MIU/L-ACNC: 0.21 UIU/ML (ref 0.36–3.74)
VLDLC SERPL CALC-MCNC: 6.8 MG/DL
WBC # BLD AUTO: 5.6 K/UL (ref 3.6–11)

## 2022-09-01 PROCEDURE — 80053 COMPREHEN METABOLIC PANEL: CPT

## 2022-09-01 PROCEDURE — 85025 COMPLETE CBC W/AUTO DIFF WBC: CPT

## 2022-09-01 PROCEDURE — 84100 ASSAY OF PHOSPHORUS: CPT

## 2022-09-01 PROCEDURE — 96372 THER/PROPH/DIAG INJ SC/IM: CPT

## 2022-09-01 PROCEDURE — 84443 ASSAY THYROID STIM HORMONE: CPT

## 2022-09-01 PROCEDURE — 83735 ASSAY OF MAGNESIUM: CPT

## 2022-09-01 PROCEDURE — 82306 VITAMIN D 25 HYDROXY: CPT

## 2022-09-01 PROCEDURE — 74011250636 HC RX REV CODE- 250/636: Performed by: NURSE PRACTITIONER

## 2022-09-01 PROCEDURE — 80061 LIPID PANEL: CPT

## 2022-09-01 PROCEDURE — 36415 COLL VENOUS BLD VENIPUNCTURE: CPT

## 2022-09-01 RX ORDER — EPINEPHRINE 1 MG/ML
0.3 INJECTION, SOLUTION, CONCENTRATE INTRAVENOUS AS NEEDED
OUTPATIENT
Start: 2023-03-02

## 2022-09-01 RX ORDER — ACETAMINOPHEN 325 MG/1
650 TABLET ORAL AS NEEDED
Status: DISCONTINUED | OUTPATIENT
Start: 2022-09-01 | End: 2022-09-02 | Stop reason: HOSPADM

## 2022-09-01 RX ORDER — HYDROCORTISONE SODIUM SUCCINATE 100 MG/2ML
100 INJECTION, POWDER, FOR SOLUTION INTRAMUSCULAR; INTRAVENOUS AS NEEDED
OUTPATIENT
Start: 2023-03-02

## 2022-09-01 RX ORDER — ONDANSETRON 2 MG/ML
8 INJECTION INTRAMUSCULAR; INTRAVENOUS AS NEEDED
Status: DISCONTINUED | OUTPATIENT
Start: 2022-09-01 | End: 2022-09-02 | Stop reason: HOSPADM

## 2022-09-01 RX ORDER — ALBUTEROL SULFATE 0.83 MG/ML
2.5 SOLUTION RESPIRATORY (INHALATION) AS NEEDED
Start: 2023-03-02

## 2022-09-01 RX ORDER — ONDANSETRON 2 MG/ML
8 INJECTION INTRAMUSCULAR; INTRAVENOUS AS NEEDED
OUTPATIENT
Start: 2023-03-02

## 2022-09-01 RX ORDER — ACETAMINOPHEN 325 MG/1
650 TABLET ORAL AS NEEDED
Start: 2023-03-02

## 2022-09-01 RX ORDER — DIPHENHYDRAMINE HYDROCHLORIDE 50 MG/ML
25 INJECTION, SOLUTION INTRAMUSCULAR; INTRAVENOUS AS NEEDED
Start: 2023-03-02

## 2022-09-01 RX ORDER — DIPHENHYDRAMINE HYDROCHLORIDE 50 MG/ML
50 INJECTION, SOLUTION INTRAMUSCULAR; INTRAVENOUS AS NEEDED
Start: 2023-03-02

## 2022-09-01 RX ORDER — DIPHENHYDRAMINE HYDROCHLORIDE 50 MG/ML
25 INJECTION, SOLUTION INTRAMUSCULAR; INTRAVENOUS AS NEEDED
Status: DISCONTINUED | OUTPATIENT
Start: 2022-09-01 | End: 2022-09-02 | Stop reason: HOSPADM

## 2022-09-01 RX ADMIN — DENOSUMAB 60 MG: 60 INJECTION SUBCUTANEOUS at 14:58

## 2022-09-01 NOTE — DISCHARGE INSTRUCTIONS
Denosumab (By injection)   Denosumab (jry-OVI-su-mab)  Treats osteoporosis, bone cancer, hypercalcemia, and other bone problems in patients who have cancer. Brand Name(s): Prolia, Xgeva   There may be other brand names for this medicine. When This Medicine Should Not Be Used: This medicine is not right for everyone. You should not receive it if you had an allergic reaction to denosumab or if you are pregnant. How to Use This Medicine:   Injectable  A doctor or other health professional will give you this medicine. This medicine is usually given as a shot under the skin of your upper arm, upper thigh, or stomach. This medicine should come with a Medication Guide. Ask your pharmacist for a copy if you do not have one. Missed dose: Call your doctor or pharmacist for instructions. Drugs and Foods to Avoid:   Ask your doctor or pharmacist before using any other medicine, including over-the-counter medicines, vitamins, and herbal products. Do not use Prolia® and Xgeva® together. They contain the same medicine. Some medicines can affect how denosumab works. Tell your doctor if you are also using medicine that weakens your immune system, including a steroid or cancer medicine. Warnings While Using This Medicine: This medicine may cause birth defects if either partner is using it during conception or pregnancy. Tell your doctor right away if you or your partner becomes pregnant. Use an effective form of birth control. Women who are being treated with Benjaman Ream should continue using birth control for at least 5 months after the last dose. Tell your doctor if you are breastfeeding, or if you have kidney disease, diabetes, gum disease, or an allergy to latex. Tell your doctor if you have problems with your thyroid, parathyroid, or digestive system.   This medicine may cause the following problems:  Low calcium levels in your blood  Increased risk of broken thigh bone  Increased risk of infections  Serious skin reactions  Severe bone, joint, or muscle pain  This medicine can cause jaw problems. You must have regular dental exams while you are being treated with this medicine. Tell your dentist that you are using this medicine. Practice good oral hygiene. Do not suddenly stop using Prolia® without checking first with your doctor. Doing so may increase risk for more fractures. Talk to your doctor about other medicine that you can take. Your doctor will do lab tests at regular visits to check on the effects of this medicine. Keep all appointments. Possible Side Effects While Using This Medicine:   Call your doctor right away if you notice any of these side effects: Allergic reaction: Itching or hives, swelling in your face or hands, swelling or tingling in your mouth or throat, chest tightness, trouble breathing  Blistering, peeling, red skin rash  Chest pain, fast or uneven heartbeat, trouble breathing  Fever, chills, cough, sore throat, body aches  Lightheadedness, dizziness, fainting  Muscle spasms or twitching, numbness or tingling in your fingers, toes, or lips  Pain or burning during urination, change in how much or how often you urinate  Pain, swelling, heavy feeling, or numbness in your mouth or jaw, loose teeth or other teeth problems  Severe bone, joint, or muscle pain  Unusual pain in your thigh, groin, or hip  If you notice these less serious side effects, talk with your doctor:   Diarrhea, nausea  Redness, pain, itching, burning, swelling, or a lump under your skin where the shot was given  Tiredness or weakness  If you notice other side effects that you think are caused by this medicine, tell your doctor. Call your doctor for medical advice about side effects. You may report side effects to FDA at 1-809-FDA-2116  © 2017 [facility] Information is for End User's use only and may not be sold, redistributed or otherwise used for commercial purposes.   The above information is an  only. It is not intended as medical advice for individual conditions or treatments. Talk to your doctor, nurse or pharmacist before following any medical regimen to see if it is safe and effective for you.

## 2022-09-01 NOTE — PROGRESS NOTES
\[facility]\"" [facility] Progress Note    Date: 2022    Name: Ritesh Kurtz    MRN: [id number]         : 1945      Ms. Mirna Miller was assessed and education was provided. Ms. Ezra Bowser vitals were reviewed and patient was observed for 5 minutes prior to treatment. Visit Vitals  BP (!) 178/68 (BP 1 Location: Left arm, BP Patient Position: Sitting)   Pulse (!) 48   Temp 98 °F (36.7 °C)   Resp 18   Ht 5' (1.524 m)   Wt 64 kg (141 lb)   SpO2 98%   BMI 27.54 kg/m²       Lab results were obtained and reviewed. Recent Results (from the past 12 hour(s))   METABOLIC PANEL, COMPREHENSIVE    Collection Time: 22  1:22 PM   Result Value Ref Range    Sodium 129 (L) 136 - 145 mmol/L    Potassium 4.6 3.5 - 5.1 mmol/L    Chloride 94 (L) 97 - 108 mmol/L    CO2 26 21 - 32 mmol/L    Anion gap 9 5 - 15 mmol/L    Glucose 82 65 - 100 mg/dL    BUN 19 6 - 20 MG/DL    Creatinine 0.70 0.55 - 1.02 MG/DL    BUN/Creatinine ratio 27 (H) 12 - 20      GFR est AA >60 >60 ml/min/1.73m2    GFR est non-AA >60 >60 ml/min/1.73m2    Calcium 9.6 8.5 - 10.1 MG/DL    Bilirubin, total 0.6 0.2 - 1.0 MG/DL    ALT (SGPT) 18 12 - 78 U/L    AST (SGOT) 18 15 - 37 U/L    Alk. phosphatase 70 45 - 117 U/L    Protein, total 6.7 6.4 - 8.2 g/dL    Albumin 4.0 3.5 - 5.0 g/dL    Globulin 2.7 2.0 - 4.0 g/dL    A-G Ratio 1.5 1.1 - 2.2     MAGNESIUM    Collection Time: 22  1:22 PM   Result Value Ref Range    Magnesium 1.5 (L) 1.6 - 2.4 mg/dL   PHOSPHORUS    Collection Time: 22  1:22 PM   Result Value Ref Range    Phosphorus 5.0 (H) 2.6 - 4.7 MG/DL       Prolia 60 mg was administered subcutaneous in  left arm. Band aid applied to site without redness, swelling or pain. Ms. Mirna Miller tolerated well, and had no complaints. Patient armband removed and shredded. Ms. Mirna Miller was discharged from [place] [address] in stable condition at 1500. She is to return on  at 1400 for her next appointment.     Isabel Dodge RN  September 1, 2022  3:16 PM

## 2022-09-02 DIAGNOSIS — Z23 ENCOUNTER FOR IMMUNIZATION: ICD-10-CM

## 2022-09-02 LAB — 25(OH)D3 SERPL-MCNC: 48 NG/ML (ref 30–100)

## 2022-09-02 RX ORDER — LEVOTHYROXINE SODIUM 88 UG/1
88 TABLET ORAL
Qty: 90 TABLET | Refills: 3 | Status: SHIPPED | OUTPATIENT
Start: 2022-09-02

## 2022-09-02 RX ORDER — ATORVASTATIN CALCIUM 10 MG/1
10 TABLET, FILM COATED ORAL DAILY
Qty: 90 TABLET | Refills: 3 | Status: SHIPPED | OUTPATIENT
Start: 2022-09-02

## 2022-09-02 NOTE — PROGRESS NOTES
Please call. Her thyroid level is too low, meaning her thyroid medication dose is too high. I am going to decrease the dose of her levothyroxine to 88 mcg daily. New Rx sent in. Cholesterol levels are up slightly. She should start a cholesterol pill to reduce her risk of heart attack or stroke. Let me know if she's agreeable and I will send this in. Her sodium levels remain low at 129. I want to do some follow up testing on her urine and her blood. Could she come see me later this month?

## 2022-09-02 NOTE — PROGRESS NOTES
Patient said Yes please send in cholesterol medication. I transferred her to psr to schedule appt for end of month. She is aware of her labs with low thyroid and Rx called in for that.

## 2022-09-06 ENCOUNTER — TELEPHONE (OUTPATIENT)
Dept: FAMILY MEDICINE CLINIC | Age: 77
End: 2022-09-06

## 2022-09-06 ENCOUNTER — HOSPITAL ENCOUNTER (OUTPATIENT)
Dept: MAMMOGRAPHY | Age: 77
Discharge: HOME | End: 2022-09-06
Payer: MEDICARE

## 2022-09-06 DIAGNOSIS — Z12.31 VISIT FOR SCREENING MAMMOGRAM: ICD-10-CM

## 2022-09-06 PROCEDURE — 77063 BREAST TOMOSYNTHESIS BI: CPT

## 2022-09-06 NOTE — TELEPHONE ENCOUNTER
Brandon Calderon missed the call from Edwin. She is asking for a return call. She will make sure the has the volume turned up on her phone this time.

## 2022-09-27 ENCOUNTER — OFFICE VISIT (OUTPATIENT)
Dept: FAMILY MEDICINE CLINIC | Age: 77
End: 2022-09-27
Payer: MEDICARE

## 2022-09-27 VITALS
TEMPERATURE: 97.1 F | BODY MASS INDEX: 27.56 KG/M2 | WEIGHT: 140.4 LBS | HEIGHT: 60 IN | OXYGEN SATURATION: 99 % | RESPIRATION RATE: 20 BRPM | DIASTOLIC BLOOD PRESSURE: 74 MMHG | SYSTOLIC BLOOD PRESSURE: 138 MMHG | HEART RATE: 55 BPM

## 2022-09-27 DIAGNOSIS — J44.9 CHRONIC OBSTRUCTIVE PULMONARY DISEASE, UNSPECIFIED COPD TYPE (HCC): Primary | ICD-10-CM

## 2022-09-27 DIAGNOSIS — R53.82 CHRONIC FATIGUE: ICD-10-CM

## 2022-09-27 DIAGNOSIS — L65.9 HAIR LOSS: ICD-10-CM

## 2022-09-27 DIAGNOSIS — M85.80 OSTEOPENIA WITH HIGH RISK OF FRACTURE: ICD-10-CM

## 2022-09-27 DIAGNOSIS — R73.01 IFG (IMPAIRED FASTING GLUCOSE): ICD-10-CM

## 2022-09-27 DIAGNOSIS — Z23 ENCOUNTER FOR IMMUNIZATION: ICD-10-CM

## 2022-09-27 DIAGNOSIS — E87.1 HYPONATREMIA: ICD-10-CM

## 2022-09-27 DIAGNOSIS — E03.9 ACQUIRED HYPOTHYROIDISM: ICD-10-CM

## 2022-09-27 DIAGNOSIS — R63.4 WEIGHT LOSS: ICD-10-CM

## 2022-09-27 DIAGNOSIS — R00.1 BRADYCARDIA: ICD-10-CM

## 2022-09-27 PROCEDURE — 93005 ELECTROCARDIOGRAM TRACING: CPT | Performed by: NURSE PRACTITIONER

## 2022-09-27 PROCEDURE — 36415 COLL VENOUS BLD VENIPUNCTURE: CPT | Performed by: NURSE PRACTITIONER

## 2022-09-27 PROCEDURE — 90694 VACC AIIV4 NO PRSRV 0.5ML IM: CPT | Performed by: NURSE PRACTITIONER

## 2022-09-27 PROCEDURE — 99214 OFFICE O/P EST MOD 30 MIN: CPT | Performed by: NURSE PRACTITIONER

## 2022-09-27 PROCEDURE — 93010 ELECTROCARDIOGRAM REPORT: CPT | Performed by: NURSE PRACTITIONER

## 2022-09-27 PROCEDURE — G0008 ADMIN INFLUENZA VIRUS VAC: HCPCS | Performed by: NURSE PRACTITIONER

## 2022-09-27 RX ORDER — UMECLIDINIUM BROMIDE AND VILANTEROL TRIFENATATE 62.5; 25 UG/1; UG/1
POWDER RESPIRATORY (INHALATION)
COMMUNITY
Start: 2022-07-08

## 2022-09-27 RX ORDER — FAMOTIDINE 40 MG/1
TABLET, FILM COATED ORAL
COMMUNITY
Start: 2022-07-16

## 2022-09-27 NOTE — PROGRESS NOTES
Chief Complaint   Patient presents with    Follow-up     With labs         HPI:     is a 65 y.o. female here today for a follow up. HTN: Compliant with meds, amlodipine, lisinopril. COPD: Sees pulmonary Q6 months, Dr. Jens Rowland. Budesonide changed to Flovent, remains on Anoro. Stable, intermittent cough but tolerable. Oral lichen planus/candidiasis: Followed by Dr. Cornelio Newby in [place]. Also with hx of SCC, BCC. Osteopenia: Failed Boniva, remains on Prolia. Next DEXA August 2024. New issues: In today for follow up. Labs earlier this month showed an abnormal TSH so her levothyroxine dose was decreased to 88 mcg. She started a cholesterol medication. She also has a hx of chronic hyponatremia, no improvement with stopping hctz. The patient reports fatigue for the past few weeks. She feels tired, no energy. She also notes hair thinning over the past few months. She just doesn't feel like herself. Allergies   Allergen Reactions    Latex Rash    Keflex [Cephalexin] Rash    Levaquin [Levofloxacin] Rash    Sulfa (Sulfonamide Antibiotics) Rash    Macrobid [Nitrofurantoin Monohyd/M-Cryst] Rash       Current Outpatient Medications   Medication Sig    levothyroxine (SYNTHROID) 88 mcg tablet Take 1 Tablet by mouth Daily (before breakfast). atorvastatin (LIPITOR) 10 mg tablet Take 1 Tablet by mouth daily. benzonatate (TESSALON) 200 mg capsule     Myrbetriq 50 mg ER tablet TAKE 1 TABLET BY MOUTH EVERY DAY    hydrocortisone (HYTONE) 2.5 % ointment     OTHER Prevagen    lisinopriL (PRINIVIL, ZESTRIL) 40 mg tablet TAKE 1 TABLET BY MOUTH EVERY DAY    amLODIPine (NORVASC) 10 mg tablet Take 1 Tablet by mouth daily. ciclopirox (Ciclodan) 0.77 % topical cream Apply  to affected area two (2) times a day.  PRN    clobetasoL (TEMOVATE) 0.05 % ointment APPLY TO AFFECTED AREA EVERY DAY    montelukast (SINGULAIR) 10 mg tablet TAKE 1 TABLET BY MOUTH EVERY DAY    betamethasone valerate (VALISONE) 0.1 % topical cream PRN    denosumab (PROLIA) 60 mg/mL injection 60 mg by SubCUTAneous route. hydroxychloroquine (PLAQUENIL) 200 mg tablet TAKE 1 TABLET BY MOUTH TWICE A DAY    multivitamin (ONE A DAY) tablet Take 1 Tab by mouth daily. guaifenesin (MUCINEX PO) Take  by mouth. acetaminophen (ARTHRITIS PAIN RELIEF) 650 mg TbER Take 1 Tab by mouth nightly as needed. albuterol (PROVENTIL HFA, VENTOLIN HFA, PROAIR HFA) 90 mcg/actuation inhaler Take 2 Puffs by inhalation. aspirin 81 mg chewable tablet Take 81 mg by mouth daily. omeprazole (PRILOSEC) 20 mg capsule Take 20 mg by mouth daily. cholecalciferol (VITAMIN D3) (1000 Units /25 mcg) tablet Take 1,000 Units by mouth daily. Indications: VITAMIN D DEFICIENCY    Anoro Ellipta 62.5-25 mcg/actuation inhaler TAKE 1 PUFF BY MOUTH EVERY DAY    famotidine (PEPCID) 40 mg tablet TAKE 1 (ONE) TABLET TWICE A DAY, BEFORE MEALS     No current facility-administered medications for this visit. Past Medical History:   Diagnosis Date    Allergic rhinitis     COPD (chronic obstructive pulmonary disease) (HCC)     Hypertension     Hypothyroidism     Lichen planus     seen at [facility]    Menopause     Osteoarthrosis, unspecified whether generalized or localized, unspecified site     Overactive bladder     Phlebitis and thrombophlebitis of lower extremities, unspecified     Raynaud's syndrome        Family History   Problem Relation Age of Onset    Stroke Mother     Dementia Mother     Cancer Father         BRAIN TUMOR    COPD Brother         emphsema    Cancer Niece        ROS:  Denies fever, chills, cough, chest pain, SOB,  nausea, vomiting, diarrhea, dysuria. Denies rashes, wounds, arthrlagias, weakness, numbness, visual changes, depression, + wt loss, wt gain, hemoptysis, hematochezia or melena. Patient is not experiencing chest pain radiating to the jaw and/or down the arms.     Physical Examination:    /74   Pulse (!) 55   Temp 97.1 °F (36.2 °C) (Temporal)   Resp 20   Ht 5' (1.524 m)   Wt 140 lb 6.4 oz (63.7 kg)   SpO2 99%   BMI 27.42 kg/m²     Wt Readings from Last 3 Encounters:   09/27/22 140 lb 6.4 oz (63.7 kg)   09/01/22 141 lb (64 kg)   04/01/22 150 lb (68 kg)     General: WDWN Female in NAD  HEENT: NC/AT, PERRL, TM pearly gray  Neck: Supple, no JVD, no bruits  Chest:  Respirations even and unlabored, lungs CTA throughout. Cardiac: Bradycardic. No clicks, murmurs, gallops, or rubs  Extremities:  No cyanosis, clubbing or edema. Musculoskeletal:  Normal strength and ROM throughout. Neurologic:  Ambulatory without assistance, CN 2-12 grossly intact. Skin: intact and warm to the touch. Lymphadenopathy: no cervical or supraclavicular nodes  Psych: Pleasant and appropriate. Judgment normal.     EKG: sinus bradycardia, rate 45. ASSESSMENT AND PLAN:       ICD-10-CM ICD-9-CM    1. Chronic obstructive pulmonary disease, unspecified COPD type ([facility]  J44.9 496       2. Osteopenia with high risk of fracture  M85.80 733.90       3. Acquired hypothyroidism  E03.9 244.9 T4, FREE      TSH 3RD GENERATION      TSH 3RD GENERATION      T4, FREE      4. Chronic fatigue  R53.82 780.79 COLLECTION VENOUS BLOOD,VENIPUNCTURE      CBC WITH AUTOMATED DIFF      METABOLIC PANEL, COMPREHENSIVE      T4, FREE      TSH 3RD GENERATION      URIC ACID      URIC ACID      TSH 3RD GENERATION      T4, FREE      METABOLIC PANEL, COMPREHENSIVE      CBC WITH AUTOMATED DIFF      COLLECTION VENOUS BLOOD,VENIPUNCTURE      5. Hair loss  L65.9 704.00       6.  Hyponatremia  E87.1 276.1 COLLECTION VENOUS BLOOD,VENIPUNCTURE      CBC WITH AUTOMATED DIFF      METABOLIC PANEL, COMPREHENSIVE      URIC ACID      SODIUM, UR, RANDOM      OSMOLALITY, UR      ANTIDIURETIC HORMONE (ADH) PROFILE      OSMOLALITY, SERUM/PLASMA      OSMOLALITY, SERUM/PLASMA      ANTIDIURETIC HORMONE (ADH) PROFILE      OSMOLALITY, UR      URIC ACID      METABOLIC PANEL, COMPREHENSIVE      CBC WITH AUTOMATED DIFF      COLLECTION VENOUS BLOOD,VENIPUNCTURE      7. Bradycardia  R00.1 427.89 COLLECTION VENOUS BLOOD,VENIPUNCTURE      CBC WITH AUTOMATED DIFF      METABOLIC PANEL, COMPREHENSIVE      TSH 3RD GENERATION      AMB POC EKG ROUTINE W/ 12 LEADS, INTER & REP      TSH 3RD GENERATION      METABOLIC PANEL, COMPREHENSIVE      CBC WITH AUTOMATED DIFF      COLLECTION VENOUS BLOOD,VENIPUNCTURE      REFERRAL TO CARDIOLOGY      8. IFG (impaired fasting glucose)  R73.01 790.21 HEMOGLOBIN A1C WITH EAG      HEMOGLOBIN A1C WITH EAG      9. Encounter for immunization  Z23 V03.89 INFLUENZA, FLUAD, (AGE 72 Y+), IM, PF, 0.5 ML      ADMIN INFLUENZA VIRUS VAC        Worsening bradycardia with fatigue. Pulse typically 55-65, now down to mid 40s. I'd like cardiology to evaluate her, last saw Dr. Waldemar Andersen in 2019 for a pericardial effusion. Echo at that time with normal EF, negative nuclear stress test. Referral placed to [facility]. I explained to patient that this may be contributing to her fatigue but we also decreased her levothyroxine dose at the onset of symptoms. Check thyroid function today. She is also now on statin therapy, get LFTs. Reviewed DEXA results, plan of care moving forward. Work up hyponatremia as above. Flu vaccine today. Patient aware of plan of care and verbalized understanding. Questions answered. RTC in 3 months, or sooner if needed.     Juhi Martinez NP

## 2022-09-27 NOTE — PROGRESS NOTES
Chief Complaint   Patient presents with    Follow-up     With labs       1 \"Have you been to the ER, urgent care clinic since your last visit? [facility]  Hospitalized since your last visit? \" No    2. \"Have you seen or consulted any other health care providers outside of the [address] [place] [address] since your last visit? \" No     3. For patients aged 39-70: Has the patient had a colonoscopy / FIT/ Cologuard? NA - based on age      If the patient is female:    4. For patients aged 41-77: Has the patient had a mammogram within the past 2 years? NA - based on age or sex      11. For patients aged 21-65: Has the patient had a pap smear?  NA - based on age or sex

## 2022-09-28 LAB
ALBUMIN SERPL-MCNC: 3.9 G/DL (ref 3.5–5)
ALBUMIN/GLOB SERPL: 1.6 {RATIO} (ref 1.1–2.2)
ALP SERPL-CCNC: 68 U/L (ref 45–117)
ALT SERPL-CCNC: 23 U/L (ref 12–78)
ANION GAP SERPL CALC-SCNC: 2 MMOL/L (ref 5–15)
AST SERPL-CCNC: 17 U/L (ref 15–37)
BASOPHILS # BLD: 0.1 K/UL (ref 0–0.1)
BASOPHILS NFR BLD: 1 % (ref 0–1)
BILIRUB SERPL-MCNC: 0.5 MG/DL (ref 0.2–1)
BUN SERPL-MCNC: 17 MG/DL (ref 6–20)
BUN/CREAT SERPL: 22 (ref 12–20)
CALCIUM SERPL-MCNC: 9.8 MG/DL (ref 8.5–10.1)
CHLORIDE SERPL-SCNC: 100 MMOL/L (ref 97–108)
CO2 SERPL-SCNC: 30 MMOL/L (ref 21–32)
CREAT SERPL-MCNC: 0.77 MG/DL (ref 0.55–1.02)
DIFFERENTIAL METHOD BLD: NORMAL
EOSINOPHIL # BLD: 0.2 K/UL (ref 0–0.4)
EOSINOPHIL NFR BLD: 3 % (ref 0–7)
ERYTHROCYTE [DISTWIDTH] IN BLOOD BY AUTOMATED COUNT: 12.4 % (ref 11.5–14.5)
EST. AVERAGE GLUCOSE BLD GHB EST-MCNC: 108 MG/DL
GLOBULIN SER CALC-MCNC: 2.4 G/DL (ref 2–4)
GLUCOSE SERPL-MCNC: 87 MG/DL (ref 65–100)
HBA1C MFR BLD: 5.4 % (ref 4–5.6)
HCT VFR BLD AUTO: 39 % (ref 35–47)
HGB BLD-MCNC: 12.7 G/DL (ref 11.5–16)
IMM GRANULOCYTES # BLD AUTO: 0 K/UL (ref 0–0.04)
IMM GRANULOCYTES NFR BLD AUTO: 0 % (ref 0–0.5)
LYMPHOCYTES # BLD: 0.8 K/UL (ref 0.8–3.5)
LYMPHOCYTES NFR BLD: 16 % (ref 12–49)
MCH RBC QN AUTO: 31.5 PG (ref 26–34)
MCHC RBC AUTO-ENTMCNC: 32.6 G/DL (ref 30–36.5)
MCV RBC AUTO: 96.8 FL (ref 80–99)
MONOCYTES # BLD: 0.5 K/UL (ref 0–1)
MONOCYTES NFR BLD: 10 % (ref 5–13)
NEUTS SEG # BLD: 3.7 K/UL (ref 1.8–8)
NEUTS SEG NFR BLD: 70 % (ref 32–75)
NRBC # BLD: 0 K/UL (ref 0–0.01)
NRBC BLD-RTO: 0 PER 100 WBC
OSMOLALITY SERPL: 279 MOSM/KG H2O
OSMOLALITY UR: 194 MOSM/KG H2O
PLATELET # BLD AUTO: 250 K/UL (ref 150–400)
PMV BLD AUTO: 9.9 FL (ref 8.9–12.9)
POTASSIUM SERPL-SCNC: 4.8 MMOL/L (ref 3.5–5.1)
PROT SERPL-MCNC: 6.3 G/DL (ref 6.4–8.2)
RBC # BLD AUTO: 4.03 M/UL (ref 3.8–5.2)
SODIUM SERPL-SCNC: 132 MMOL/L (ref 136–145)
T4 FREE SERPL-MCNC: 1.2 NG/DL (ref 0.8–1.5)
TSH SERPL DL<=0.05 MIU/L-ACNC: 0.38 UIU/ML (ref 0.36–3.74)
URATE SERPL-MCNC: 6.2 MG/DL (ref 2.6–6)
WBC # BLD AUTO: 5.3 K/UL (ref 3.6–11)

## 2022-09-29 LAB — SODIUM 24H UR-SCNC: 39 MMOL/L

## 2022-09-30 NOTE — PROGRESS NOTES
Patient verified stating name and date of birth. Eladio Palacios informed of lab results and states understanding.

## 2022-09-30 NOTE — PROGRESS NOTES
Please call. Labs look good. Sodium has improved slightly to 132. Potassium is normal. Kidneys are good. Thyroid is normal on this dose. Has she heard back about a cardiology appointment yet? If not, she needs an appt either in [facility] or up in [place] for fatigue and bradycardia. Thanks.

## 2022-10-06 LAB
OSMOLALITY SERPL: 275 MOSMOL/KG (ref 280–301)
VASOPRESSIN SERPL-MCNC: <0.8 PG/ML (ref 0–4.7)

## 2022-11-14 NOTE — PROGRESS NOTES
July , [place], [address]  [phone]  [address] [place] [address] Way     Subjective: Sergio Chew is a 65 y.o. female is here to re-establish care. Pmhx HTN, HLD. Allergic rhinitis, COPD (chronic obstructive pulmonary disease) ([facility] Hypothyroidism, Lichen planus, Menopause, Osteoarthrosis, unspecified whether generalized or localized, unspecified site, Overactive bladder, Phlebitis and thrombophlebitis of lower extremities, unspecified, and Raynaud's syndrome    Previously seen by Dr Yin Ng, last OV 3/2019. She was hospitalized in 11/18 and echo at that time showed a moderate pericardial effusion. Her echo today showed resolution of her pericardial effusion, normal LVEF, normal PASP    Referred back to us by her PCP d/t fatigue, bradycardia: Worsening bradycardia with fatigue. Pulse typically 55-65, now down to mid 40s. I'd like cardiology to evaluate her, last saw Dr. Umu Escudero in 2019 for a pericardial effusion. Echo at that time with normal EF, negative nuclear stress test    Today,   Endorses fatigue, occasional stable sob. No cp.      the patient denies chest pain,  orthopnea, PND, LE edema, palpitations, syncope, or presyncope.     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    Vitamin D deficiency 11/16/2015    Pitting edema 11/16/2015    Primary osteoarthritis of right hand 11/16/2015    Osteopenia with high risk of fracture 09/08/2015    Insomnia 03/21/2014    Colitis 01/09/2014    Osteoarthritis     Essential hypertension     Hypothyroidism     Allergic rhinitis     Overactive bladder       Rashad Schofield NP  Past Medical History:   Diagnosis Date    Allergic rhinitis     COPD (chronic obstructive pulmonary disease) ([facility] Hypertension     Hypothyroidism     Lichen planus     seen at [facility]    Menopause     Osteoarthrosis, unspecified whether generalized or localized, unspecified site     Overactive bladder     Phlebitis and thrombophlebitis of lower extremities, unspecified     Raynaud's syndrome       Past Surgical History:   Procedure Laterality Date    HX APPENDECTOMY      HX GI      colitis    HX GYN      HYSTERECTOMY//BTL    HX HEENT      TONSILLECTOMY    HX HYSTERECTOMY      HX MOHS PROCEDURES  2018    left lower leg    HX OOPHORECTOMY      HX UROLOGICAL      BLADDER SLING     Allergies   Allergen Reactions    Latex Rash    Keflex [Cephalexin] Rash    Levaquin [Levofloxacin] Rash    Sulfa (Sulfonamide Antibiotics) Rash    Macrobid [Nitrofurantoin Monohyd/M-Cryst] Rash      Family History   Problem Relation Age of Onset    Stroke Mother     Dementia Mother     Cancer Father         BRAIN TUMOR    COPD Brother         emphsema    Cancer Niece       Social History     Socioeconomic History    Marital status:      Spouse name: Not on file    Number of children: Not on file    Years of education: Not on file    Highest education level: Not on file   Occupational History    Not on file   Tobacco Use    Smoking status: Former     Types: Cigarettes    Smokeless tobacco: Never    Tobacco comments:     smoked less than 10 yrs and quit 40 +   Vaping Use    Vaping Use: Never used   Substance and Sexual Activity    Alcohol use: No    Drug use: No    Sexual activity: Not Currently     Partners: Male   Other Topics Concern     Service No    Blood Transfusions No    Caffeine Concern No    Occupational Exposure No    Hobby Hazards No    Sleep Concern No    Stress Concern No    Weight Concern No    Special Diet No    Back Care No    Exercise Yes    Bike Helmet No    Seat Belt Yes    Self-Exams No   Social History Narrative    Not on file     Social Determinants of Health     Financial Resource Strain: Not on file Food Insecurity: Not on file   Transportation Needs: Not on file   Physical Activity: Not on file   Stress: Not on file   Social Connections: Not on file   Intimate Partner Violence: Not on file   Housing Stability: Not on file      Current Outpatient Medications   Medication Sig    amLODIPine (NORVASC) 10 mg tablet TAKE 1 TABLET BY MOUTH EVERY DAY    Anoro Ellipta 62.5-25 mcg/actuation inhaler TAKE 1 PUFF BY MOUTH EVERY DAY    famotidine (PEPCID) 40 mg tablet TAKE 1 (ONE) TABLET TWICE A DAY, BEFORE MEALS    levothyroxine (SYNTHROID) 88 mcg tablet Take 1 Tablet by mouth Daily (before breakfast). atorvastatin (LIPITOR) 10 mg tablet Take 1 Tablet by mouth daily. benzonatate (TESSALON) 200 mg capsule     Myrbetriq 50 mg ER tablet TAKE 1 TABLET BY MOUTH EVERY DAY    hydrocortisone (HYTONE) 2.5 % ointment     OTHER Prevagen    lisinopriL (PRINIVIL, ZESTRIL) 40 mg tablet TAKE 1 TABLET BY MOUTH EVERY DAY    ciclopirox (Ciclodan) 0.77 % topical cream Apply  to affected area two (2) times a day. PRN    clobetasoL (TEMOVATE) 0.05 % ointment APPLY TO AFFECTED AREA EVERY DAY    montelukast (SINGULAIR) 10 mg tablet TAKE 1 TABLET BY MOUTH EVERY DAY    betamethasone valerate (VALISONE) 0.1 % topical cream PRN    denosumab (PROLIA) 60 mg/mL injection 60 mg by SubCUTAneous route. hydroxychloroquine (PLAQUENIL) 200 mg tablet TAKE 1 TABLET BY MOUTH TWICE A DAY    multivitamin (ONE A DAY) tablet Take 1 Tab by mouth daily. guaifenesin (MUCINEX PO) Take  by mouth. acetaminophen (ARTHRITIS PAIN RELIEF) 650 mg TbER Take 1 Tab by mouth nightly as needed. albuterol (PROVENTIL HFA, VENTOLIN HFA, PROAIR HFA) 90 mcg/actuation inhaler Take 2 Puffs by inhalation. aspirin 81 mg chewable tablet Take 81 mg by mouth daily. cholecalciferol (VITAMIN D3) (1000 Units /25 mcg) tablet Take 1,000 Units by mouth daily. Indications: VITAMIN D DEFICIENCY     No current facility-administered medications for this visit.          Review of Symptoms:  11 systems reviewed, negative other than as stated in the HPI    Physical ExamPhysical Exam:    Vitals:    11/17/22 1052   BP: 118/60   Pulse: 61   Resp: 22   Temp: 97 °F (36.1 °C)   TempSrc: Temporal   SpO2: 99%   Weight: 139 lb (63 kg)   Height: 5' (1.524 m)     Body mass index is 27.15 kg/m². General PE  Gen:  NAD  Mental Status - Alert. General Appearance - Not in acute distress. HEENT:  PERRL, no carotid bruits or JVD  Chest and Lung Exam   Inspection: Accessory muscles - No use of accessory muscles in breathing. Auscultation:   Breath sounds: - Normal.   Cardiovascular   Inspection: Jugular vein - Bilateral - Inspection Normal.   Palpation/Percussion:   Apical Impulse: - Normal.   Auscultation: Rhythm - Regular. Heart Sounds - S1 WNL and S2 WNL. No S3 or S4. Murmurs & Other Heart Sounds: Auscultation of the heart reveals - III/VI BHARGAVI  Peripheral Vascular   Upper Extremity: Inspection - Bilateral - No Cyanotic nailbeds or Digital clubbing. Lower Extremity:   Palpation: Edema - Bilateral - No edema. Abdomen:   Soft, non-tender, bowel sounds are active.   Neuro: A&O times 3, CN and motor grossly WNL    Labs:   Lab Results   Component Value Date/Time    Cholesterol, total 201 (H) 09/01/2022 02:50 PM    Cholesterol, total 186 02/22/2022 08:03 AM    Cholesterol, total 184 02/19/2021 10:35 AM    Cholesterol, total 204 (H) 03/22/2019 08:38 AM    Cholesterol, total 199 06/20/2018 09:32 AM    HDL Cholesterol 106 09/01/2022 02:50 PM    HDL Cholesterol 99 02/22/2022 08:03 AM    HDL Cholesterol 99 02/19/2021 10:35 AM    HDL Cholesterol 91 03/22/2019 08:38 AM    HDL Cholesterol 94 06/20/2018 09:32 AM    LDL, calculated 88.2 09/01/2022 02:50 PM    LDL, calculated 77.8 02/22/2022 08:03 AM    LDL, calculated 73 02/19/2021 10:35 AM    LDL, calculated 101 (H) 03/22/2019 08:38 AM    LDL, calculated 94 06/20/2018 09:32 AM    LDL, calculated 101 (H) 07/06/2016 08:02 AM    Triglyceride 34 09/01/2022 02:50 PM Triglyceride 46 02/22/2022 08:03 AM    Triglyceride 65 02/19/2021 10:35 AM    Triglyceride 60 03/22/2019 08:38 AM    Triglyceride 54 06/20/2018 09:32 AM    CHOL/HDL Ratio 1.9 09/01/2022 02:50 PM    CHOL/HDL Ratio 1.9 02/22/2022 08:03 AM     Lab Results   Component Value Date/Time    CK 64 11/18/2018 07:27 PM     Lab Results   Component Value Date/Time    Sodium 132 (L) 09/27/2022 10:50 AM    Potassium 4.8 09/27/2022 10:50 AM    Chloride 100 09/27/2022 10:50 AM    CO2 30 09/27/2022 10:50 AM    Anion gap 2 (L) 09/27/2022 10:50 AM    Glucose 87 09/27/2022 10:50 AM    BUN 17 09/27/2022 10:50 AM    Creatinine 0.77 09/27/2022 10:50 AM    BUN/Creatinine ratio 22 (H) 09/27/2022 10:50 AM    GFR est AA >60 09/27/2022 10:50 AM    GFR est non-AA >60 09/27/2022 10:50 AM    Calcium 9.8 09/27/2022 10:50 AM    Bilirubin, total 0.5 09/27/2022 10:50 AM    Alk. phosphatase 68 09/27/2022 10:50 AM    Protein, total 6.3 (L) 09/27/2022 10:50 AM    Albumin 3.9 09/27/2022 10:50 AM    Globulin 2.4 09/27/2022 10:50 AM    A-G Ratio 1.6 09/27/2022 10:50 AM    ALT (SGPT) 23 09/27/2022 10:50 AM       EKG:  SB       Assessment:          ICD-10-CM ICD-9-CM    1. Essential hypertension  I10 401.9 AMB POC EKG ROUTINE W/ 12 LEADS, INTER & REP      ECHO ADULT COMPLETE      CARDIAC HOLTER MONITOR      2. Mixed hyperlipidemia  E78.2 272.2 AMB POC EKG ROUTINE W/ 12 LEADS, INTER & REP      ECHO ADULT COMPLETE      CARDIAC HOLTER MONITOR      3. Chronic obstructive pulmonary disease, unspecified COPD type (HCC)  J44.9 496 AMB POC EKG ROUTINE W/ 12 LEADS, INTER & REP      ECHO ADULT COMPLETE      CARDIAC HOLTER MONITOR      4. Sinus bradycardia  R00.1 427.89 AMB POC EKG ROUTINE W/ 12 LEADS, INTER & REP      ECHO ADULT COMPLETE      CARDIAC HOLTER MONITOR      5. Pericardial effusion  I31.39 423.9 AMB POC EKG ROUTINE W/ 12 LEADS, INTER & REP      ECHO ADULT COMPLETE      CARDIAC HOLTER MONITOR      6.  Fatigue, unspecified type  R53.83 780.79 ECHO ADULT COMPLETE      CARDIAC HOLTER MONITOR      7. Cardiac murmur  R01.1 785.2           Orders Placed This Encounter    AMB POC EKG ROUTINE W/ 12 LEADS, INTER & REP     Order Specific Question:   Reason for Exam:     Answer:   HTN       . Moderate pericardial effusion by TTE in 11/18 - no sx of tamponade, today shows resolution of pericardial effusion   -initial effusion? Viral/inflammatory in etiology   3. Dyspnea - no ischemia noted on nuclear stress test in 11/18, TTE today showed normal LVEF, likely related to her COPD   4. Body mass index is 34.44 kg/m². 5. Lichen planus/candida - [facility] by [facility] at [facility]   6. HTN - well controlled on amlodipine 10mg daily and lisinopril 40mg daily   8. ?Lupus - ZACH negative, followed by Dr. Johan Steinberg      Echo 3/19 - (prelim), no pericardial effusion, normal LVEF, PASP normal    Nuc Stress 11/18 - no ischemia, LVEF 66%  Echo 11/18 - LV mildly dilated with LVEF 55 % to 60 %, no WMA, mild MR, mild TR, dilated IVC, moderate pericardial effusion without signs of tamponade, no hemodynamic compromise, effusion circumferential to the heart, a portion of organization was noted along the rv free wall, otherwise the effusion wasfree flowing and echo lucent. Plan:     Fatigue, Bradycardia  3/6 BHARGAVI  Echo 3/19 -  no pericardial effusion, normal LVEF, PASP normal    Nuc Stress 11/18 - no ischemia, LVEF 66%  Repeat echo,  2 day holter      Hx  Moderate pericardial effusion by TTE in 11/18 - no sx of tamponade, today shows resolution of pericardial effusion   -initial effusion? Viral/inflammatory in etiology  Echo 11/18 - LV mildly dilated with LVEF 55 % to 60 %, no WMA, mild MR, mild TR, dilated IVC, moderate pericardial effusion without signs of tamponade, no hemodynamic compromise, effusion circumferential to the heart, a portion of organization was noted along the rv free wall, otherwise the effusion wasfree flowing and echo lucent.   Repeat Echo 3/19 -  no pericardial effusion, normal LVEF, PASP normal      HTN  Controlled with current therapy  Serum Cr 0.77 in 9/2022  Recall avoid hctz d/t hyponatremia    HLD  9/2022 LDL 88 On statin Labs and lipids per PCP    COPD  Followed by Pulmonary--Dr Ana De Jesus    Venous Insufficiency  Followed by Dr James Reyes    Patient was made aware during visit today that all testing completed would be instantaneously available on their MyChart for review. Discussed that these results will be made available to the provider at the same time. They were advised to wait at least 3 business days to allow for provider's interpretation of results with follow-up before calling our office with concerns about their results. Continue current care and f/u in 6 months.         Lynn Rivera NP

## 2022-11-17 ENCOUNTER — OFFICE VISIT (OUTPATIENT)
Dept: CARDIOLOGY CLINIC | Age: 77
End: 2022-11-17
Payer: MEDICARE

## 2022-11-17 VITALS
TEMPERATURE: 97 F | HEIGHT: 60 IN | HEART RATE: 61 BPM | RESPIRATION RATE: 22 BRPM | SYSTOLIC BLOOD PRESSURE: 118 MMHG | WEIGHT: 139 LBS | BODY MASS INDEX: 27.29 KG/M2 | OXYGEN SATURATION: 99 % | DIASTOLIC BLOOD PRESSURE: 60 MMHG

## 2022-11-17 DIAGNOSIS — R01.1 CARDIAC MURMUR: ICD-10-CM

## 2022-11-17 DIAGNOSIS — R53.83 FATIGUE, UNSPECIFIED TYPE: ICD-10-CM

## 2022-11-17 DIAGNOSIS — I10 ESSENTIAL HYPERTENSION: Primary | ICD-10-CM

## 2022-11-17 DIAGNOSIS — J44.9 CHRONIC OBSTRUCTIVE PULMONARY DISEASE, UNSPECIFIED COPD TYPE (HCC): ICD-10-CM

## 2022-11-17 DIAGNOSIS — E78.2 MIXED HYPERLIPIDEMIA: ICD-10-CM

## 2022-11-17 DIAGNOSIS — R00.1 SINUS BRADYCARDIA: ICD-10-CM

## 2022-11-17 DIAGNOSIS — I31.39 PERICARDIAL EFFUSION: ICD-10-CM

## 2022-11-17 PROCEDURE — 93000 ELECTROCARDIOGRAM COMPLETE: CPT | Performed by: NURSE PRACTITIONER

## 2022-11-17 PROCEDURE — G8427 DOCREV CUR MEDS BY ELIG CLIN: HCPCS | Performed by: NURSE PRACTITIONER

## 2022-11-17 PROCEDURE — G8752 SYS BP LESS 140: HCPCS | Performed by: NURSE PRACTITIONER

## 2022-11-17 PROCEDURE — 1101F PT FALLS ASSESS-DOCD LE1/YR: CPT | Performed by: NURSE PRACTITIONER

## 2022-11-17 PROCEDURE — G8417 CALC BMI ABV UP PARAM F/U: HCPCS | Performed by: NURSE PRACTITIONER

## 2022-11-17 PROCEDURE — 3078F DIAST BP <80 MM HG: CPT | Performed by: NURSE PRACTITIONER

## 2022-11-17 PROCEDURE — 1090F PRES/ABSN URINE INCON ASSESS: CPT | Performed by: NURSE PRACTITIONER

## 2022-11-17 PROCEDURE — G8399 PT W/DXA RESULTS DOCUMENT: HCPCS | Performed by: NURSE PRACTITIONER

## 2022-11-17 PROCEDURE — G8536 NO DOC ELDER MAL SCRN: HCPCS | Performed by: NURSE PRACTITIONER

## 2022-11-17 PROCEDURE — 1123F ACP DISCUSS/DSCN MKR DOCD: CPT | Performed by: NURSE PRACTITIONER

## 2022-11-17 PROCEDURE — 3074F SYST BP LT 130 MM HG: CPT | Performed by: NURSE PRACTITIONER

## 2022-11-17 PROCEDURE — G8432 DEP SCR NOT DOC, RNG: HCPCS | Performed by: NURSE PRACTITIONER

## 2022-11-17 PROCEDURE — G8754 DIAS BP LESS 90: HCPCS | Performed by: NURSE PRACTITIONER

## 2022-11-17 PROCEDURE — 99204 OFFICE O/P NEW MOD 45 MIN: CPT | Performed by: NURSE PRACTITIONER

## 2022-11-17 RX ORDER — AMLODIPINE BESYLATE 10 MG/1
TABLET ORAL
Qty: 90 TABLET | Refills: 3 | Status: SHIPPED | OUTPATIENT
Start: 2022-11-17

## 2022-11-17 NOTE — PROGRESS NOTES
Identified pt with two pt identifiers(name and ). Reviewed record in preparation for visit and have obtained necessary documentation. Chief Complaint   Patient presents with    New Patient    Hypertension      Vitals:    22 1052   BP: 118/60   Pulse: 61   Resp: 22   Temp: 97 °F (36.1 °C)   TempSrc: Temporal   SpO2: 99%   Weight: 139 lb (63 kg)   Height: 5' (1.524 m)   PainSc:   0 - No pain       Medications reviewed/approved by provider. Health Maintenance Review: Patient reminded of \"due or due soon\" health maintenance. I have asked the patient to contact his/her primary care provider (PCP) for follow-up on his/her health maintenance. Coordination of Care Questionnaire:  :   1) Have you been to an emergency room, urgent care, or hospitalized since your last visit? If yes, where when, and reason for visit? no       2. Have seen or consulted any other health care provider since your last visit? If yes, where when, and reason for visit? NO      Patient is accompanied by self I have received verbal consent from Neva Hodgkin to discuss any/all medical information while they are present in the room.

## 2022-11-28 ENCOUNTER — CLINICAL SUPPORT (OUTPATIENT)
Dept: CARDIOLOGY CLINIC | Age: 77
End: 2022-11-28
Payer: MEDICARE

## 2022-11-28 DIAGNOSIS — R00.1 SINUS BRADYCARDIA: Primary | ICD-10-CM

## 2022-11-28 DIAGNOSIS — R53.83 FATIGUE, UNSPECIFIED TYPE: ICD-10-CM

## 2022-11-28 NOTE — PROGRESS NOTES
OFFICE hook up (in office supply shortage. Had monitor shipped to pt and she brought it in clinic for help with hook up.)  HOLTER 48 hr monitor only. Verified patient with two patient identifiers. Patient verbalized understanding of its use. Ordering Provider:  Emily Cheng MD  Reason: Essential hypertension [I10 (ICD-10-CM)]; Mixed hyperlipidemia [E78.2 (ICD-10-CM)]; Chronic obstructive pulmonary disease, unspecified COPD type ([facility] [J44.9 (ICD-10-CM)]; Sinus bradycardia [R00.1 (ICD-10-CM)]; Pericardial effusion [I31.39 (ICD-10-CM)]; Fatigue, unspecified type [R53.83 (ICD-10-CM)]  Start time: 11:49am    Patient has been successfully enrolled through [employer]. No LOS.

## 2022-11-30 ENCOUNTER — HOSPITAL ENCOUNTER (OUTPATIENT)
Dept: ULTRASOUND IMAGING | Age: 77
Discharge: HOME | End: 2022-11-30
Attending: NURSE PRACTITIONER
Payer: MEDICARE

## 2022-11-30 DIAGNOSIS — I10 ESSENTIAL HYPERTENSION: ICD-10-CM

## 2022-11-30 DIAGNOSIS — R00.1 SINUS BRADYCARDIA: ICD-10-CM

## 2022-11-30 DIAGNOSIS — J44.9 CHRONIC OBSTRUCTIVE PULMONARY DISEASE, UNSPECIFIED COPD TYPE (HCC): ICD-10-CM

## 2022-11-30 DIAGNOSIS — R53.83 FATIGUE, UNSPECIFIED TYPE: ICD-10-CM

## 2022-11-30 DIAGNOSIS — E78.2 MIXED HYPERLIPIDEMIA: ICD-10-CM

## 2022-11-30 DIAGNOSIS — I31.39 PERICARDIAL EFFUSION: ICD-10-CM

## 2022-11-30 PROCEDURE — 93306 TTE W/DOPPLER COMPLETE: CPT

## 2022-12-03 LAB
ECHO AO ROOT DIAM: 2.6 CM
ECHO AV AREA PEAK VELOCITY: 1.5 CM2
ECHO AV AREA VTI: 1.7 CM2
ECHO AV MEAN GRADIENT: 11 MMHG
ECHO AV MEAN VELOCITY: 1.6 M/S
ECHO AV PEAK GRADIENT: 19 MMHG
ECHO AV PEAK VELOCITY: 2.2 M/S
ECHO AV VELOCITY RATIO: 0.41
ECHO AV VTI: 55.9 CM
ECHO EST RA PRESSURE: 10 MMHG
ECHO LA DIAMETER: 4.5 CM
ECHO LA TO AORTIC ROOT RATIO: 1.73
ECHO LA VOL 2C: 49 ML (ref 22–52)
ECHO LA VOL 4C: 85 ML (ref 22–52)
ECHO LA VOLUME AREA LENGTH: 69 ML
ECHO LV FRACTIONAL SHORTENING: 32 % (ref 28–44)
ECHO LV INTERNAL DIMENSION DIASTOLIC: 4.4 CM (ref 3.9–5.3)
ECHO LV INTERNAL DIMENSION SYSTOLIC: 3 CM
ECHO LV IVSD: 0.9 CM (ref 0.6–0.9)
ECHO LV MASS 2D: 128 G (ref 67–162)
ECHO LV POSTERIOR WALL DIASTOLIC: 0.9 CM (ref 0.6–0.9)
ECHO LV RELATIVE WALL THICKNESS RATIO: 0.41
ECHO LVOT AREA: 3.8 CM2
ECHO LVOT AV VTI INDEX: 0.43
ECHO LVOT DIAM: 2.2 CM
ECHO LVOT MEAN GRADIENT: 2 MMHG
ECHO LVOT PEAK GRADIENT: 3 MMHG
ECHO LVOT PEAK VELOCITY: 0.9 M/S
ECHO LVOT SV: 91.9 ML
ECHO LVOT VTI: 24.2 CM
ECHO RIGHT VENTRICULAR SYSTOLIC PRESSURE (RVSP): 34 MMHG
ECHO TV REGURGITANT MAX VELOCITY: 2.47 M/S
ECHO TV REGURGITANT PEAK GRADIENT: 25 MMHG

## 2022-12-04 NOTE — PROGRESS NOTES
Echo showed NORMAL heart pumping strength, mild stenosis or narrowing across aortic valve;  mild-moderate leakage across tricuspid valve but stable. No evidence of recurrent pericardial effusion.

## 2023-01-02 ENCOUNTER — TELEPHONE (OUTPATIENT)
Dept: CARDIOLOGY CLINIC | Age: 78
End: 2023-01-02

## 2023-01-02 NOTE — TELEPHONE ENCOUNTER
Please advise patient that her monitor showed an average heart rate of 60 bpm which is normal.  She had a few short episodes of fast heartbeat called supraventricular tachycardia that did not last long. She had 1 episode of a slow heartbeat at 10:17 PM, but slowest was 41 bpm.  At this point unless she is feeling like she is severely dizzy or going to faint, or symptoms with heart rate less than 45 bpm, no further action is needed at this time. If she does find that she is weak or dizzy with low blood pressure and heart rate less than 45, she needs to let us know.

## 2023-01-03 ENCOUNTER — OFFICE VISIT (OUTPATIENT)
Dept: FAMILY MEDICINE CLINIC | Age: 78
End: 2023-01-03
Payer: MEDICARE

## 2023-01-03 VITALS
TEMPERATURE: 97.8 F | WEIGHT: 139.8 LBS | HEART RATE: 65 BPM | RESPIRATION RATE: 17 BRPM | HEIGHT: 60 IN | SYSTOLIC BLOOD PRESSURE: 138 MMHG | OXYGEN SATURATION: 98 % | DIASTOLIC BLOOD PRESSURE: 60 MMHG | BODY MASS INDEX: 27.45 KG/M2

## 2023-01-03 DIAGNOSIS — I48.91 ATRIAL FIBRILLATION, UNSPECIFIED TYPE (HCC): ICD-10-CM

## 2023-01-03 DIAGNOSIS — E03.9 ACQUIRED HYPOTHYROIDISM: ICD-10-CM

## 2023-01-03 DIAGNOSIS — J44.9 CHRONIC OBSTRUCTIVE PULMONARY DISEASE, UNSPECIFIED COPD TYPE (HCC): Primary | ICD-10-CM

## 2023-01-03 DIAGNOSIS — I99.8 VASCULAR INSUFFICIENCY OF EXTREMITY: ICD-10-CM

## 2023-01-03 DIAGNOSIS — J06.9 UPPER RESPIRATORY TRACT INFECTION, UNSPECIFIED TYPE: ICD-10-CM

## 2023-01-03 DIAGNOSIS — E87.1 HYPONATREMIA: ICD-10-CM

## 2023-01-03 DIAGNOSIS — I10 ESSENTIAL HYPERTENSION: ICD-10-CM

## 2023-01-03 DIAGNOSIS — E78.2 MIXED HYPERLIPIDEMIA: ICD-10-CM

## 2023-01-03 PROCEDURE — G8417 CALC BMI ABV UP PARAM F/U: HCPCS | Performed by: NURSE PRACTITIONER

## 2023-01-03 PROCEDURE — 3075F SYST BP GE 130 - 139MM HG: CPT | Performed by: NURSE PRACTITIONER

## 2023-01-03 PROCEDURE — G8536 NO DOC ELDER MAL SCRN: HCPCS | Performed by: NURSE PRACTITIONER

## 2023-01-03 PROCEDURE — G8427 DOCREV CUR MEDS BY ELIG CLIN: HCPCS | Performed by: NURSE PRACTITIONER

## 2023-01-03 PROCEDURE — 99214 OFFICE O/P EST MOD 30 MIN: CPT | Performed by: NURSE PRACTITIONER

## 2023-01-03 PROCEDURE — 3078F DIAST BP <80 MM HG: CPT | Performed by: NURSE PRACTITIONER

## 2023-01-03 PROCEDURE — 1101F PT FALLS ASSESS-DOCD LE1/YR: CPT | Performed by: NURSE PRACTITIONER

## 2023-01-03 PROCEDURE — 36415 COLL VENOUS BLD VENIPUNCTURE: CPT | Performed by: NURSE PRACTITIONER

## 2023-01-03 PROCEDURE — G8432 DEP SCR NOT DOC, RNG: HCPCS | Performed by: NURSE PRACTITIONER

## 2023-01-03 PROCEDURE — G8399 PT W/DXA RESULTS DOCUMENT: HCPCS | Performed by: NURSE PRACTITIONER

## 2023-01-03 PROCEDURE — 1123F ACP DISCUSS/DSCN MKR DOCD: CPT | Performed by: NURSE PRACTITIONER

## 2023-01-03 PROCEDURE — 1090F PRES/ABSN URINE INCON ASSESS: CPT | Performed by: NURSE PRACTITIONER

## 2023-01-03 RX ORDER — PREDNISONE 10 MG/1
TABLET ORAL
Qty: 21 TABLET | Refills: 0 | Status: SHIPPED | OUTPATIENT
Start: 2023-01-03

## 2023-01-03 NOTE — PATIENT INSTRUCTIONS
Take some Biotin. This is a supplement available over the counter. It may help with hair. I am checking your thyroid and your sodium today. If your thyroid function is still normal, I will try the prescription medicine spironolactone. This is a fluid pill that helps with hair growth. It will be worth trying. I am sending in steroids for your cold. Please call me Monday if you're not improving.

## 2023-01-03 NOTE — PROGRESS NOTES
Chief Complaint   Patient presents with    Medication Evaluation     6mth check up         HPI:     is a 65 y.o. female here today for a follow up. HTN: Compliant with meds, amlodipine, lisinopril. COPD: Sees pulmonary yearly, Dr. Oxana Reno. Budesonide changed to Flovent, remains on Anoro. Stable, intermittent cough but tolerable. Oral lichen planus/candidiasis: Followed by Dr. Marissa Lofton in [place]. Also with hx of SCC, BCC. Osteopenia: Failed Boniva, remains on Prolia. Next DEXA August 2024. New issues: In today for her check up with a few concerns. Fatigue has stabilized but she continues to have thinning hair, falling out in clumps around her temples. She wonders about her thyroid medication dose. Last TSH was ok. She also reports URI symptoms since Christmas. Negative COVID, no exposure. Lastly, she is on the schedule for bilateral saphenous vein ablation. Allergies   Allergen Reactions    Latex Rash    Keflex [Cephalexin] Rash    Levaquin [Levofloxacin] Rash    Sulfa (Sulfonamide Antibiotics) Rash    Macrobid [Nitrofurantoin Monohyd/M-Cryst] Rash       Current Outpatient Medications   Medication Sig    predniSONE (STERAPRED DS) 10 mg dose pack See administration instruction per 10mg dose pack    amLODIPine (NORVASC) 10 mg tablet TAKE 1 TABLET BY MOUTH EVERY DAY    Anoro Ellipta 62.5-25 mcg/actuation inhaler TAKE 1 PUFF BY MOUTH EVERY DAY    famotidine (PEPCID) 40 mg tablet TAKE 1 (ONE) TABLET TWICE A DAY, BEFORE MEALS    levothyroxine (SYNTHROID) 88 mcg tablet Take 1 Tablet by mouth Daily (before breakfast). atorvastatin (LIPITOR) 10 mg tablet Take 1 Tablet by mouth daily.     benzonatate (TESSALON) 200 mg capsule     Myrbetriq 50 mg ER tablet TAKE 1 TABLET BY MOUTH EVERY DAY    hydrocortisone (HYTONE) 2.5 % ointment     OTHER Prevagen    lisinopriL (PRINIVIL, ZESTRIL) 40 mg tablet TAKE 1 TABLET BY MOUTH EVERY DAY    ciclopirox (Ciclodan) 0.77 % topical cream Apply  to affected area two (2) times a day. PRN    clobetasoL (TEMOVATE) 0.05 % ointment APPLY TO AFFECTED AREA EVERY DAY    montelukast (SINGULAIR) 10 mg tablet TAKE 1 TABLET BY MOUTH EVERY DAY    betamethasone valerate (VALISONE) 0.1 % topical cream PRN    denosumab (PROLIA) 60 mg/mL injection 60 mg by SubCUTAneous route. hydroxychloroquine (PLAQUENIL) 200 mg tablet TAKE 1 TABLET BY MOUTH TWICE A DAY    multivitamin (ONE A DAY) tablet Take 1 Tab by mouth daily. guaifenesin (MUCINEX PO) Take  by mouth. acetaminophen (ARTHRITIS PAIN RELIEF) 650 mg TbER Take 1 Tab by mouth nightly as needed. albuterol (PROVENTIL HFA, VENTOLIN HFA, PROAIR HFA) 90 mcg/actuation inhaler Take 2 Puffs by inhalation. aspirin 81 mg chewable tablet Take 81 mg by mouth daily. cholecalciferol (VITAMIN D3) (1000 Units /25 mcg) tablet Take 1,000 Units by mouth daily. Indications: VITAMIN D DEFICIENCY     No current facility-administered medications for this visit. Past Medical History:   Diagnosis Date    Allergic rhinitis     COPD (chronic obstructive pulmonary disease) (HCC)     Hypertension     Hypothyroidism     Lichen planus     seen at [facility]    Menopause     Osteoarthrosis, unspecified whether generalized or localized, unspecified site     Overactive bladder     Phlebitis and thrombophlebitis of lower extremities, unspecified     Raynaud's syndrome        Family History   Problem Relation Age of Onset    Stroke Mother     Dementia Mother     Cancer Father         BRAIN TUMOR    COPD Brother         emphsema    Cancer Niece        ROS:  Denies fever, chills, +cough, chest pain, SOB,  nausea, vomiting, diarrhea, dysuria. Denies rashes, wounds, arthrlagias, weakness, numbness, visual changes, depression, wt loss, wt gain, hemoptysis, hematochezia or melena. Patient is not experiencing chest pain radiating to the jaw and/or down the arms.     Physical Examination:    /60 (BP 1 Location: Right arm, BP Patient Position: Sitting, BP Cuff Size: Adult)   Pulse 65   Temp 97.8 °F (36.6 °C) (Temporal)   Resp 17   Ht 5' (1.524 m)   Wt 139 lb 12.8 oz (63.4 kg)   SpO2 98%   BMI 27.30 kg/m²     Wt Readings from Last 3 Encounters:   01/03/23 139 lb 12.8 oz (63.4 kg)   11/17/22 139 lb (63 kg)   09/27/22 140 lb 6.4 oz (63.7 kg)     General: WDWN Female in NAD  HEENT: NC/AT, PERRL, TM pearly gray  Neck: Supple, no JVD, no bruits  Chest:  Respirations even and unlabored, lungs CTA throughout. Dry cough  Cardiac: RRR. No clicks, murmurs, gallops, or rubs  Extremities:  No cyanosis, clubbing or edema. Musculoskeletal:  Normal strength and ROM throughout. Neurologic:  Ambulatory without assistance, CN 2-12 grossly intact. Skin: intact and warm to the touch. Lymphadenopathy: no cervical or supraclavicular nodes  Psych: Pleasant and appropriate. Judgment normal.     ASSESSMENT AND PLAN:       ICD-10-CM ICD-9-CM    1. Chronic obstructive pulmonary disease, unspecified COPD type ([facility])  J44.9 496 predniSONE (STERAPRED DS) 10 mg dose pack      2. Atrial fibrillation, unspecified type ([facility]  I48.91 427.31       3. Upper respiratory tract infection, unspecified type  J06.9 465.9 predniSONE (STERAPRED DS) 10 mg dose pack      4. Acquired hypothyroidism  E03.9 244.9 CA COLLECTION VENOUS BLOOD VENIPUNCTURE      T4, FREE      TSH 3RD GENERATION      TSH 3RD GENERATION      T4, FREE      CA COLLECTION VENOUS BLOOD VENIPUNCTURE      5. Essential hypertension  I10 401.9 CA COLLECTION VENOUS BLOOD VENIPUNCTURE      CBC WITH AUTOMATED DIFF      METABOLIC PANEL, BASIC      METABOLIC PANEL, BASIC      CBC WITH AUTOMATED DIFF      CA COLLECTION VENOUS BLOOD VENIPUNCTURE      6. Hyponatremia  E87.1 276.1       7. Mixed hyperlipidemia  E78.2 272.2 CA COLLECTION VENOUS BLOOD VENIPUNCTURE      LIPID PANEL      LIPID PANEL      CA COLLECTION VENOUS BLOOD VENIPUNCTURE      8. Vascular insufficiency of extremity  I99.8 459.9           Check up labs today.  We reviewed results of cardiac testing. I also looked over her vascular results. Discussed hair loss. Trial of Biotin, check TSH. If WNL, consider spironolactone, derm referral. Rx prednisone for URI symptoms. Call in 1 week if no improvement. Patient aware of plan of care and verbalized understanding. Questions answered. RTC in 3 months, or sooner if needed.     Mima Trujillo, JOHNNIE

## 2023-01-03 NOTE — PROGRESS NOTES
1. \"Have you been to the ER, urgent care clinic since your last visit? Hospitalized since your last visit? \" No    2. \"Have you seen or consulted any other health care providers outside of the [facility] since your last visit? \" No     3. For patients aged 39-70: Has the patient had a colonoscopy / FIT/ Cologuard? Yes - Care Gap present. Most recent result on file      If the patient is female:    4. For patients aged 41-77: Has the patient had a mammogram within the past 2 years? Yes - Care Gap present. Most recent result on file      5. For patients aged 21-65: Has the patient had a pap smear?  NA - based on age or sex

## 2023-01-04 LAB
ANION GAP SERPL CALC-SCNC: 8 MMOL/L (ref 5–15)
BASOPHILS # BLD: 0.1 K/UL (ref 0–0.1)
BASOPHILS NFR BLD: 1 % (ref 0–1)
BUN SERPL-MCNC: 15 MG/DL (ref 6–20)
BUN/CREAT SERPL: 17 (ref 12–20)
CALCIUM SERPL-MCNC: 10.1 MG/DL (ref 8.5–10.1)
CHLORIDE SERPL-SCNC: 102 MMOL/L (ref 97–108)
CHOLEST SERPL-MCNC: 143 MG/DL
CO2 SERPL-SCNC: 27 MMOL/L (ref 21–32)
CREAT SERPL-MCNC: 0.9 MG/DL (ref 0.55–1.02)
DIFFERENTIAL METHOD BLD: ABNORMAL
EOSINOPHIL # BLD: 0.2 K/UL (ref 0–0.4)
EOSINOPHIL NFR BLD: 2 % (ref 0–7)
ERYTHROCYTE [DISTWIDTH] IN BLOOD BY AUTOMATED COUNT: 11.9 % (ref 11.5–14.5)
GLUCOSE SERPL-MCNC: 88 MG/DL (ref 65–100)
HCT VFR BLD AUTO: 37.6 % (ref 35–47)
HDLC SERPL-MCNC: 90 MG/DL
HDLC SERPL: 1.6 {RATIO} (ref 0–5)
HGB BLD-MCNC: 12.7 G/DL (ref 11.5–16)
IMM GRANULOCYTES # BLD AUTO: 0.1 K/UL (ref 0–0.04)
IMM GRANULOCYTES NFR BLD AUTO: 1 % (ref 0–0.5)
LDLC SERPL CALC-MCNC: 43.8 MG/DL (ref 0–100)
LYMPHOCYTES # BLD: 0.7 K/UL (ref 0.8–3.5)
LYMPHOCYTES NFR BLD: 8 % (ref 12–49)
MCH RBC QN AUTO: 32.2 PG (ref 26–34)
MCHC RBC AUTO-ENTMCNC: 33.8 G/DL (ref 30–36.5)
MCV RBC AUTO: 95.4 FL (ref 80–99)
MONOCYTES # BLD: 0.8 K/UL (ref 0–1)
MONOCYTES NFR BLD: 9 % (ref 5–13)
NEUTS SEG # BLD: 7.4 K/UL (ref 1.8–8)
NEUTS SEG NFR BLD: 79 % (ref 32–75)
NRBC # BLD: 0 K/UL (ref 0–0.01)
NRBC BLD-RTO: 0 PER 100 WBC
PLATELET # BLD AUTO: 292 K/UL (ref 150–400)
PMV BLD AUTO: 9.5 FL (ref 8.9–12.9)
POTASSIUM SERPL-SCNC: 4.3 MMOL/L (ref 3.5–5.1)
RBC # BLD AUTO: 3.94 M/UL (ref 3.8–5.2)
RBC MORPH BLD: ABNORMAL
SODIUM SERPL-SCNC: 137 MMOL/L (ref 136–145)
T4 FREE SERPL-MCNC: 1.1 NG/DL (ref 0.8–1.5)
TRIGL SERPL-MCNC: 46 MG/DL (ref ?–150)
TSH SERPL DL<=0.05 MIU/L-ACNC: 0.61 UIU/ML (ref 0.36–3.74)
VLDLC SERPL CALC-MCNC: 9.2 MG/DL
WBC # BLD AUTO: 9.3 K/UL (ref 3.6–11)

## 2023-01-05 NOTE — PROGRESS NOTES
Please call. Cholesterol looks excellent. Sodium is finally back in the normal range. Kidney function, liver function were normal. Thyroid is in the normal range. I know she's been feeling poorly since we cut her dose from 100 mcg to 88 mcg. Why don't we try alternating the two to see if we can get her feeling better without throwing her thyroid numbers out of range? If she's ok with that, let me know if she has any 100 mcg at home or if she needs a new Rx.

## 2023-01-05 NOTE — PROGRESS NOTES
Patient verified by stating name and date of birth. Patient informed of lab results and states understanding per Anni Fonseca Patient would like to have the RX sent to [facility] to up the dosage.

## 2023-01-06 RX ORDER — LEVOTHYROXINE SODIUM 100 UG/1
TABLET ORAL
Qty: 45 TABLET | Refills: 3 | Status: SHIPPED | OUTPATIENT
Start: 2023-01-06

## 2023-01-31 RX ORDER — LISINOPRIL 40 MG/1
TABLET ORAL
Qty: 90 TABLET | Refills: 3 | Status: SHIPPED | OUTPATIENT
Start: 2023-01-31

## 2023-03-02 ENCOUNTER — HOSPITAL ENCOUNTER (OUTPATIENT)
Dept: INFUSION THERAPY | Age: 78
Discharge: HOME | End: 2023-03-02
Payer: MEDICARE

## 2023-03-02 VITALS
SYSTOLIC BLOOD PRESSURE: 125 MMHG | RESPIRATION RATE: 18 BRPM | TEMPERATURE: 97.6 F | DIASTOLIC BLOOD PRESSURE: 54 MMHG | HEART RATE: 58 BPM | OXYGEN SATURATION: 97 %

## 2023-03-02 DIAGNOSIS — M85.80 OSTEOPENIA WITH HIGH RISK OF FRACTURE: Primary | ICD-10-CM

## 2023-03-02 LAB
ALBUMIN SERPL-MCNC: 3.9 G/DL (ref 3.5–5)
ALBUMIN/GLOB SERPL: 1.4 (ref 1.1–2.2)
ALP SERPL-CCNC: 74 U/L (ref 45–117)
ALT SERPL-CCNC: 23 U/L (ref 12–78)
ANION GAP SERPL CALC-SCNC: 9 MMOL/L (ref 5–15)
AST SERPL-CCNC: 19 U/L (ref 15–37)
BILIRUB SERPL-MCNC: 0.6 MG/DL (ref 0.2–1)
BUN SERPL-MCNC: 21 MG/DL (ref 6–20)
BUN/CREAT SERPL: 27 (ref 12–20)
CALCIUM SERPL-MCNC: 9.3 MG/DL (ref 8.5–10.1)
CHLORIDE SERPL-SCNC: 97 MMOL/L (ref 97–108)
CO2 SERPL-SCNC: 27 MMOL/L (ref 21–32)
CREAT SERPL-MCNC: 0.78 MG/DL (ref 0.55–1.02)
GLOBULIN SER CALC-MCNC: 2.8 G/DL (ref 2–4)
GLUCOSE SERPL-MCNC: 85 MG/DL (ref 65–100)
MAGNESIUM SERPL-MCNC: 1.7 MG/DL (ref 1.6–2.4)
PHOSPHATE SERPL-MCNC: 4.9 MG/DL (ref 2.6–4.7)
POTASSIUM SERPL-SCNC: 4.8 MMOL/L (ref 3.5–5.1)
PROT SERPL-MCNC: 6.7 G/DL (ref 6.4–8.2)
SODIUM SERPL-SCNC: 133 MMOL/L (ref 136–145)

## 2023-03-02 PROCEDURE — 84100 ASSAY OF PHOSPHORUS: CPT

## 2023-03-02 PROCEDURE — 96372 THER/PROPH/DIAG INJ SC/IM: CPT

## 2023-03-02 PROCEDURE — 74011250636 HC RX REV CODE- 250/636: Performed by: NURSE PRACTITIONER

## 2023-03-02 PROCEDURE — 83735 ASSAY OF MAGNESIUM: CPT

## 2023-03-02 PROCEDURE — 36415 COLL VENOUS BLD VENIPUNCTURE: CPT

## 2023-03-02 PROCEDURE — 80053 COMPREHEN METABOLIC PANEL: CPT

## 2023-03-02 RX ORDER — ONDANSETRON 2 MG/ML
8 INJECTION INTRAMUSCULAR; INTRAVENOUS AS NEEDED
OUTPATIENT
Start: 2023-08-27

## 2023-03-02 RX ORDER — DIPHENHYDRAMINE HYDROCHLORIDE 50 MG/ML
50 INJECTION, SOLUTION INTRAMUSCULAR; INTRAVENOUS AS NEEDED
Start: 2023-08-27

## 2023-03-02 RX ORDER — HYDROCORTISONE SODIUM SUCCINATE 100 MG/2ML
100 INJECTION, POWDER, FOR SOLUTION INTRAMUSCULAR; INTRAVENOUS AS NEEDED
OUTPATIENT
Start: 2023-08-27

## 2023-03-02 RX ORDER — ACETAMINOPHEN 325 MG/1
650 TABLET ORAL AS NEEDED
Start: 2023-08-27

## 2023-03-02 RX ORDER — EPINEPHRINE 1 MG/ML
0.3 INJECTION, SOLUTION, CONCENTRATE INTRAVENOUS AS NEEDED
OUTPATIENT
Start: 2023-08-27

## 2023-03-02 RX ORDER — DIPHENHYDRAMINE HYDROCHLORIDE 50 MG/ML
25 INJECTION, SOLUTION INTRAMUSCULAR; INTRAVENOUS AS NEEDED
Start: 2023-08-27

## 2023-03-02 RX ORDER — ALBUTEROL SULFATE 0.83 MG/ML
2.5 SOLUTION RESPIRATORY (INHALATION) AS NEEDED
Start: 2023-08-27

## 2023-03-02 RX ADMIN — DENOSUMAB 60 MG: 60 INJECTION SUBCUTANEOUS at 14:31

## 2023-03-02 NOTE — PROGRESS NOTES
Patient verified by stating name and date of birth.  Patient informed of lab results and states understanding per Lori Zuñiga

## 2023-03-02 NOTE — DISCHARGE INSTRUCTIONS
denosumab (Prolia)  Pronunciation:  casimiro prasad mab  Brand:  Prolia  What is the most important information I should know about Prolia? This medication guide provides information about the Prolia brand of denosumab. Luis M [place] is another brand of denosumab used to prevent bone fractures and other skeletal conditions in people with tumors that have spread to the bone. Prolia can cause many serious side effects. Call your doctor at once if you have a fever, chills, pain or burning when you urinate, severe stomach pain, cough, shortness of breath, skin problems, numbness or tingling, severe or unusual pain, or skin problems. Do not use if you are pregnant. Use effective birth control while using Prolia, and for at least 5 months after you stop. What is denosumab (Prolia)? Denosumab is a monoclonal antibody. Monoclonal antibodies are made to target and destroy only certain cells in the body. This may help to protect healthy cells from damage. The Prolia brand of denosumab is used to treat osteoporosis or bone loss in men and women who have a high risk of bone fracture. Prolia is sometimes used in people whose bone fracture is caused by certain medicines or cancer treatments. This medication guide provides information about the Prolia brand of denosumab. Luis M [place] is another brand of denosumab used to prevent bone fractures and other skeletal conditions in people with tumors that have spread to the bone. Denosumab may also be used for purposes not listed in this medication guide. What should I discuss with my healthcare provider before receiving Prolia? You should not receive Prolia if you are allergic to denosumab, or if you have:  low levels of calcium in your blood (hypocalcemia); or  if you are pregnant. While you are using Prolia, you should not receive Xgeva, another brand of denosumab.   Tell your doctor if you have ever had:  kidney disease (or if you are on dialysis);  a weak immune system (caused by disease or by using certain medicines);  hypoparathyroidism (decreased functioning of the parathyroid glands);  thyroid surgery;  any condition that makes it hard for your body to absorb nutrients from food (malabsorption);  a latex allergy;  if you are scheduled for a dental procedure; or  if you cannot take daily calcium and vitamin D. Denosumab may cause bone loss (osteonecrosis) in the jaw. Symptoms include jaw pain or numbness, red or swollen gums, loose teeth, gum infection, or slow healing after dental work. The risk of osteonecrosis is highest in people with cancer, blood cell disorders, pre-existing dental problems, or people treated with steroids, chemotherapy, or radiation. Ask your doctor about your own risk. You may need to have a negative pregnancy test before starting this treatment. Do not use Prolia if you are pregnant. It could harm the unborn baby or cause birth defects. Use effective birth control to prevent pregnancy while you are using this medicine and for at least 5 months after your last dose. Tell your doctor right away if you become pregnant. You should not breastfeed while using denosumab. How is Prolia given? Denosumab is injected under the skin of your stomach, upper thigh, or upper arm. A healthcare provider will give you this injection. Prolia is usually given once every 6 months. Your doctor may have you take extra calcium and vitamin D while you are being treated with denosumab. Take only the amount of calcium and vitamin D that your doctor has prescribed. If you need to have any dental work (especially surgery), tell the dentist ahead of time that you are receiving denosumab. Pay special attention to your dental hygiene. Brush and floss your teeth regularly while receiving this medication. You may need to have a dental exam before you begin treatment with Prolia. Follow your doctor's instructions. Your risk of bone fractures can increase when you stop using Prolia.  Do not stop using this medicine without first talking to your doctor. If you keep this medicine at home, store it in the original carton in a refrigerator. Protect from light and do not freeze. Do not shake the prefilled syringe. You may take the carton out of the refrigerator and allow it to reach room temperature before the injection is given. After you have taken Prolia out of the refrigerator, you may keep it at room temperature for up to 14 days. Store in the original container away from heat and light. Each prefilled syringe is for one use only. Throw it away after one use, even if there is still medicine left inside. Use a needle and syringe only once and then place them in a puncture-proof \"sharps\" container. Follow state or local laws about how to dispose of this container. Keep it out of the reach of children and pets. Do not share this medicine with another person, even if they have the same symptoms you have. What happens if I miss a dose? Call your doctor for instructions if you miss a dose or miss an appointment for your Prolia injection. You should receive your missed injection as soon as possible. What happens if I overdose? Seek emergency medical attention or call the Poison Help line at 1-961.919.6197. What should I avoid while receiving Prolia? Follow your doctor's instructions about any restrictions on food, beverages, or activity. What are the possible side effects of Prolia? Get emergency medical help if you have signs of an allergic reaction: hives, itching, rash; difficult breathing, feeling light-headed; swelling of your face, lips, tongue, or throat.   Call your doctor at once if you have:  new or unusual pain in your thigh, hip, or groin;  severe pain in your joints, muscles, or bones;  skin problems such as dryness, peeling, redness, itching, blisters, bumps, oozing, or crusting; or  low calcium level --muscle spasms or contractions, numbness or tingly feeling (around your mouth, or in your fingers and toes). Serious infections may occur during treatment with Prolia. Call your doctor right away if you have signs of infection such as:  fever, chills, night sweats;  swelling, pain, tenderness, warmth, or redness anywhere on your body;  pain or burning when you urinate;  increased or urgent need to urinate;  severe stomach pain; or  cough, wheezing, feeling short of breath. Common side effects may include:  bladder infection (painful or difficult urination);  lung infection (cough, shortness of breath);  headache;  back pain, muscle pain, joint pain;  increased blood pressure;  cold symptoms such as stuffy nose, sneezing, sore throat;  high cholesterol; or  pain in your arms or legs. This is not a complete list of side effects and others may occur. Call your doctor for medical advice about side effects. You may report side effects to FDA at 8-424-FDA-2947. What other drugs will affect Prolia? Other drugs may affect Prolia, including prescription and over-the-counter medicines, vitamins, and herbal products. Tell your doctor about all your current medicines and any medicine you start or stop using. Where can I get more information? Your doctor or pharmacist can provide more information about denosumab (Prolia). Remember, keep this and all other medicines out of the reach of children, never share your medicines with others, and use this medication only for the indication prescribed. Every effort has been made to ensure that the information provided by [address] is accurate, up-to-date, and complete, but no guarantee is made to that effect. Drug information contained herein may be time sensitive. [facility] information has been compiled for use by healthcare practitioners and consumers in the [place] and therefore [employer] does not warrant that uses outside of the [place] are appropriate, unless specifically indicated otherwise.  [facility]'s drug information does not endorse drugs, diagnose patients or recommend therapy. [facility]'s drug information is an informational resource designed to assist licensed healthcare practitioners in caring for their patients and/or to serve consumers viewing this service as a supplement to, and not a substitute for, the expertise, skill, knowledge and judgment of healthcare practitioners. The absence of a warning for a given drug or drug combination in no way should be construed to indicate that the drug or drug combination is safe, effective or appropriate for any given patient. [facility] does not assume any responsibility for any aspect of healthcare administered with the aid of information [facility] provides. The information contained herein is not intended to cover all possible uses, directions, precautions, warnings, drug interactions, allergic reactions, or adverse effects. If you have questions about the drugs you are taking, check with your doctor, nurse or pharmacist.  Copyright 2298-1282 [address]: 12.01. Revision date: 2/18/2020. Care instructions adapted under license by [employer] (which disclaims liability or warranty for this information). If you have questions about a medical condition or this instruction, always ask your healthcare professional. [place] [address] any warranty or liability for your use of this information.

## 2023-03-02 NOTE — PROGRESS NOTES
[facility] Progress Note    Date: 2023    Name: Allegra Mckeon    MRN: [id number]         : 1945      Ms. Rosendo Nelson was assessed and education was provided. Ms. Baig Deaapoorva vitals were reviewed and patient was observed for 5 minutes prior to treatment. Visit Vitals  BP (!) 125/54 (BP 1 Location: Left arm, BP Patient Position: Sitting)   Pulse (!) 58   Temp 97.6 °F (36.4 °C)   Resp 18   SpO2 97%   Breastfeeding No       Lab results were obtained and reviewed. Recent Results (from the past 12 hour(s))   METABOLIC PANEL, COMPREHENSIVE    Collection Time: 23 11:44 AM   Result Value Ref Range    Sodium 133 (L) 136 - 145 mmol/L    Potassium 4.8 3.5 - 5.1 mmol/L    Chloride 97 97 - 108 mmol/L    CO2 27 21 - 32 mmol/L    Anion gap 9 5 - 15 mmol/L    Glucose 85 65 - 100 mg/dL    BUN 21 (H) 6 - 20 MG/DL    Creatinine 0.78 0.55 - 1.02 MG/DL    BUN/Creatinine ratio 27 (H) 12 - 20      eGFR >60 >60 ml/min/1.73m2    Calcium 9.3 8.5 - 10.1 MG/DL    Bilirubin, total 0.6 0.2 - 1.0 MG/DL    ALT (SGPT) 23 12 - 78 U/L    AST (SGOT) 19 15 - 37 U/L    Alk. phosphatase 74 45 - 117 U/L    Protein, total 6.7 6.4 - 8.2 g/dL    Albumin 3.9 3.5 - 5.0 g/dL    Globulin 2.8 2.0 - 4.0 g/dL    A-G Ratio 1.4 1.1 - 2.2     MAGNESIUM    Collection Time: 23 11:44 AM   Result Value Ref Range    Magnesium 1.7 1.6 - 2.4 mg/dL       Prolia 60 mg was administered subcutaneous in left arm. Band-aid applied to site without redness, swelling or pain. Ms. Rosendo Nelson tolerated well, and had no complaints. Patient armband removed and shredded. Ms. Rosendo Nelson was discharged from [place] [address] in stable condition at 1435. She is to return on  at 1400 for her next appointment.     Aldean Furlough, RN  2023  2:45 PM

## 2023-03-06 ENCOUNTER — APPOINTMENT (OUTPATIENT)
Dept: INFUSION THERAPY | Age: 78
End: 2023-03-06

## 2023-03-31 ENCOUNTER — OFFICE VISIT (OUTPATIENT)
Dept: FAMILY MEDICINE CLINIC | Age: 78
End: 2023-03-31
Payer: MEDICARE

## 2023-03-31 VITALS
RESPIRATION RATE: 16 BRPM | BODY MASS INDEX: 26.97 KG/M2 | OXYGEN SATURATION: 97 % | SYSTOLIC BLOOD PRESSURE: 138 MMHG | HEIGHT: 60 IN | DIASTOLIC BLOOD PRESSURE: 60 MMHG | TEMPERATURE: 97.7 F | HEART RATE: 61 BPM | WEIGHT: 137.4 LBS

## 2023-03-31 DIAGNOSIS — R53.82 CHRONIC FATIGUE: ICD-10-CM

## 2023-03-31 DIAGNOSIS — J44.9 CHRONIC OBSTRUCTIVE PULMONARY DISEASE, UNSPECIFIED COPD TYPE (HCC): ICD-10-CM

## 2023-03-31 DIAGNOSIS — N39.492 POSTURAL URINARY INCONTINENCE: ICD-10-CM

## 2023-03-31 DIAGNOSIS — E03.9 ACQUIRED HYPOTHYROIDISM: ICD-10-CM

## 2023-03-31 DIAGNOSIS — Z00.00 MEDICARE ANNUAL WELLNESS VISIT, SUBSEQUENT: Primary | ICD-10-CM

## 2023-03-31 PROCEDURE — 3078F DIAST BP <80 MM HG: CPT | Performed by: NURSE PRACTITIONER

## 2023-03-31 PROCEDURE — G8536 NO DOC ELDER MAL SCRN: HCPCS | Performed by: NURSE PRACTITIONER

## 2023-03-31 PROCEDURE — G8399 PT W/DXA RESULTS DOCUMENT: HCPCS | Performed by: NURSE PRACTITIONER

## 2023-03-31 PROCEDURE — 99214 OFFICE O/P EST MOD 30 MIN: CPT | Performed by: NURSE PRACTITIONER

## 2023-03-31 PROCEDURE — 1090F PRES/ABSN URINE INCON ASSESS: CPT | Performed by: NURSE PRACTITIONER

## 2023-03-31 PROCEDURE — G8417 CALC BMI ABV UP PARAM F/U: HCPCS | Performed by: NURSE PRACTITIONER

## 2023-03-31 PROCEDURE — G8432 DEP SCR NOT DOC, RNG: HCPCS | Performed by: NURSE PRACTITIONER

## 2023-03-31 PROCEDURE — 1123F ACP DISCUSS/DSCN MKR DOCD: CPT | Performed by: NURSE PRACTITIONER

## 2023-03-31 PROCEDURE — G8427 DOCREV CUR MEDS BY ELIG CLIN: HCPCS | Performed by: NURSE PRACTITIONER

## 2023-03-31 PROCEDURE — 3075F SYST BP GE 130 - 139MM HG: CPT | Performed by: NURSE PRACTITIONER

## 2023-03-31 PROCEDURE — G0439 PPPS, SUBSEQ VISIT: HCPCS | Performed by: NURSE PRACTITIONER

## 2023-03-31 PROCEDURE — 1101F PT FALLS ASSESS-DOCD LE1/YR: CPT | Performed by: NURSE PRACTITIONER

## 2023-03-31 PROCEDURE — 36415 COLL VENOUS BLD VENIPUNCTURE: CPT | Performed by: NURSE PRACTITIONER

## 2023-03-31 NOTE — PROGRESS NOTES
Chief Complaint   Patient presents with    Annual Wellness Visit         HPI:    Zeb Malin is a 65 y.o. female here today for a follow up and AWV. HTN: Compliant with meds, amlodipine, lisinopril. COPD: Sees pulmonary yearly, Dr. Ammy Wasserman. Budesonide changed to Flovent, remains on Anoro. Stable, intermittent cough but tolerable. Oral lichen planus/candidiasis: Followed by Dr. Jessica Stapleton in [place]. Also with hx of SCC, BCC. Currently being treated for a persistent rash on her RLE. Osteopenia: Failed Boniva, remains on Prolia. Next DEXA August 2024. New issues: In today for her check up with a few concerns. S/p R saphenous vein ablation, discharged from Dr. Zachary Botello. SHe reports urinary leakage with position changes x several years, worsening. She also endorses daytime inattention, falling asleep while reading or while watching TV. She sleeps soundly at night, no snoring. Wakes up feeling well rested. No naps. No family concerns regarding cognition. She notes thinning hair, brittle nails. On Biotin. Allergies   Allergen Reactions    Latex Rash    Keflex [Cephalexin] Rash    Levaquin [Levofloxacin] Rash    Sulfa (Sulfonamide Antibiotics) Rash    Macrobid [Nitrofurantoin Monohyd/M-Cryst] Rash       Current Outpatient Medications   Medication Sig    lisinopriL (PRINIVIL, ZESTRIL) 40 mg tablet TAKE 1 TABLET BY MOUTH EVERY DAY    levothyroxine (SYNTHROID) 100 mcg tablet 1 tab PO every other morning. Alternate with 88 mcg dose. amLODIPine (NORVASC) 10 mg tablet TAKE 1 TABLET BY MOUTH EVERY DAY    Anoro Ellipta 62.5-25 mcg/actuation inhaler TAKE 1 PUFF BY MOUTH EVERY DAY    famotidine (PEPCID) 40 mg tablet TAKE 1 (ONE) TABLET TWICE A DAY, BEFORE MEALS    levothyroxine (SYNTHROID) 88 mcg tablet Take 1 Tablet by mouth Daily (before breakfast). atorvastatin (LIPITOR) 10 mg tablet Take 1 Tablet by mouth daily.     benzonatate (TESSALON) 200 mg capsule     Myrbetriq 50 mg ER tablet TAKE 1 TABLET BY MOUTH EVERY DAY    hydrocortisone (HYTONE) 2.5 % ointment     OTHER Prevagen    ciclopirox (Ciclodan) 0.77 % topical cream Apply  to affected area two (2) times a day. PRN    clobetasoL (TEMOVATE) 0.05 % ointment APPLY TO AFFECTED AREA EVERY DAY    montelukast (SINGULAIR) 10 mg tablet TAKE 1 TABLET BY MOUTH EVERY DAY    betamethasone valerate (VALISONE) 0.1 % topical cream PRN    denosumab (PROLIA) 60 mg/mL injection 60 mg by SubCUTAneous route. hydroxychloroquine (PLAQUENIL) 200 mg tablet TAKE 1 TABLET BY MOUTH TWICE A DAY    multivitamin (ONE A DAY) tablet Take 1 Tab by mouth daily. guaifenesin (MUCINEX PO) Take  by mouth. acetaminophen (ARTHRITIS PAIN RELIEF) 650 mg TbER Take 1 Tab by mouth nightly as needed. albuterol (PROVENTIL HFA, VENTOLIN HFA, PROAIR HFA) 90 mcg/actuation inhaler Take 2 Puffs by inhalation. aspirin 81 mg chewable tablet Take 81 mg by mouth daily. cholecalciferol (VITAMIN D3) (1000 Units /25 mcg) tablet Take 1,000 Units by mouth daily. Indications: VITAMIN D DEFICIENCY     No current facility-administered medications for this visit. Past Medical History:   Diagnosis Date    Allergic rhinitis     COPD (chronic obstructive pulmonary disease) (HCC)     Hypertension     Hypothyroidism     [place]ichen planus     [place] [facility] [place]     Osteoarthrosis, unspecified whether generalized or localized, unspecified site     Overactive bladder     Phlebitis and thrombophlebitis of lower extremities, unspecified     Raynaud's syndrome        Family History   Problem Relation Age of Onset    Stroke Mother     Dementia Mother     Cancer Father         BRAIN TUMOR    COPD Brother         emphsema    Cancer Niece        ROS:  Denies fever, chills, cough, chest pain, SOB,  nausea, vomiting, diarrhea, dysuria, + rashes, wounds, arthrlagias, weakness, numbness, visual changes, depression, wt loss, wt gain, hemoptysis, hematochezia or melena.     Patient is not experiencing chest pain radiating to the jaw and/or down the arms. Physical Examination:    /60 (BP 1 Location: Right arm, BP Patient Position: Sitting, BP Cuff Size: Adult)   Pulse 61   Temp 97.7 °F (36.5 °C) (Oral)   Resp 16   Ht 5' (1.524 m)   Wt 137 lb 6.4 oz (62.3 kg)   SpO2 97%   BMI 26.83 kg/m²     Wt Readings from Last 3 Encounters:   03/31/23 137 lb 6.4 oz (62.3 kg)   01/03/23 139 lb 12.8 oz (63.4 kg)   11/17/22 139 lb (63 kg)     General: WDWN Female in NAD  HEENT: NC/AT, PERRL, TM pearly gray  Neck: Supple, no JVD, no bruits  Chest:  Respirations even and unlabored, lungs CTA throughout. Cardiac: RRR. No clicks, murmurs, gallops, or rubs  Extremities:  No cyanosis, clubbing or edema. Musculoskeletal:  Normal strength and ROM throughout. Neurologic:  Ambulatory without assistance, CN 2-12 grossly intact. Skin: intact and warm to the touch. Circumferential erythematous rash with raised borders to RLE  Lymphadenopathy: no cervical or supraclavicular nodes  Psych: Pleasant and appropriate. Judgment normal.     ASSESSMENT AND PLAN:       ICD-10-CM ICD-9-CM    1. Medicare annual wellness visit, subsequent  Z00.00 V70.0       2. Postural urinary incontinence  N39.492 788.39 REFERRAL TO PHYSICAL THERAPY      3. Acquired hypothyroidism  E03.9 244.9 MS COLLECTION VENOUS BLOOD VENIPUNCTURE      T4, FREE      TSH 3RD GENERATION      METABOLIC PANEL, BASIC      METABOLIC PANEL, BASIC      TSH 3RD GENERATION      T4, FREE      MS COLLECTION VENOUS BLOOD VENIPUNCTURE      4. Chronic obstructive pulmonary disease, unspecified COPD type ([facility] [address]  J44.9 496       5. Chronic fatigue  R53.82 780.79         Check TSH today. Recommended [place] Severe for hair loss, consider minoxidil in the future. Discussed sleep, daytime inattention. Come back for cognitive testing - hx of dementia in mother and maternal grandmother. Refer to PT for pelvic floor rehab. See attached AWV. Patient aware of plan of care and verbalized understanding.  Questions answered. RTC in 6 months, or sooner if needed.     Hung Sierra, NP

## 2023-03-31 NOTE — PROGRESS NOTES
YES Answers must have Comments  1. \"Have you been to the ER, urgent care clinic since your last visit? Hospitalized since your last visit? \"    [] YES   [x] NO       2. Have you seen or consulted any other health care providers outside of [address] [place] [address] since your last visit?     [] YES   [x] NO       3. For patients aged 39-70: Have you had a colorectal cancer screening such as a colonoscopy/FIT/Cologuard? Nurse/CMA to request records if not in chart   [x] YES Where [facility]  [] NO   [] NA, based on age    If the patient is female:      4. For female patients aged 41-77: Kiser Анна you had a mammogram in the last two years?  Nurse/CMA to request records if not in chart   [x] YES Where [facility]  [] NO   [] NA, based on age    11. For female patients aged 21-65: Kiser Анна you had a pap smear?   Nurse/CMA to request records if not in chart   [] YES   [] NO  [x] NA, based on age

## 2023-03-31 NOTE — PATIENT INSTRUCTIONS
The best way to stay healthy is to live a healthy lifestyle. A healthy lifestyle includes regular exercise, eating a well-balanced diet, keeping a healthy weight and not smoking. Regular physical exams and screening tests are another important way to take care of yourself. Preventive exams provided by health care providers can find health problems early when treatment works best and can keep you from getting certain diseases or illnesses. Preventive services include exams, lab tests, screenings, shots, monitoring and information to help you take care of your own health. All people over 65 should have a pneumonia shot. Pneumonia shots are usually only needed once in a lifetime unless your doctor decides differently. In addition to your physical exam, some screening tests are recommended:    All people over 65 should have a yearly flu shot. People over 65 are at medium to high risk for Hepatitis B. Three shots are needed for complete protection. Bone mass measurement (dexa scan) is recommended every two years. Diabetes Mellitus screening is recommended every year. Glaucoma is an eye disease caused by high pressure in the eye. An eye exam is recommended every year. Cardiovascular screening tests that check your cholesterol and other blood fat (lipid) levels are recommended every five years. Colorectal Cancer screening tests help to find pre-cancerous polyps (growths in the colon) so they can be removed before they turn into cancer. Tests ordered for screening depend on your personal and family history risk factors. Prostate Cancer Screening (annually up to age 76)    Screening for breast cancer is recommended yearly with a Mammogram.    Screening for cervical and vaginal cancer is recommended with a pelvic and Pap test every two years.  However if you have had an abnormal pap in the past  three years or at high risk for cervical or vaginal cancer Medicare will cover a pap test and a pelvic exam every year.      Here is a list of your current Health Maintenance items with a due date:  Health Maintenance Due   Topic Date Due    COVID-19 Vaccine (4 - Booster for Leonetta Drone series) 12/26/2021    Annual Well Visit  02/23/2023

## 2023-03-31 NOTE — PROGRESS NOTES
This is the Subsequent Medicare Annual Wellness Exam, performed 12 months or more after the Initial AWV or the last Subsequent AWV    I have reviewed the patient's medical history in detail and updated the computerized patient record. Assessment/Plan   Education and counseling provided:  Are appropriate based on today's review and evaluation    1. Medicare annual wellness visit, subsequent  2. Postural urinary incontinence  -     REFERRAL TO PHYSICAL THERAPY  3. Acquired hypothyroidism  -     LA COLLECTION VENOUS BLOOD VENIPUNCTURE; Future  -     T4, FREE; Future  -     TSH 3RD GENERATION; Future  -     METABOLIC PANEL, BASIC; Future  4. Chronic obstructive pulmonary disease, unspecified COPD type ([facility] [address]  5. Chronic fatigue     Depression Risk Factor Screening:     3 most recent PHQ Screens 3/31/2023   PHQ Not Done -   Little interest or pleasure in doing things Not at all   Feeling down, depressed, irritable, or hopeless Not at all   Total Score PHQ 2 0       Alcohol & Drug Abuse Risk Screen    Do you average more than 1 drink per night or more than 7 drinks a week:  No    On any one occasion in the past three months have you have had more than 3 drinks containing alcohol:  No          Functional Ability and Level of Safety:    Hearing: Hearing is good. Activities of Daily Living: The home contains: handrails and grab bars  Patient does total self care      Ambulation: with no difficulty     Fall Risk:  Fall Risk Assessment, last 12 mths 3/31/2023   Able to walk? Yes   Fall in past 12 months? 0   Do you feel unsteady? 0   Are you worried about falling 0   Is TUG test greater than 12 seconds? -   Is the gait abnormal? -   Number of falls in past 12 months -   Fall with injury?  -      Abuse Screen:  Patient is not abused       Cognitive Screening    Has your family/caregiver stated any concerns about your memory: no    Cognitive Screening: Normal - MMSE (Mini Mental Status Exam)    Health Maintenance Due Health Maintenance Due   Topic Date Due    COVID-19 Vaccine (4 - Booster for Moderna series) 2021       Patient Care Team   Patient Care Team:  Oksana Taylor NP as PCP - General (Nurse Practitioner)  Oksana Taylor NP as PCP - [facility] Empaneled Provider  Geovani Copeland NP (Nurse Practitioner)  Marco Lee MD as Physician (Cardiovascular Disease Physician)    History     Patient Active Problem List   Diagnosis Code    Osteoarthritis M19.90    Essential hypertension I10    Hypothyroidism E03.9    Allergic rhinitis J30.9    Overactive bladder N32.81    Colitis K52.9    Insomnia G47.00    Osteopenia with high risk of fracture M85.80    Primary osteoarthritis of both knees M17.0    Chronic back pain M54.9, G89.29    Other secondary kyphosis, 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    Vitamin D deficiency E55.9    Pitting edema R60.9    Primary osteoarthritis of right hand A89.171    Lichen planus R89.1    Pericardial effusion I31.39    Chronic obstructive pulmonary disease, unspecified COPD type ([address] J44.9    Atrial fibrillation, unspecified type ([address] I48.91     Past Medical History:   Diagnosis Date    Allergic rhinitis     COPD (chronic obstructive pulmonary disease) ([address]     Hypertension     Hypothyroidism     [place]ichen planus     [place] [facility] [place]     Osteoarthrosis, unspecified whether generalized or localized, unspecified site     Overactive bladder     Phlebitis and thrombophlebitis of lower extremities, unspecified     Raynaud's syndrome       Past Surgical History:   Procedure Laterality Date    HX APPENDECTOMY      HX GI      colitis    HX GYN      HYSTERECTOMY//BTL    HX HEENT      TONSILLECTOMY    HX HYSTERECTOMY      HX MOHS PROCEDURES  2018    left lower leg    HX OOPHORECTOMY      HX UROLOGICAL      BLADDER SLING     Current Outpatient Medications   Medication Sig Dispense Refill    lisinopriL (PRINIVIL, ZESTRIL) 40 mg tablet TAKE 1 TABLET BY MOUTH EVERY DAY 90 Tablet 3    levothyroxine (SYNTHROID) 100 mcg tablet 1 tab PO every other morning. Alternate with 88 mcg dose. 45 Tablet 3    amLODIPine (NORVASC) 10 mg tablet TAKE 1 TABLET BY MOUTH EVERY DAY 90 Tablet 3    Anoro Ellipta 62.5-25 mcg/actuation inhaler TAKE 1 PUFF BY MOUTH EVERY DAY      famotidine (PEPCID) 40 mg tablet TAKE 1 (ONE) TABLET TWICE A DAY, BEFORE MEALS      levothyroxine (SYNTHROID) 88 mcg tablet Take 1 Tablet by mouth Daily (before breakfast). 90 Tablet 3    atorvastatin (LIPITOR) 10 mg tablet Take 1 Tablet by mouth daily. 90 Tablet 3    benzonatate (TESSALON) 200 mg capsule       Myrbetriq 50 mg ER tablet TAKE 1 TABLET BY MOUTH EVERY DAY 90 Tablet 3    hydrocortisone (HYTONE) 2.5 % ointment       OTHER Prevagen      ciclopirox (Ciclodan) 0.77 % topical cream Apply  to affected area two (2) times a day. PRN 15 g 5    clobetasoL (TEMOVATE) 0.05 % ointment APPLY TO AFFECTED AREA EVERY DAY 15 g 3    montelukast (SINGULAIR) 10 mg tablet TAKE 1 TABLET BY MOUTH EVERY DAY      betamethasone valerate (VALISONE) 0.1 % topical cream PRN  1    denosumab (PROLIA) 60 mg/mL injection 60 mg by SubCUTAneous route. hydroxychloroquine (PLAQUENIL) 200 mg tablet TAKE 1 TABLET BY MOUTH TWICE A DAY  1    multivitamin (ONE A DAY) tablet Take 1 Tab by mouth daily. guaifenesin (MUCINEX PO) Take  by mouth. acetaminophen (ARTHRITIS PAIN RELIEF) 650 mg TbER Take 1 Tab by mouth nightly as needed. 30 Tab 0    albuterol (PROVENTIL HFA, VENTOLIN HFA, PROAIR HFA) 90 mcg/actuation inhaler Take 2 Puffs by inhalation. aspirin 81 mg chewable tablet Take 81 mg by mouth daily. cholecalciferol (VITAMIN D3) (1000 Units /25 mcg) tablet Take 1,000 Units by mouth daily.  Indications: VITAMIN D DEFICIENCY       Allergies   Allergen Reactions    Latex Rash    Keflex [Cephalexin] Rash    Levaquin [Levofloxacin] Rash    Sulfa (Sulfonamide Antibiotics) Rash    Macrobid [Nitrofurantoin Monohyd/M-Cryst] Rash       Family History   Problem Relation Age of Onset    Stroke Mother     Dementia Mother     Cancer Father         BRAIN TUMOR    COPD Brother         emphsema    Cancer Niece      Social History     Tobacco Use    Smoking status: Former     Types: Cigarettes    Smokeless tobacco: Never    Tobacco comments:     smoked less than 10 yrs and quit 40 +   Substance Use Topics    Alcohol use: No          Leigh Ann Askew

## 2023-04-03 LAB
ANION GAP SERPL CALC-SCNC: 2 MMOL/L (ref 5–15)
BUN SERPL-MCNC: 19 MG/DL (ref 6–20)
BUN/CREAT SERPL: 26 (ref 12–20)
CALCIUM SERPL-MCNC: 10.2 MG/DL (ref 8.5–10.1)
CHLORIDE SERPL-SCNC: 99 MMOL/L (ref 97–108)
CO2 SERPL-SCNC: 31 MMOL/L (ref 21–32)
CREAT SERPL-MCNC: 0.74 MG/DL (ref 0.55–1.02)
GLUCOSE SERPL-MCNC: 92 MG/DL (ref 65–100)
POTASSIUM SERPL-SCNC: 5 MMOL/L (ref 3.5–5.1)
SODIUM SERPL-SCNC: 132 MMOL/L (ref 136–145)
T4 FREE SERPL-MCNC: 1.2 NG/DL (ref 0.8–1.5)
TSH SERPL DL<=0.05 MIU/L-ACNC: 0.67 UIU/ML (ref 0.36–3.74)

## 2023-05-12 PROBLEM — I49.1 PREMATURE ATRIAL CONTRACTIONS: Status: ACTIVE | Noted: 2023-05-12

## 2023-05-17 ENCOUNTER — OFFICE VISIT (OUTPATIENT)
Age: 78
End: 2023-05-17
Payer: COMMERCIAL

## 2023-05-17 VITALS
HEIGHT: 60 IN | OXYGEN SATURATION: 98 % | SYSTOLIC BLOOD PRESSURE: 110 MMHG | WEIGHT: 142 LBS | TEMPERATURE: 98.2 F | DIASTOLIC BLOOD PRESSURE: 60 MMHG | BODY MASS INDEX: 27.88 KG/M2 | HEART RATE: 56 BPM | RESPIRATION RATE: 20 BRPM

## 2023-05-17 DIAGNOSIS — I49.1 PREMATURE ATRIAL CONTRACTIONS: ICD-10-CM

## 2023-05-17 DIAGNOSIS — I35.0 NONRHEUMATIC AORTIC VALVE STENOSIS: ICD-10-CM

## 2023-05-17 DIAGNOSIS — I10 ESSENTIAL HYPERTENSION: ICD-10-CM

## 2023-05-17 DIAGNOSIS — I31.39 PERICARDIAL EFFUSION: Primary | ICD-10-CM

## 2023-05-17 PROCEDURE — 3074F SYST BP LT 130 MM HG: CPT | Performed by: INTERNAL MEDICINE

## 2023-05-17 PROCEDURE — 3078F DIAST BP <80 MM HG: CPT | Performed by: INTERNAL MEDICINE

## 2023-05-17 PROCEDURE — 99214 OFFICE O/P EST MOD 30 MIN: CPT | Performed by: INTERNAL MEDICINE

## 2023-05-17 PROCEDURE — 1123F ACP DISCUSS/DSCN MKR DOCD: CPT | Performed by: INTERNAL MEDICINE

## 2023-05-17 RX ORDER — LEVOTHYROXINE SODIUM 88 UG/1
88 TABLET ORAL
COMMUNITY
Start: 2022-09-02

## 2023-05-17 RX ORDER — SENNOSIDES 8.6 MG
650 CAPSULE ORAL
COMMUNITY
Start: 2018-11-26

## 2023-05-17 RX ORDER — AMLODIPINE BESYLATE 10 MG/1
1 TABLET ORAL DAILY
COMMUNITY
Start: 2022-08-07

## 2023-05-17 RX ORDER — MONTELUKAST SODIUM 10 MG/1
1 TABLET ORAL DAILY
COMMUNITY
Start: 2020-08-10

## 2023-05-17 RX ORDER — ALBUTEROL SULFATE 90 UG/1
2 AEROSOL, METERED RESPIRATORY (INHALATION)
COMMUNITY
Start: 2022-04-20

## 2023-05-17 RX ORDER — ASPIRIN 81 MG/1
81 TABLET, CHEWABLE ORAL DAILY
COMMUNITY

## 2023-05-17 RX ORDER — CETIRIZINE HYDROCHLORIDE 5 MG/1
5 TABLET ORAL PRN
COMMUNITY

## 2023-05-17 RX ORDER — FAMOTIDINE 40 MG/1
40 TABLET, FILM COATED ORAL DAILY
COMMUNITY
Start: 2022-07-16

## 2023-05-17 RX ORDER — WHEAT DEXTRIN 3 G/3.8 G
4 POWDER (GRAM) ORAL
COMMUNITY

## 2023-05-17 RX ORDER — UMECLIDINIUM BROMIDE AND VILANTEROL TRIFENATATE 62.5; 25 UG/1; UG/1
POWDER RESPIRATORY (INHALATION)
COMMUNITY
Start: 2022-07-08

## 2023-05-17 RX ORDER — LISINOPRIL 40 MG/1
1 TABLET ORAL DAILY
COMMUNITY
Start: 2022-07-28

## 2023-05-17 RX ORDER — BENZONATATE 200 MG/1
200 CAPSULE ORAL PRN
COMMUNITY
Start: 2022-04-06

## 2023-05-17 RX ORDER — ATORVASTATIN CALCIUM 10 MG/1
10 TABLET, FILM COATED ORAL DAILY
COMMUNITY
Start: 2022-09-02

## 2023-05-17 ASSESSMENT — PATIENT HEALTH QUESTIONNAIRE - PHQ9
SUM OF ALL RESPONSES TO PHQ QUESTIONS 1-9: 0
SUM OF ALL RESPONSES TO PHQ9 QUESTIONS 1 & 2: 0
1. LITTLE INTEREST OR PLEASURE IN DOING THINGS: 0
2. FEELING DOWN, DEPRESSED OR HOPELESS: 0
SUM OF ALL RESPONSES TO PHQ QUESTIONS 1-9: 0

## 2023-05-17 NOTE — PROGRESS NOTES
Identified pt with two pt identifiers(name and ). Reviewed record in preparation for visit and have obtained necessary documentation. Chief Complaint   Patient presents with    Hypertension      /60 (Site: Left Upper Arm, Position: Sitting, Cuff Size: Medium Adult)   Pulse 56   Temp 98.2 °F (36.8 °C) (Temporal)   Resp 20   Ht 5' (1.524 m)   Wt 142 lb (64.4 kg)   SpO2 98%   BMI 27.73 kg/m²       Medications reviewed/approved by provider. Health Maintenance Review: Patient reminded of \"due or due soon\" health maintenance. I have asked the patient to contact his/her primary care provider (PCP) for follow-up on his/her health maintenance. Coordination of Care Questionnaire:  :   1) Have you been to an emergency room, urgent care, or hospitalized since your last visit? If yes, where when, and reason for visit? no       2. Have seen or consulted any other health care provider since your last visit? If yes, where when, and reason for visit?  no      Patient is accompanied by Self I have received verbal consent from Claudia Harris to discuss any/all medical information while they are present in the room.

## 2023-05-21 RX ORDER — LEVOTHYROXINE SODIUM 0.1 MG/1
TABLET ORAL
COMMUNITY
Start: 2023-01-06

## 2023-05-21 RX ORDER — HYDROXYCHLOROQUINE SULFATE 200 MG/1
1 TABLET, FILM COATED ORAL 2 TIMES DAILY
COMMUNITY
Start: 2019-07-30 | End: 2023-06-21

## 2023-05-21 RX ORDER — CLOBETASOL PROPIONATE 0.5 MG/G
OINTMENT TOPICAL
COMMUNITY
Start: 2021-04-29 | End: 2023-06-21

## 2023-06-18 ENCOUNTER — HOSPITAL ENCOUNTER (EMERGENCY)
Facility: HOSPITAL | Age: 78
Discharge: HOME | End: 2023-06-18
Attending: FAMILY MEDICINE | Admitting: FAMILY MEDICINE
Payer: MEDICARE

## 2023-06-18 VITALS
SYSTOLIC BLOOD PRESSURE: 134 MMHG | RESPIRATION RATE: 16 BRPM | BODY MASS INDEX: 27.88 KG/M2 | WEIGHT: 142 LBS | OXYGEN SATURATION: 96 % | HEART RATE: 98 BPM | TEMPERATURE: 98 F | HEIGHT: 60 IN | DIASTOLIC BLOOD PRESSURE: 69 MMHG

## 2023-06-18 DIAGNOSIS — T14.8XXA SKIN AVULSION: Primary | ICD-10-CM

## 2023-06-18 PROCEDURE — 99282 EMERGENCY DEPT VISIT SF MDM: CPT

## 2023-06-18 ASSESSMENT — PAIN DESCRIPTION - LOCATION: LOCATION: HAND

## 2023-06-18 ASSESSMENT — LIFESTYLE VARIABLES
HOW OFTEN DO YOU HAVE A DRINK CONTAINING ALCOHOL: 2-4 TIMES A MONTH
HOW MANY STANDARD DRINKS CONTAINING ALCOHOL DO YOU HAVE ON A TYPICAL DAY: 1 OR 2

## 2023-06-18 ASSESSMENT — PAIN SCALES - GENERAL: PAINLEVEL_OUTOF10: 1

## 2023-06-18 ASSESSMENT — PAIN - FUNCTIONAL ASSESSMENT
PAIN_FUNCTIONAL_ASSESSMENT: NONE - DENIES PAIN
PAIN_FUNCTIONAL_ASSESSMENT: 0-10

## 2023-06-19 NOTE — ED TRIAGE NOTES
Pt sustained small 2cm v-shaped skin tear posterior left hand 1 hour before arrival. Not painful, no bleeding. Pt dressed with pressure dressing. Tetanus vaccination up to date per pt.

## 2023-06-20 NOTE — ED PROVIDER NOTES
[address] ENCOUNTER       Pt Name: Kierra Hart  MRN: [id number]  [place] 1945  Date of evaluation: 6/18/2023  Provider: Ori Nolasco MD   PCP: BUBBA Estrada NP  Note Started: 5:15 AM EDT 6/20/23     CHIEF COMPLAINT       Chief Complaint   Patient presents with    Skin care    Abrasion        HISTORY OF PRESENT ILLNESS: 1 or more elements      History From: Patient  HPI Limitations: None     Kierra Hart is a 65 y.o. female who presents to the ED because of a skin avulsion on her right hand that she sustained when she was getting out of a chair. She struck the arm of the chair with her hand. She put her own dressing on the wound but did not wash it. No pain when she moves her wrist or fingers. Nursing Notes were all reviewed and agreed with or any disagreements were addressed in the HPI. REVIEW OF SYSTEMS      Review of Systems     Positives and Pertinent negatives as per HPI. PAST HISTORY     Past Medical History:  No past medical history on file. Past Surgical History:  No past surgical history on file. Family History:  No family history on file. Social History:  Social History     Tobacco Use    Smoking status: Never    Smokeless tobacco: Never   Substance Use Topics    Alcohol use: Yes     Alcohol/week: 1.0 standard drink     Types: 1 Glasses of wine per week     Comment: maybe 1 drink per week    Drug use: Not Currently       Allergies:   Allergies   Allergen Reactions    Latex Rash    Cephalexin Rash    Levofloxacin Rash    Sulfa Antibiotics Rash    Adhesive Tape Rash    Nitrofurantoin Rash       CURRENT MEDICATIONS      Discharge Medication List as of 6/18/2023 11:14 PM        CONTINUE these medications which have NOT CHANGED    Details   MYRBETRIQ 50 MG TB24 TAKE 1 TABLET BY MOUTH EVERY DAY, Disp-90 tablet, R-3Normal      betamethasone valerate (VALISONE) 0.1 % cream PRN, Historical Med      vitamin D (CHOLECALCIFEROL) 25 MCG

## 2023-06-21 ENCOUNTER — OFFICE VISIT (OUTPATIENT)
Age: 78
End: 2023-06-21
Payer: MEDICARE

## 2023-06-21 VITALS
SYSTOLIC BLOOD PRESSURE: 110 MMHG | WEIGHT: 141 LBS | DIASTOLIC BLOOD PRESSURE: 58 MMHG | OXYGEN SATURATION: 98 % | HEIGHT: 60 IN | RESPIRATION RATE: 17 BRPM | HEART RATE: 128 BPM | BODY MASS INDEX: 27.68 KG/M2

## 2023-06-21 DIAGNOSIS — I48.0 PAF (PAROXYSMAL ATRIAL FIBRILLATION) (HCC): Primary | ICD-10-CM

## 2023-06-21 DIAGNOSIS — Z51.89 ENCOUNTER FOR POST-TRAUMATIC WOUND CHECK: ICD-10-CM

## 2023-06-21 PROCEDURE — G8399 PT W/DXA RESULTS DOCUMENT: HCPCS

## 2023-06-21 PROCEDURE — 99214 OFFICE O/P EST MOD 30 MIN: CPT

## 2023-06-21 PROCEDURE — G8419 CALC BMI OUT NRM PARAM NOF/U: HCPCS

## 2023-06-21 PROCEDURE — 1036F TOBACCO NON-USER: CPT

## 2023-06-21 PROCEDURE — G8427 DOCREV CUR MEDS BY ELIG CLIN: HCPCS

## 2023-06-21 PROCEDURE — 1090F PRES/ABSN URINE INCON ASSESS: CPT

## 2023-06-21 PROCEDURE — 1123F ACP DISCUSS/DSCN MKR DOCD: CPT

## 2023-06-21 PROCEDURE — 3074F SYST BP LT 130 MM HG: CPT

## 2023-06-21 PROCEDURE — 3078F DIAST BP <80 MM HG: CPT

## 2023-06-21 SDOH — ECONOMIC STABILITY: FOOD INSECURITY: WITHIN THE PAST 12 MONTHS, THE FOOD YOU BOUGHT JUST DIDN'T LAST AND YOU DIDN'T HAVE MONEY TO GET MORE.: NEVER TRUE

## 2023-06-21 SDOH — ECONOMIC STABILITY: HOUSING INSECURITY
IN THE LAST 12 MONTHS, WAS THERE A TIME WHEN YOU DID NOT HAVE A STEADY PLACE TO SLEEP OR SLEPT IN A SHELTER (INCLUDING NOW)?: NO

## 2023-06-21 SDOH — ECONOMIC STABILITY: FOOD INSECURITY: WITHIN THE PAST 12 MONTHS, YOU WORRIED THAT YOUR FOOD WOULD RUN OUT BEFORE YOU GOT MONEY TO BUY MORE.: NEVER TRUE

## 2023-06-21 SDOH — ECONOMIC STABILITY: INCOME INSECURITY: HOW HARD IS IT FOR YOU TO PAY FOR THE VERY BASICS LIKE FOOD, HOUSING, MEDICAL CARE, AND HEATING?: NOT HARD AT ALL

## 2023-06-21 ASSESSMENT — PATIENT HEALTH QUESTIONNAIRE - PHQ9
SUM OF ALL RESPONSES TO PHQ QUESTIONS 1-9: 0
SUM OF ALL RESPONSES TO PHQ9 QUESTIONS 1 & 2: 0
2. FEELING DOWN, DEPRESSED OR HOPELESS: 0
SUM OF ALL RESPONSES TO PHQ QUESTIONS 1-9: 0
1. LITTLE INTEREST OR PLEASURE IN DOING THINGS: 0

## 2023-06-21 ASSESSMENT — ENCOUNTER SYMPTOMS
CHEST TIGHTNESS: 0
SHORTNESS OF BREATH: 0

## 2023-06-21 NOTE — PROGRESS NOTES
1. \"Have you been to the ER, urgent care clinic since your last visit? Hospitalized since your last visit? \" Yes [address]    2. \"Have you seen or consulted any other health care providers outside of the [address] [place] [address] since your last visit? \" No     3. For patients aged 39-70: Has the patient had a colonoscopy / FIT/ Cologuard? NA - based on age     If the patient is female:    4. For patients aged 41-77: Has the patient had a mammogram within the past 2 years? NA - based on age    11. For patients aged 21-65: Has the patient had a pap smear? NA - based on age    Chief Complaint   Patient presents with    Hand Injury     Hit hand on railing went to er on Sunday same day.  They put plastic stick on it for 72 hours and to make appt here     Vitals:    06/21/23 0905   BP: (!) 110/58   Pulse: (!) 128   Resp: 17   SpO2: 98%

## 2023-06-21 NOTE — PROGRESS NOTES
Chief Complaint   Patient presents with    Hand Injury     Hit hand on railing went to er on Sunday same day. They put plastic stick on it for 72 hours and to make appt here       HPI:     is a 65 y.o. female who presents for wound check. She struck her hand against the porch railing three days ago with resulting skin tear and contusion; she was seen at [facility] ER where wound care was provided and was instructed to f/u here. The wound is not painful and there has been no redness, swelling, or drainage. Upon arrival, HR noted to be elevated--128; she denies CP, palpitations, SOB--reports chronic fatigue for which she has been evaluated, most recently with cardiology just last month. Allergies   Allergen Reactions    Latex Rash    Cephalexin Rash    Levofloxacin Rash    Sulfa Antibiotics Rash    Adhesive Tape Rash    Nitrofurantoin Rash       Current Outpatient Medications   Medication Sig Dispense Refill    apixaban (ELIQUIS) 5 MG TABS tablet Take 1 tablet by mouth 2 times daily 60 tablet 0    MYRBETRIQ 50 MG TB24 TAKE 1 TABLET BY MOUTH EVERY DAY 90 tablet 3    vitamin D (CHOLECALCIFEROL) 25 MCG (1000 UT) TABS tablet Take 1 tablet by mouth daily      levothyroxine (SYNTHROID) 100 MCG tablet 1 tab PO every other morning. Alternate with 88 mcg dose.       amLODIPine (NORVASC) 10 MG tablet Take 1 tablet by mouth daily      aspirin 81 MG chewable tablet Take 1 tablet by mouth daily      lisinopril (PRINIVIL;ZESTRIL) 40 MG tablet Take 1 tablet by mouth daily      acetaminophen (TYLENOL) 650 MG extended release tablet Take 1 tablet by mouth      levothyroxine (SYNTHROID) 88 MCG tablet Take 1 tablet by mouth every morning (before breakfast)      albuterol sulfate HFA (PROVENTIL;VENTOLIN;PROAIR) 108 (90 Base) MCG/ACT inhaler Inhale 2 puffs into the lungs      montelukast (SINGULAIR) 10 MG tablet Take 1 tablet by mouth daily      benzonatate (TESSALON) 200 MG capsule Take 1 capsule by mouth as needed

## 2023-06-22 ENCOUNTER — TELEPHONE (OUTPATIENT)
Age: 78
End: 2023-06-22

## 2023-06-22 NOTE — TELEPHONE ENCOUNTER
Spoke with the patient. Verified patient with two patient identifiers. Advised that the office is out of stock and I will mail her the monitor. Pt is leaving tomorrow AM for a 2 week trip. She asked that the monitor be shipped to her daughters house. Questions answered. Patient verbalized understanding.

## 2023-06-22 NOTE — TELEPHONE ENCOUNTER
----- Message from Elisabeth Islas MD sent at 6/21/2023  4:12 PM EDT -----  Flores Ms. Julius Acevedo presented to [facility] today and was noted to be in atrial fibrillation. This is new for her. She was in sinus rhythm with me at the last visit and on a prior monitor. Please call her and have her come in for a 2-week monitor (order placed). Arrange follow-up with me shortly after that to review results and discuss A-fib management. Thanks!

## 2023-07-07 DIAGNOSIS — I48.0 PAF (PAROXYSMAL ATRIAL FIBRILLATION) (HCC): ICD-10-CM

## 2023-07-07 RX ORDER — APIXABAN 5 MG/1
TABLET, FILM COATED ORAL
Qty: 180 TABLET | Refills: 3 | Status: ON HOLD | OUTPATIENT
Start: 2023-07-07 | End: 2023-08-18 | Stop reason: HOSPADM

## 2023-07-26 PROBLEM — I48.91 ATRIAL FIBRILLATION (HCC): Status: ACTIVE | Noted: 2023-07-26

## 2023-07-26 NOTE — PROGRESS NOTES
ASSESSMENT and PLAN  1. Atrial fibrillation  New onset 6/21/2023. Symptomatic with suboptimal rate control. Eliquis initiated 6/21/2023 with no missed doses in the last 3-4 weeks. Recommend electrical cardioversion and referral to EP for consultation. If her sinus rate is acceptable after cardioversion, begin amiodarone to maintain sinus rhythm until EP follow-up. 2. Bradycardia  Holter 11/2022 demonstrated heart rates 41-93 bpm, avg 60 bpm with no heart block, pauses, bradyarrhythmias or marked bradycardia. Recheck sinus rate after cardioversion and began amiodarone if heart rate acceptable (see above). 3. Nonrheumatic aortic valve stenosis   Mild stenosis documented on echo 11/30/2022. Follow with serial echo studies. 4. Pericardial effusion  Moderate effusion on echo 2018, resolved spontaneously with no recurrence on serial echo studies since 2019, most recent echo 11/30/2022. No further evaluation necessary. 5. Essential hypertension  Well-controlled. Continue current medications. Management per PCP. 6.  Hypothyroidism  On replacement therapy. Euthyroid on labs 3/2023. Follow-up in 3 to 4 weeks with myself or Inocencio Up NP. The patient has been instructed and agrees to call our office with any issues or other concerns related to their cardiac condition(s) and/or complaint(s). CHIEF COMPLAINT  Atrial fibrillation    HPI:    Cam Navarro is a 77 y.o. female here for an acute visit. She was seen in the office 11/17/2022 by Inocencio Up NP on referral from her PCP regarding fatigue and bradycardia. Evaluation up to that point was unrevealing including normal CMP, CBC and thyroid panel. Low heart rates were noted at times and she was subsequently referred for evaluation. A 48-hour Holter monitor was performed 11/2022 demonstrating no significant howard or tachyarrhythmias.   Her heart rate was 41-93 bpm, average 60 bpm with 2.7% PACs, brief SVT/AT runs up to 7

## 2023-07-27 ENCOUNTER — TELEPHONE (OUTPATIENT)
Age: 78
End: 2023-07-27

## 2023-07-27 ENCOUNTER — CLINICAL DOCUMENTATION (OUTPATIENT)
Age: 78
End: 2023-07-27

## 2023-07-27 ENCOUNTER — OFFICE VISIT (OUTPATIENT)
Age: 78
End: 2023-07-27

## 2023-07-27 VITALS
SYSTOLIC BLOOD PRESSURE: 116 MMHG | OXYGEN SATURATION: 99 % | DIASTOLIC BLOOD PRESSURE: 70 MMHG | TEMPERATURE: 98.2 F | RESPIRATION RATE: 22 BRPM | BODY MASS INDEX: 25.91 KG/M2 | HEART RATE: 96 BPM | HEIGHT: 60 IN | WEIGHT: 132 LBS

## 2023-07-27 DIAGNOSIS — I48.91 ATRIAL FIBRILLATION, UNSPECIFIED TYPE (HCC): ICD-10-CM

## 2023-07-27 DIAGNOSIS — I31.39 PERICARDIAL EFFUSION: ICD-10-CM

## 2023-07-27 DIAGNOSIS — R00.1 BRADYCARDIA: Primary | ICD-10-CM

## 2023-07-27 DIAGNOSIS — I10 ESSENTIAL HYPERTENSION: ICD-10-CM

## 2023-07-27 DIAGNOSIS — I35.0 NONRHEUMATIC AORTIC VALVE STENOSIS: ICD-10-CM

## 2023-07-27 ASSESSMENT — PATIENT HEALTH QUESTIONNAIRE - PHQ9
SUM OF ALL RESPONSES TO PHQ QUESTIONS 1-9: 0
SUM OF ALL RESPONSES TO PHQ9 QUESTIONS 1 & 2: 0
SUM OF ALL RESPONSES TO PHQ QUESTIONS 1-9: 0
SUM OF ALL RESPONSES TO PHQ QUESTIONS 1-9: 0
2. FEELING DOWN, DEPRESSED OR HOPELESS: 0
1. LITTLE INTEREST OR PLEASURE IN DOING THINGS: 0
SUM OF ALL RESPONSES TO PHQ QUESTIONS 1-9: 0

## 2023-07-27 NOTE — TELEPHONE ENCOUNTER
Left message for patient to return call to schedule procedure.    ----- Message from Katrina Clayton LPN sent at 5/74/4682  9:31 AM EDT -----  Regarding: CV  Cardioversion please    CPT codes: 35344  ICD-10 codes: I48.91  DOCTOR: Yazan   Urgency (ASAP or routine): Frist available  Length of procedure (slot): per MD   Time frame: per MD  Special instructions (medications, overnight stay):  Hold Supplements & vitamins morning of procedure. Thanks!

## 2023-07-27 NOTE — PATIENT INSTRUCTIONS
Schedule outpatient electrical cardioversion. Follow-up with me or Debi Morales NP 3 to 4 weeks after cardioversion. Refer to electrophysiology for consultation regarding atrial fibrillation management.         ~ [facility]~    (T) [address] [facility], [address] [facility] [address].  Evelyne Burroughs MD (Electrophysiologist)

## 2023-07-27 NOTE — PROGRESS NOTES
Information given to , Santiago Rajput (all information has been discussed/approved with MD and NP):    CPT codes: 65819  ICD-10 codes: I48.91  DOCTOR: Yazan   Urgency (ASAP or routine): Jewel vargas  Length of procedure (slot): per MD   Time frame: per MD  Special instructions (medications, overnight stay):  Hold Supplements & vitamins morning of procedure. Awaiting reply from Santiago Rajput regarding procedure date/arrival time for [facility]. Instructions given to the patient by this nurse and will be reiterated by , Author Zeng. Note: Lab orders placed to have pre-procedure labs completed 3-14 days prior to procedure.

## 2023-07-27 NOTE — TELEPHONE ENCOUNTER
Spoke to patient and scheduled her for Cardioversion with Dr. Pepe Falk 8/11/23 @ 9:30 am with 8:00 am arrival. Instructions gone over with patient advised to have labs done by 8/7/23 and to hold vitamins and supplements the morning of the procedure. Copy of instructions sent to patients my chart. Patient verbalized understanding at the time of call. Patient identification verified x2. Patient Instructions    JENNIFER (Transesophageal Echocardiogram)  Cardioversion  Pre-procedure instructions  The night before the procedure nothing to eat or drink after midnight, you may take approved medications the morning of the procedure with a few sips of water.   Medication restrictions: No vitamins or supplements the morning of procedure  Labs:  Please do by 08/07    Procedure day  Have a  that will bring you and take you home  Have a responsible adult that can stay with you for 24hrs  Bring ID and insurance card  Wear comfortable clothing  Leave valuables at home, bring: dentures, hearing aids, or glasses  Bring overnight bag (just in case of an overnight stay)  Where to report  [facility]: go through main entrance and to the left is outpatient registration      Date of procedure: 8/11 w/Dr. Pepe Falk  Arrival Time: 8:00 am        Post procedure instructions  No driving for 24 hours  No heavy lifting (over 10lbs) or strenuous activity for 48hrs  You may have tenderness/soreness in groin or wrist area, you may take Tylenol for relief    When to call the office  You notice any tingling, numbness, coldness, or loss of feeling, or you have a fever for 2-3 days after the procedure, if there is visible blood at the site, hold pressure for 20 minutes and then call       [address] [place] [address] office:

## 2023-08-02 ENCOUNTER — TELEPHONE (OUTPATIENT)
Age: 78
End: 2023-08-02

## 2023-08-02 NOTE — TELEPHONE ENCOUNTER
Spoke to patient who informed me that she will be cancelling her Cardioversion scheduled with Dr. Thee Haney for 8/11/23. She states she will be going to [facility].    Patient identification verified x2.

## 2023-08-03 ENCOUNTER — HOSPITAL ENCOUNTER (OUTPATIENT)
Facility: HOSPITAL | Age: 78
Discharge: HOME | End: 2023-08-06

## 2023-08-03 DIAGNOSIS — I48.91 ATRIAL FIBRILLATION, UNSPECIFIED TYPE (HCC): ICD-10-CM

## 2023-08-04 LAB
ANION GAP SERPL CALC-SCNC: 7 MMOL/L (ref 5–15)
BUN SERPL-MCNC: 22 MG/DL (ref 6–20)
BUN/CREAT SERPL: 25 (ref 12–20)
CALCIUM SERPL-MCNC: 9.2 MG/DL (ref 8.5–10.1)
CHLORIDE SERPL-SCNC: 102 MMOL/L (ref 97–108)
CO2 SERPL-SCNC: 24 MMOL/L (ref 21–32)
CREAT SERPL-MCNC: 0.88 MG/DL (ref 0.55–1.02)
GLUCOSE SERPL-MCNC: 85 MG/DL (ref 65–100)
MAGNESIUM SERPL-MCNC: 2.1 MG/DL (ref 1.6–2.4)
POTASSIUM SERPL-SCNC: 4.5 MMOL/L (ref 3.5–5.1)
SODIUM SERPL-SCNC: 133 MMOL/L (ref 136–145)

## 2023-08-04 NOTE — RESULT ENCOUNTER NOTE
Please inform the patient that her potassium and magnesium levels are within normal range. Okay to proceed with cardioversion. Her sodium is low but it appears to be unchanged from serial measurements for the past year. Thanks!

## 2023-08-09 DIAGNOSIS — Z23 ENCOUNTER FOR IMMUNIZATION: ICD-10-CM

## 2023-08-09 RX ORDER — LEVOTHYROXINE SODIUM 88 UG/1
TABLET ORAL
Qty: 45 TABLET | Refills: 3 | Status: SHIPPED | OUTPATIENT
Start: 2023-08-09

## 2023-08-09 RX ORDER — ATORVASTATIN CALCIUM 10 MG/1
TABLET, FILM COATED ORAL
Qty: 90 TABLET | Refills: 3 | Status: SHIPPED | OUTPATIENT
Start: 2023-08-09

## 2023-08-11 ENCOUNTER — HOSPITAL ENCOUNTER (OUTPATIENT)
Facility: HOSPITAL | Age: 78
DRG: 309 | End: 2023-08-11
Attending: STUDENT IN AN ORGANIZED HEALTH CARE EDUCATION/TRAINING PROGRAM
Payer: MEDICARE

## 2023-08-11 VITALS
OXYGEN SATURATION: 96 % | DIASTOLIC BLOOD PRESSURE: 53 MMHG | RESPIRATION RATE: 18 BRPM | BODY MASS INDEX: 26.37 KG/M2 | HEART RATE: 51 BPM | WEIGHT: 135 LBS | SYSTOLIC BLOOD PRESSURE: 121 MMHG

## 2023-08-11 DIAGNOSIS — I48.91 ATRIAL FIBRILLATION (HCC): ICD-10-CM

## 2023-08-11 PROCEDURE — 6360000002 HC RX W HCPCS: Performed by: STUDENT IN AN ORGANIZED HEALTH CARE EDUCATION/TRAINING PROGRAM

## 2023-08-11 PROCEDURE — 99152 MOD SED SAME PHYS/QHP 5/>YRS: CPT

## 2023-08-11 PROCEDURE — 5A2204Z RESTORATION OF CARDIAC RHYTHM, SINGLE: ICD-10-PCS | Performed by: INTERNAL MEDICINE

## 2023-08-11 PROCEDURE — 92960 CARDIOVERSION ELECTRIC EXT: CPT

## 2023-08-11 RX ORDER — MIDAZOLAM HYDROCHLORIDE 1 MG/ML
INJECTION INTRAMUSCULAR; INTRAVENOUS PRN
Status: DISCONTINUED | OUTPATIENT
Start: 2023-08-11 | End: 2023-08-11

## 2023-08-11 RX ORDER — FENTANYL CITRATE 50 UG/ML
INJECTION, SOLUTION INTRAMUSCULAR; INTRAVENOUS PRN
Status: DISCONTINUED | OUTPATIENT
Start: 2023-08-11 | End: 2023-08-11

## 2023-08-11 RX ORDER — AMIODARONE HYDROCHLORIDE 200 MG/1
200 TABLET ORAL DAILY
Status: ON HOLD | COMMUNITY
End: 2023-08-18 | Stop reason: HOSPADM

## 2023-08-11 RX ADMIN — FENTANYL CITRATE 50 MCG: 50 INJECTION, SOLUTION INTRAMUSCULAR; INTRAVENOUS at 08:21

## 2023-08-11 RX ADMIN — MIDAZOLAM HYDROCHLORIDE 2 MG: 1 INJECTION, SOLUTION INTRAMUSCULAR; INTRAVENOUS at 08:21

## 2023-08-11 RX ADMIN — FENTANYL CITRATE 25 MCG: 50 INJECTION, SOLUTION INTRAMUSCULAR; INTRAVENOUS at 08:24

## 2023-08-11 RX ADMIN — MIDAZOLAM HYDROCHLORIDE 1 MG: 1 INJECTION, SOLUTION INTRAMUSCULAR; INTRAVENOUS at 08:24

## 2023-08-11 NOTE — PROGRESS NOTES
Patient arrived to Non-Invasive Cardiology Lab for Out Patient [facility] Procedure. Staff introduced to patient. Patient identifiers verified with Name and Date of Birth. Procedure verified with patient. Consent forms reviewed and signed by patient or authorized representative and verified. Allergies verified. Patient informed of procedure and plan of care. Questions answered with review. Patient on cardiac monitor, non-invasive blood pressure, SPO2 monitor. On RA. Patient is A&Ox3. Patient reports no complaints. Patient on stretcher, in low position, with side rails up. Patient instructed to call for assistance as needed. Family in waiting room.

## 2023-08-11 NOTE — PROGRESS NOTES
Pt sedated with 3 mg Versed and  75 mcg Fentanyl, given 1 synchronized shock(s) at 150 Joules, Afib converted to NSR/SB. (monitored sedation from 0820 to 0831)

## 2023-08-11 NOTE — DISCHARGE INSTRUCTIONS
DISCHARGE SUMMARY       The following personal items collected during your admission are returned to you:   Dental Appliance:    Vision:    Hearing Aid:    Jewelry:    Clothing:          PATIENT INSTRUCTIONS: Continue taking all the same medications. The cardioversion procedure can cause redness to the skin where the patches were placed. You may treat this with Aloe or Cortisone cream over the counter lotion. If the skin appears very reddened or blistered contact your physician      Call to make an appointment with Dr. Kimmy Wiggins for follow up if you do not have one already. What to do at Home:  Recommended activity: No driving today      The discharge information has been reviewed with the PATIENT . The PATIENT  verbalized understanding.

## 2023-08-12 LAB
EKG DIAGNOSIS: NORMAL
EKG Q-T INTERVAL: 356 MS
EKG QRS DURATION: 96 MS
EKG QTC CALCULATION (BAZETT): 437 MS
EKG R AXIS: -3 DEGREES
EKG T AXIS: 87 DEGREES
EKG VENTRICULAR RATE: 91 BPM

## 2023-08-13 ENCOUNTER — APPOINTMENT (OUTPATIENT)
Facility: HOSPITAL | Age: 78
DRG: 309 | End: 2023-08-13
Payer: MEDICARE

## 2023-08-13 ENCOUNTER — HOSPITAL ENCOUNTER (INPATIENT)
Facility: HOSPITAL | Age: 78
LOS: 2 days | Discharge: HOME | DRG: 309 | End: 2023-08-15
Attending: EMERGENCY MEDICINE | Admitting: STUDENT IN AN ORGANIZED HEALTH CARE EDUCATION/TRAINING PROGRAM
Payer: MEDICARE

## 2023-08-13 DIAGNOSIS — I48.0 PAROXYSMAL ATRIAL FIBRILLATION (HCC): Primary | ICD-10-CM

## 2023-08-13 LAB
ALBUMIN SERPL-MCNC: 4 G/DL (ref 3.5–5)
ALBUMIN/GLOB SERPL: 1.5 (ref 1.1–2.2)
ALP SERPL-CCNC: 69 U/L (ref 45–117)
ALT SERPL-CCNC: 26 U/L (ref 12–78)
ANION GAP SERPL CALC-SCNC: 6 MMOL/L (ref 5–15)
AST SERPL-CCNC: 15 U/L (ref 15–37)
BASOPHILS # BLD: 0.1 K/UL (ref 0–0.1)
BASOPHILS NFR BLD: 1 % (ref 0–1)
BILIRUB SERPL-MCNC: 0.7 MG/DL (ref 0.2–1)
BUN SERPL-MCNC: 16 MG/DL (ref 6–20)
BUN/CREAT SERPL: 20 (ref 12–20)
CALCIUM SERPL-MCNC: 9.1 MG/DL (ref 8.5–10.1)
CHLORIDE SERPL-SCNC: 100 MMOL/L (ref 97–108)
CO2 SERPL-SCNC: 24 MMOL/L (ref 21–32)
COMMENT:: NORMAL
CREAT SERPL-MCNC: 0.79 MG/DL (ref 0.55–1.02)
DIFFERENTIAL METHOD BLD: ABNORMAL
EKG ATRIAL RATE: 119 BPM
EKG DIAGNOSIS: NORMAL
EKG Q-T INTERVAL: 294 MS
EKG QRS DURATION: 100 MS
EKG QTC CALCULATION (BAZETT): 385 MS
EKG R AXIS: -5 DEGREES
EKG T AXIS: 96 DEGREES
EKG VENTRICULAR RATE: 103 BPM
EOSINOPHIL # BLD: 0.1 K/UL (ref 0–0.4)
EOSINOPHIL NFR BLD: 2 % (ref 0–7)
ERYTHROCYTE [DISTWIDTH] IN BLOOD BY AUTOMATED COUNT: 11.9 % (ref 11.5–14.5)
GLOBULIN SER CALC-MCNC: 2.6 G/DL (ref 2–4)
GLUCOSE SERPL-MCNC: 91 MG/DL (ref 65–100)
HCT VFR BLD AUTO: 39.6 % (ref 35–47)
HGB BLD-MCNC: 13.9 G/DL (ref 11.5–16)
IMM GRANULOCYTES # BLD AUTO: 0.1 K/UL (ref 0–0.04)
IMM GRANULOCYTES NFR BLD AUTO: 1 % (ref 0–0.5)
LIPASE SERPL-CCNC: 136 U/L (ref 73–393)
LYMPHOCYTES # BLD: 0.6 K/UL (ref 0.8–3.5)
LYMPHOCYTES NFR BLD: 10 % (ref 12–49)
MAGNESIUM SERPL-MCNC: 2.1 MG/DL (ref 1.6–2.4)
MCH RBC QN AUTO: 32.3 PG (ref 26–34)
MCHC RBC AUTO-ENTMCNC: 35.1 G/DL (ref 30–36.5)
MCV RBC AUTO: 91.9 FL (ref 80–99)
MONOCYTES # BLD: 0.6 K/UL (ref 0–1)
MONOCYTES NFR BLD: 9 % (ref 5–13)
NEUTS SEG # BLD: 4.7 K/UL (ref 1.8–8)
NEUTS SEG NFR BLD: 77 % (ref 32–75)
NRBC # BLD: 0 K/UL (ref 0–0.01)
NRBC BLD-RTO: 0 PER 100 WBC
NT PRO BNP: 922 PG/ML
PLATELET # BLD AUTO: 243 K/UL (ref 150–400)
PMV BLD AUTO: 10.2 FL (ref 8.9–12.9)
POTASSIUM SERPL-SCNC: 4.1 MMOL/L (ref 3.5–5.1)
PROT SERPL-MCNC: 6.6 G/DL (ref 6.4–8.2)
RBC # BLD AUTO: 4.31 M/UL (ref 3.8–5.2)
RBC MORPH BLD: ABNORMAL
SODIUM SERPL-SCNC: 130 MMOL/L (ref 136–145)
SPECIMEN HOLD: NORMAL
TROPONIN I SERPL HS-MCNC: 12 NG/L (ref 0–51)
TSH SERPL DL<=0.05 MIU/L-ACNC: 1.25 UIU/ML (ref 0.36–3.74)
WBC # BLD AUTO: 6.2 K/UL (ref 3.6–11)

## 2023-08-13 PROCEDURE — 2500000003 HC RX 250 WO HCPCS: Performed by: EMERGENCY MEDICINE

## 2023-08-13 PROCEDURE — 36415 COLL VENOUS BLD VENIPUNCTURE: CPT

## 2023-08-13 PROCEDURE — 6370000000 HC RX 637 (ALT 250 FOR IP): Performed by: NURSE PRACTITIONER

## 2023-08-13 PROCEDURE — 83880 ASSAY OF NATRIURETIC PEPTIDE: CPT

## 2023-08-13 PROCEDURE — 85025 COMPLETE CBC W/AUTO DIFF WBC: CPT

## 2023-08-13 PROCEDURE — 84443 ASSAY THYROID STIM HORMONE: CPT

## 2023-08-13 PROCEDURE — 84484 ASSAY OF TROPONIN QUANT: CPT

## 2023-08-13 PROCEDURE — 2580000003 HC RX 258: Performed by: EMERGENCY MEDICINE

## 2023-08-13 PROCEDURE — 99285 EMERGENCY DEPT VISIT HI MDM: CPT

## 2023-08-13 PROCEDURE — 80053 COMPREHEN METABOLIC PANEL: CPT

## 2023-08-13 PROCEDURE — 83690 ASSAY OF LIPASE: CPT

## 2023-08-13 PROCEDURE — 1100000003 HC PRIVATE W/ TELEMETRY

## 2023-08-13 PROCEDURE — 2580000003 HC RX 258: Performed by: NURSE PRACTITIONER

## 2023-08-13 PROCEDURE — 71045 X-RAY EXAM CHEST 1 VIEW: CPT

## 2023-08-13 PROCEDURE — 83735 ASSAY OF MAGNESIUM: CPT

## 2023-08-13 PROCEDURE — 2060000000 HC ICU INTERMEDIATE R&B

## 2023-08-13 PROCEDURE — 96374 THER/PROPH/DIAG INJ IV PUSH: CPT

## 2023-08-13 PROCEDURE — 93005 ELECTROCARDIOGRAM TRACING: CPT | Performed by: EMERGENCY MEDICINE

## 2023-08-13 RX ORDER — DILTIAZEM HYDROCHLORIDE 5 MG/ML
10 INJECTION INTRAVENOUS
Status: COMPLETED | OUTPATIENT
Start: 2023-08-13 | End: 2023-08-13

## 2023-08-13 RX ORDER — M-VIT,TX,IRON,MINS/CALC/FOLIC 27MG-0.4MG
1 TABLET ORAL DAILY
Status: DISCONTINUED | OUTPATIENT
Start: 2023-08-13 | End: 2023-08-15 | Stop reason: HOSPADM

## 2023-08-13 RX ORDER — LEVOTHYROXINE SODIUM 0.1 MG/1
100 TABLET ORAL EVERY OTHER DAY
Status: DISCONTINUED | OUTPATIENT
Start: 2023-08-14 | End: 2023-08-15 | Stop reason: HOSPADM

## 2023-08-13 RX ORDER — ATORVASTATIN CALCIUM 10 MG/1
10 TABLET, FILM COATED ORAL DAILY
Status: DISCONTINUED | OUTPATIENT
Start: 2023-08-13 | End: 2023-08-15 | Stop reason: HOSPADM

## 2023-08-13 RX ORDER — SODIUM CHLORIDE 9 MG/ML
INJECTION, SOLUTION INTRAVENOUS CONTINUOUS
Status: DISCONTINUED | OUTPATIENT
Start: 2023-08-13 | End: 2023-08-14

## 2023-08-13 RX ORDER — WHEAT DEXTRIN 3 G/3.8 G
4 POWDER (GRAM) ORAL
Status: DISCONTINUED | OUTPATIENT
Start: 2023-08-13 | End: 2023-08-13 | Stop reason: DRUGHIGH

## 2023-08-13 RX ORDER — TROSPIUM CHLORIDE 20 MG/1
20 TABLET, FILM COATED ORAL
Status: DISCONTINUED | OUTPATIENT
Start: 2023-08-13 | End: 2023-08-15 | Stop reason: HOSPADM

## 2023-08-13 RX ORDER — ASPIRIN 81 MG/1
81 TABLET, CHEWABLE ORAL DAILY
Status: DISCONTINUED | OUTPATIENT
Start: 2023-08-13 | End: 2023-08-15 | Stop reason: HOSPADM

## 2023-08-13 RX ORDER — MAGNESIUM HYDROXIDE/ALUMINUM HYDROXICE/SIMETHICONE 120; 1200; 1200 MG/30ML; MG/30ML; MG/30ML
30 SUSPENSION ORAL EVERY 6 HOURS PRN
Status: DISCONTINUED | OUTPATIENT
Start: 2023-08-13 | End: 2023-08-15 | Stop reason: HOSPADM

## 2023-08-13 RX ORDER — 0.9 % SODIUM CHLORIDE 0.9 %
1000 INTRAVENOUS SOLUTION INTRAVENOUS ONCE
Status: COMPLETED | OUTPATIENT
Start: 2023-08-13 | End: 2023-08-13

## 2023-08-13 RX ORDER — AMIODARONE HYDROCHLORIDE 200 MG/1
200 TABLET ORAL DAILY
Status: DISCONTINUED | OUTPATIENT
Start: 2023-08-13 | End: 2023-08-15 | Stop reason: HOSPADM

## 2023-08-13 RX ORDER — LEVOTHYROXINE SODIUM 88 UG/1
88 TABLET ORAL
Status: DISCONTINUED | OUTPATIENT
Start: 2023-08-14 | End: 2023-08-13

## 2023-08-13 RX ORDER — ACETAMINOPHEN 325 MG/1
650 TABLET ORAL EVERY 4 HOURS PRN
Status: DISCONTINUED | OUTPATIENT
Start: 2023-08-13 | End: 2023-08-15 | Stop reason: HOSPADM

## 2023-08-13 RX ORDER — MONTELUKAST SODIUM 10 MG/1
10 TABLET ORAL DAILY
Status: DISCONTINUED | OUTPATIENT
Start: 2023-08-13 | End: 2023-08-15 | Stop reason: HOSPADM

## 2023-08-13 RX ORDER — FAMOTIDINE 20 MG/1
40 TABLET, FILM COATED ORAL DAILY
Status: DISCONTINUED | OUTPATIENT
Start: 2023-08-13 | End: 2023-08-15 | Stop reason: HOSPADM

## 2023-08-13 RX ORDER — LANOLIN ALCOHOL/MO/W.PET/CERES
1.5 CREAM (GRAM) TOPICAL NIGHTLY PRN
Status: DISCONTINUED | OUTPATIENT
Start: 2023-08-13 | End: 2023-08-15 | Stop reason: HOSPADM

## 2023-08-13 RX ORDER — LEVOTHYROXINE SODIUM 0.05 MG/1
100 TABLET ORAL EVERY OTHER DAY
Status: DISCONTINUED | OUTPATIENT
Start: 2023-08-13 | End: 2023-08-13

## 2023-08-13 RX ORDER — VITAMIN B COMPLEX
1000 TABLET ORAL DAILY
Status: DISCONTINUED | OUTPATIENT
Start: 2023-08-13 | End: 2023-08-15 | Stop reason: HOSPADM

## 2023-08-13 RX ORDER — TRAMADOL HYDROCHLORIDE 50 MG/1
25 TABLET ORAL EVERY 6 HOURS PRN
Status: DISCONTINUED | OUTPATIENT
Start: 2023-08-13 | End: 2023-08-15 | Stop reason: HOSPADM

## 2023-08-13 RX ORDER — POLYETHYLENE GLYCOL 3350 17 G/17G
17 POWDER, FOR SOLUTION ORAL DAILY PRN
Status: DISCONTINUED | OUTPATIENT
Start: 2023-08-13 | End: 2023-08-15 | Stop reason: HOSPADM

## 2023-08-13 RX ORDER — SENNA AND DOCUSATE SODIUM 50; 8.6 MG/1; MG/1
2 TABLET, FILM COATED ORAL DAILY PRN
Status: DISCONTINUED | OUTPATIENT
Start: 2023-08-13 | End: 2023-08-15 | Stop reason: HOSPADM

## 2023-08-13 RX ORDER — LISINOPRIL 20 MG/1
40 TABLET ORAL DAILY
Status: DISCONTINUED | OUTPATIENT
Start: 2023-08-13 | End: 2023-08-14

## 2023-08-13 RX ORDER — ONDANSETRON 4 MG/1
4 TABLET, ORALLY DISINTEGRATING ORAL EVERY 8 HOURS PRN
Status: DISCONTINUED | OUTPATIENT
Start: 2023-08-13 | End: 2023-08-15 | Stop reason: HOSPADM

## 2023-08-13 RX ORDER — AMLODIPINE BESYLATE 5 MG/1
10 TABLET ORAL DAILY
Status: DISCONTINUED | OUTPATIENT
Start: 2023-08-13 | End: 2023-08-14

## 2023-08-13 RX ORDER — LEVOTHYROXINE SODIUM 88 UG/1
88 TABLET ORAL EVERY OTHER DAY
Status: DISCONTINUED | OUTPATIENT
Start: 2023-08-15 | End: 2023-08-15 | Stop reason: HOSPADM

## 2023-08-13 RX ORDER — LEVOTHYROXINE SODIUM 88 UG/1
88 TABLET ORAL EVERY OTHER DAY
Status: DISCONTINUED | OUTPATIENT
Start: 2023-08-14 | End: 2023-08-13

## 2023-08-13 RX ORDER — ONDANSETRON 2 MG/ML
4 INJECTION INTRAMUSCULAR; INTRAVENOUS EVERY 6 HOURS PRN
Status: DISCONTINUED | OUTPATIENT
Start: 2023-08-13 | End: 2023-08-15 | Stop reason: HOSPADM

## 2023-08-13 RX ADMIN — ATORVASTATIN CALCIUM 10 MG: 10 TABLET, FILM COATED ORAL at 16:14

## 2023-08-13 RX ADMIN — APIXABAN 5 MG: 5 TABLET, FILM COATED ORAL at 16:14

## 2023-08-13 RX ADMIN — SODIUM CHLORIDE 1000 ML: 9 INJECTION, SOLUTION INTRAVENOUS at 12:11

## 2023-08-13 RX ADMIN — SODIUM CHLORIDE: 9 INJECTION, SOLUTION INTRAVENOUS at 16:15

## 2023-08-13 RX ADMIN — SODIUM CHLORIDE 2.5 MG/HR: 900 INJECTION, SOLUTION INTRAVENOUS at 14:28

## 2023-08-13 RX ADMIN — ASPIRIN 81 MG: 81 TABLET, CHEWABLE ORAL at 16:14

## 2023-08-13 RX ADMIN — DILTIAZEM HYDROCHLORIDE 10 MG: 5 INJECTION, SOLUTION INTRAVENOUS at 12:11

## 2023-08-13 ASSESSMENT — ENCOUNTER SYMPTOMS
CHEST TIGHTNESS: 1
STRIDOR: 0
WHEEZING: 0
COUGH: 0
COLOR CHANGE: 0
VOMITING: 0
CHOKING: 0
APNEA: 0
ABDOMINAL DISTENTION: 0
DIARRHEA: 0
CHEST TIGHTNESS: 0
SHORTNESS OF BREATH: 1
ABDOMINAL PAIN: 0

## 2023-08-13 ASSESSMENT — PAIN SCALES - GENERAL: PAINLEVEL_OUTOF10: 5

## 2023-08-13 NOTE — H&P
Hospitalist Admission Note    NAME:   Anna Mustafa   :    MRN: [id number]     Date/Time: 2023 2:45 PM    Patient PCP: BUBBA Blakely NP    ______________________________________________________________________  Given the patient's current clinical presentation, I have a high level of concern for decompensation if discharged from the emergency department. Complex decision making was performed, which includes reviewing the patient's available past medical records, laboratory results, and x-ray films. My assessment of this patient's clinical condition and my plan of care is as follows. Assessment / Plan:  Atrial fibrillation with RVR  - EKG shows atrial fibrillation with RVR  - last echo on 22 shows:  EF of 55%-65%. Left ventricle size is normal. Normal wall thickness. Normal wall motion. Left atrium is moderately dilated. Left atrial volume index is moderately increased. Mild stenosis of the aortic valve. Mild regurgitation of MV. Mild to moderate regurgitation of TV.  - repeat echo ordered  - trend troponin levels  - cardiology started on cardizem drip  - appreciate cardiology, continue [address] and oral amiodarone. - not ideal candidate for long term amiodarone given underlying hypothyroid  - EP on Monday to consider PVI ablation. - continue eliquis  - NPO after MN in lieu of possible procedure  - cardiology following    2. History of COPD  - not in acute exacerbation  - keep 02 sats greater than 92%  - prn nebulizer treatments    3. History of hypertension  - currently hypotensive  - will hold lisinopril and norvasc  - continue statin  - may resume if blood pressure allows    4. Hyponatremia  - Na+ 130  - will gently hydrate  - monitor for s/s of volume overload  - will monitor lab work    5. Hypothyroidism  - TSH 1.25  - takes levothyroxine 88mcg alternating with 100mcg every other day.   - last took 88mcg on today        Medical Decision Making:   I personally

## 2023-08-13 NOTE — ED PROVIDER NOTES
specialist, family decision- making, bedside attention and documentation. Time is exclusive of EKG interpretation, imaging interpretation and procedures. During this entire length of time I was immediately available to the patient.   Duane Hodge MD       EMERGENCY DEPARTMENT COURSE and DIFFERENTIAL DIAGNOSIS/MDM   Vitals:    Vitals:    08/13/23 1230 08/13/23 1245 08/13/23 1415 08/13/23 1445   BP:   (!) 109/54 103/64   Pulse: 89 99 96 (!) 103   Resp: 23 26 28 18   Temp:       TempSrc:       SpO2: 93% 93% 94% 93%   Weight:           Patient was given the following medications:  Medications   dilTIAZem 100 mg in sodium chloride 0.9 % 100 mL infusion ([facility]) (2.5 mg/hr IntraVENous New Bag 8/13/23 1428)   acetaminophen (TYLENOL) tablet 650 mg (has no administration in time range)   atorvastatin (LIPITOR) tablet 10 mg (has no administration in time range)   apixaban (ELIQUIS) tablet 5 mg (has no administration in time range)   trospium (SANCTURA) tablet 20 mg (has no administration in time range)   amiodarone (CORDARONE) tablet 200 mg (200 mg Oral Not Given 8/13/23 1603)   amLODIPine (NORVASC) tablet 10 mg ( Oral Automatically Held 8/16/23 0900)   aspirin chewable tablet 81 mg (has no administration in time range)   famotidine (PEPCID) tablet 40 mg (40 mg Oral Not Given 8/13/23 1605)   lisinopril (PRINIVIL;ZESTRIL) tablet 40 mg ( Oral Automatically Held 8/16/23 0900)   montelukast (SINGULAIR) tablet 10 mg (has no administration in time range)   Vitamin D (CHOLECALCIFEROL) tablet 1,000 Units (1,000 Units Oral Not Given 8/13/23 1604)   therapeutic multivitamin-minerals 1 tablet (1 tablet Oral Not Given 8/13/23 1604)   0.9 % sodium chloride infusion (has no administration in time range)   levothyroxine (SYNTHROID) tablet 88 mcg (has no administration in time range)   levothyroxine (SYNTHROID) tablet 100 mcg (has no administration in time range)   ondansetron (ZOFRAN) injection 4 mg (has no administration in time

## 2023-08-13 NOTE — ED NOTES
1150: Assumed care of patient. Patient complaining of chest pain and rapid heart rate after cardioversion Friday morning. Patient placed on monitor x3 with call bell within reach and side rails x2. Family at bedside. Jenny Polk RN  08/13/23 1155    1525: Patient ambulatory to restroom.         Jenny Polk RN  08/13/23 1536

## 2023-08-14 LAB
ANION GAP SERPL CALC-SCNC: 6 MMOL/L (ref 5–15)
BASOPHILS # BLD: 0 K/UL (ref 0–0.1)
BASOPHILS NFR BLD: 1 % (ref 0–1)
BUN SERPL-MCNC: 18 MG/DL (ref 6–20)
BUN/CREAT SERPL: 23 (ref 12–20)
CALCIUM SERPL-MCNC: 8.4 MG/DL (ref 8.5–10.1)
CHLORIDE SERPL-SCNC: 106 MMOL/L (ref 97–108)
CO2 SERPL-SCNC: 23 MMOL/L (ref 21–32)
CREAT SERPL-MCNC: 0.77 MG/DL (ref 0.55–1.02)
DIFFERENTIAL METHOD BLD: NORMAL
EOSINOPHIL # BLD: 0.2 K/UL (ref 0–0.4)
EOSINOPHIL NFR BLD: 3 % (ref 0–7)
ERYTHROCYTE [DISTWIDTH] IN BLOOD BY AUTOMATED COUNT: 12.3 % (ref 11.5–14.5)
GLUCOSE SERPL-MCNC: 81 MG/DL (ref 65–100)
HCT VFR BLD AUTO: 37.2 % (ref 35–47)
HGB BLD-MCNC: 12.5 G/DL (ref 11.5–16)
IMM GRANULOCYTES # BLD AUTO: 0 K/UL (ref 0–0.04)
IMM GRANULOCYTES NFR BLD AUTO: 0 % (ref 0–0.5)
LYMPHOCYTES # BLD: 1.1 K/UL (ref 0.8–3.5)
LYMPHOCYTES NFR BLD: 19 % (ref 12–49)
MCH RBC QN AUTO: 32 PG (ref 26–34)
MCHC RBC AUTO-ENTMCNC: 33.6 G/DL (ref 30–36.5)
MCV RBC AUTO: 95.1 FL (ref 80–99)
MONOCYTES # BLD: 0.6 K/UL (ref 0–1)
MONOCYTES NFR BLD: 11 % (ref 5–13)
NEUTS SEG # BLD: 3.9 K/UL (ref 1.8–8)
NEUTS SEG NFR BLD: 66 % (ref 32–75)
NRBC # BLD: 0 K/UL (ref 0–0.01)
NRBC BLD-RTO: 0 PER 100 WBC
PLATELET # BLD AUTO: 212 K/UL (ref 150–400)
PMV BLD AUTO: 10.2 FL (ref 8.9–12.9)
POTASSIUM SERPL-SCNC: 4.3 MMOL/L (ref 3.5–5.1)
RBC # BLD AUTO: 3.91 M/UL (ref 3.8–5.2)
SODIUM SERPL-SCNC: 135 MMOL/L (ref 136–145)
TROPONIN I SERPL HS-MCNC: 12 NG/L (ref 0–51)
WBC # BLD AUTO: 5.8 K/UL (ref 3.6–11)

## 2023-08-14 PROCEDURE — 36415 COLL VENOUS BLD VENIPUNCTURE: CPT

## 2023-08-14 PROCEDURE — 85025 COMPLETE CBC W/AUTO DIFF WBC: CPT

## 2023-08-14 PROCEDURE — 2580000003 HC RX 258: Performed by: EMERGENCY MEDICINE

## 2023-08-14 PROCEDURE — 6370000000 HC RX 637 (ALT 250 FOR IP): Performed by: NURSE PRACTITIONER

## 2023-08-14 PROCEDURE — 93005 ELECTROCARDIOGRAM TRACING: CPT | Performed by: STUDENT IN AN ORGANIZED HEALTH CARE EDUCATION/TRAINING PROGRAM

## 2023-08-14 PROCEDURE — 80048 BASIC METABOLIC PNL TOTAL CA: CPT

## 2023-08-14 PROCEDURE — 6370000000 HC RX 637 (ALT 250 FOR IP): Performed by: INTERNAL MEDICINE

## 2023-08-14 PROCEDURE — 84484 ASSAY OF TROPONIN QUANT: CPT

## 2023-08-14 PROCEDURE — 2500000003 HC RX 250 WO HCPCS: Performed by: EMERGENCY MEDICINE

## 2023-08-14 PROCEDURE — 2580000003 HC RX 258: Performed by: NURSE PRACTITIONER

## 2023-08-14 PROCEDURE — 1100000003 HC PRIVATE W/ TELEMETRY

## 2023-08-14 RX ORDER — GUAIFENESIN 600 MG/1
600 TABLET, EXTENDED RELEASE ORAL 2 TIMES DAILY
Status: DISCONTINUED | OUTPATIENT
Start: 2023-08-14 | End: 2023-08-15 | Stop reason: HOSPADM

## 2023-08-14 RX ORDER — METOPROLOL SUCCINATE 25 MG/1
25 TABLET, EXTENDED RELEASE ORAL DAILY
Status: DISCONTINUED | OUTPATIENT
Start: 2023-08-14 | End: 2023-08-15 | Stop reason: HOSPADM

## 2023-08-14 RX ADMIN — LEVOTHYROXINE SODIUM 100 MCG: 0.1 TABLET ORAL at 08:48

## 2023-08-14 RX ADMIN — ASPIRIN 81 MG: 81 TABLET, CHEWABLE ORAL at 21:30

## 2023-08-14 RX ADMIN — GUAIFENESIN 600 MG: 600 TABLET, EXTENDED RELEASE ORAL at 21:29

## 2023-08-14 RX ADMIN — POLYETHYLENE GLYCOL 3350 17 G: 17 POWDER, FOR SOLUTION ORAL at 21:28

## 2023-08-14 RX ADMIN — TROSPIUM CHLORIDE 20 MG: 20 TABLET, FILM COATED ORAL at 15:49

## 2023-08-14 RX ADMIN — AMIODARONE HYDROCHLORIDE 200 MG: 200 TABLET ORAL at 08:48

## 2023-08-14 RX ADMIN — SODIUM CHLORIDE 7.5 MG/HR: 900 INJECTION, SOLUTION INTRAVENOUS at 00:19

## 2023-08-14 RX ADMIN — MONTELUKAST 10 MG: 10 TABLET, FILM COATED ORAL at 08:48

## 2023-08-14 RX ADMIN — SODIUM CHLORIDE: 9 INJECTION, SOLUTION INTRAVENOUS at 00:29

## 2023-08-14 RX ADMIN — MULTIPLE VITAMINS W/ MINERALS TAB 1 TABLET: TAB at 21:29

## 2023-08-14 RX ADMIN — APIXABAN 5 MG: 5 TABLET, FILM COATED ORAL at 21:29

## 2023-08-14 RX ADMIN — TROSPIUM CHLORIDE 20 MG: 20 TABLET, FILM COATED ORAL at 08:49

## 2023-08-14 RX ADMIN — METOPROLOL SUCCINATE 25 MG: 25 TABLET, EXTENDED RELEASE ORAL at 12:39

## 2023-08-14 RX ADMIN — Medication 1000 UNITS: at 21:29

## 2023-08-14 RX ADMIN — FAMOTIDINE 40 MG: 20 TABLET, FILM COATED ORAL at 21:29

## 2023-08-14 RX ADMIN — ATORVASTATIN CALCIUM 10 MG: 10 TABLET, FILM COATED ORAL at 08:48

## 2023-08-14 NOTE — PLAN OF CARE
Problem: Discharge Planning  Goal: Discharge to home or other facility with appropriate resources  8/14/2023 0825 by Pablo Dotson RN  Outcome: Progressing  8/14/2023 0142 by Nicolette Rico RN  Outcome: Progressing     Problem: Safety - Adult  Goal: Free from fall injury  8/14/2023 0825 by Pablo Dotson RN  Outcome: Progressing  8/14/2023 0142 by Nicolette Rico RN  Outcome: Progressing  Flowsheets (Taken 8/13/2023 1945)  Free From Fall Injury: Instruct family/caregiver on patient safety

## 2023-08-14 NOTE — PLAN OF CARE
0150 Patients rhythm switching between afib and sinus with a heart rate ranging between 60-65. Dilt gtt placed on hold.          Problem: Discharge Planning  Goal: Discharge to home or other facility with appropriate resources  Outcome: Progressing     Problem: Safety - Adult  Goal: Free from fall injury  Outcome: Progressing  Flowsheets (Taken 8/13/2023 1945)  Free From Fall Injury: Instruct family/caregiver on patient safety

## 2023-08-15 VITALS
SYSTOLIC BLOOD PRESSURE: 123 MMHG | BODY MASS INDEX: 26.75 KG/M2 | DIASTOLIC BLOOD PRESSURE: 88 MMHG | RESPIRATION RATE: 18 BRPM | HEIGHT: 60 IN | WEIGHT: 136.24 LBS | HEART RATE: 91 BPM | TEMPERATURE: 98.4 F | OXYGEN SATURATION: 95 %

## 2023-08-15 LAB
ANION GAP SERPL CALC-SCNC: 5 MMOL/L (ref 5–15)
BASOPHILS # BLD: 0.1 K/UL (ref 0–0.1)
BASOPHILS NFR BLD: 1 % (ref 0–1)
BUN SERPL-MCNC: 15 MG/DL (ref 6–20)
BUN/CREAT SERPL: 17 (ref 12–20)
CALCIUM SERPL-MCNC: 8.9 MG/DL (ref 8.5–10.1)
CHLORIDE SERPL-SCNC: 105 MMOL/L (ref 97–108)
CO2 SERPL-SCNC: 22 MMOL/L (ref 21–32)
CREAT SERPL-MCNC: 0.86 MG/DL (ref 0.55–1.02)
DIFFERENTIAL METHOD BLD: NORMAL
EKG DIAGNOSIS: NORMAL
EKG Q-T INTERVAL: 344 MS
EKG QRS DURATION: 96 MS
EKG QTC CALCULATION (BAZETT): 482 MS
EKG R AXIS: -8 DEGREES
EKG T AXIS: 129 DEGREES
EKG VENTRICULAR RATE: 118 BPM
EOSINOPHIL # BLD: 0.3 K/UL (ref 0–0.4)
EOSINOPHIL NFR BLD: 4 % (ref 0–7)
ERYTHROCYTE [DISTWIDTH] IN BLOOD BY AUTOMATED COUNT: 12.1 % (ref 11.5–14.5)
GLUCOSE SERPL-MCNC: 93 MG/DL (ref 65–100)
HCT VFR BLD AUTO: 41.2 % (ref 35–47)
HGB BLD-MCNC: 13.7 G/DL (ref 11.5–16)
IMM GRANULOCYTES # BLD AUTO: 0 K/UL (ref 0–0.04)
IMM GRANULOCYTES NFR BLD AUTO: 0 % (ref 0–0.5)
LYMPHOCYTES # BLD: 1.3 K/UL (ref 0.8–3.5)
LYMPHOCYTES NFR BLD: 19 % (ref 12–49)
MCH RBC QN AUTO: 31.8 PG (ref 26–34)
MCHC RBC AUTO-ENTMCNC: 33.3 G/DL (ref 30–36.5)
MCV RBC AUTO: 95.6 FL (ref 80–99)
MONOCYTES # BLD: 0.5 K/UL (ref 0–1)
MONOCYTES NFR BLD: 8 % (ref 5–13)
NEUTS SEG # BLD: 4.5 K/UL (ref 1.8–8)
NEUTS SEG NFR BLD: 68 % (ref 32–75)
NRBC # BLD: 0 K/UL (ref 0–0.01)
NRBC BLD-RTO: 0 PER 100 WBC
PLATELET # BLD AUTO: 236 K/UL (ref 150–400)
PMV BLD AUTO: 9.8 FL (ref 8.9–12.9)
POTASSIUM SERPL-SCNC: 4.1 MMOL/L (ref 3.5–5.1)
RBC # BLD AUTO: 4.31 M/UL (ref 3.8–5.2)
SODIUM SERPL-SCNC: 132 MMOL/L (ref 136–145)
WBC # BLD AUTO: 6.5 K/UL (ref 3.6–11)

## 2023-08-15 PROCEDURE — 36415 COLL VENOUS BLD VENIPUNCTURE: CPT

## 2023-08-15 PROCEDURE — 6370000000 HC RX 637 (ALT 250 FOR IP): Performed by: NURSE PRACTITIONER

## 2023-08-15 PROCEDURE — 85025 COMPLETE CBC W/AUTO DIFF WBC: CPT

## 2023-08-15 PROCEDURE — 80048 BASIC METABOLIC PNL TOTAL CA: CPT

## 2023-08-15 PROCEDURE — 6370000000 HC RX 637 (ALT 250 FOR IP): Performed by: INTERNAL MEDICINE

## 2023-08-15 RX ORDER — METOPROLOL SUCCINATE 25 MG/1
25 TABLET, EXTENDED RELEASE ORAL DAILY
Qty: 30 TABLET | Refills: 3 | Status: SHIPPED | OUTPATIENT
Start: 2023-08-16

## 2023-08-15 RX ADMIN — ATORVASTATIN CALCIUM 10 MG: 10 TABLET, FILM COATED ORAL at 08:13

## 2023-08-15 RX ADMIN — AMIODARONE HYDROCHLORIDE 200 MG: 200 TABLET ORAL at 08:14

## 2023-08-15 RX ADMIN — TROSPIUM CHLORIDE 20 MG: 20 TABLET, FILM COATED ORAL at 08:14

## 2023-08-15 RX ADMIN — MONTELUKAST 10 MG: 10 TABLET, FILM COATED ORAL at 08:13

## 2023-08-15 RX ADMIN — APIXABAN 5 MG: 5 TABLET, FILM COATED ORAL at 08:14

## 2023-08-15 RX ADMIN — GUAIFENESIN 600 MG: 600 TABLET, EXTENDED RELEASE ORAL at 08:13

## 2023-08-15 RX ADMIN — METOPROLOL SUCCINATE 25 MG: 25 TABLET, EXTENDED RELEASE ORAL at 08:13

## 2023-08-15 RX ADMIN — LEVOTHYROXINE SODIUM 88 MCG: 0.09 TABLET ORAL at 06:29

## 2023-08-15 NOTE — PLAN OF CARE
Problem: Discharge Planning  Goal: Discharge to home or other facility with appropriate resources  Outcome: Completed     Problem: Safety - Adult  Goal: Free from fall injury  Outcome: Completed  Flowsheets (Taken 8/14/2023 1950 by Sebastian Rashid RN)  Free From Fall Injury: Instruct family/caregiver on patient safety

## 2023-08-15 NOTE — DISCHARGE SUMMARY
Discharge Summary    Name: Aurelio Luciano  [id number]  YOB: 1945 (Age: 77 y.o.)   Date of Admission: 8/13/2023  Date of Discharge: 8/15/2023  Attending Physician: Delma Soares MD    Discharge Diagnosis:   Atrial fibrillation with RVR POA- now rate controlled , 90's today AM on metoprolol XL 25 mg per cardiology, off carizem drip overnight again per RN  COPD  HTN  Hypothyroidism  Mild Hyponatremia POA- stable at 132  Full code    Consultations:  IP CONSULT TO [facility] [address]      Brief Admission History/Reason for Admission Per Mickiel Claude, MD/NP Jerome Gray:   \"68 y.o.  female with PMHx significant for atrial fibrillation, COPD, hypothyroidism and osteoporosis presented to the emergency room for complaints of rapid heart rate after cardioversion on Friday morning 8/11/23. Patient observed resting in bed with eyes opened. Complains of fatigue. Denies palpitations or active chest pain. Denies lower extremity swelling/pain or distress. In the ER initial EKG shows atrial fibrillation with RVR. Patient was started on a cardizem drip by cardiology. We were asked to admit for work up and evaluation of the above problems. \"    Brief Hospital Course by Main Problems:   Atrial fibrillation with RVR POA- now rate controlled , 90's today AM on metoprolol XL 25 mg per cardiology, off carizem drip overnight again per RN  - EKG shows atrial fibrillation with RVR  - last echo on 11/30/22 shows:  EF of 55%-65%. Left ventricle size is normal. Normal wall thickness. Normal wall motion. Left atrium is moderately dilated. Left atrial volume index is moderately increased. Mild stenosis of the aortic valve. Mild regurgitation of MV.  Mild to moderate regurgitation of TV.     - repeat echo ordered- still pending- pt to have JENNIFER/ablation on Friday-- TTE can be Dcd this admission  IP cardiology/EP consulted- Metoprolol XL 25 mg started  for rate control-- working  continue [address]

## 2023-08-15 NOTE — DISCHARGE INSTRUCTIONS
HOSPITALIST DISCHARGE INSTRUCTIONS    NAME: Marcell Whitmore   :  1945   MRN:  [id number]     Date/Time:  8/15/2023 9:04 AM    ADMIT DATE: 2023     DISCHARGE DATE: 8/15/2023     DISCHARGE DIAGNOSIS:  Atrial fibrillation with RVR POA- now rate controlled , 90's today AM on metoprolol XL 25 mg per cardiology, off carizem drip overnight again per RN  COPD  HTN  Hypothyroidism  Mild Hyponatremia POA- stable at 132  Full code    MEDICATIONS:  As per medication reconciliation  list  It is important that you take the medication exactly as they are prescribed. Keep your medication in the bottles provided by the pharmacist and keep a list of the medication names, dosages, and times to be taken in your wallet. Do not take other medications without consulting your doctor. Pain Management: per above medications    What to do at Home    Recommended diet:  cardiac diet and low fat, low cholesterol diet    Recommended activity: activity as tolerated    If you have questions regarding the hospital related prescriptions or hospital related issues please call at [phone]. If you experience any of the following symptoms then please call your primary care physician or return to the emergency room if you cannot get hold of your doctor:  Fever, chills, nausea, vomiting, diarrhea, change in mentation, falling, bleeding, shortness of breath,     Follow Up:  Dr Blank Root EP/cardiology with [facility], as scheduled for Friday JENNIFER/Ablation as opted by you      Information obtained by :  I understand that if any problems occur once I am at home I am to contact my physician. I understand and acknowledge receipt of the instructions indicated above.                                                                                                                                            Physician's or R.N.'s Signature

## 2023-08-18 ENCOUNTER — ANESTHESIA EVENT (OUTPATIENT)
Facility: HOSPITAL | Age: 78
End: 2023-08-18
Payer: MEDICARE

## 2023-08-18 ENCOUNTER — ANESTHESIA (OUTPATIENT)
Facility: HOSPITAL | Age: 78
End: 2023-08-18
Payer: MEDICARE

## 2023-08-18 ENCOUNTER — APPOINTMENT (OUTPATIENT)
Facility: HOSPITAL | Age: 78
End: 2023-08-18
Attending: INTERNAL MEDICINE
Payer: MEDICARE

## 2023-08-18 ENCOUNTER — HOSPITAL ENCOUNTER (OUTPATIENT)
Facility: HOSPITAL | Age: 78
Discharge: HOME | End: 2023-08-18
Attending: INTERNAL MEDICINE | Admitting: INTERNAL MEDICINE
Payer: MEDICARE

## 2023-08-18 VITALS
WEIGHT: 135 LBS | BODY MASS INDEX: 26.5 KG/M2 | TEMPERATURE: 98.1 F | HEIGHT: 60 IN | SYSTOLIC BLOOD PRESSURE: 153 MMHG | OXYGEN SATURATION: 97 % | HEART RATE: 96 BPM | DIASTOLIC BLOOD PRESSURE: 85 MMHG | RESPIRATION RATE: 16 BRPM

## 2023-08-18 DIAGNOSIS — I48.91 ATRIAL FIBRILLATION (HCC): ICD-10-CM

## 2023-08-18 LAB
ECHO BSA: 1.61 M2
ECHO BSA: 1.61 M2

## 2023-08-18 PROCEDURE — 2580000003 HC RX 258: Performed by: NURSE ANESTHETIST, CERTIFIED REGISTERED

## 2023-08-18 PROCEDURE — 3700000000 HC ANESTHESIA ATTENDED CARE: Performed by: INTERNAL MEDICINE

## 2023-08-18 PROCEDURE — 93312 ECHO TRANSESOPHAGEAL: CPT

## 2023-08-18 PROCEDURE — 7100000000 HC PACU RECOVERY - FIRST 15 MIN: Performed by: INTERNAL MEDICINE

## 2023-08-18 PROCEDURE — 3700000001 HC ADD 15 MINUTES (ANESTHESIA): Performed by: INTERNAL MEDICINE

## 2023-08-18 PROCEDURE — 2709999900 HC NON-CHARGEABLE SUPPLY: Performed by: INTERNAL MEDICINE

## 2023-08-18 PROCEDURE — 6360000002 HC RX W HCPCS: Performed by: NURSE ANESTHETIST, CERTIFIED REGISTERED

## 2023-08-18 PROCEDURE — 7100000001 HC PACU RECOVERY - ADDTL 15 MIN: Performed by: INTERNAL MEDICINE

## 2023-08-18 PROCEDURE — 2500000003 HC RX 250 WO HCPCS: Performed by: NURSE ANESTHETIST, CERTIFIED REGISTERED

## 2023-08-18 RX ORDER — SODIUM CHLORIDE 0.9 % (FLUSH) 0.9 %
5-40 SYRINGE (ML) INJECTION EVERY 12 HOURS SCHEDULED
Status: DISCONTINUED | OUTPATIENT
Start: 2023-08-18 | End: 2023-08-18 | Stop reason: HOSPADM

## 2023-08-18 RX ORDER — PROCHLORPERAZINE EDISYLATE 5 MG/ML
5 INJECTION INTRAMUSCULAR; INTRAVENOUS
Status: DISCONTINUED | OUTPATIENT
Start: 2023-08-18 | End: 2023-08-18 | Stop reason: HOSPADM

## 2023-08-18 RX ORDER — DEXAMETHASONE SODIUM PHOSPHATE 4 MG/ML
INJECTION, SOLUTION INTRA-ARTICULAR; INTRALESIONAL; INTRAMUSCULAR; INTRAVENOUS; SOFT TISSUE PRN
Status: DISCONTINUED | OUTPATIENT
Start: 2023-08-18 | End: 2023-08-18 | Stop reason: SDUPTHER

## 2023-08-18 RX ORDER — SODIUM CHLORIDE 0.9 % (FLUSH) 0.9 %
5-40 SYRINGE (ML) INJECTION PRN
Status: DISCONTINUED | OUTPATIENT
Start: 2023-08-18 | End: 2023-08-18 | Stop reason: HOSPADM

## 2023-08-18 RX ORDER — DIPHENHYDRAMINE HYDROCHLORIDE 50 MG/ML
12.5 INJECTION INTRAMUSCULAR; INTRAVENOUS
Status: DISCONTINUED | OUTPATIENT
Start: 2023-08-18 | End: 2023-08-18 | Stop reason: HOSPADM

## 2023-08-18 RX ORDER — MIDAZOLAM HYDROCHLORIDE 1 MG/ML
2 INJECTION, SOLUTION INTRAMUSCULAR; INTRAVENOUS
Status: DISCONTINUED | OUTPATIENT
Start: 2023-08-18 | End: 2023-08-18 | Stop reason: HOSPADM

## 2023-08-18 RX ORDER — SUCCINYLCHOLINE CHLORIDE 20 MG/ML
INJECTION INTRAMUSCULAR; INTRAVENOUS PRN
Status: DISCONTINUED | OUTPATIENT
Start: 2023-08-18 | End: 2023-08-18 | Stop reason: SDUPTHER

## 2023-08-18 RX ORDER — SODIUM CHLORIDE 9 MG/ML
INJECTION, SOLUTION INTRAVENOUS PRN
Status: DISCONTINUED | OUTPATIENT
Start: 2023-08-18 | End: 2023-08-18 | Stop reason: HOSPADM

## 2023-08-18 RX ORDER — 0.9 % SODIUM CHLORIDE 0.9 %
INTRAVENOUS SOLUTION INTRAVENOUS PRN
Status: DISCONTINUED | OUTPATIENT
Start: 2023-08-18 | End: 2023-08-18 | Stop reason: SDUPTHER

## 2023-08-18 RX ORDER — ONDANSETRON 2 MG/ML
INJECTION INTRAMUSCULAR; INTRAVENOUS PRN
Status: DISCONTINUED | OUTPATIENT
Start: 2023-08-18 | End: 2023-08-18 | Stop reason: SDUPTHER

## 2023-08-18 RX ORDER — ONDANSETRON 2 MG/ML
4 INJECTION INTRAMUSCULAR; INTRAVENOUS
Status: DISCONTINUED | OUTPATIENT
Start: 2023-08-18 | End: 2023-08-18 | Stop reason: HOSPADM

## 2023-08-18 RX ORDER — HYDRALAZINE HYDROCHLORIDE 20 MG/ML
10 INJECTION INTRAMUSCULAR; INTRAVENOUS
Status: DISCONTINUED | OUTPATIENT
Start: 2023-08-18 | End: 2023-08-18 | Stop reason: HOSPADM

## 2023-08-18 RX ORDER — FENTANYL CITRATE 50 UG/ML
50 INJECTION, SOLUTION INTRAMUSCULAR; INTRAVENOUS EVERY 5 MIN PRN
Status: DISCONTINUED | OUTPATIENT
Start: 2023-08-18 | End: 2023-08-18 | Stop reason: HOSPADM

## 2023-08-18 RX ORDER — PHENYLEPHRINE HCL IN 0.9% NACL 0.4MG/10ML
SYRINGE (ML) INTRAVENOUS PRN
Status: DISCONTINUED | OUTPATIENT
Start: 2023-08-18 | End: 2023-08-18 | Stop reason: SDUPTHER

## 2023-08-18 RX ORDER — HYDROMORPHONE HYDROCHLORIDE 1 MG/ML
0.25 INJECTION, SOLUTION INTRAMUSCULAR; INTRAVENOUS; SUBCUTANEOUS EVERY 5 MIN PRN
Status: DISCONTINUED | OUTPATIENT
Start: 2023-08-18 | End: 2023-08-18 | Stop reason: HOSPADM

## 2023-08-18 RX ORDER — FENTANYL CITRATE 50 UG/ML
INJECTION, SOLUTION INTRAMUSCULAR; INTRAVENOUS PRN
Status: DISCONTINUED | OUTPATIENT
Start: 2023-08-18 | End: 2023-08-18 | Stop reason: SDUPTHER

## 2023-08-18 RX ORDER — ROCURONIUM BROMIDE 10 MG/ML
INJECTION, SOLUTION INTRAVENOUS PRN
Status: DISCONTINUED | OUTPATIENT
Start: 2023-08-18 | End: 2023-08-18 | Stop reason: SDUPTHER

## 2023-08-18 RX ORDER — SODIUM CHLORIDE, SODIUM LACTATE, POTASSIUM CHLORIDE, CALCIUM CHLORIDE 600; 310; 30; 20 MG/100ML; MG/100ML; MG/100ML; MG/100ML
INJECTION, SOLUTION INTRAVENOUS CONTINUOUS
Status: DISCONTINUED | OUTPATIENT
Start: 2023-08-18 | End: 2023-08-18 | Stop reason: HOSPADM

## 2023-08-18 RX ADMIN — DEXAMETHASONE SODIUM PHOSPHATE 4 MG: 4 INJECTION, SOLUTION INTRAMUSCULAR; INTRAVENOUS at 10:38

## 2023-08-18 RX ADMIN — Medication 100 MCG: at 10:57

## 2023-08-18 RX ADMIN — ONDANSETRON HYDROCHLORIDE 4 MG: 2 INJECTION, SOLUTION INTRAMUSCULAR; INTRAVENOUS at 11:10

## 2023-08-18 RX ADMIN — SODIUM CHLORIDE 300 ML: 900 INJECTION, SOLUTION INTRAVENOUS at 11:19

## 2023-08-18 RX ADMIN — Medication 40 MCG: at 10:30

## 2023-08-18 RX ADMIN — PROPOFOL 20 MG: 10 INJECTION, EMULSION INTRAVENOUS at 10:31

## 2023-08-18 RX ADMIN — SUCCINYLCHOLINE CHLORIDE 120 MG: 20 INJECTION, SOLUTION INTRAMUSCULAR; INTRAVENOUS at 10:30

## 2023-08-18 RX ADMIN — PROPOFOL 100 MG: 10 INJECTION, EMULSION INTRAVENOUS at 10:30

## 2023-08-18 RX ADMIN — FENTANYL CITRATE 50 MCG: 50 INJECTION, SOLUTION INTRAMUSCULAR; INTRAVENOUS at 10:30

## 2023-08-18 RX ADMIN — FENTANYL CITRATE 50 MCG: 50 INJECTION, SOLUTION INTRAMUSCULAR; INTRAVENOUS at 10:45

## 2023-08-18 RX ADMIN — ROCURONIUM BROMIDE 5 MG: 10 INJECTION INTRAVENOUS at 10:30

## 2023-08-18 RX ADMIN — PROPOFOL 50 MG: 10 INJECTION, EMULSION INTRAVENOUS at 10:45

## 2023-08-18 NOTE — ANESTHESIA PRE PROCEDURE
Department of Anesthesiology  Preprocedure Note       Name:  Seleta Hatchet   Age:  77 y.o.  :  1945                                          MRN:  [id number]         Date:  2023      Surgeon: Dora Galvan):  Lora Diaz MD    Procedure: Procedure(s):  Ablation A-fib w complete ep study    Medications prior to admission:   Prior to Admission medications    Medication Sig Start Date End Date Taking? Authorizing Provider   metoprolol succinate (TOPROL XL) 25 MG extended release tablet Take 1 tablet by mouth daily 23   Trisha Maxwell MD   amiodarone (CORDARONE) 200 MG tablet Take 1 tablet by mouth daily    Historical Provider, MD   levothyroxine (SYNTHROID) 88 MCG tablet TAKE 1 TABLET BY MOUTH EVERY DAY BEFORE BREAKFAST 23   BUBBA Dockery NP   atorvastatin (LIPITOR) 10 MG tablet TAKE 1 TABLET BY MOUTH EVERY DAY 23   BUBBA Montaño NP   ELIQUIS 5 MG TABS tablet TAKE 1 TABLET BY MOUTH TWICE A DAY 23   BUBBA Keita NP   MYRBETRIQ 50 MG TB24 TAKE 1 TABLET BY MOUTH EVERY DAY 23   BUBBA Keita NP   vitamin D (CHOLECALCIFEROL) 25 MCG (1000 UT) TABS tablet Take 1 tablet by mouth daily    Ar Automatic Reconciliation   levothyroxine (SYNTHROID) 100 MCG tablet 1 tab PO every other morning. Alternate with 88 mcg dose.  23   Ar Automatic Reconciliation   aspirin 81 MG chewable tablet Take 1 tablet by mouth daily  Patient not taking: Reported on 2023    Historical Provider, MD   acetaminophen (TYLENOL) 650 MG extended release tablet Take 1 tablet by mouth 18   Historical Provider, MD   albuterol sulfate HFA (PROVENTIL;VENTOLIN;PROAIR) 108 (90 Base) MCG/ACT inhaler Inhale 2 puffs into the lungs 22   Historical Provider, MD   montelukast (SINGULAIR) 10 MG tablet Take 1 tablet by mouth daily 8/10/20   Historical Provider, MD   benzonatate (TESSALON) 200 MG capsule Take 1 capsule by mouth as needed 22   Historical

## 2023-08-18 NOTE — PROGRESS NOTES
Received pt from the PACU. No voiced complaints . Awake and alert. Family in to visit. Pt will be discharged later today .

## 2023-08-18 NOTE — PROGRESS NOTES
Dual Skin preformed with ALFREDO wadsworth  Cardiac Cath Lab Recovery Arrival Note:      Corina Mccabe arrived to Cardiac Cath Lab, Recovery Area. Staff introduced to patient. Patient identifiers verified with NAME and DATE OF BIRTH. Procedure verified with patient. Consent forms reviewed and signed by patient or authorized representative and verified. Allergies verified. Patient and family oriented to department. Patient and family informed of procedure and plan of care. Questions answered with review. Patient prepped for procedure, per orders from physician, prior to arrival.    Patient on cardiac monitor, non-invasive blood pressure, SPO2 monitor. On RA. Patient is A&Ox 4. Patient reports no pain. Patient in stretcher, in low position, with side rails up, call bell within reach, patient instructed to call if assistance as needed. Patient prep in: [address] [phone]. Patient family has pager #   Family in: lucila.    Prep by: Meagan De La Torre

## 2023-08-18 NOTE — ANESTHESIA POSTPROCEDURE EVALUATION
Department of Anesthesiology  Postprocedure Note    Patient: Irene Solorzano  MRN: [id number]  YOB: 1945  Date of evaluation: 8/18/2023      Procedure Summary     Date: 08/18/23 Room / Location: \[facility]\"" EP LAB / \[facility]\"" CARDIAC CATH LAB    Anesthesia Start: 6025 Anesthesia Stop: 8911    Procedure: Procedure cancelled Diagnosis: Atrial fibrillation ([address])    Providers: Roque Montilla MD Responsible Provider: Grayson Ramirez DO    Anesthesia Type: general ASA Status: 3          Anesthesia Type: No value filed.     Surya Phase I: Surya Score: 10    Surya Phase II:        Anesthesia Post Evaluation    Patient location during evaluation: PACU  Patient participation: complete - patient participated  Level of consciousness: awake and alert  Pain score: 0  Airway patency: patent  Nausea & Vomiting: no nausea  Complications: no  Cardiovascular status: hemodynamically stable  Respiratory status: acceptable  Hydration status: euvolemic  Comments: Seen, no complaints   Pain management: adequate

## 2023-08-21 ENCOUNTER — TELEPHONE (OUTPATIENT)
Age: 78
End: 2023-08-21

## 2023-08-21 NOTE — TELEPHONE ENCOUNTER
Patient's daughter called and wanted to cancel patient's appointment on Friday 8/25 with [facility], Patient's ablation was cancelled due to patient having a clot. Daughter stated she would reschedule once patient has a new SX date.

## 2023-08-21 NOTE — TELEPHONE ENCOUNTER
Pt has apt tomorrow but daughter insisted on matt calling today for answer. . they are thinking of taking her to [place] to be closer to hospital and get good quick care if she was to have a stroke .  They are concerned because the ablation has been post poned

## 2023-08-21 NOTE — TELEPHONE ENCOUNTER
PC ROLLY Mrs. Matthew Barajas, yes she was asking was it OK for her to miss her appointment  with Urvashi Falk on next Tuesday? Her ablation has been cancelled due to her having a clot, has to have INR's checked, daughter prefers to have her close to her? I informed her appointment  with Urvashi Falk is scheduled for tomorrow and should be able to confirm then. Urvashi Falk informed, stated, should keep her appointment  on tomorrow, can discuss questions at that time. Patient wants to cancel for tomorrow, reschedule after the holiday, call was d/C,  [facility] Urvashi Falk will be verbally informed.

## 2023-08-21 NOTE — TELEPHONE ENCOUNTER
PC ROLLY Mr. Artis Christoph, message left per Majo Eddy OK to see her when she is back in town. He stated OK of understanding and thanks.

## 2023-08-28 NOTE — PROGRESS NOTES
CSS Consult Note    Subjective: Tiara Barajas is a 77 y.o. female who was referred for cardiac evaluation for paroxysmal atrial fibrillation by Dr. Ellis Valentino. Pt has a significant PMH for HTN, HLD, hypothyroid, COPD, and frequent UTI. Previous ablations attempted 8/18 was aborted due to COLT thrombus, Cardioversion  x 1 8/11. Patient was re-admitted to the hospital on 8/12 due to A-Fib with RVR. She was hospitalized for 3 days for rate control, and scheduled for ablation on 8/18 with Dr. Ellis Valentino.      Denies ANGEL. Taking Xarelto for [facility] [place], operated by [facility] and denies h/o GIB. Denies previous surgeries/trauma to chest. MR is noted as mild to moderate. Denies being on testosterone or other hormonal replacement therapy. Pt consumes 2-3 caffeinated beverages/day. Pt lives 2 hours away with her . She regularly walks and practices balance. 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 She is a nonsmoker, drinks no alcohol, and denies illicit drug use. Pt has a significant family history of CAD, CVA, COPD, and brain CA. She's had the COVID-19 vaccines. Cardiac Testing  Cardiac catheterization:    ECHO:  8/18/23  Interpretation Summary  Result status: Final result    Left Ventricle: Normal left ventricular systolic function. Left ventricle size is normal. Normal wall thickness. Normal wall motion. Mitral Valve: Mild to moderate regurgitation. Tricuspid Valve: Moderate to severe regurgitation. Left Atrium: Left atrium is severely dilated. No left atrial appendage thrombus noted. No left atrial appendage mass noted. Right Atrium: Right atrium is severely dilated. Echo Findings  Left Ventricle Normal left ventricular systolic function. Left ventricle size is normal. Normal wall thickness. Normal wall motion. Diastolic function indeterminate in the setting of atrial fibrillation. Left Atrium Left atrium is severely dilated.  No left atrial appendage thrombus

## 2023-08-30 ENCOUNTER — INITIAL CONSULT (OUTPATIENT)
Age: 78
End: 2023-08-30
Payer: MEDICARE

## 2023-08-30 VITALS
SYSTOLIC BLOOD PRESSURE: 142 MMHG | WEIGHT: 136 LBS | DIASTOLIC BLOOD PRESSURE: 88 MMHG | OXYGEN SATURATION: 98 % | BODY MASS INDEX: 26.56 KG/M2 | HEART RATE: 94 BPM | TEMPERATURE: 98.1 F | RESPIRATION RATE: 16 BRPM

## 2023-08-30 DIAGNOSIS — M85.80 OSTEOPENIA WITH HIGH RISK OF FRACTURE: Primary | ICD-10-CM

## 2023-08-30 DIAGNOSIS — I48.0 PAROXYSMAL ATRIAL FIBRILLATION (HCC): Primary | ICD-10-CM

## 2023-08-30 PROCEDURE — 1090F PRES/ABSN URINE INCON ASSESS: CPT | Performed by: THORACIC SURGERY (CARDIOTHORACIC VASCULAR SURGERY)

## 2023-08-30 PROCEDURE — 99204 OFFICE O/P NEW MOD 45 MIN: CPT | Performed by: NURSE PRACTITIONER

## 2023-08-30 PROCEDURE — G8427 DOCREV CUR MEDS BY ELIG CLIN: HCPCS | Performed by: THORACIC SURGERY (CARDIOTHORACIC VASCULAR SURGERY)

## 2023-08-30 PROCEDURE — G8399 PT W/DXA RESULTS DOCUMENT: HCPCS | Performed by: THORACIC SURGERY (CARDIOTHORACIC VASCULAR SURGERY)

## 2023-08-30 PROCEDURE — 3079F DIAST BP 80-89 MM HG: CPT | Performed by: THORACIC SURGERY (CARDIOTHORACIC VASCULAR SURGERY)

## 2023-08-30 PROCEDURE — 1123F ACP DISCUSS/DSCN MKR DOCD: CPT | Performed by: NURSE PRACTITIONER

## 2023-08-30 PROCEDURE — 1111F DSCHRG MED/CURRENT MED MERGE: CPT | Performed by: THORACIC SURGERY (CARDIOTHORACIC VASCULAR SURGERY)

## 2023-08-30 PROCEDURE — 3077F SYST BP >= 140 MM HG: CPT | Performed by: NURSE PRACTITIONER

## 2023-08-30 PROCEDURE — G8419 CALC BMI OUT NRM PARAM NOF/U: HCPCS | Performed by: THORACIC SURGERY (CARDIOTHORACIC VASCULAR SURGERY)

## 2023-08-30 PROCEDURE — 1036F TOBACCO NON-USER: CPT | Performed by: THORACIC SURGERY (CARDIOTHORACIC VASCULAR SURGERY)

## 2023-08-30 RX ORDER — DIPHENHYDRAMINE HYDROCHLORIDE 50 MG/ML
50 INJECTION INTRAMUSCULAR; INTRAVENOUS
OUTPATIENT
Start: 2023-08-30

## 2023-08-30 RX ORDER — ACETAMINOPHEN 325 MG/1
650 TABLET ORAL
OUTPATIENT
Start: 2023-08-30

## 2023-08-30 RX ORDER — SODIUM CHLORIDE 9 MG/ML
INJECTION, SOLUTION INTRAVENOUS CONTINUOUS
OUTPATIENT
Start: 2023-08-30

## 2023-08-30 RX ORDER — ONDANSETRON 2 MG/ML
8 INJECTION INTRAMUSCULAR; INTRAVENOUS
OUTPATIENT
Start: 2023-08-30

## 2023-08-30 RX ORDER — ALBUTEROL SULFATE 90 UG/1
4 AEROSOL, METERED RESPIRATORY (INHALATION) PRN
OUTPATIENT
Start: 2023-08-30

## 2023-08-30 RX ORDER — FAMOTIDINE 10 MG/ML
20 INJECTION, SOLUTION INTRAVENOUS
OUTPATIENT
Start: 2023-08-30

## 2023-08-30 RX ORDER — EPINEPHRINE 1 MG/ML
0.3 INJECTION, SOLUTION, CONCENTRATE INTRAVENOUS PRN
OUTPATIENT
Start: 2023-08-30

## 2023-08-30 ASSESSMENT — PATIENT HEALTH QUESTIONNAIRE - PHQ9
SUM OF ALL RESPONSES TO PHQ9 QUESTIONS 1 & 2: 0
SUM OF ALL RESPONSES TO PHQ QUESTIONS 1-9: 0
1. LITTLE INTEREST OR PLEASURE IN DOING THINGS: 0
2. FEELING DOWN, DEPRESSED OR HOPELESS: 0

## 2023-08-31 ENCOUNTER — TELEPHONE (OUTPATIENT)
Age: 78
End: 2023-08-31

## 2023-08-31 NOTE — TELEPHONE ENCOUNTER
I called [facility]. I spoke with Zulay Castillo, Dr. Yolie Briceno  to request scheduling patient for a JENNIFER one week prior to her surgery per Dr. Makenna Juarez.     Zulay Castillo returned my call to let me know patient has been scheduled on 10/2/23 at 8am.

## 2023-09-07 ENCOUNTER — HOSPITAL ENCOUNTER (OUTPATIENT)
Facility: HOSPITAL | Age: 78
End: 2023-09-07
Payer: MEDICARE

## 2023-09-07 ENCOUNTER — TRANSCRIBE ORDERS (OUTPATIENT)
Facility: HOSPITAL | Age: 78
End: 2023-09-07

## 2023-09-07 ENCOUNTER — HOSPITAL ENCOUNTER (OUTPATIENT)
Facility: HOSPITAL | Age: 78
Setting detail: INFUSION SERIES
End: 2023-09-07
Payer: MEDICARE

## 2023-09-07 VITALS
TEMPERATURE: 97.8 F | BODY MASS INDEX: 26.91 KG/M2 | WEIGHT: 137.8 LBS | HEART RATE: 98 BPM | SYSTOLIC BLOOD PRESSURE: 139 MMHG | RESPIRATION RATE: 18 BRPM | OXYGEN SATURATION: 98 % | DIASTOLIC BLOOD PRESSURE: 85 MMHG

## 2023-09-07 DIAGNOSIS — R92.8 ABNORMAL MAMMOGRAM: Primary | ICD-10-CM

## 2023-09-07 DIAGNOSIS — M85.80 OSTEOPENIA WITH HIGH RISK OF FRACTURE: Primary | ICD-10-CM

## 2023-09-07 DIAGNOSIS — Z12.31 VISIT FOR SCREENING MAMMOGRAM: ICD-10-CM

## 2023-09-07 PROCEDURE — 96372 THER/PROPH/DIAG INJ SC/IM: CPT

## 2023-09-07 PROCEDURE — 77063 BREAST TOMOSYNTHESIS BI: CPT

## 2023-09-07 PROCEDURE — 6360000002 HC RX W HCPCS: Performed by: NURSE PRACTITIONER

## 2023-09-07 RX ORDER — ACETAMINOPHEN 325 MG/1
650 TABLET ORAL
OUTPATIENT
Start: 2024-03-03

## 2023-09-07 RX ORDER — DIPHENHYDRAMINE HYDROCHLORIDE 50 MG/ML
50 INJECTION INTRAMUSCULAR; INTRAVENOUS
OUTPATIENT
Start: 2024-03-03

## 2023-09-07 RX ORDER — EPINEPHRINE 1 MG/ML
0.3 INJECTION, SOLUTION, CONCENTRATE INTRAVENOUS PRN
OUTPATIENT
Start: 2024-03-03

## 2023-09-07 RX ORDER — EPINEPHRINE 1 MG/ML
0.3 INJECTION, SOLUTION, CONCENTRATE INTRAVENOUS PRN
Status: DISCONTINUED | OUTPATIENT
Start: 2023-09-07 | End: 2023-10-28 | Stop reason: HOSPADM

## 2023-09-07 RX ORDER — ACETAMINOPHEN 325 MG/1
650 TABLET ORAL
Status: DISCONTINUED | OUTPATIENT
Start: 2023-09-07 | End: 2023-09-08 | Stop reason: HOSPADM

## 2023-09-07 RX ORDER — ONDANSETRON 2 MG/ML
8 INJECTION INTRAMUSCULAR; INTRAVENOUS
Status: DISCONTINUED | OUTPATIENT
Start: 2023-09-07 | End: 2023-09-08 | Stop reason: HOSPADM

## 2023-09-07 RX ORDER — SODIUM CHLORIDE 9 MG/ML
INJECTION, SOLUTION INTRAVENOUS CONTINUOUS
Status: DISCONTINUED | OUTPATIENT
Start: 2023-09-07 | End: 2023-10-28 | Stop reason: HOSPADM

## 2023-09-07 RX ORDER — ALBUTEROL SULFATE 90 UG/1
4 AEROSOL, METERED RESPIRATORY (INHALATION) PRN
Status: DISCONTINUED | OUTPATIENT
Start: 2023-09-07 | End: 2023-10-28 | Stop reason: HOSPADM

## 2023-09-07 RX ORDER — DIPHENHYDRAMINE HYDROCHLORIDE 50 MG/ML
50 INJECTION INTRAMUSCULAR; INTRAVENOUS
Status: DISCONTINUED | OUTPATIENT
Start: 2023-09-07 | End: 2023-09-08 | Stop reason: HOSPADM

## 2023-09-07 RX ORDER — ALBUTEROL SULFATE 90 UG/1
4 AEROSOL, METERED RESPIRATORY (INHALATION) PRN
OUTPATIENT
Start: 2024-03-03

## 2023-09-07 RX ORDER — SODIUM CHLORIDE 9 MG/ML
INJECTION, SOLUTION INTRAVENOUS CONTINUOUS
OUTPATIENT
Start: 2024-03-03

## 2023-09-07 RX ORDER — ONDANSETRON 2 MG/ML
8 INJECTION INTRAMUSCULAR; INTRAVENOUS
OUTPATIENT
Start: 2024-03-03

## 2023-09-07 RX ADMIN — DENOSUMAB 60 MG: 60 INJECTION SUBCUTANEOUS at 11:05

## 2023-09-07 ASSESSMENT — PAIN SCALES - GENERAL: PAINLEVEL_OUTOF10: 0

## 2023-09-08 ENCOUNTER — OFFICE VISIT (OUTPATIENT)
Age: 78
End: 2023-09-08
Payer: MEDICARE

## 2023-09-08 VITALS
DIASTOLIC BLOOD PRESSURE: 78 MMHG | BODY MASS INDEX: 27.13 KG/M2 | TEMPERATURE: 97.5 F | HEART RATE: 121 BPM | WEIGHT: 138.2 LBS | RESPIRATION RATE: 17 BRPM | SYSTOLIC BLOOD PRESSURE: 136 MMHG | OXYGEN SATURATION: 98 % | HEIGHT: 60 IN

## 2023-09-08 DIAGNOSIS — R04.0 EPISTAXIS: ICD-10-CM

## 2023-09-08 DIAGNOSIS — R53.82 CHRONIC FATIGUE, UNSPECIFIED: ICD-10-CM

## 2023-09-08 DIAGNOSIS — I48.11 LONGSTANDING PERSISTENT ATRIAL FIBRILLATION (HCC): Primary | ICD-10-CM

## 2023-09-08 DIAGNOSIS — R41.3 MEMORY CHANGES: ICD-10-CM

## 2023-09-08 PROCEDURE — 3078F DIAST BP <80 MM HG: CPT | Performed by: NURSE PRACTITIONER

## 2023-09-08 PROCEDURE — 1036F TOBACCO NON-USER: CPT | Performed by: NURSE PRACTITIONER

## 2023-09-08 PROCEDURE — 1123F ACP DISCUSS/DSCN MKR DOCD: CPT | Performed by: NURSE PRACTITIONER

## 2023-09-08 PROCEDURE — 3075F SYST BP GE 130 - 139MM HG: CPT | Performed by: NURSE PRACTITIONER

## 2023-09-08 PROCEDURE — 99214 OFFICE O/P EST MOD 30 MIN: CPT | Performed by: NURSE PRACTITIONER

## 2023-09-08 PROCEDURE — 1090F PRES/ABSN URINE INCON ASSESS: CPT | Performed by: NURSE PRACTITIONER

## 2023-09-08 PROCEDURE — G8399 PT W/DXA RESULTS DOCUMENT: HCPCS | Performed by: NURSE PRACTITIONER

## 2023-09-08 PROCEDURE — G8427 DOCREV CUR MEDS BY ELIG CLIN: HCPCS | Performed by: NURSE PRACTITIONER

## 2023-09-08 PROCEDURE — 1111F DSCHRG MED/CURRENT MED MERGE: CPT | Performed by: NURSE PRACTITIONER

## 2023-09-08 PROCEDURE — G8419 CALC BMI OUT NRM PARAM NOF/U: HCPCS | Performed by: NURSE PRACTITIONER

## 2023-09-08 SDOH — ECONOMIC STABILITY: INCOME INSECURITY: IN THE LAST 12 MONTHS, WAS THERE A TIME WHEN YOU WERE NOT ABLE TO PAY THE MORTGAGE OR RENT ON TIME?: NO

## 2023-09-08 ASSESSMENT — PATIENT HEALTH QUESTIONNAIRE - PHQ9
1. LITTLE INTEREST OR PLEASURE IN DOING THINGS: 0
SUM OF ALL RESPONSES TO PHQ9 QUESTIONS 1 & 2: 0
2. FEELING DOWN, DEPRESSED OR HOPELESS: 0
SUM OF ALL RESPONSES TO PHQ QUESTIONS 1-9: 0

## 2023-09-08 NOTE — PROGRESS NOTES
Chief Complaint   Patient presents with    Follow-Up from [address] [place] [address]:      Diagnosis Orders   1. Longstanding persistent atrial fibrillation (HCC)        2. Epistaxis        3. Chronic fatigue, unspecified        4. Memory changes          Reviewed mammogram results. Discussed this with the patient. Reviewed hospital records. Hold on labs as she has a pre op scheduled in October. We discussed her fatigue, memory changes. Come back in October after her surgery for a repeat MOCA, neurology referral. She agrees. Meds reconciled. I recommended saline nasal spray and vaseline for epistaxis. Patient aware of plan of care and verbalized understanding. Questions answered. RTC October, or sooner if needed. HPI:     is a 77 y.o. female in today for hospital follow up. HTN: Meds adjusted with new diagnosis of atrial fib, now on metoprolol. Cardiology: Atrial fib with RVR; echo with EF 50-60%. COPD: Sees pulmonary yearly, Dr. Dolores Palma. Budesonide changed to Flovent, remains on Anoro. Stable, intermittent cough but tolerable. Oral lichen planus/candidiasis: Followed by Dr. Jason Jones in [facility]. Also with hx of SCC, BCC. Currently being treated for a persistent rash on her RLE. Osteopenia: Failed Boniva, remains on Prolia. Next DEXA August 2024. New issues:  Diagnosed with atrial fib in June 2023, cardiologist is Dr. Andrea Sheffield. She had a cardioversion in August which failed so she was scheduled for an ablation. The ablation was canceled due to L AAA thrombus. She was admitted 8/12-8/15 for afib with RVR. Currently on Xarelto. She is now seeing Dr. Brittaney Acosta and plans for a JENNIFER 10/2 followed by an ablation with LAAL on 10/9 with Dr. Brittaney Acosta. Allergies   Allergen Reactions    Latex Rash    Cephalexin Rash    Levofloxacin Rash    Sulfa Antibiotics Rash    Adhesive Tape Rash    Nitrofurantoin Rash       Prior to Visit Medications    Medication Sig Taking?  Authorizing

## 2023-09-08 NOTE — PATIENT INSTRUCTIONS
Ocean nasal spray. Avoid intranasal steroids like Flonase or Nasacort as they thin lining and can make nose bleeds worse.

## 2023-09-18 ENCOUNTER — HOSPITAL ENCOUNTER (OUTPATIENT)
Facility: HOSPITAL | Age: 78
Discharge: HOME | End: 2023-09-21
Payer: MEDICARE

## 2023-09-18 DIAGNOSIS — R92.8 ABNORMAL MAMMOGRAM: ICD-10-CM

## 2023-09-18 PROCEDURE — G0279 TOMOSYNTHESIS, MAMMO: HCPCS

## 2023-09-29 ENCOUNTER — PREP FOR PROCEDURE (OUTPATIENT)
Age: 78
End: 2023-09-29

## 2023-09-29 DIAGNOSIS — I48.91 ATRIAL FIBRILLATION, UNSPECIFIED TYPE (HCC): Primary | ICD-10-CM

## 2023-09-29 RX ORDER — SODIUM CHLORIDE 0.9 % (FLUSH) 0.9 %
5-40 SYRINGE (ML) INJECTION PRN
Status: CANCELLED | OUTPATIENT
Start: 2023-09-29

## 2023-09-29 RX ORDER — SODIUM CHLORIDE 9 MG/ML
INJECTION, SOLUTION INTRAVENOUS PRN
Status: CANCELLED | OUTPATIENT
Start: 2023-09-29

## 2023-09-29 RX ORDER — SODIUM CHLORIDE 0.9 % (FLUSH) 0.9 %
5-40 SYRINGE (ML) INJECTION EVERY 12 HOURS SCHEDULED
Status: CANCELLED | OUTPATIENT
Start: 2023-09-29

## 2023-10-02 ENCOUNTER — HOSPITAL ENCOUNTER (OUTPATIENT)
Facility: HOSPITAL | Age: 78
Discharge: HOME | End: 2023-10-05
Attending: STUDENT IN AN ORGANIZED HEALTH CARE EDUCATION/TRAINING PROGRAM
Payer: MEDICARE

## 2023-10-02 ENCOUNTER — HOSPITAL ENCOUNTER (OUTPATIENT)
Facility: HOSPITAL | Age: 78
Discharge: HOME | End: 2023-10-04
Attending: STUDENT IN AN ORGANIZED HEALTH CARE EDUCATION/TRAINING PROGRAM
Payer: MEDICARE

## 2023-10-02 VITALS
SYSTOLIC BLOOD PRESSURE: 107 MMHG | HEART RATE: 80 BPM | DIASTOLIC BLOOD PRESSURE: 85 MMHG | OXYGEN SATURATION: 95 % | RESPIRATION RATE: 17 BRPM

## 2023-10-02 VITALS
SYSTOLIC BLOOD PRESSURE: 142 MMHG | TEMPERATURE: 97.5 F | BODY MASS INDEX: 27.27 KG/M2 | DIASTOLIC BLOOD PRESSURE: 103 MMHG | HEART RATE: 120 BPM | HEIGHT: 60 IN | OXYGEN SATURATION: 100 % | WEIGHT: 138.89 LBS | RESPIRATION RATE: 20 BRPM

## 2023-10-02 DIAGNOSIS — I48.91 ATRIAL FIBRILLATION, UNSPECIFIED TYPE (HCC): ICD-10-CM

## 2023-10-02 DIAGNOSIS — I48.91 ATRIAL FIBRILLATION (HCC): ICD-10-CM

## 2023-10-02 LAB
ALBUMIN SERPL-MCNC: 4 G/DL (ref 3.5–5)
ALBUMIN/GLOB SERPL: 1.3 (ref 1.1–2.2)
ALP SERPL-CCNC: 80 U/L (ref 45–117)
ALT SERPL-CCNC: 37 U/L (ref 12–78)
ANION GAP SERPL CALC-SCNC: 4 MMOL/L (ref 5–15)
ANION GAP SERPL CALC-SCNC: 5 MMOL/L (ref 5–15)
APPEARANCE UR: CLEAR
APTT PPP: 28 SEC (ref 22.1–31)
ARTERIAL PATENCY WRIST A: YES
AST SERPL-CCNC: 17 U/L (ref 15–37)
BACTERIA URNS QL MICRO: NEGATIVE /HPF
BASE DEFICIT BLDA-SCNC: 0.7 MMOL/L
BASOPHILS # BLD: 0.1 K/UL (ref 0–0.1)
BASOPHILS NFR BLD: 1 % (ref 0–1)
BDY SITE: NORMAL
BILIRUB SERPL-MCNC: 0.6 MG/DL (ref 0.2–1)
BILIRUB UR QL: NEGATIVE
BUN SERPL-MCNC: 16 MG/DL (ref 6–20)
BUN SERPL-MCNC: 19 MG/DL (ref 6–20)
BUN/CREAT SERPL: 24 (ref 12–20)
BUN/CREAT SERPL: 25 (ref 12–20)
CALCIUM SERPL-MCNC: 8.7 MG/DL (ref 8.5–10.1)
CALCIUM SERPL-MCNC: 9.1 MG/DL (ref 8.5–10.1)
CHLORIDE SERPL-SCNC: 106 MMOL/L (ref 97–108)
CHLORIDE SERPL-SCNC: 109 MMOL/L (ref 97–108)
CO2 SERPL-SCNC: 23 MMOL/L (ref 21–32)
CO2 SERPL-SCNC: 26 MMOL/L (ref 21–32)
COLOR UR: ABNORMAL
COMMENT:: NORMAL
CREAT SERPL-MCNC: 0.68 MG/DL (ref 0.55–1.02)
CREAT SERPL-MCNC: 0.76 MG/DL (ref 0.55–1.02)
DIFFERENTIAL METHOD BLD: ABNORMAL
EOSINOPHIL # BLD: 0.2 K/UL (ref 0–0.4)
EOSINOPHIL NFR BLD: 3 % (ref 0–7)
EPITH CASTS URNS QL MICRO: ABNORMAL /LPF
ERYTHROCYTE [DISTWIDTH] IN BLOOD BY AUTOMATED COUNT: 12.6 % (ref 11.5–14.5)
ERYTHROCYTE [DISTWIDTH] IN BLOOD BY AUTOMATED COUNT: 12.6 % (ref 11.5–14.5)
EST. AVERAGE GLUCOSE BLD GHB EST-MCNC: 105 MG/DL
GLOBULIN SER CALC-MCNC: 3 G/DL (ref 2–4)
GLUCOSE SERPL-MCNC: 81 MG/DL (ref 65–100)
GLUCOSE SERPL-MCNC: 99 MG/DL (ref 65–100)
GLUCOSE UR STRIP.AUTO-MCNC: NEGATIVE MG/DL
HBA1C MFR BLD: 5.3 % (ref 4–5.6)
HCO3 BLDA-SCNC: 24 MMOL/L (ref 22–26)
HCT VFR BLD AUTO: 42.8 % (ref 35–47)
HCT VFR BLD AUTO: 44.8 % (ref 35–47)
HGB BLD-MCNC: 14.3 G/DL (ref 11.5–16)
HGB BLD-MCNC: 14.7 G/DL (ref 11.5–16)
HGB UR QL STRIP: NEGATIVE
HISTORY CHECK: NORMAL
IMM GRANULOCYTES # BLD AUTO: 0 K/UL (ref 0–0.04)
IMM GRANULOCYTES NFR BLD AUTO: 1 % (ref 0–0.5)
INR PPP: 1.1 (ref 0.9–1.1)
KETONES UR QL STRIP.AUTO: NEGATIVE MG/DL
LEUKOCYTE ESTERASE UR QL STRIP.AUTO: ABNORMAL
LYMPHOCYTES # BLD: 1.7 K/UL (ref 0.8–3.5)
LYMPHOCYTES NFR BLD: 22 % (ref 12–49)
MAGNESIUM SERPL-MCNC: 1.9 MG/DL (ref 1.6–2.4)
MAGNESIUM SERPL-MCNC: 2 MG/DL (ref 1.6–2.4)
MCH RBC QN AUTO: 31.8 PG (ref 26–34)
MCH RBC QN AUTO: 32 PG (ref 26–34)
MCHC RBC AUTO-ENTMCNC: 32.8 G/DL (ref 30–36.5)
MCHC RBC AUTO-ENTMCNC: 33.4 G/DL (ref 30–36.5)
MCV RBC AUTO: 95.7 FL (ref 80–99)
MCV RBC AUTO: 97 FL (ref 80–99)
MONOCYTES # BLD: 0.7 K/UL (ref 0–1)
MONOCYTES NFR BLD: 9 % (ref 5–13)
NEUTS SEG # BLD: 5 K/UL (ref 1.8–8)
NEUTS SEG NFR BLD: 64 % (ref 32–75)
NITRITE UR QL STRIP.AUTO: NEGATIVE
NRBC # BLD: 0 K/UL (ref 0–0.01)
NRBC # BLD: 0 K/UL (ref 0–0.01)
NRBC BLD-RTO: 0 PER 100 WBC
NRBC BLD-RTO: 0 PER 100 WBC
NT PRO BNP: 2288 PG/ML
PCO2 BLDA: 39 MMHG (ref 35–45)
PH BLDA: 7.4 (ref 7.35–7.45)
PH UR STRIP: 6.5 (ref 5–8)
PLATELET # BLD AUTO: 203 K/UL (ref 150–400)
PLATELET # BLD AUTO: 224 K/UL (ref 150–400)
PMV BLD AUTO: 10.6 FL (ref 8.9–12.9)
PMV BLD AUTO: 10.8 FL (ref 8.9–12.9)
PO2 BLDA: 85 MMHG (ref 80–100)
POTASSIUM SERPL-SCNC: 3.9 MMOL/L (ref 3.5–5.1)
POTASSIUM SERPL-SCNC: 4.8 MMOL/L (ref 3.5–5.1)
PROT SERPL-MCNC: 7 G/DL (ref 6.4–8.2)
PROT UR STRIP-MCNC: NEGATIVE MG/DL
PROTHROMBIN TIME: 11.5 SEC (ref 9–11.1)
RBC # BLD AUTO: 4.47 M/UL (ref 3.8–5.2)
RBC # BLD AUTO: 4.62 M/UL (ref 3.8–5.2)
RBC #/AREA URNS HPF: ABNORMAL /HPF (ref 0–5)
SAO2 % BLD: 97 % (ref 92–97)
SAO2% DEVICE SAO2% SENSOR NAME: NORMAL
SARS-COV-2 RNA RESP QL NAA+PROBE: NOT DETECTED
SODIUM SERPL-SCNC: 136 MMOL/L (ref 136–145)
SODIUM SERPL-SCNC: 137 MMOL/L (ref 136–145)
SOURCE: NORMAL
SP GR UR REFRACTOMETRY: 1.01
SPECIMEN HOLD: NORMAL
SPECIMEN SITE: NORMAL
THERAPEUTIC RANGE: NORMAL SECS (ref 58–77)
TSH SERPL DL<=0.05 MIU/L-ACNC: 0.36 UIU/ML (ref 0.36–3.74)
URINE CULTURE IF INDICATED: ABNORMAL
UROBILINOGEN UR QL STRIP.AUTO: 0.2 EU/DL (ref 0.2–1)
WBC # BLD AUTO: 7 K/UL (ref 3.6–11)
WBC # BLD AUTO: 7.6 K/UL (ref 3.6–11)
WBC URNS QL MICRO: ABNORMAL /HPF (ref 0–4)

## 2023-10-02 PROCEDURE — 83735 ASSAY OF MAGNESIUM: CPT

## 2023-10-02 PROCEDURE — 6360000002 HC RX W HCPCS: Performed by: STUDENT IN AN ORGANIZED HEALTH CARE EDUCATION/TRAINING PROGRAM

## 2023-10-02 PROCEDURE — 36415 COLL VENOUS BLD VENIPUNCTURE: CPT

## 2023-10-02 PROCEDURE — 99153 MOD SED SAME PHYS/QHP EA: CPT

## 2023-10-02 PROCEDURE — 85027 COMPLETE CBC AUTOMATED: CPT

## 2023-10-02 PROCEDURE — 6370000000 HC RX 637 (ALT 250 FOR IP): Performed by: STUDENT IN AN ORGANIZED HEALTH CARE EDUCATION/TRAINING PROGRAM

## 2023-10-02 PROCEDURE — 82803 BLOOD GASES ANY COMBINATION: CPT

## 2023-10-02 PROCEDURE — 86850 RBC ANTIBODY SCREEN: CPT

## 2023-10-02 PROCEDURE — 93312 ECHO TRANSESOPHAGEAL: CPT

## 2023-10-02 PROCEDURE — 81001 URINALYSIS AUTO W/SCOPE: CPT

## 2023-10-02 PROCEDURE — 86900 BLOOD TYPING SEROLOGIC ABO: CPT

## 2023-10-02 PROCEDURE — 36600 WITHDRAWAL OF ARTERIAL BLOOD: CPT

## 2023-10-02 PROCEDURE — 85730 THROMBOPLASTIN TIME PARTIAL: CPT

## 2023-10-02 PROCEDURE — 83036 HEMOGLOBIN GLYCOSYLATED A1C: CPT

## 2023-10-02 PROCEDURE — 86901 BLOOD TYPING SEROLOGIC RH(D): CPT

## 2023-10-02 PROCEDURE — 85025 COMPLETE CBC W/AUTO DIFF WBC: CPT

## 2023-10-02 PROCEDURE — 87635 SARS-COV-2 COVID-19 AMP PRB: CPT

## 2023-10-02 PROCEDURE — 84443 ASSAY THYROID STIM HORMONE: CPT

## 2023-10-02 PROCEDURE — 83880 ASSAY OF NATRIURETIC PEPTIDE: CPT

## 2023-10-02 PROCEDURE — 80053 COMPREHEN METABOLIC PANEL: CPT

## 2023-10-02 PROCEDURE — 71046 X-RAY EXAM CHEST 2 VIEWS: CPT

## 2023-10-02 PROCEDURE — 99152 MOD SED SAME PHYS/QHP 5/>YRS: CPT

## 2023-10-02 PROCEDURE — 85610 PROTHROMBIN TIME: CPT

## 2023-10-02 RX ORDER — FENTANYL CITRATE 50 UG/ML
INJECTION, SOLUTION INTRAMUSCULAR; INTRAVENOUS PRN
Status: COMPLETED | OUTPATIENT
Start: 2023-10-02 | End: 2023-10-02

## 2023-10-02 RX ORDER — ENOXAPARIN SODIUM 150 MG/ML
1 INJECTION SUBCUTANEOUS 2 TIMES DAILY
Qty: 2 ML | Refills: 0 | Status: SHIPPED | OUTPATIENT
Start: 2023-10-07 | End: 2023-10-08

## 2023-10-02 RX ORDER — CHLORHEXIDINE GLUCONATE ORAL RINSE 1.2 MG/ML
15 SOLUTION DENTAL 2 TIMES DAILY
Qty: 60 ML | Refills: 0 | Status: ON HOLD | OUTPATIENT
Start: 2023-10-07 | End: 2023-10-11 | Stop reason: HOSPADM

## 2023-10-02 RX ORDER — MIDAZOLAM HYDROCHLORIDE 1 MG/ML
INJECTION INTRAMUSCULAR; INTRAVENOUS PRN
Status: COMPLETED | OUTPATIENT
Start: 2023-10-02 | End: 2023-10-02

## 2023-10-02 RX ORDER — LIDOCAINE HYDROCHLORIDE 20 MG/ML
SOLUTION OROPHARYNGEAL PRN
Status: COMPLETED | OUTPATIENT
Start: 2023-10-02 | End: 2023-10-02

## 2023-10-02 RX ADMIN — FENTANYL CITRATE 50 MCG: 50 INJECTION, SOLUTION INTRAMUSCULAR; INTRAVENOUS at 08:28

## 2023-10-02 RX ADMIN — MIDAZOLAM HYDROCHLORIDE 1 MG: 1 INJECTION, SOLUTION INTRAMUSCULAR; INTRAVENOUS at 08:29

## 2023-10-02 RX ADMIN — FENTANYL CITRATE 25 MCG: 50 INJECTION, SOLUTION INTRAMUSCULAR; INTRAVENOUS at 08:34

## 2023-10-02 RX ADMIN — LIDOCAINE HYDROCHLORIDE 10 ML: 20 SOLUTION ORAL at 08:22

## 2023-10-02 RX ADMIN — MIDAZOLAM HYDROCHLORIDE 1 MG: 1 INJECTION, SOLUTION INTRAMUSCULAR; INTRAVENOUS at 08:34

## 2023-10-02 RX ADMIN — BENZOCAINE, BUTAMBEN, AND TETRACAINE HYDROCHLORIDE 1 SPRAY: .028; .004; .004 AEROSOL, SPRAY TOPICAL at 08:23

## 2023-10-02 NOTE — PROGRESS NOTES
Patient arrived to Non-Invasive Cardiology Lab for Out Patient JENNIFER Procedure. Staff introduced to patient. Patient identifiers verified with Name and Date of Birth. Procedure verified with patient. Consent forms reviewed and signed by patient or authorized representative and verified. Allergies verified. Patient informed of procedure and plan of care. Questions answered with review. Patient on cardiac monitor, non-invasive blood pressure, SPO2 monitor. On RA. Patient is A&Ox3. Patient reports no complaints. Patient on stretcher, in low position, with side rails up. Patient instructed to call for assistance as needed. Family in waiting room.

## 2023-10-02 NOTE — H&P
CSS   History and Physical    Subjective: Elta Bumpers is a 77 y.o. female who  was referred for cardiac evaluation for paroxysmal atrial fibrillation by Dr. Tammy Dias. Pt has a significant PMH for HTN, HLD, hypothyroid, COPD, and frequent UTI. Previous ablations attempted 8/18 was aborted due to COLT thrombus, Cardioversion  x 1 8/11. Patient was re-admitted to the hospital on 8/12 due to A-Fib with RVR. She was hospitalized for 3 days for rate control, and scheduled for ablation on 8/18 with Dr. Tammy Dias.      Denies ANGEL. Taking Xarelto for [facility] and denies h/o GIB. Denies previous surgeries/trauma to chest. MR is noted as mild to moderate. Denies being on testosterone or other hormonal replacement therapy. Pt consumes 2-3 caffeinated beverages/day. Pt lives 2 hours away with her . She regularly walks and practices balance. 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 She is a nonsmoker, drinks no alcohol, and denies illicit drug use. Pt has a significant family history of CAD, CVA, COPD, and brain CA. She's had the COVID-19 vaccines. Cardiac Testing    Cardiac catheterization:  No results found for this or any previous visit. ECHO:  10/02/23    JENNIFER (PRN CONTRAST/BUBBLE/3D) 10/02/2023  9:23 AM (Final)    Interpretation Summary    Left Ventricle: Unable to assess left ventricular systolic function. Not assessed. Unable to assess wall thickness. Unable to assess wall motion. Aortic Valve: Trileaflet valve. Sclerosis of the aortic valve cusp. Mitral Valve: Mild regurgitation. Tricuspid Valve: Moderate regurgitation. Left Atrium: Left atrium is dilated. Decreased appendage flow velocity. No left atrial appendage thrombus noted. Signed by: Sindi Parsons MD on 10/2/2023  9:23 AM      ECHO:  8/18/23  Interpretation Summary  Result status: Final result    Left Ventricle: Normal left ventricular systolic function.

## 2023-10-02 NOTE — PROGRESS NOTES

## 2023-10-02 NOTE — DISCHARGE INSTRUCTIONS
DISCHARGE SUMMARY       The following personal items collected during your admission are returned to you:   Dental Appliance:    Vision:    Hearing Aid:    Jewelry:    Clothing:          PATIENT INSTRUCTIONS: Continue taking all the same medications. You had a transesophageal echocardiogram, your throat was numbed for the procedure, so start with sips of water and advance diet as swallowing returns to normal. Avoid any scalding hot beverages for the next 2 hours. [phone])        What to do at Home:  Recommended activity: No driving today      The discharge information has been reviewed with the PATIENT . The PATIENT  verbalized understanding.

## 2023-10-03 LAB
EKG ATRIAL RATE: 115 BPM
EKG DIAGNOSIS: NORMAL
EKG Q-T INTERVAL: 292 MS
EKG QRS DURATION: 92 MS
EKG QTC CALCULATION (BAZETT): 383 MS
EKG R AXIS: -28 DEGREES
EKG T AXIS: 77 DEGREES
EKG VENTRICULAR RATE: 104 BPM

## 2023-10-03 RX ORDER — SODIUM CHLORIDE, SODIUM LACTATE, POTASSIUM CHLORIDE, CALCIUM CHLORIDE 600; 310; 30; 20 MG/100ML; MG/100ML; MG/100ML; MG/100ML
INJECTION, SOLUTION INTRAVENOUS CONTINUOUS
OUTPATIENT
Start: 2023-10-09

## 2023-10-08 LAB
ABO + RH BLD: NORMAL
BLD PROD TYP BPU: NORMAL
BLD PROD TYP BPU: NORMAL
BLOOD BANK DISPENSE STATUS: NORMAL
BLOOD BANK DISPENSE STATUS: NORMAL
BLOOD GROUP ANTIBODIES SERPL: NORMAL
BPU ID: NORMAL
BPU ID: NORMAL
CROSSMATCH RESULT: NORMAL
CROSSMATCH RESULT: NORMAL
SPECIMEN EXP DATE BLD: NORMAL
UNIT DIVISION: 0
UNIT DIVISION: 0

## 2023-10-09 ENCOUNTER — ANESTHESIA EVENT (OUTPATIENT)
Facility: HOSPITAL | Age: 78
DRG: 229 | End: 2023-10-09
Payer: MEDICARE

## 2023-10-09 ENCOUNTER — HOSPITAL ENCOUNTER (INPATIENT)
Facility: HOSPITAL | Age: 78
LOS: 2 days | Discharge: HOME | DRG: 229 | End: 2023-10-11
Attending: THORACIC SURGERY (CARDIOTHORACIC VASCULAR SURGERY) | Admitting: THORACIC SURGERY (CARDIOTHORACIC VASCULAR SURGERY)
Payer: MEDICARE

## 2023-10-09 ENCOUNTER — ANESTHESIA (OUTPATIENT)
Facility: HOSPITAL | Age: 78
DRG: 229 | End: 2023-10-09
Payer: MEDICARE

## 2023-10-09 ENCOUNTER — HOSPITAL ENCOUNTER (OUTPATIENT)
Facility: HOSPITAL | Age: 78
Discharge: HOME | End: 2023-10-11
Attending: THORACIC SURGERY (CARDIOTHORACIC VASCULAR SURGERY)

## 2023-10-09 ENCOUNTER — APPOINTMENT (OUTPATIENT)
Facility: HOSPITAL | Age: 78
DRG: 229 | End: 2023-10-09
Attending: THORACIC SURGERY (CARDIOTHORACIC VASCULAR SURGERY)
Payer: MEDICARE

## 2023-10-09 DIAGNOSIS — I48.21 PERMANENT ATRIAL FIBRILLATION (HCC): ICD-10-CM

## 2023-10-09 DIAGNOSIS — Z86.79 S/P ABLATION OPERATION FOR ARRHYTHMIA: ICD-10-CM

## 2023-10-09 DIAGNOSIS — I48.11 LONGSTANDING PERSISTENT ATRIAL FIBRILLATION (HCC): Primary | ICD-10-CM

## 2023-10-09 DIAGNOSIS — Z98.890 S/P ABLATION OPERATION FOR ARRHYTHMIA: ICD-10-CM

## 2023-10-09 DIAGNOSIS — I48.91 ATRIAL FIBRILLATION (HCC): ICD-10-CM

## 2023-10-09 LAB
ABO + RH BLD: NORMAL
ACT BLD: 137 SECS (ref 79–138)
ACT BLD: 281 SECS (ref 79–138)
ALBUMIN SERPL-MCNC: 3.2 G/DL (ref 3.5–5)
ALBUMIN/GLOB SERPL: 1.3 (ref 1.1–2.2)
ALP SERPL-CCNC: 76 U/L (ref 45–117)
ALT SERPL-CCNC: 47 U/L (ref 12–78)
ANION GAP SERPL CALC-SCNC: 7 MMOL/L (ref 5–15)
APTT PPP: 29.6 SEC (ref 22.1–31)
AST SERPL-CCNC: 34 U/L (ref 15–37)
BILIRUB SERPL-MCNC: 0.7 MG/DL (ref 0.2–1)
BLD PROD TYP BPU: NORMAL
BLD PROD TYP BPU: NORMAL
BLOOD BANK DISPENSE STATUS: NORMAL
BLOOD BANK DISPENSE STATUS: NORMAL
BLOOD GROUP ANTIBODIES SERPL: NORMAL
BPU ID: NORMAL
BPU ID: NORMAL
BUN SERPL-MCNC: 17 MG/DL (ref 6–20)
BUN/CREAT SERPL: 25 (ref 12–20)
CALCIUM SERPL-MCNC: 8.7 MG/DL (ref 8.5–10.1)
CHLORIDE SERPL-SCNC: 107 MMOL/L (ref 97–108)
CO2 SERPL-SCNC: 24 MMOL/L (ref 21–32)
CREAT SERPL-MCNC: 0.67 MG/DL (ref 0.55–1.02)
CROSSMATCH RESULT: NORMAL
CROSSMATCH RESULT: NORMAL
ECHO BSA: 1.65 M2
ECHO BSA: 1.65 M2
ERYTHROCYTE [DISTWIDTH] IN BLOOD BY AUTOMATED COUNT: 12.8 % (ref 11.5–14.5)
GLOBULIN SER CALC-MCNC: 2.4 G/DL (ref 2–4)
GLUCOSE BLD STRIP.AUTO-MCNC: 115 MG/DL (ref 65–117)
GLUCOSE BLD STRIP.AUTO-MCNC: 161 MG/DL (ref 65–117)
GLUCOSE BLD STRIP.AUTO-MCNC: 168 MG/DL (ref 65–117)
GLUCOSE BLD STRIP.AUTO-MCNC: 191 MG/DL (ref 65–117)
GLUCOSE SERPL-MCNC: 174 MG/DL (ref 65–100)
HCT VFR BLD AUTO: 39.8 % (ref 35–47)
HGB BLD-MCNC: 13.1 G/DL (ref 11.5–16)
INR PPP: 1.1 (ref 0.9–1.1)
INR PPP: 1.1 (ref 0.9–1.1)
MAGNESIUM SERPL-MCNC: 1.4 MG/DL (ref 1.6–2.4)
MCH RBC QN AUTO: 32.6 PG (ref 26–34)
MCHC RBC AUTO-ENTMCNC: 32.9 G/DL (ref 30–36.5)
MCV RBC AUTO: 99 FL (ref 80–99)
NRBC # BLD: 0 K/UL (ref 0–0.01)
NRBC BLD-RTO: 0 PER 100 WBC
PLATELET # BLD AUTO: 167 K/UL (ref 150–400)
PMV BLD AUTO: 10.5 FL (ref 8.9–12.9)
POTASSIUM SERPL-SCNC: 4 MMOL/L (ref 3.5–5.1)
PROT SERPL-MCNC: 5.6 G/DL (ref 6.4–8.2)
PROTHROMBIN TIME: 11 SEC (ref 9–11.1)
PROTHROMBIN TIME: 11.7 SEC (ref 9–11.1)
RBC # BLD AUTO: 4.02 M/UL (ref 3.8–5.2)
SERVICE CMNT-IMP: ABNORMAL
SERVICE CMNT-IMP: NORMAL
SODIUM SERPL-SCNC: 138 MMOL/L (ref 136–145)
SPECIMEN EXP DATE BLD: NORMAL
THERAPEUTIC RANGE: NORMAL SECS (ref 58–77)
UNIT DIVISION: 0
UNIT DIVISION: 0
WBC # BLD AUTO: 14.3 K/UL (ref 3.6–11)

## 2023-10-09 PROCEDURE — 71045 X-RAY EXAM CHEST 1 VIEW: CPT

## 2023-10-09 PROCEDURE — C1893 INTRO/SHEATH, FIXED,NON-PEEL: HCPCS | Performed by: INTERNAL MEDICINE

## 2023-10-09 PROCEDURE — 3700000001 HC ADD 15 MINUTES (ANESTHESIA): Performed by: THORACIC SURGERY (CARDIOTHORACIC VASCULAR SURGERY)

## 2023-10-09 PROCEDURE — 2700000000 HC OXYGEN THERAPY PER DAY

## 2023-10-09 PROCEDURE — 2500000003 HC RX 250 WO HCPCS: Performed by: INTERNAL MEDICINE

## 2023-10-09 PROCEDURE — 2580000003 HC RX 258: Performed by: ANESTHESIOLOGY

## 2023-10-09 PROCEDURE — 02583ZZ DESTRUCTION OF CONDUCTION MECHANISM, PERCUTANEOUS APPROACH: ICD-10-PCS | Performed by: PHYSICIAN ASSISTANT

## 2023-10-09 PROCEDURE — 3600000007 HC SURGERY HYBRID BASE: Performed by: THORACIC SURGERY (CARDIOTHORACIC VASCULAR SURGERY)

## 2023-10-09 PROCEDURE — 2709999900 HC NON-CHARGEABLE SUPPLY: Performed by: INTERNAL MEDICINE

## 2023-10-09 PROCEDURE — 6360000002 HC RX W HCPCS: Performed by: THORACIC SURGERY (CARDIOTHORACIC VASCULAR SURGERY)

## 2023-10-09 PROCEDURE — C1729 CATH, DRAINAGE: HCPCS | Performed by: THORACIC SURGERY (CARDIOTHORACIC VASCULAR SURGERY)

## 2023-10-09 PROCEDURE — 2580000003 HC RX 258: Performed by: INTERNAL MEDICINE

## 2023-10-09 PROCEDURE — 2500000003 HC RX 250 WO HCPCS: Performed by: NURSE ANESTHETIST, CERTIFIED REGISTERED

## 2023-10-09 PROCEDURE — 85610 PROTHROMBIN TIME: CPT

## 2023-10-09 PROCEDURE — 6360000002 HC RX W HCPCS: Performed by: NURSE ANESTHETIST, CERTIFIED REGISTERED

## 2023-10-09 PROCEDURE — 2580000003 HC RX 258: Performed by: PHYSICIAN ASSISTANT

## 2023-10-09 PROCEDURE — 6370000000 HC RX 637 (ALT 250 FOR IP): Performed by: PHYSICIAN ASSISTANT

## 2023-10-09 PROCEDURE — 93656 COMPRE EP EVAL ABLTJ ATR FIB: CPT | Performed by: INTERNAL MEDICINE

## 2023-10-09 PROCEDURE — 6360000002 HC RX W HCPCS: Performed by: PHYSICIAN ASSISTANT

## 2023-10-09 PROCEDURE — 02584ZZ DESTRUCTION OF CONDUCTION MECHANISM, PERCUTANEOUS ENDOSCOPIC APPROACH: ICD-10-PCS | Performed by: PHYSICIAN ASSISTANT

## 2023-10-09 PROCEDURE — 2000000000 HC ICU R&B

## 2023-10-09 PROCEDURE — 2709999900 HC NON-CHARGEABLE SUPPLY: Performed by: THORACIC SURGERY (CARDIOTHORACIC VASCULAR SURGERY)

## 2023-10-09 PROCEDURE — 6360000002 HC RX W HCPCS: Performed by: HOSPITALIST

## 2023-10-09 PROCEDURE — 94640 AIRWAY INHALATION TREATMENT: CPT

## 2023-10-09 PROCEDURE — 2580000003 HC RX 258: Performed by: NURSE ANESTHETIST, CERTIFIED REGISTERED

## 2023-10-09 PROCEDURE — 36415 COLL VENOUS BLD VENIPUNCTURE: CPT

## 2023-10-09 PROCEDURE — 3600000017 HC SURGERY HYBRID ADDL 15MIN: Performed by: THORACIC SURGERY (CARDIOTHORACIC VASCULAR SURGERY)

## 2023-10-09 PROCEDURE — 6370000000 HC RX 637 (ALT 250 FOR IP): Performed by: THORACIC SURGERY (CARDIOTHORACIC VASCULAR SURGERY)

## 2023-10-09 PROCEDURE — 2580000003 HC RX 258: Performed by: NURSE PRACTITIONER

## 2023-10-09 PROCEDURE — 85347 COAGULATION TIME ACTIVATED: CPT

## 2023-10-09 PROCEDURE — 85027 COMPLETE CBC AUTOMATED: CPT

## 2023-10-09 PROCEDURE — 3700000000 HC ANESTHESIA ATTENDED CARE: Performed by: THORACIC SURGERY (CARDIOTHORACIC VASCULAR SURGERY)

## 2023-10-09 PROCEDURE — C1894 INTRO/SHEATH, NON-LASER: HCPCS | Performed by: INTERNAL MEDICINE

## 2023-10-09 PROCEDURE — 2720000010 HC SURG SUPPLY STERILE: Performed by: INTERNAL MEDICINE

## 2023-10-09 PROCEDURE — 2500000003 HC RX 250 WO HCPCS: Performed by: PHYSICIAN ASSISTANT

## 2023-10-09 PROCEDURE — C1732 CATH, EP, DIAG/ABL, 3D/VECT: HCPCS | Performed by: INTERNAL MEDICINE

## 2023-10-09 PROCEDURE — 03HY32Z INSERTION OF MONITORING DEVICE INTO UPPER ARTERY, PERCUTANEOUS APPROACH: ICD-10-PCS | Performed by: STUDENT IN AN ORGANIZED HEALTH CARE EDUCATION/TRAINING PROGRAM

## 2023-10-09 PROCEDURE — B246ZZ4 ULTRASONOGRAPHY OF RIGHT AND LEFT HEART, TRANSESOPHAGEAL: ICD-10-PCS | Performed by: PHYSICIAN ASSISTANT

## 2023-10-09 PROCEDURE — 6370000000 HC RX 637 (ALT 250 FOR IP): Performed by: INTERNAL MEDICINE

## 2023-10-09 PROCEDURE — 83735 ASSAY OF MAGNESIUM: CPT

## 2023-10-09 PROCEDURE — 2720000010 HC SURG SUPPLY STERILE: Performed by: THORACIC SURGERY (CARDIOTHORACIC VASCULAR SURGERY)

## 2023-10-09 PROCEDURE — A4216 STERILE WATER/SALINE, 10 ML: HCPCS | Performed by: PHYSICIAN ASSISTANT

## 2023-10-09 PROCEDURE — 6360000002 HC RX W HCPCS: Performed by: NURSE PRACTITIONER

## 2023-10-09 PROCEDURE — 85730 THROMBOPLASTIN TIME PARTIAL: CPT

## 2023-10-09 PROCEDURE — 80053 COMPREHEN METABOLIC PANEL: CPT

## 2023-10-09 PROCEDURE — C1731 CATH, EP, 20 OR MORE ELEC: HCPCS | Performed by: INTERNAL MEDICINE

## 2023-10-09 PROCEDURE — 82962 GLUCOSE BLOOD TEST: CPT

## 2023-10-09 PROCEDURE — 02HV33Z INSERTION OF INFUSION DEVICE INTO SUPERIOR VENA CAVA, PERCUTANEOUS APPROACH: ICD-10-PCS | Performed by: STUDENT IN AN ORGANIZED HEALTH CARE EDUCATION/TRAINING PROGRAM

## 2023-10-09 PROCEDURE — C1766 INTRO/SHEATH,STRBLE,NON-PEEL: HCPCS | Performed by: INTERNAL MEDICINE

## 2023-10-09 RX ORDER — SODIUM CHLORIDE, SODIUM LACTATE, POTASSIUM CHLORIDE, CALCIUM CHLORIDE 600; 310; 30; 20 MG/100ML; MG/100ML; MG/100ML; MG/100ML
INJECTION, SOLUTION INTRAVENOUS CONTINUOUS
Status: DISCONTINUED | OUTPATIENT
Start: 2023-10-09 | End: 2023-10-09

## 2023-10-09 RX ORDER — INSULIN LISPRO 100 [IU]/ML
0-6 INJECTION, SOLUTION INTRAVENOUS; SUBCUTANEOUS NIGHTLY
Status: DISCONTINUED | OUTPATIENT
Start: 2023-10-09 | End: 2023-10-11 | Stop reason: HOSPADM

## 2023-10-09 RX ORDER — ACETAMINOPHEN 500 MG
1000 TABLET ORAL ONCE
Status: DISCONTINUED | OUTPATIENT
Start: 2023-10-09 | End: 2023-10-09 | Stop reason: HOSPADM

## 2023-10-09 RX ORDER — LIDOCAINE HYDROCHLORIDE 20 MG/ML
INJECTION, SOLUTION EPIDURAL; INFILTRATION; INTRACAUDAL; PERINEURAL PRN
Status: DISCONTINUED | OUTPATIENT
Start: 2023-10-09 | End: 2023-10-09 | Stop reason: SDUPTHER

## 2023-10-09 RX ORDER — SODIUM CHLORIDE 0.9 % (FLUSH) 0.9 %
5-40 SYRINGE (ML) INJECTION PRN
Status: DISCONTINUED | OUTPATIENT
Start: 2023-10-09 | End: 2023-10-09 | Stop reason: HOSPADM

## 2023-10-09 RX ORDER — MONTELUKAST SODIUM 10 MG/1
10 TABLET ORAL DAILY
Status: DISCONTINUED | OUTPATIENT
Start: 2023-10-10 | End: 2023-10-11 | Stop reason: HOSPADM

## 2023-10-09 RX ORDER — SENNA AND DOCUSATE SODIUM 50; 8.6 MG/1; MG/1
1 TABLET, FILM COATED ORAL 2 TIMES DAILY
Status: DISCONTINUED | OUTPATIENT
Start: 2023-10-09 | End: 2023-10-11 | Stop reason: HOSPADM

## 2023-10-09 RX ORDER — LEVOTHYROXINE SODIUM 0.1 MG/1
100 TABLET ORAL EVERY OTHER DAY
Status: DISCONTINUED | OUTPATIENT
Start: 2023-10-10 | End: 2023-10-11 | Stop reason: HOSPADM

## 2023-10-09 RX ORDER — FENTANYL CITRATE 50 UG/ML
INJECTION, SOLUTION INTRAMUSCULAR; INTRAVENOUS PRN
Status: DISCONTINUED | OUTPATIENT
Start: 2023-10-09 | End: 2023-10-09 | Stop reason: SDUPTHER

## 2023-10-09 RX ORDER — SODIUM CHLORIDE 0.9 % (FLUSH) 0.9 %
5-40 SYRINGE (ML) INJECTION EVERY 12 HOURS SCHEDULED
Status: DISCONTINUED | OUTPATIENT
Start: 2023-10-09 | End: 2023-10-09 | Stop reason: HOSPADM

## 2023-10-09 RX ORDER — CHLORHEXIDINE GLUCONATE ORAL RINSE 1.2 MG/ML
15 SOLUTION DENTAL 2 TIMES DAILY
Status: DISCONTINUED | OUTPATIENT
Start: 2023-10-09 | End: 2023-10-10

## 2023-10-09 RX ORDER — HYDROMORPHONE HYDROCHLORIDE 1 MG/ML
0.5 INJECTION, SOLUTION INTRAMUSCULAR; INTRAVENOUS; SUBCUTANEOUS EVERY 5 MIN PRN
Status: DISCONTINUED | OUTPATIENT
Start: 2023-10-09 | End: 2023-10-09 | Stop reason: HOSPADM

## 2023-10-09 RX ORDER — SODIUM CHLORIDE 0.9 % (FLUSH) 0.9 %
5-40 SYRINGE (ML) INJECTION PRN
Status: DISCONTINUED | OUTPATIENT
Start: 2023-10-09 | End: 2023-10-11 | Stop reason: HOSPADM

## 2023-10-09 RX ORDER — BISACODYL 10 MG
10 SUPPOSITORY, RECTAL RECTAL DAILY PRN
Status: DISCONTINUED | OUTPATIENT
Start: 2023-10-09 | End: 2023-10-11 | Stop reason: HOSPADM

## 2023-10-09 RX ORDER — PROPOFOL 10 MG/ML
INJECTION, EMULSION INTRAVENOUS PRN
Status: DISCONTINUED | OUTPATIENT
Start: 2023-10-09 | End: 2023-10-09 | Stop reason: SDUPTHER

## 2023-10-09 RX ORDER — SUCCINYLCHOLINE/SOD CL,ISO/PF 200MG/10ML
SYRINGE (ML) INTRAVENOUS PRN
Status: DISCONTINUED | OUTPATIENT
Start: 2023-10-09 | End: 2023-10-09 | Stop reason: SDUPTHER

## 2023-10-09 RX ORDER — BUPIVACAINE HYDROCHLORIDE 5 MG/ML
INJECTION, SOLUTION EPIDURAL; INTRACAUDAL PRN
Status: DISCONTINUED | OUTPATIENT
Start: 2023-10-09 | End: 2023-10-09 | Stop reason: ALTCHOICE

## 2023-10-09 RX ORDER — ONDANSETRON 2 MG/ML
INJECTION INTRAMUSCULAR; INTRAVENOUS PRN
Status: DISCONTINUED | OUTPATIENT
Start: 2023-10-09 | End: 2023-10-09 | Stop reason: SDUPTHER

## 2023-10-09 RX ORDER — ONDANSETRON 2 MG/ML
4 INJECTION INTRAMUSCULAR; INTRAVENOUS EVERY 4 HOURS PRN
Status: DISCONTINUED | OUTPATIENT
Start: 2023-10-09 | End: 2023-10-11 | Stop reason: HOSPADM

## 2023-10-09 RX ORDER — MIDAZOLAM HYDROCHLORIDE 2 MG/2ML
2 INJECTION, SOLUTION INTRAMUSCULAR; INTRAVENOUS
Status: DISCONTINUED | OUTPATIENT
Start: 2023-10-09 | End: 2023-10-09 | Stop reason: HOSPADM

## 2023-10-09 RX ORDER — ATORVASTATIN CALCIUM 10 MG/1
10 TABLET, FILM COATED ORAL DAILY
Status: DISCONTINUED | OUTPATIENT
Start: 2023-10-09 | End: 2023-10-11 | Stop reason: HOSPADM

## 2023-10-09 RX ORDER — SODIUM CHLORIDE 9 MG/ML
INJECTION, SOLUTION INTRAVENOUS PRN
Status: DISCONTINUED | OUTPATIENT
Start: 2023-10-09 | End: 2023-10-09 | Stop reason: HOSPADM

## 2023-10-09 RX ORDER — POLYETHYLENE GLYCOL 3350 17 G/17G
17 POWDER, FOR SOLUTION ORAL DAILY
Status: DISCONTINUED | OUTPATIENT
Start: 2023-10-09 | End: 2023-10-11 | Stop reason: HOSPADM

## 2023-10-09 RX ORDER — ROCURONIUM BROMIDE 10 MG/ML
INJECTION, SOLUTION INTRAVENOUS PRN
Status: DISCONTINUED | OUTPATIENT
Start: 2023-10-09 | End: 2023-10-09 | Stop reason: SDUPTHER

## 2023-10-09 RX ORDER — HEPARIN SODIUM 1000 [USP'U]/ML
INJECTION, SOLUTION INTRAVENOUS; SUBCUTANEOUS PRN
Status: DISCONTINUED | OUTPATIENT
Start: 2023-10-09 | End: 2023-10-09 | Stop reason: SDUPTHER

## 2023-10-09 RX ORDER — IPRATROPIUM BROMIDE AND ALBUTEROL SULFATE 2.5; .5 MG/3ML; MG/3ML
1 SOLUTION RESPIRATORY (INHALATION) EVERY 4 HOURS PRN
OUTPATIENT
Start: 2023-10-09

## 2023-10-09 RX ORDER — KETOROLAC TROMETHAMINE 30 MG/ML
30 INJECTION, SOLUTION INTRAMUSCULAR; INTRAVENOUS ONCE
Status: COMPLETED | OUTPATIENT
Start: 2023-10-09 | End: 2023-10-09

## 2023-10-09 RX ORDER — ALBUTEROL SULFATE 2.5 MG/3ML
2.5 SOLUTION RESPIRATORY (INHALATION) EVERY 4 HOURS PRN
Status: DISCONTINUED | OUTPATIENT
Start: 2023-10-09 | End: 2023-10-11 | Stop reason: HOSPADM

## 2023-10-09 RX ORDER — PHENYLEPHRINE HCL IN 0.9% NACL 0.4MG/10ML
SYRINGE (ML) INTRAVENOUS PRN
Status: DISCONTINUED | OUTPATIENT
Start: 2023-10-09 | End: 2023-10-09 | Stop reason: SDUPTHER

## 2023-10-09 RX ORDER — HYDRALAZINE HYDROCHLORIDE 20 MG/ML
10 INJECTION INTRAMUSCULAR; INTRAVENOUS EVERY 6 HOURS PRN
Status: DISCONTINUED | OUTPATIENT
Start: 2023-10-09 | End: 2023-10-11 | Stop reason: HOSPADM

## 2023-10-09 RX ORDER — HEPARIN SODIUM 10000 [USP'U]/ML
INJECTION, SOLUTION INTRAVENOUS; SUBCUTANEOUS PRN
Status: DISCONTINUED | OUTPATIENT
Start: 2023-10-09 | End: 2023-10-09 | Stop reason: HOSPADM

## 2023-10-09 RX ORDER — SODIUM CHLORIDE 0.9 % (FLUSH) 0.9 %
5-40 SYRINGE (ML) INJECTION EVERY 12 HOURS SCHEDULED
Status: DISCONTINUED | OUTPATIENT
Start: 2023-10-09 | End: 2023-10-11 | Stop reason: HOSPADM

## 2023-10-09 RX ORDER — POTASSIUM CHLORIDE 29.8 MG/ML
20 INJECTION INTRAVENOUS PRN
Status: DISCONTINUED | OUTPATIENT
Start: 2023-10-09 | End: 2023-10-11 | Stop reason: HOSPADM

## 2023-10-09 RX ORDER — ONDANSETRON 2 MG/ML
4 INJECTION INTRAMUSCULAR; INTRAVENOUS
Status: DISCONTINUED | OUTPATIENT
Start: 2023-10-09 | End: 2023-10-09 | Stop reason: HOSPADM

## 2023-10-09 RX ORDER — SODIUM CHLORIDE 9 MG/ML
INJECTION, SOLUTION INTRAVENOUS CONTINUOUS
Status: DISCONTINUED | OUTPATIENT
Start: 2023-10-09 | End: 2023-10-11 | Stop reason: HOSPADM

## 2023-10-09 RX ORDER — DEXAMETHASONE SODIUM PHOSPHATE 4 MG/ML
INJECTION, SOLUTION INTRA-ARTICULAR; INTRALESIONAL; INTRAMUSCULAR; INTRAVENOUS; SOFT TISSUE PRN
Status: DISCONTINUED | OUTPATIENT
Start: 2023-10-09 | End: 2023-10-09 | Stop reason: SDUPTHER

## 2023-10-09 RX ORDER — DIPHENHYDRAMINE HCL 25 MG
25 CAPSULE ORAL NIGHTLY PRN
Status: DISCONTINUED | OUTPATIENT
Start: 2023-10-10 | End: 2023-10-11 | Stop reason: HOSPADM

## 2023-10-09 RX ORDER — LIDOCAINE HYDROCHLORIDE 10 MG/ML
1 INJECTION, SOLUTION EPIDURAL; INFILTRATION; INTRACAUDAL; PERINEURAL
Status: DISCONTINUED | OUTPATIENT
Start: 2023-10-09 | End: 2023-10-09 | Stop reason: HOSPADM

## 2023-10-09 RX ORDER — FAMOTIDINE 20 MG/1
20 TABLET, FILM COATED ORAL 2 TIMES DAILY
Status: COMPLETED | OUTPATIENT
Start: 2023-10-09 | End: 2023-10-09

## 2023-10-09 RX ORDER — INSULIN LISPRO 100 [IU]/ML
0-12 INJECTION, SOLUTION INTRAVENOUS; SUBCUTANEOUS
Status: DISCONTINUED | OUTPATIENT
Start: 2023-10-09 | End: 2023-10-11 | Stop reason: HOSPADM

## 2023-10-09 RX ORDER — OXYCODONE HYDROCHLORIDE 5 MG/1
5 TABLET ORAL EVERY 4 HOURS PRN
Status: DISCONTINUED | OUTPATIENT
Start: 2023-10-09 | End: 2023-10-11 | Stop reason: HOSPADM

## 2023-10-09 RX ORDER — LEVOTHYROXINE SODIUM 88 UG/1
88 TABLET ORAL EVERY OTHER DAY
Status: DISCONTINUED | OUTPATIENT
Start: 2023-10-11 | End: 2023-10-11 | Stop reason: HOSPADM

## 2023-10-09 RX ORDER — PROCHLORPERAZINE EDISYLATE 5 MG/ML
5 INJECTION INTRAMUSCULAR; INTRAVENOUS
Status: DISCONTINUED | OUTPATIENT
Start: 2023-10-09 | End: 2023-10-09 | Stop reason: HOSPADM

## 2023-10-09 RX ORDER — FENTANYL CITRATE 50 UG/ML
25 INJECTION, SOLUTION INTRAMUSCULAR; INTRAVENOUS EVERY 5 MIN PRN
Status: DISCONTINUED | OUTPATIENT
Start: 2023-10-09 | End: 2023-10-09 | Stop reason: HOSPADM

## 2023-10-09 RX ORDER — DEXTROSE MONOHYDRATE 100 MG/ML
INJECTION, SOLUTION INTRAVENOUS CONTINUOUS PRN
Status: DISCONTINUED | OUTPATIENT
Start: 2023-10-09 | End: 2023-10-09 | Stop reason: HOSPADM

## 2023-10-09 RX ORDER — MAGNESIUM SULFATE IN WATER 40 MG/ML
2000 INJECTION, SOLUTION INTRAVENOUS PRN
Status: DISCONTINUED | OUTPATIENT
Start: 2023-10-09 | End: 2023-10-11 | Stop reason: HOSPADM

## 2023-10-09 RX ORDER — OXYCODONE HYDROCHLORIDE 5 MG/1
10 TABLET ORAL EVERY 4 HOURS PRN
Status: DISCONTINUED | OUTPATIENT
Start: 2023-10-09 | End: 2023-10-11 | Stop reason: HOSPADM

## 2023-10-09 RX ORDER — KETOROLAC TROMETHAMINE 30 MG/ML
15 INJECTION, SOLUTION INTRAMUSCULAR; INTRAVENOUS EVERY 6 HOURS
Status: DISPENSED | OUTPATIENT
Start: 2023-10-09 | End: 2023-10-10

## 2023-10-09 RX ORDER — FENTANYL CITRATE 50 UG/ML
100 INJECTION, SOLUTION INTRAMUSCULAR; INTRAVENOUS
Status: DISCONTINUED | OUTPATIENT
Start: 2023-10-09 | End: 2023-10-09 | Stop reason: HOSPADM

## 2023-10-09 RX ORDER — IPRATROPIUM BROMIDE AND ALBUTEROL SULFATE 2.5; .5 MG/3ML; MG/3ML
1 SOLUTION RESPIRATORY (INHALATION) EVERY 6 HOURS PRN
Status: CANCELLED | OUTPATIENT
Start: 2023-10-09

## 2023-10-09 RX ORDER — SODIUM CHLORIDE 450 MG/100ML
INJECTION, SOLUTION INTRAVENOUS CONTINUOUS
Status: DISCONTINUED | OUTPATIENT
Start: 2023-10-09 | End: 2023-10-09

## 2023-10-09 RX ORDER — ACETAMINOPHEN 500 MG
1000 TABLET ORAL EVERY 6 HOURS PRN
Status: DISCONTINUED | OUTPATIENT
Start: 2023-10-09 | End: 2023-10-11 | Stop reason: HOSPADM

## 2023-10-09 RX ORDER — IPRATROPIUM BROMIDE AND ALBUTEROL SULFATE 2.5; .5 MG/3ML; MG/3ML
1 SOLUTION RESPIRATORY (INHALATION)
Status: DISCONTINUED | OUTPATIENT
Start: 2023-10-09 | End: 2023-10-09 | Stop reason: HOSPADM

## 2023-10-09 RX ORDER — DEXTROSE MONOHYDRATE 100 MG/ML
INJECTION, SOLUTION INTRAVENOUS CONTINUOUS PRN
Status: DISCONTINUED | OUTPATIENT
Start: 2023-10-09 | End: 2023-10-11 | Stop reason: HOSPADM

## 2023-10-09 RX ORDER — LANOLIN ALCOHOL/MO/W.PET/CERES
400 CREAM (GRAM) TOPICAL 2 TIMES DAILY
Status: DISCONTINUED | OUTPATIENT
Start: 2023-10-10 | End: 2023-10-11 | Stop reason: HOSPADM

## 2023-10-09 RX ORDER — METOPROLOL SUCCINATE 25 MG/1
25 TABLET, EXTENDED RELEASE ORAL DAILY
Status: DISCONTINUED | OUTPATIENT
Start: 2023-10-09 | End: 2023-10-11 | Stop reason: HOSPADM

## 2023-10-09 RX ORDER — PROTAMINE SULFATE 10 MG/ML
INJECTION, SOLUTION INTRAVENOUS PRN
Status: DISCONTINUED | OUTPATIENT
Start: 2023-10-09 | End: 2023-10-09 | Stop reason: SDUPTHER

## 2023-10-09 RX ORDER — LIDOCAINE 4 G/G
2 PATCH TOPICAL DAILY
Status: DISCONTINUED | OUTPATIENT
Start: 2023-10-09 | End: 2023-10-11 | Stop reason: HOSPADM

## 2023-10-09 RX ORDER — AMIODARONE HYDROCHLORIDE 200 MG/1
200 TABLET ORAL DAILY
Status: DISCONTINUED | OUTPATIENT
Start: 2023-10-09 | End: 2023-10-11 | Stop reason: HOSPADM

## 2023-10-09 RX ORDER — METHYLPREDNISOLONE SODIUM SUCCINATE 125 MG/2ML
125 INJECTION, POWDER, LYOPHILIZED, FOR SOLUTION INTRAMUSCULAR; INTRAVENOUS EVERY 12 HOURS
Status: DISCONTINUED | OUTPATIENT
Start: 2023-10-09 | End: 2023-10-09 | Stop reason: HOSPADM

## 2023-10-09 RX ADMIN — KETOROLAC TROMETHAMINE 15 MG: 30 INJECTION, SOLUTION INTRAMUSCULAR; INTRAVENOUS at 18:15

## 2023-10-09 RX ADMIN — MUPIROCIN: 20 OINTMENT TOPICAL at 21:08

## 2023-10-09 RX ADMIN — PROPOFOL 100 MG: 10 INJECTION, EMULSION INTRAVENOUS at 07:45

## 2023-10-09 RX ADMIN — ROCURONIUM BROMIDE 10 MG: 10 INJECTION INTRAVENOUS at 10:52

## 2023-10-09 RX ADMIN — ACETAMINOPHEN 1000 MG: 500 TABLET ORAL at 21:17

## 2023-10-09 RX ADMIN — SODIUM CHLORIDE, POTASSIUM CHLORIDE, SODIUM LACTATE AND CALCIUM CHLORIDE: 600; 310; 30; 20 INJECTION, SOLUTION INTRAVENOUS at 06:52

## 2023-10-09 RX ADMIN — SODIUM CHLORIDE, PRESERVATIVE FREE 10 ML: 5 INJECTION INTRAVENOUS at 21:21

## 2023-10-09 RX ADMIN — Medication 80 MCG: at 08:14

## 2023-10-09 RX ADMIN — FENTANYL CITRATE 25 MCG: 50 INJECTION INTRAMUSCULAR; INTRAVENOUS at 12:26

## 2023-10-09 RX ADMIN — WATER 2000 MG: 1 INJECTION INTRAMUSCULAR; INTRAVENOUS; SUBCUTANEOUS at 14:25

## 2023-10-09 RX ADMIN — FENTANYL CITRATE 25 MCG: 50 INJECTION INTRAMUSCULAR; INTRAVENOUS at 11:23

## 2023-10-09 RX ADMIN — 0.12% CHLORHEXIDINE GLUCONATE 15 ML: 1.2 RINSE ORAL at 14:52

## 2023-10-09 RX ADMIN — ONDANSETRON HYDROCHLORIDE 4 MG: 2 INJECTION, SOLUTION INTRAMUSCULAR; INTRAVENOUS at 12:33

## 2023-10-09 RX ADMIN — Medication 3 AMPULE: at 06:51

## 2023-10-09 RX ADMIN — FAMOTIDINE 20 MG: 10 INJECTION, SOLUTION INTRAVENOUS at 14:09

## 2023-10-09 RX ADMIN — HYDRALAZINE HYDROCHLORIDE 10 MG: 20 INJECTION, SOLUTION INTRAMUSCULAR; INTRAVENOUS at 14:33

## 2023-10-09 RX ADMIN — FENTANYL CITRATE 25 MCG: 50 INJECTION INTRAMUSCULAR; INTRAVENOUS at 12:25

## 2023-10-09 RX ADMIN — LIDOCAINE HYDROCHLORIDE 80 MG: 20 INJECTION, SOLUTION EPIDURAL; INFILTRATION; INTRACAUDAL; PERINEURAL at 07:45

## 2023-10-09 RX ADMIN — PHENYLEPHRINE HYDROCHLORIDE 30 MCG/MIN: 10 INJECTION INTRAVENOUS at 08:37

## 2023-10-09 RX ADMIN — Medication 80 MCG: at 09:49

## 2023-10-09 RX ADMIN — DIPHENHYDRAMINE HYDROCHLORIDE 25 MG: 25 CAPSULE ORAL at 23:38

## 2023-10-09 RX ADMIN — KETOROLAC TROMETHAMINE 30 MG: 30 INJECTION, SOLUTION INTRAMUSCULAR; INTRAVENOUS at 14:00

## 2023-10-09 RX ADMIN — HEPARIN SODIUM 12000 UNITS: 1000 INJECTION, SOLUTION INTRAVENOUS; SUBCUTANEOUS at 11:40

## 2023-10-09 RX ADMIN — FENTANYL CITRATE 50 MCG: 50 INJECTION INTRAMUSCULAR; INTRAVENOUS at 07:45

## 2023-10-09 RX ADMIN — VANCOMYCIN HYDROCHLORIDE 1000 MG: 1 INJECTION, POWDER, LYOPHILIZED, FOR SOLUTION INTRAVENOUS at 06:51

## 2023-10-09 RX ADMIN — INSULIN LISPRO 1 UNITS: 100 INJECTION, SOLUTION INTRAVENOUS; SUBCUTANEOUS at 21:18

## 2023-10-09 RX ADMIN — IPRATROPIUM BROMIDE 0.5 MG: 0.5 SOLUTION RESPIRATORY (INHALATION) at 19:55

## 2023-10-09 RX ADMIN — SODIUM CHLORIDE: 9 INJECTION, SOLUTION INTRAVENOUS at 14:42

## 2023-10-09 RX ADMIN — PROTAMINE SULFATE 100 MG: 10 INJECTION, SOLUTION INTRAVENOUS at 12:21

## 2023-10-09 RX ADMIN — Medication 80 MCG: at 08:39

## 2023-10-09 RX ADMIN — DEXAMETHASONE SODIUM PHOSPHATE 10 MG: 4 INJECTION INTRA-ARTICULAR; INTRALESIONAL; INTRAMUSCULAR; INTRAVENOUS; SOFT TISSUE at 10:01

## 2023-10-09 RX ADMIN — HEPARIN SODIUM 3000 UNITS: 1000 INJECTION, SOLUTION INTRAVENOUS; SUBCUTANEOUS at 12:03

## 2023-10-09 RX ADMIN — METOPROLOL SUCCINATE 25 MG: 25 TABLET, EXTENDED RELEASE ORAL at 18:14

## 2023-10-09 RX ADMIN — FENTANYL CITRATE 25 MCG: 50 INJECTION INTRAMUSCULAR; INTRAVENOUS at 11:34

## 2023-10-09 RX ADMIN — Medication 140 MG: at 07:45

## 2023-10-09 RX ADMIN — AMIODARONE HYDROCHLORIDE 200 MG: 200 TABLET ORAL at 22:16

## 2023-10-09 RX ADMIN — ROCURONIUM BROMIDE 10 MG: 10 INJECTION INTRAVENOUS at 07:45

## 2023-10-09 RX ADMIN — 0.12% CHLORHEXIDINE GLUCONATE 15 ML: 1.2 RINSE ORAL at 21:34

## 2023-10-09 RX ADMIN — WATER 2000 MG: 1 INJECTION INTRAMUSCULAR; INTRAVENOUS; SUBCUTANEOUS at 21:19

## 2023-10-09 RX ADMIN — MUPIROCIN: 20 OINTMENT TOPICAL at 14:53

## 2023-10-09 RX ADMIN — ALBUTEROL SULFATE 2.5 MG: 2.5 SOLUTION RESPIRATORY (INHALATION) at 15:37

## 2023-10-09 RX ADMIN — ROCURONIUM BROMIDE 20 MG: 10 INJECTION INTRAVENOUS at 09:57

## 2023-10-09 RX ADMIN — SUGAMMADEX 200 MG: 100 INJECTION, SOLUTION INTRAVENOUS at 12:34

## 2023-10-09 RX ADMIN — ROCURONIUM BROMIDE 40 MG: 10 INJECTION INTRAVENOUS at 07:57

## 2023-10-09 RX ADMIN — INSULIN LISPRO 2 UNITS: 100 INJECTION, SOLUTION INTRAVENOUS; SUBCUTANEOUS at 14:29

## 2023-10-09 RX ADMIN — SENNOSIDES AND DOCUSATE SODIUM 1 TABLET: 50; 8.6 TABLET ORAL at 21:09

## 2023-10-09 RX ADMIN — ARFORMOTEROL TARTRATE: 15 SOLUTION RESPIRATORY (INHALATION) at 19:55

## 2023-10-09 RX ADMIN — FAMOTIDINE 20 MG: 20 TABLET ORAL at 21:08

## 2023-10-09 ASSESSMENT — PAIN SCALES - GENERAL
PAINLEVEL_OUTOF10: 4
PAINLEVEL_OUTOF10: 3
PAINLEVEL_OUTOF10: 5
PAINLEVEL_OUTOF10: 6

## 2023-10-09 ASSESSMENT — PAIN DESCRIPTION - LOCATION
LOCATION: INCISION
LOCATION: CHEST;INCISION
LOCATION: HEAD

## 2023-10-09 ASSESSMENT — PAIN DESCRIPTION - ORIENTATION
ORIENTATION: MID
ORIENTATION: ANTERIOR

## 2023-10-09 ASSESSMENT — PAIN DESCRIPTION - DESCRIPTORS: DESCRIPTORS: ACHING

## 2023-10-09 NOTE — ANESTHESIA POSTPROCEDURE EVALUATION
Department of Anesthesiology  Postprocedure Note    Patient: Meera Villegas  MRN: [id number]  YOB: 1945  Date of evaluation: 10/9/2023      Procedure Summary     Date: 10/09/23 Room / Location: [facility] / [facility]    Anesthesia Start: 2733 Anesthesia Stop: 9564    Procedure: TRANSPERICARDIAL HYBRID ABLATION WITH FLUORO, LEFT VIDEO ASSISTED THORACIC SURGERY (VATS), JENNIFER PERFORMED BY DR. Harshal More.  (Left: Chest) Diagnosis:       Atrial fibrillation, unspecified type (HCC)      (Atrial fibrillation, unspecified type ([address]) [I48.91])    Surgeons: Vera Burger MD Responsible Provider: Jumana Cai MD    Anesthesia Type: General ASA Status: 3          Anesthesia Type: General    Surya Phase I: Surya Score: 3    Surya Phase II:        Anesthesia Post Evaluation    Patient location: [facility] to cath lab for 2nd part of procedure, handoff given to acceptint anes provider   Patient participation: complete - patient cannot participate  Level of consciousness: sedated and ventilated  Pain score: 0  Airway patency: patent  Nausea & Vomiting: no nausea and no vomiting  Complications: no  Cardiovascular status: hemodynamically stable  Respiratory status: intubated and ventilator  Hydration status: euvolemic  Pain management: adequate

## 2023-10-09 NOTE — ANESTHESIA PROCEDURE NOTES
Arterial Line:    An arterial line was placed using ultrasound guidance, in the pre-op for the following indication(s): continuous blood pressure monitoring and blood sampling needed. A 20 gauge (size) (length), Arrow (type) catheter was placed, Seldinger technique used, into the right radial artery, secured by Tegaderm. Anesthesia type: Local  Local infiltration: Injection    Events:  patient tolerated procedure well with no complications. 10/9/2023 7:15 AM10/9/2023 7:17 AM  Anesthesiologist: Betsy Mckeon DO  Performed: Anesthesiologist   Preanesthetic Checklist  Completed: patient identified, IV checked, site marked, risks and benefits discussed, surgical/procedural consents, equipment checked, pre-op evaluation, timeout performed, anesthesia consent given, oxygen available, monitors applied/VS acknowledged and fire risk safety assessment completed and verbalized
Central Venous Line:    A central venous line was placed using ultrasound guidance, in the pre-op for the following indication(s): central venous access and CVP monitoring. 10/9/2023 11:36 AM    Sterility preparation included the following: hand hygiene performed prior to procedure, maximum sterile barriers used and sterile technique used to drape from head to toe. The patient was placed in Trendelenburg position. The right internal jugular vein was prepped. The site was prepped with Chloraprep. A 7 Fr (size), 16 (length), triple lumen was placed. During the procedure, the following specific steps were taken: target vein identified, needle advanced into vein and blood aspirated and guidewire advanced into vein. Intravenous verification was obtained by ultrasound, venous blood return and manometry. Post insertion care included: all ports aspirated, all ports flushed easily, guidewire removed intact, Biopatch applied, line sutured in place and dressing applied. During the procedure the patient experienced: patient tolerated procedure well with no complications.       Outcomes: uncomplicated  Anesthesia type: local  Staffing  Performed: Anesthesiologist   Anesthesiologist: Carmen Rdz DO  Performed by: Carmen Rdz DO  Authorized by: Earlene Lin MD    Preanesthetic Checklist  Completed: patient identified, IV checked, site marked, risks and benefits discussed, surgical/procedural consents, equipment checked, pre-op evaluation, timeout performed, anesthesia consent given, oxygen available, monitors applied/VS acknowledged and fire risk safety assessment completed and verbalized
present.     Left atrial appendage normal.      Septa    Atrial Septum:  Intra-atrial septal morphology normal.      Ventricular Septum:  Intra-ventricular septum morphology normal.          Other Findings  Pericardium:  normal  Pleural Effusion:  none      Anesthesia Information  Performed Personally  Anesthesiologist:  Lexa Lagos DO      Echocardiogram Comments:       Limited JENNIFER imaging to rule out COLT thrombus, evaluate LV function and evaluate COLT ligation post procedure    No thrombus noted in COLT    Ligation procedure not performed secondary to poor lung isolation    Exam unchanged post procedure Post Intervention Follow-up Study  Aortic Function: unchangedMitral Function: unchangedTricuspid Function: unchangedNone

## 2023-10-09 NOTE — BRIEF OP NOTE
BRIEF OP NOTE  Pre-Op Diagnosis: Atrial fibrillation, unspecified type ([address]) [I48.91]    Post-Op Diagnosis: AF    Procedure:   TRANSPERICARDIAL HYBRID ABLATION  Attempted LEFT VIDEO ASSISTED THORACIC SURGERY (VATS) LAAL    Surgeon: Harshad Santiago MD    Assistant(s): Harris Joseph PA-C    Anesthesia: General     Infusions: None    Estimated Blood Loss:  100ml    Drains: 2 bogdan drains    Complications: None    Findings: AF

## 2023-10-09 NOTE — H&P
Update History & Physical    The patient's History and Physical of October 2, 2023 was reviewed. There was no change. Plan: The risks, benefits, expected outcome, and alternative to the recommended procedure have been discussed with the patient. Patient understands and wants to proceed with the procedure.      Electronically signed by DAGOBERTO Gomez on 10/9/2023 at 7:38 AM

## 2023-10-09 NOTE — PERIOP NOTE
0800 - Patient's jewlry (3 rings) given to Nas Becerril RN in CVICU. 1045 - Report called to CVICU, spoke with Shaquille Mcwilliams5 [address] Trevino - Report called to EP Lab, spoke with Nuha Lopez RN    TRANSFER - OUT REPORT:    Verbal report given to Ky on Seleta Hatchet  being transferred to EP Lab for ordered procedure       Report consisted of patient's Situation, Background, Assessment and   Recommendations(SBAR). Information from the following report(s) Surgery Report was reviewed with the receiving nurse. Lines:   Peripheral IV 10/09/23 Left Antecubital (Active)        Opportunity for questions and clarification was provided.       Patient transported with:  Monitor, O2 @ 10 lpm, and Registered Nurse  DANIEL Draper RN

## 2023-10-10 ENCOUNTER — APPOINTMENT (OUTPATIENT)
Facility: HOSPITAL | Age: 78
DRG: 229 | End: 2023-10-10
Attending: THORACIC SURGERY (CARDIOTHORACIC VASCULAR SURGERY)
Payer: MEDICARE

## 2023-10-10 LAB
ANION GAP SERPL CALC-SCNC: 8 MMOL/L (ref 5–15)
BUN SERPL-MCNC: 20 MG/DL (ref 6–20)
BUN/CREAT SERPL: 22 (ref 12–20)
CALCIUM SERPL-MCNC: 8.5 MG/DL (ref 8.5–10.1)
CHLORIDE SERPL-SCNC: 104 MMOL/L (ref 97–108)
CO2 SERPL-SCNC: 25 MMOL/L (ref 21–32)
CREAT SERPL-MCNC: 0.91 MG/DL (ref 0.55–1.02)
EKG ATRIAL RATE: 59 BPM
EKG DIAGNOSIS: NORMAL
EKG P AXIS: 81 DEGREES
EKG P-R INTERVAL: 154 MS
EKG Q-T INTERVAL: 448 MS
EKG QRS DURATION: 86 MS
EKG QTC CALCULATION (BAZETT): 443 MS
EKG R AXIS: -24 DEGREES
EKG T AXIS: -54 DEGREES
EKG VENTRICULAR RATE: 59 BPM
ERYTHROCYTE [DISTWIDTH] IN BLOOD BY AUTOMATED COUNT: 13.1 % (ref 11.5–14.5)
GLUCOSE BLD STRIP.AUTO-MCNC: 113 MG/DL (ref 65–117)
GLUCOSE BLD STRIP.AUTO-MCNC: 118 MG/DL (ref 65–117)
GLUCOSE BLD STRIP.AUTO-MCNC: 122 MG/DL (ref 65–117)
GLUCOSE BLD STRIP.AUTO-MCNC: 131 MG/DL (ref 65–117)
GLUCOSE SERPL-MCNC: 148 MG/DL (ref 65–100)
HCT VFR BLD AUTO: 38.4 % (ref 35–47)
HGB BLD-MCNC: 12.7 G/DL (ref 11.5–16)
MAGNESIUM SERPL-MCNC: 1.6 MG/DL (ref 1.6–2.4)
MCH RBC QN AUTO: 32.6 PG (ref 26–34)
MCHC RBC AUTO-ENTMCNC: 33.1 G/DL (ref 30–36.5)
MCV RBC AUTO: 98.5 FL (ref 80–99)
NRBC # BLD: 0 K/UL (ref 0–0.01)
NRBC BLD-RTO: 0 PER 100 WBC
PLATELET # BLD AUTO: 161 K/UL (ref 150–400)
PMV BLD AUTO: 10.4 FL (ref 8.9–12.9)
POTASSIUM SERPL-SCNC: 4.1 MMOL/L (ref 3.5–5.1)
RBC # BLD AUTO: 3.9 M/UL (ref 3.8–5.2)
SERVICE CMNT-IMP: ABNORMAL
SERVICE CMNT-IMP: NORMAL
SODIUM SERPL-SCNC: 137 MMOL/L (ref 136–145)
WBC # BLD AUTO: 11.9 K/UL (ref 3.6–11)

## 2023-10-10 PROCEDURE — 2060000000 HC ICU INTERMEDIATE R&B

## 2023-10-10 PROCEDURE — 93005 ELECTROCARDIOGRAM TRACING: CPT | Performed by: NURSE PRACTITIONER

## 2023-10-10 PROCEDURE — 6360000002 HC RX W HCPCS: Performed by: THORACIC SURGERY (CARDIOTHORACIC VASCULAR SURGERY)

## 2023-10-10 PROCEDURE — 82962 GLUCOSE BLOOD TEST: CPT

## 2023-10-10 PROCEDURE — 6370000000 HC RX 637 (ALT 250 FOR IP): Performed by: NURSE PRACTITIONER

## 2023-10-10 PROCEDURE — 80048 BASIC METABOLIC PNL TOTAL CA: CPT

## 2023-10-10 PROCEDURE — 71045 X-RAY EXAM CHEST 1 VIEW: CPT

## 2023-10-10 PROCEDURE — 6370000000 HC RX 637 (ALT 250 FOR IP): Performed by: INTERNAL MEDICINE

## 2023-10-10 PROCEDURE — 85027 COMPLETE CBC AUTOMATED: CPT

## 2023-10-10 PROCEDURE — 6360000002 HC RX W HCPCS: Performed by: PHYSICIAN ASSISTANT

## 2023-10-10 PROCEDURE — 6360000002 HC RX W HCPCS: Performed by: HOSPITALIST

## 2023-10-10 PROCEDURE — 36415 COLL VENOUS BLD VENIPUNCTURE: CPT

## 2023-10-10 PROCEDURE — 94640 AIRWAY INHALATION TREATMENT: CPT

## 2023-10-10 PROCEDURE — 2580000003 HC RX 258: Performed by: PHYSICIAN ASSISTANT

## 2023-10-10 PROCEDURE — 83735 ASSAY OF MAGNESIUM: CPT

## 2023-10-10 PROCEDURE — 6360000002 HC RX W HCPCS: Performed by: NURSE PRACTITIONER

## 2023-10-10 PROCEDURE — 6370000000 HC RX 637 (ALT 250 FOR IP): Performed by: PHYSICIAN ASSISTANT

## 2023-10-10 RX ORDER — FUROSEMIDE 10 MG/ML
20 INJECTION INTRAMUSCULAR; INTRAVENOUS ONCE
Status: COMPLETED | OUTPATIENT
Start: 2023-10-10 | End: 2023-10-10

## 2023-10-10 RX ORDER — ENOXAPARIN SODIUM 100 MG/ML
1 INJECTION SUBCUTANEOUS 2 TIMES DAILY
Status: DISCONTINUED | OUTPATIENT
Start: 2023-10-10 | End: 2023-10-11 | Stop reason: HOSPADM

## 2023-10-10 RX ORDER — FAMOTIDINE 20 MG/1
20 TABLET, FILM COATED ORAL 2 TIMES DAILY
Status: DISCONTINUED | OUTPATIENT
Start: 2023-10-10 | End: 2023-10-11 | Stop reason: HOSPADM

## 2023-10-10 RX ORDER — AMLODIPINE BESYLATE 5 MG/1
5 TABLET ORAL DAILY
Status: DISCONTINUED | OUTPATIENT
Start: 2023-10-10 | End: 2023-10-11 | Stop reason: HOSPADM

## 2023-10-10 RX ORDER — MAGNESIUM SULFATE 1 G/100ML
1000 INJECTION INTRAVENOUS ONCE
Status: COMPLETED | OUTPATIENT
Start: 2023-10-10 | End: 2023-10-10

## 2023-10-10 RX ADMIN — MAGNESIUM SULFATE HEPTAHYDRATE 1000 MG: 1 INJECTION, SOLUTION INTRAVENOUS at 04:51

## 2023-10-10 RX ADMIN — ACETAMINOPHEN 1000 MG: 500 TABLET ORAL at 22:23

## 2023-10-10 RX ADMIN — FUROSEMIDE 20 MG: 10 INJECTION, SOLUTION INTRAMUSCULAR; INTRAVENOUS at 08:39

## 2023-10-10 RX ADMIN — KETOROLAC TROMETHAMINE 15 MG: 30 INJECTION, SOLUTION INTRAMUSCULAR; INTRAVENOUS at 08:39

## 2023-10-10 RX ADMIN — ATORVASTATIN CALCIUM 10 MG: 10 TABLET, FILM COATED ORAL at 08:39

## 2023-10-10 RX ADMIN — ACETAMINOPHEN 1000 MG: 500 TABLET ORAL at 11:12

## 2023-10-10 RX ADMIN — Medication 400 MG: at 20:51

## 2023-10-10 RX ADMIN — KETOROLAC TROMETHAMINE 15 MG: 30 INJECTION, SOLUTION INTRAMUSCULAR; INTRAVENOUS at 03:08

## 2023-10-10 RX ADMIN — WATER 2000 MG: 1 INJECTION INTRAMUSCULAR; INTRAVENOUS; SUBCUTANEOUS at 14:30

## 2023-10-10 RX ADMIN — FAMOTIDINE 20 MG: 20 TABLET ORAL at 20:51

## 2023-10-10 RX ADMIN — AMIODARONE HYDROCHLORIDE 200 MG: 200 TABLET ORAL at 08:39

## 2023-10-10 RX ADMIN — AMLODIPINE BESYLATE 5 MG: 5 TABLET ORAL at 14:30

## 2023-10-10 RX ADMIN — ENOXAPARIN SODIUM 60 MG: 100 INJECTION SUBCUTANEOUS at 20:51

## 2023-10-10 RX ADMIN — WATER 2000 MG: 1 INJECTION INTRAMUSCULAR; INTRAVENOUS; SUBCUTANEOUS at 22:23

## 2023-10-10 RX ADMIN — MONTELUKAST 10 MG: 10 TABLET, FILM COATED ORAL at 08:39

## 2023-10-10 RX ADMIN — SODIUM CHLORIDE, PRESERVATIVE FREE 10 ML: 5 INJECTION INTRAVENOUS at 08:40

## 2023-10-10 RX ADMIN — POLYETHYLENE GLYCOL 3350 17 G: 17 POWDER, FOR SOLUTION ORAL at 08:39

## 2023-10-10 RX ADMIN — Medication 400 MG: at 08:39

## 2023-10-10 RX ADMIN — ENOXAPARIN SODIUM 60 MG: 100 INJECTION SUBCUTANEOUS at 14:29

## 2023-10-10 RX ADMIN — ARFORMOTEROL TARTRATE: 15 SOLUTION RESPIRATORY (INHALATION) at 07:54

## 2023-10-10 RX ADMIN — IPRATROPIUM BROMIDE 0.5 MG: 0.5 SOLUTION RESPIRATORY (INHALATION) at 07:49

## 2023-10-10 RX ADMIN — LEVOTHYROXINE SODIUM 100 MCG: 0.1 TABLET ORAL at 06:27

## 2023-10-10 RX ADMIN — METOPROLOL SUCCINATE 25 MG: 25 TABLET, EXTENDED RELEASE ORAL at 08:39

## 2023-10-10 RX ADMIN — SENNOSIDES AND DOCUSATE SODIUM 1 TABLET: 50; 8.6 TABLET ORAL at 08:39

## 2023-10-10 RX ADMIN — FAMOTIDINE 20 MG: 20 TABLET ORAL at 08:39

## 2023-10-10 RX ADMIN — WATER 2000 MG: 1 INJECTION INTRAMUSCULAR; INTRAVENOUS; SUBCUTANEOUS at 06:26

## 2023-10-10 RX ADMIN — SENNOSIDES AND DOCUSATE SODIUM 1 TABLET: 50; 8.6 TABLET ORAL at 20:51

## 2023-10-10 RX ADMIN — SODIUM CHLORIDE, PRESERVATIVE FREE 10 ML: 5 INJECTION INTRAVENOUS at 20:52

## 2023-10-10 ASSESSMENT — PAIN DESCRIPTION - DESCRIPTORS
DESCRIPTORS: ACHING
DESCRIPTORS: BURNING;THROBBING

## 2023-10-10 ASSESSMENT — PAIN DESCRIPTION - ORIENTATION
ORIENTATION: MID
ORIENTATION: ANTERIOR

## 2023-10-10 ASSESSMENT — PAIN SCALES - GENERAL
PAINLEVEL_OUTOF10: 5
PAINLEVEL_OUTOF10: 4

## 2023-10-10 ASSESSMENT — PAIN DESCRIPTION - LOCATION
LOCATION: INCISION
LOCATION: HEAD

## 2023-10-10 NOTE — OP NOTE
[facility]  OPERATIVE REPORT    Name:  Grant Herrera  MR#:  [id number]  :  1945  ACCOUNT #:  [de-identified]  DATE OF SERVICE:  10/09/2023    PREOPERATIVE DIAGNOSIS:  Longstanding persistent atrial fibrillation. POSTOPERATIVE DIAGNOSIS:  Longstanding persistent atrial fibrillation. PROCEDURE PERFORMED:  1. Transpericardial epicardial posterior wall ablation. 2.  Attempted but aborted left VATS and left atrial appendage ligation. SURGEON:  Zohra Murphy MD    ASSISTANT:  Betzaida Singletary PA-C    ANESTHESIA:  General endotracheal.    COMPLICATIONS:  None. SPECIMENS REMOVED:  None. IMPLANTS:  None. ESTIMATED BLOOD LOSS:  10 mL. ANESTHESIOLOGIST:  Carole Rm DO    INDICATIONS:  The patient is a 67-year-old woman who is referred by Dr. Ludwin Birmingham for left atrial appendage ligation and posterior wall ablation. PROCEDURE:  She was prepped and draped in a sterile fashion. A double lumen tube was attempted, but according to the Anesthesia team her airway was too small for the double lumen tube. We therefore attempted a single lumen tube that was right main stem and below the left lung to passively deflate and apply some suction through her main stem. We made one incision in the left second intercostal space and inserted the camera and turned the insufflation onto 10 mm of flow with pressure of 10. Despite this and despite sometime with upper lobe decompression, the lower lobe still had some inflation likely due to air trapping given her moderate-to-severe emphysema. We were not able to safely visualize the pericardium in order to open this and perform a clip ligation. We therefore aborted the procedure at this point. We converted her back to a normal endotracheal intubation and proceed with the posterior wall ablation. Over the xyphoid process, I made an incision and exposed the distal tip of the xyphoid. We resected this.   I then opened the pericardium widely

## 2023-10-10 NOTE — DISCHARGE SUMMARY
Procedure note for procedure details. Xray Result (most recent):  XR CHEST PORTABLE 10/11/2023    Narrative  EXAM:  XR CHEST PORTABLE    INDICATION: Postop heart    COMPARISON: October 10    TECHNIQUE: portable chest AP view    FINDINGS: The cardiac silhouette is unchanged. The pulmonary vasculature is  within normal limits. Central line is been removed    The lungs and pleural spaces are clear. The visualized bones and upper abdomen  are age-appropriate. Impression  No acute process on portable chest. No change, central line removed. Patient Instructions/Follow Up Care:  Discharge instructions were reviewed with the patient and family present. Questions were also answered at this time. Prescriptions and medications were reviewed. The patient has a follow up appointment with the Nurse Practitioner or Physician's Assistant as specified on AVS. The patient was also instructed to follow up with her primary care physician as needed. The patient and family were encouraged to call with any questions or concerns.        Signed:  BUBBA Miller NP  10/11/2023  9:22 AM

## 2023-10-10 NOTE — PROCEDURES
Patient was assisted in a recumbent position. Carmelo drain dressing removed, site cleaned with CHG, sutures x 1 cut, Carmelo drain x 1 removed without difficulty. New occlusive dressing placed. VS stable. RN updated.

## 2023-10-10 NOTE — PLAN OF CARE
Problem: Respiratory - Adult  Goal: Achieves optimal ventilation and oxygenation  10/10/2023 0601 by Thai Cheney RCP  Outcome: Progressing  10/10/2023 0408 by Thai Cheney RCP  Outcome: Progressing

## 2023-10-10 NOTE — CARE COORDINATION
Care Management Initial Assessment       RUR: N/A  Readmission? No  1st IM letter given? Yes   1st  letter given: No     Initial note: Chart reviewed for updates. CM met with pt at bedside, introduced role, confirmed demographics and discussed d/c planning. Pt lives with her spouse in a 1 level home with 3 steps to get into the front entrance. Pt is independent wih ADL's at home and does not use any DME to ambulate. Pt however has DME at home: [employer], Shower chair, cane, WC and crutches. Pt reports history of inpatient rehab at [facility] with [facility]. Pt reports having a knee replacement at the time hence reason for inpatient rehab. Pt reports prior this admission she was driving. Pt uses [facility] in [address] [place]. CM is awaiting PT/OT recommendations. Pt's family will provide transport at d/c. Complete assessment is below:     10/10/23 5822   Service Assessment   Patient Orientation Alert and Oriented   Cognition Alert   History Provided By Patient   Primary Caregiver Self   Accompanied By/Relationship Spouse and daughter   Support Systems Spouse/Significant Other   Patient's Healthcare Decision Maker is: Legal Next of Kin  (Spouse Marlena Beckwith [phone])   PCP Verified by CM Yes   Last Visit to PCP Within last 3 months   Prior Functional Level Independent in ADLs/IADLs   Current Functional Level Assistance with the following:;Bathing;Dressing; Toileting;Mobility   Can patient return to prior living arrangement Yes   Ability to make needs known: Good   Family able to assist with home care needs: Yes   Would you like for me to discuss the discharge plan with any other family members/significant others, and if so, who?  Yes  (Spouse and daughter)   Financial Resources Medicare   Community Resources None   Social/Functional History   Lives With Spouse   Type of [address] [place] [address]  One level   [address] to enter with rails   Entrance Stairs -

## 2023-10-11 ENCOUNTER — APPOINTMENT (OUTPATIENT)
Facility: HOSPITAL | Age: 78
DRG: 229 | End: 2023-10-11
Attending: THORACIC SURGERY (CARDIOTHORACIC VASCULAR SURGERY)
Payer: MEDICARE

## 2023-10-11 VITALS
HEIGHT: 60 IN | BODY MASS INDEX: 29.02 KG/M2 | SYSTOLIC BLOOD PRESSURE: 136 MMHG | OXYGEN SATURATION: 97 % | TEMPERATURE: 98 F | RESPIRATION RATE: 15 BRPM | HEART RATE: 59 BPM | DIASTOLIC BLOOD PRESSURE: 66 MMHG | WEIGHT: 147.82 LBS

## 2023-10-11 LAB
ANION GAP SERPL CALC-SCNC: 8 MMOL/L (ref 5–15)
BUN SERPL-MCNC: 30 MG/DL (ref 6–20)
BUN/CREAT SERPL: 29 (ref 12–20)
CALCIUM SERPL-MCNC: 8.1 MG/DL (ref 8.5–10.1)
CHLORIDE SERPL-SCNC: 99 MMOL/L (ref 97–108)
CO2 SERPL-SCNC: 24 MMOL/L (ref 21–32)
CREAT SERPL-MCNC: 1.03 MG/DL (ref 0.55–1.02)
EKG ATRIAL RATE: 55 BPM
EKG DIAGNOSIS: NORMAL
EKG P AXIS: 82 DEGREES
EKG P-R INTERVAL: 156 MS
EKG Q-T INTERVAL: 438 MS
EKG QRS DURATION: 84 MS
EKG QTC CALCULATION (BAZETT): 419 MS
EKG R AXIS: -4 DEGREES
EKG T AXIS: 35 DEGREES
EKG VENTRICULAR RATE: 55 BPM
ERYTHROCYTE [DISTWIDTH] IN BLOOD BY AUTOMATED COUNT: 13 % (ref 11.5–14.5)
GLUCOSE BLD STRIP.AUTO-MCNC: 95 MG/DL (ref 65–117)
GLUCOSE SERPL-MCNC: 93 MG/DL (ref 65–100)
HCT VFR BLD AUTO: 35.4 % (ref 35–47)
HGB BLD-MCNC: 11.5 G/DL (ref 11.5–16)
MAGNESIUM SERPL-MCNC: 2.3 MG/DL (ref 1.6–2.4)
MCH RBC QN AUTO: 31.6 PG (ref 26–34)
MCHC RBC AUTO-ENTMCNC: 32.5 G/DL (ref 30–36.5)
MCV RBC AUTO: 97.3 FL (ref 80–99)
NRBC # BLD: 0 K/UL (ref 0–0.01)
NRBC BLD-RTO: 0 PER 100 WBC
PLATELET # BLD AUTO: 147 K/UL (ref 150–400)
PMV BLD AUTO: 10.9 FL (ref 8.9–12.9)
POTASSIUM SERPL-SCNC: 3.6 MMOL/L (ref 3.5–5.1)
RBC # BLD AUTO: 3.64 M/UL (ref 3.8–5.2)
SERVICE CMNT-IMP: NORMAL
SODIUM SERPL-SCNC: 131 MMOL/L (ref 136–145)
WBC # BLD AUTO: 9.8 K/UL (ref 3.6–11)

## 2023-10-11 PROCEDURE — 83735 ASSAY OF MAGNESIUM: CPT

## 2023-10-11 PROCEDURE — 80048 BASIC METABOLIC PNL TOTAL CA: CPT

## 2023-10-11 PROCEDURE — 6360000002 HC RX W HCPCS: Performed by: THORACIC SURGERY (CARDIOTHORACIC VASCULAR SURGERY)

## 2023-10-11 PROCEDURE — 94761 N-INVAS EAR/PLS OXIMETRY MLT: CPT

## 2023-10-11 PROCEDURE — 94640 AIRWAY INHALATION TREATMENT: CPT

## 2023-10-11 PROCEDURE — 6370000000 HC RX 637 (ALT 250 FOR IP): Performed by: NURSE PRACTITIONER

## 2023-10-11 PROCEDURE — 82962 GLUCOSE BLOOD TEST: CPT

## 2023-10-11 PROCEDURE — 2580000003 HC RX 258: Performed by: PHYSICIAN ASSISTANT

## 2023-10-11 PROCEDURE — 6370000000 HC RX 637 (ALT 250 FOR IP): Performed by: PHYSICIAN ASSISTANT

## 2023-10-11 PROCEDURE — 71045 X-RAY EXAM CHEST 1 VIEW: CPT

## 2023-10-11 PROCEDURE — 85027 COMPLETE CBC AUTOMATED: CPT

## 2023-10-11 PROCEDURE — 6370000000 HC RX 637 (ALT 250 FOR IP): Performed by: INTERNAL MEDICINE

## 2023-10-11 PROCEDURE — 6360000002 HC RX W HCPCS: Performed by: HOSPITALIST

## 2023-10-11 PROCEDURE — 36415 COLL VENOUS BLD VENIPUNCTURE: CPT

## 2023-10-11 RX ORDER — OXYCODONE HYDROCHLORIDE 5 MG/1
5 TABLET ORAL EVERY 8 HOURS PRN
Qty: 10 TABLET | Refills: 0 | Status: SHIPPED | OUTPATIENT
Start: 2023-10-11 | End: 2023-10-14

## 2023-10-11 RX ORDER — SENNA AND DOCUSATE SODIUM 50; 8.6 MG/1; MG/1
1 TABLET, FILM COATED ORAL 2 TIMES DAILY
Qty: 28 TABLET | Refills: 0 | Status: SHIPPED | OUTPATIENT
Start: 2023-10-11 | End: 2023-10-25

## 2023-10-11 RX ORDER — METHYLPREDNISOLONE SODIUM SUCCINATE 125 MG/2ML
125 INJECTION, POWDER, LYOPHILIZED, FOR SOLUTION INTRAMUSCULAR; INTRAVENOUS EVERY 12 HOURS
Status: DISCONTINUED | OUTPATIENT
Start: 2023-10-11 | End: 2023-10-11

## 2023-10-11 RX ORDER — DIPHENHYDRAMINE HCL 25 MG
25 CAPSULE ORAL EVERY 8 HOURS PRN
Status: SHIPPED | COMMUNITY
Start: 2023-10-11 | End: 2023-10-21

## 2023-10-11 RX ORDER — LIDOCAINE 4 G/G
2 PATCH TOPICAL DAILY
Qty: 10 EACH | Refills: 0 | Status: SHIPPED | OUTPATIENT
Start: 2023-10-11 | End: 2023-10-16

## 2023-10-11 RX ORDER — METHYLPREDNISOLONE 4 MG/1
TABLET ORAL
Qty: 1 KIT | Refills: 0 | Status: SHIPPED | OUTPATIENT
Start: 2023-10-11 | End: 2023-10-17

## 2023-10-11 RX ORDER — AMIODARONE HYDROCHLORIDE 200 MG/1
200 TABLET ORAL DAILY
Qty: 30 TABLET | Refills: 1 | Status: SHIPPED | OUTPATIENT
Start: 2023-10-11

## 2023-10-11 RX ORDER — POLYETHYLENE GLYCOL 3350 17 G/17G
17 POWDER, FOR SOLUTION ORAL DAILY
Qty: 14 PACKET | Refills: 0 | Status: SHIPPED | OUTPATIENT
Start: 2023-10-11 | End: 2023-10-25

## 2023-10-11 RX ORDER — METOPROLOL SUCCINATE 25 MG/1
12.5 TABLET, EXTENDED RELEASE ORAL 2 TIMES DAILY
Qty: 30 TABLET | Refills: 1 | Status: SHIPPED | OUTPATIENT
Start: 2023-10-11

## 2023-10-11 RX ADMIN — AMIODARONE HYDROCHLORIDE 200 MG: 200 TABLET ORAL at 09:29

## 2023-10-11 RX ADMIN — ARFORMOTEROL TARTRATE: 15 SOLUTION RESPIRATORY (INHALATION) at 09:05

## 2023-10-11 RX ADMIN — LEVOTHYROXINE SODIUM 88 MCG: 0.09 TABLET ORAL at 05:08

## 2023-10-11 RX ADMIN — MONTELUKAST 10 MG: 10 TABLET, FILM COATED ORAL at 09:28

## 2023-10-11 RX ADMIN — SENNOSIDES AND DOCUSATE SODIUM 1 TABLET: 50; 8.6 TABLET ORAL at 09:28

## 2023-10-11 RX ADMIN — FAMOTIDINE 20 MG: 20 TABLET ORAL at 09:28

## 2023-10-11 RX ADMIN — IPRATROPIUM BROMIDE 0.5 MG: 0.5 SOLUTION RESPIRATORY (INHALATION) at 00:45

## 2023-10-11 RX ADMIN — OXYCODONE HYDROCHLORIDE 5 MG: 5 TABLET ORAL at 01:17

## 2023-10-11 RX ADMIN — METOPROLOL SUCCINATE 25 MG: 25 TABLET, EXTENDED RELEASE ORAL at 09:28

## 2023-10-11 RX ADMIN — METHYLPREDNISOLONE SODIUM SUCCINATE 125 MG: 125 INJECTION, POWDER, FOR SOLUTION INTRAMUSCULAR; INTRAVENOUS at 09:28

## 2023-10-11 RX ADMIN — SODIUM CHLORIDE, PRESERVATIVE FREE 10 ML: 5 INJECTION INTRAVENOUS at 09:34

## 2023-10-11 RX ADMIN — ARFORMOTEROL TARTRATE: 15 SOLUTION RESPIRATORY (INHALATION) at 00:52

## 2023-10-11 RX ADMIN — METHYLPREDNISOLONE SODIUM SUCCINATE 125 MG: 125 INJECTION, POWDER, FOR SOLUTION INTRAMUSCULAR; INTRAVENOUS at 09:37

## 2023-10-11 RX ADMIN — IPRATROPIUM BROMIDE 0.5 MG: 0.5 SOLUTION RESPIRATORY (INHALATION) at 08:54

## 2023-10-11 RX ADMIN — POLYETHYLENE GLYCOL 3350 17 G: 17 POWDER, FOR SOLUTION ORAL at 09:34

## 2023-10-11 RX ADMIN — AMLODIPINE BESYLATE 5 MG: 5 TABLET ORAL at 09:29

## 2023-10-11 RX ADMIN — Medication 400 MG: at 09:29

## 2023-10-11 RX ADMIN — ATORVASTATIN CALCIUM 10 MG: 10 TABLET, FILM COATED ORAL at 09:29

## 2023-10-11 ASSESSMENT — PAIN SCALES - GENERAL: PAINLEVEL_OUTOF10: 4

## 2023-10-11 ASSESSMENT — PAIN DESCRIPTION - LOCATION: LOCATION: INCISION

## 2023-10-11 ASSESSMENT — PAIN DESCRIPTION - DESCRIPTORS: DESCRIPTORS: BURNING

## 2023-10-11 NOTE — CARE COORDINATION
No further CM needs identified at this time. Transition of Care Plan:    RUR: 19% \"moderate risk\"  Prior Level of Functioning: Independent with ADL's  Disposition: Home with follow up appts  Follow up appointments: To be arranged by the Cardiac team  DME needed: Has DME at home  Transportation at discharge: Spouse to provide transport at d/c  IM/IMM Medicare/ letter given: 1st IM letter given 10/09/23  Is patient a  and connected with VA? N/A  Caregiver Contact: Pt's spouse Cassandra Renee [phone]  Discharge Caregiver contacted prior to discharge? No, pt will contact  Care Conference needed? No  Barriers to discharge:  None    Initial note: Chart reviewed for updates. It was reported by the NP Anjana Smith that pt is d/c today. No PT/OT needs identified. No HH needs identified. 1st IM letter given; signed copy on chart. Pt's spouse or daughter will provide transport at d/c. No further CM needs identified at this time.  CM will remain accessible for consult incase additional CM needs arise prior d/c.     10/11/23 825 [address] Discharge   Transition of Care Consult (CM Consult) Discharge Planning   Services At/After Discharge None   Mode of Transport at Discharge Other (see comment)  (Spouse will provide transport at d/c)   Condition of Participation: Discharge Planning   The Plan for Transition of Care is related to the following treatment goals: Pt is d/c home with follow up appointments     ELENA Cunha    [phone]

## 2023-10-11 NOTE — DISCHARGE INSTRUCTIONS
Cardiac Surgery Specialist    [address] [place] [address] [address] [facility]                                          [facility], [address]                                       [place]  Office- [phone]  Fax- [phone]       Office- [phone]  Fax- [phone]  _____________________________________________________________  MARCELLA Molina Dr., Dr., NP, [address]                 MARCELLA Theodore    Discharge Date: 10/11/2023     Discharge to: Home    INSTRUCTIONS:    Wash incision with soap and water daily. No strenuous activity for a week. No driving while taking pain medications. Your follow-up appointment with Dr Juana Onofre will be on 10/16/23 at 1:15pm. Office is located at [facility], [address], [address]. Please call our office at [phone] if you are unable to make this appointment. Your appointment with Dr. Augusto Dunn will be on 11/1/23 at 11:15 for follow up ablation and to discuss watchman procedure. Office phone is [phone]. PLEASE bring your medication bottles to each appointment. Please sign up for My Chart. Call the office immediately for any shortness of breath or chest pressure/discomfort. CALL [phone] FOR ANY QUESTIONS OR PROBLEMS.     Signature:___________________________________________________

## 2023-10-12 ENCOUNTER — TELEPHONE (OUTPATIENT)
Facility: HOSPITAL | Age: 78
End: 2023-10-12

## 2023-10-12 NOTE — TELEPHONE ENCOUNTER
Cardiac Surgery Discharge - Follow up call placed to Grace Davis. Reviewed plan of care after discharge and encouraged Grace Davis to verbalize. Discussed precautions and reviewed medications. Patient without questions regarding medications. Encouraged continued use of the incentive spirometer, incision care and walking. Confirmed follow up appts and reinforced importance of wearing red reminder bracelet. Grace Capones is without questions or concerns.  Celso Moss RN

## 2023-10-13 ENCOUNTER — TELEPHONE (OUTPATIENT)
Age: 78
End: 2023-10-13

## 2023-10-13 RX ORDER — AMIODARONE HYDROCHLORIDE 200 MG/1
200 TABLET ORAL DAILY
Qty: 30 TABLET | Refills: 1 | Status: CANCELLED | OUTPATIENT
Start: 2023-10-13

## 2023-10-13 RX ORDER — FUROSEMIDE 40 MG/1
40 TABLET ORAL DAILY
Qty: 7 TABLET | Refills: 0 | Status: SHIPPED | OUTPATIENT
Start: 2023-10-13 | End: 2023-10-20

## 2023-10-13 RX ORDER — POTASSIUM CHLORIDE 20 MEQ/1
20 TABLET, EXTENDED RELEASE ORAL 2 TIMES DAILY
Qty: 7 TABLET | Refills: 0 | Status: SHIPPED | OUTPATIENT
Start: 2023-10-13

## 2023-10-13 NOTE — TELEPHONE ENCOUNTER
Call received from Mony Washington who reports a 3 pound weight gain in 2 days from 145# on 10/11 ti 148# on 10/13. She also stated shortness of breath with minimal exertion. Symptoms described to Lidia Núñez NP who ordered Lasix 40 mg QD and Potassium 29 mg QD. Orders placed with [facility]. Mrs. Christopher Lomas informed of medications and instructions. She verbalized understanding.

## 2023-10-16 ENCOUNTER — OFFICE VISIT (OUTPATIENT)
Age: 78
End: 2023-10-16

## 2023-10-16 VITALS
HEART RATE: 61 BPM | TEMPERATURE: 98.3 F | OXYGEN SATURATION: 96 % | SYSTOLIC BLOOD PRESSURE: 210 MMHG | BODY MASS INDEX: 26.33 KG/M2 | WEIGHT: 134.8 LBS | DIASTOLIC BLOOD PRESSURE: 92 MMHG | RESPIRATION RATE: 18 BRPM

## 2023-10-16 DIAGNOSIS — Z86.79 S/P ABLATION OF ATRIAL FIBRILLATION: Primary | ICD-10-CM

## 2023-10-16 DIAGNOSIS — Z98.890 S/P ABLATION OF ATRIAL FIBRILLATION: Primary | ICD-10-CM

## 2023-10-16 PROCEDURE — 99024 POSTOP FOLLOW-UP VISIT: CPT | Performed by: PHYSICIAN ASSISTANT

## 2023-10-16 RX ORDER — AMLODIPINE BESYLATE 5 MG/1
5 TABLET ORAL DAILY
Qty: 30 TABLET | Refills: 1 | Status: SHIPPED | OUTPATIENT
Start: 2023-10-16 | End: 2023-11-15

## 2023-10-16 ASSESSMENT — PATIENT HEALTH QUESTIONNAIRE - PHQ9
2. FEELING DOWN, DEPRESSED OR HOPELESS: 1
SUM OF ALL RESPONSES TO PHQ QUESTIONS 1-9: 2
SUM OF ALL RESPONSES TO PHQ QUESTIONS 1-9: 2
SUM OF ALL RESPONSES TO PHQ9 QUESTIONS 1 & 2: 2
SUM OF ALL RESPONSES TO PHQ QUESTIONS 1-9: 2
SUM OF ALL RESPONSES TO PHQ QUESTIONS 1-9: 2
1. LITTLE INTEREST OR PLEASURE IN DOING THINGS: 1

## 2023-10-16 NOTE — PROGRESS NOTES
Patient: Dayan Ashley   Age: 77 y.o. Patient Care Team:  BUBBA Malin NP as PCP - General  BUBBA Malin NP as PCP - Empaneled Provider  Shayy Childress MD as Physician  Rosalinda Sims MD as Consulting Physician (Electrophysiology)  Laure Allen MD (Cardiothoracic Surgery)    Diagnosis: The encounter diagnosis was S/P ablation of atrial fibrillation. Problem List:   Patient Active Problem List   Diagnosis    Colitis    Allergic rhinitis    Osteopenia with high risk of fracture    Vitamin D deficiency    Pericardial effusion    Lichen planus    Essential hypertension    Primary osteoarthritis of both knees    Chronic back pain    Insomnia    Osteoarthritis    Primary osteoarthritis of right hand    Overactive bladder    Pitting edema    Other secondary kyphosis, thoracic region    Hypothyroidism    Chronic obstructive pulmonary disease, unspecified COPD type ([address])    Premature atrial contractions    Nonrheumatic aortic valve stenosis    Atrial fibrillation ([address])    A-fib ([facility])    S/P ablation operation for arrhythmia          Date of Surgery: 10/9/23     Surgery: TRANSPERICARDIAL HYBRID ABLATION WITH FLUORO, ATTEMPTED LEFT VIDEO ASSISTED THORACIC SURGERY (VATS), JENNIFER     HPI:  Pt had a TRANSPERICARDIAL HYBRID ABLATION WITH FLUORO, ATTEMPTED LEFT VIDEO ASSISTED THORACIC SURGERY (VATS), JENNIFER  on 10/9/23. Post-op course was unremarkable and she discharged home on POD2. Pt is here for post op follow up, seen with Dr. Shira Ortiz. Overall doing ok. She had a 3 lb weight gain and some shortness of breath last week for which she was prescribed lasix x 7 days. Her shortness of breath has improved and her weight is down 10 lbs. She has a hematoma from her right radial arterial line that extends up her forearm which is sore, but manageable. States she has had some dizziness and lightheadedness over the past week or so intermittently.   Noted her pulse ox showed her a

## 2023-10-17 ENCOUNTER — TELEPHONE (OUTPATIENT)
Age: 78
End: 2023-10-17

## 2023-10-17 NOTE — TELEPHONE ENCOUNTER
Please find out what her questions are. These are all blood pressure/cardiac medications. Per my list, the lisinopril has been stopped during her August hospital stay and is no longer on her med list. Amlodipine was also stopped back during her August hospital stay but restarted by the cardiac surgery PA yesterday because her BP was so high at Dr. Memo Nugent office.  Amiodarone 200 mg is still an active med on her list.

## 2023-10-17 NOTE — TELEPHONE ENCOUNTER
Tim Hernandez would like to discuss these medications with Maddi Gains. Amlodipine, Lisinopril, and Amiodarone.

## 2023-10-23 ENCOUNTER — HOSPITAL ENCOUNTER (EMERGENCY)
Facility: HOSPITAL | Age: 78
Discharge: HOME | End: 2023-10-23
Attending: EMERGENCY MEDICINE
Payer: MEDICARE

## 2023-10-23 VITALS
WEIGHT: 149.7 LBS | OXYGEN SATURATION: 97 % | DIASTOLIC BLOOD PRESSURE: 63 MMHG | HEART RATE: 54 BPM | TEMPERATURE: 98 F | RESPIRATION RATE: 15 BRPM | BODY MASS INDEX: 29.39 KG/M2 | SYSTOLIC BLOOD PRESSURE: 163 MMHG | HEIGHT: 60 IN

## 2023-10-23 DIAGNOSIS — L76.82 POSTOPERATIVE COMPLICATION OF SKIN INVOLVING DRAINAGE FROM SURGICAL WOUND: Primary | ICD-10-CM

## 2023-10-23 PROCEDURE — 99283 EMERGENCY DEPT VISIT LOW MDM: CPT

## 2023-10-23 PROCEDURE — 6370000000 HC RX 637 (ALT 250 FOR IP): Performed by: EMERGENCY MEDICINE

## 2023-10-23 RX ORDER — DOXYCYCLINE 100 MG/1
100 CAPSULE ORAL
Status: COMPLETED | OUTPATIENT
Start: 2023-10-23 | End: 2023-10-23

## 2023-10-23 RX ORDER — DOXYCYCLINE HYCLATE 100 MG
100 TABLET ORAL 2 TIMES DAILY
Qty: 14 TABLET | Refills: 0 | Status: SHIPPED | OUTPATIENT
Start: 2023-10-23 | End: 2023-10-30

## 2023-10-23 RX ORDER — CEPHALEXIN 250 MG/1
500 CAPSULE ORAL
Status: DISCONTINUED | OUTPATIENT
Start: 2023-10-23 | End: 2023-10-23

## 2023-10-23 RX ORDER — CEPHALEXIN 500 MG/1
500 CAPSULE ORAL 3 TIMES DAILY
Qty: 21 CAPSULE | Refills: 0 | Status: SHIPPED | OUTPATIENT
Start: 2023-10-23 | End: 2023-10-23 | Stop reason: ALTCHOICE

## 2023-10-23 RX ADMIN — DOXYCYCLINE 100 MG: 100 CAPSULE ORAL at 19:38

## 2023-10-23 ASSESSMENT — LIFESTYLE VARIABLES: HOW OFTEN DO YOU HAVE A DRINK CONTAINING ALCOHOL: NEVER

## 2023-10-23 ASSESSMENT — PAIN - FUNCTIONAL ASSESSMENT: PAIN_FUNCTIONAL_ASSESSMENT: NONE - DENIES PAIN

## 2023-10-23 NOTE — ED TRIAGE NOTES
Pt reports small amount of bleeding and yellow drainage from surigcal site to chest 2 weeks ago. Denies fever.

## 2023-10-23 NOTE — ED TRIAGE NOTES
States that she has seen here Cardiologist who instructed her that it was going to drain per pt. Pt has a \"double ablation\" per pt.

## 2023-10-23 NOTE — ED PROVIDER NOTES
[facility] EMERGENCY DEP  EMERGENCY DEPARTMENT ENCOUNTER       Pt Name: Neyda Turner  MRN: [id number]  [address] [place] 1945  Date of evaluation: 10/23/2023  Provider: Lisbeth Mitchell MD   PCP: BUBBA Voss NP  Note Started: 7:29 PM EDT 10/23/23     CHIEF COMPLAINT       Chief Complaint   Patient presents with    Wound Check        HISTORY OF PRESENT ILLNESS: 1 or more elements      History From: Patient  HPI Limitations: None     Neyda Turner is a 77 y.o. female with history of hypertension, MRSA cellulitis, hyperlipidemia, COPD, recurrent UTIs, paroxysmal A-fib on Eliquis and amiodarone, s/p recent hybrid ablation with attempted left VATS procedure who presents to ED with drainage from her subxiphoid chest wound. Patient reports that she was told that her wound might drain after the procedure. She states that it seemed to be subsiding, but over the last few days the drainage has been picking up and she saw some green material in the drainage. She is concerned about a possible infection. She states she has had to change the dressing on her wound every couple of hours for the last few days. She denies any fevers or chills. Denies any significant pain, but does state that the wound is sore. Denies any shortness of breath. Denies any palpitations or dizziness. Denies nausea, vomiting, diarrhea, abdominal pain. Patient reports she followed up with her cardiothoracic surgeon 1 week after procedure and there were no concerns for infection at that time. REVIEW OF SYSTEMS      Review of Systems     Positives and Pertinent negatives as per HPI. PAST HISTORY     Past Medical History:  Past Medical History:   Diagnosis Date    Arthritis     Atrial fibrillation ([address])     Cancer ([address])     Colitis     COPD (chronic obstructive pulmonary disease) (HCC)     Hx MRSA infection     right leg few years ago ? cellulitis    Hyperlipidemia     Hypertension     Osteoporosis     Raynauds disease

## 2023-11-06 ENCOUNTER — OFFICE VISIT (OUTPATIENT)
Age: 78
End: 2023-11-06
Payer: MEDICARE

## 2023-11-06 VITALS
HEART RATE: 58 BPM | BODY MASS INDEX: 27.48 KG/M2 | WEIGHT: 140 LBS | HEIGHT: 60 IN | RESPIRATION RATE: 16 BRPM | DIASTOLIC BLOOD PRESSURE: 60 MMHG | SYSTOLIC BLOOD PRESSURE: 146 MMHG | OXYGEN SATURATION: 97 % | TEMPERATURE: 97.7 F

## 2023-11-06 DIAGNOSIS — I48.0 PAF (PAROXYSMAL ATRIAL FIBRILLATION) (HCC): Primary | ICD-10-CM

## 2023-11-06 DIAGNOSIS — R00.1 BRADYCARDIA: ICD-10-CM

## 2023-11-06 DIAGNOSIS — I10 ESSENTIAL HYPERTENSION: ICD-10-CM

## 2023-11-06 DIAGNOSIS — I35.0 NONRHEUMATIC AORTIC VALVE STENOSIS: ICD-10-CM

## 2023-11-06 PROBLEM — I48.91 A-FIB (HCC): Status: RESOLVED | Noted: 2023-08-18 | Resolved: 2023-11-06

## 2023-11-06 PROCEDURE — G8427 DOCREV CUR MEDS BY ELIG CLIN: HCPCS | Performed by: NURSE PRACTITIONER

## 2023-11-06 PROCEDURE — G8399 PT W/DXA RESULTS DOCUMENT: HCPCS | Performed by: NURSE PRACTITIONER

## 2023-11-06 PROCEDURE — G8419 CALC BMI OUT NRM PARAM NOF/U: HCPCS | Performed by: NURSE PRACTITIONER

## 2023-11-06 PROCEDURE — 1123F ACP DISCUSS/DSCN MKR DOCD: CPT | Performed by: NURSE PRACTITIONER

## 2023-11-06 PROCEDURE — 99214 OFFICE O/P EST MOD 30 MIN: CPT | Performed by: NURSE PRACTITIONER

## 2023-11-06 PROCEDURE — 1111F DSCHRG MED/CURRENT MED MERGE: CPT | Performed by: NURSE PRACTITIONER

## 2023-11-06 PROCEDURE — 1090F PRES/ABSN URINE INCON ASSESS: CPT | Performed by: NURSE PRACTITIONER

## 2023-11-06 PROCEDURE — 1036F TOBACCO NON-USER: CPT | Performed by: NURSE PRACTITIONER

## 2023-11-06 PROCEDURE — 3077F SYST BP >= 140 MM HG: CPT | Performed by: NURSE PRACTITIONER

## 2023-11-06 PROCEDURE — G8484 FLU IMMUNIZE NO ADMIN: HCPCS | Performed by: NURSE PRACTITIONER

## 2023-11-06 PROCEDURE — 3078F DIAST BP <80 MM HG: CPT | Performed by: NURSE PRACTITIONER

## 2023-11-06 RX ORDER — AMLODIPINE BESYLATE 10 MG/1
10 TABLET ORAL DAILY
COMMUNITY
End: 2023-11-08

## 2023-11-06 RX ORDER — LISINOPRIL 40 MG/1
40 TABLET ORAL DAILY
Qty: 30 TABLET | Refills: 5
Start: 2023-11-06

## 2023-11-06 RX ORDER — FUROSEMIDE 20 MG/1
20 TABLET ORAL DAILY
Qty: 30 TABLET | Refills: 1 | Status: SHIPPED | OUTPATIENT
Start: 2023-11-06

## 2023-11-06 ASSESSMENT — PATIENT HEALTH QUESTIONNAIRE - PHQ9
SUM OF ALL RESPONSES TO PHQ QUESTIONS 1-9: 0
SUM OF ALL RESPONSES TO PHQ QUESTIONS 1-9: 0
SUM OF ALL RESPONSES TO PHQ9 QUESTIONS 1 & 2: 0
1. LITTLE INTEREST OR PLEASURE IN DOING THINGS: 0
SUM OF ALL RESPONSES TO PHQ QUESTIONS 1-9: 0
SUM OF ALL RESPONSES TO PHQ QUESTIONS 1-9: 0
2. FEELING DOWN, DEPRESSED OR HOPELESS: 0

## 2023-11-06 NOTE — PATIENT INSTRUCTIONS
We are going to do an EKG in the office and send it to Dr. Mark Alejandra.   I am going to put you on a low dose fluid pill. Take this in the morning. This will help your blood pressure and your fluid. If you start getting low blood pressures (less than 110 on the top), I am going to cut your amlodipine back down to 5 mg. This is the dose you used to take. Please message or call us if you get low readings.

## 2023-11-06 NOTE — PROGRESS NOTES
Chief Complaint   Patient presents with    Heart Problem     [address]       ASSESSMENT AND PLAN:      Diagnosis Orders   1. PAF (paroxysmal atrial fibrillation) ([facility])  EKG 12 Lead    EKG 12 Lead      2. Essential hypertension  EKG 12 Lead    EKG 12 Lead      3. Nonrheumatic aortic valve stenosis  EKG 12 Lead    EKG 12 Lead      4. Bradycardia          We are going to do an EKG in the office and send it to Dr. Marlys Dewitt.   I am going to put you on a low dose fluid pill. Take this in the morning. This will help your blood pressure and your fluid. If you start getting low blood pressures (less than 110 on the top), I am going to cut your amlodipine back down to 5 mg. This is the dose you used to take. Please message or call us if you get low readings. Patient aware of plan of care and verbalized understanding. Questions answered. RTC 1 month with labs, or sooner if needed. HPI:     is a 77 y.o. female in today for follow up. HTN: On lisinopril, amlodipine. Cardiology: Atrial fib with RVR; echo with EF 50-60%. See below. COPD: Sees pulmonary yearly, Dr. Uriel Pena. Budesonide changed to Flovent, remains on Anoro. Stable, intermittent cough but tolerable. Oral lichen planus/candidiasis: Followed by Dr. Deuce Pacheco in [facility]. Also with hx of SCC, BCC. Osteopenia: Failed Boniva, remains on Prolia. Next DEXA August 2024. New issues: s/p ablation on 10/9/23, attempted L VATS LAAL with worsening dizziness, lightheadedness. EKG with cardiology with junctional rhythm, rate 47. Metoprolol stopped, amiodarone decreased, and amlodipine increased due to hypertension. She needs a repeat EKG today. Cardiology also put her on a 1 week monitor, may need a pacemaker. She has an appt on 11/28 to see Dr. Marlys Dewitt. She reports increased lower extremity edema, likely due to fluid overload and amlodipine increase.         Allergies   Allergen Reactions    Latex Rash    Adhesive

## 2023-11-08 RX ORDER — AMLODIPINE BESYLATE 10 MG/1
10 TABLET ORAL DAILY
Qty: 90 TABLET | Refills: 3 | Status: SHIPPED | OUTPATIENT
Start: 2023-11-08

## 2023-11-09 ENCOUNTER — APPOINTMENT (OUTPATIENT)
Facility: HOSPITAL | Age: 78
End: 2023-11-09
Payer: MEDICARE

## 2023-11-09 ENCOUNTER — HOSPITAL ENCOUNTER (EMERGENCY)
Facility: HOSPITAL | Age: 78
Discharge: HOME | End: 2023-11-09
Attending: EMERGENCY MEDICINE
Payer: MEDICARE

## 2023-11-09 VITALS
OXYGEN SATURATION: 97 % | SYSTOLIC BLOOD PRESSURE: 149 MMHG | DIASTOLIC BLOOD PRESSURE: 53 MMHG | BODY MASS INDEX: 25.03 KG/M2 | RESPIRATION RATE: 16 BRPM | HEART RATE: 70 BPM | WEIGHT: 136 LBS | TEMPERATURE: 98.4 F | HEIGHT: 62 IN

## 2023-11-09 DIAGNOSIS — I83.811 VARICOSE VEINS OF RIGHT LOWER EXTREMITY WITH PAIN: Primary | ICD-10-CM

## 2023-11-09 LAB — ECHO BSA: 1.64 M2

## 2023-11-09 PROCEDURE — 99284 EMERGENCY DEPT VISIT MOD MDM: CPT

## 2023-11-09 PROCEDURE — 93971 EXTREMITY STUDY: CPT

## 2023-11-09 ASSESSMENT — PAIN - FUNCTIONAL ASSESSMENT: PAIN_FUNCTIONAL_ASSESSMENT: 0-10

## 2023-11-09 ASSESSMENT — PAIN DESCRIPTION - ORIENTATION: ORIENTATION: RIGHT

## 2023-11-09 ASSESSMENT — LIFESTYLE VARIABLES
HOW OFTEN DO YOU HAVE A DRINK CONTAINING ALCOHOL: NEVER
HOW MANY STANDARD DRINKS CONTAINING ALCOHOL DO YOU HAVE ON A TYPICAL DAY: PATIENT DOES NOT DRINK

## 2023-11-09 ASSESSMENT — PAIN SCALES - GENERAL
PAINLEVEL_OUTOF10: 0
PAINLEVEL_OUTOF10: 2

## 2023-11-09 ASSESSMENT — PAIN DESCRIPTION - LOCATION: LOCATION: LEG

## 2023-11-09 ASSESSMENT — PAIN DESCRIPTION - PAIN TYPE: TYPE: ACUTE PAIN

## 2023-11-09 NOTE — DISCHARGE INSTRUCTIONS
Keep taking your blood thinning medication. You can use warm compresses such as a heating pad or a warm washcloth on the area of the varicose vein that is causing you discomfort. Come back to the emergency department immediately if you notice the area is red or if you have increased pain and swelling. Follow-up with your primary care doctor as needed.

## 2023-11-09 NOTE — ED NOTES
D/C instructions provided and reviewed with patient. Opportunity provided for discussion. All questions answered nothing further at this time.        Michelle Hernandez RN  11/09/23 [id number]

## 2023-11-28 ENCOUNTER — TELEPHONE (OUTPATIENT)
Age: 78
End: 2023-11-28

## 2023-11-28 RX ORDER — CEFUROXIME AXETIL 500 MG/1
500 TABLET ORAL 2 TIMES DAILY
Qty: 20 TABLET | Refills: 0 | Status: SHIPPED | OUTPATIENT
Start: 2023-11-28 | End: 2023-12-08

## 2023-11-28 NOTE — TELEPHONE ENCOUNTER
Tsering Mercedes is having a pace maker put in about 2 wks from now. She is having sinus pressure and chest congestion. The heart dr wants her on something to clear it up before surgery.  Tried offering appt but wanted to see if Micaela Francisco would call in something for her first.      [facility] [address], [address] [facility] [phone] - F [phone]  [address]  [address]  Phone: [phone] Fax: [phone]

## 2023-11-28 NOTE — TELEPHONE ENCOUNTER
Antibiotic sent to [facility]. If no improvement, she should come in and let me check her out on Friday.

## 2023-11-29 ENCOUNTER — TELEPHONE (OUTPATIENT)
Age: 78
End: 2023-11-29

## 2023-11-29 NOTE — TELEPHONE ENCOUNTER
Medication Refill Request    Chin Toshia is requesting a refill of the following medication(s):   furosemide (LASIX) 20 MG tablet  #90    Please send refill to:     [facility]pharmacy [address] Monie Macias, [address] [place] [address] [phone] - F [phone]  [address] [address]  Phone: [phone] Fax: [phone]

## 2023-12-04 RX ORDER — FUROSEMIDE 20 MG/1
20 TABLET ORAL DAILY
Qty: 90 TABLET | Refills: 1 | Status: SHIPPED | OUTPATIENT
Start: 2023-12-04

## 2023-12-12 ENCOUNTER — HOSPITAL ENCOUNTER (OUTPATIENT)
Facility: HOSPITAL | Age: 78
Discharge: HOME | End: 2023-12-15
Payer: MEDICARE

## 2023-12-12 ENCOUNTER — TRANSCRIBE ORDERS (OUTPATIENT)
Facility: HOSPITAL | Age: 78
End: 2023-12-12

## 2023-12-12 DIAGNOSIS — I49.5 SINOATRIAL NODE DYSFUNCTION (HCC): Primary | ICD-10-CM

## 2023-12-12 DIAGNOSIS — I49.5 SINOATRIAL NODE DYSFUNCTION (HCC): ICD-10-CM

## 2023-12-12 PROCEDURE — 71046 X-RAY EXAM CHEST 2 VIEWS: CPT

## 2023-12-19 PROBLEM — I49.5 SSS (SICK SINUS SYNDROME) (HCC): Status: ACTIVE | Noted: 2023-12-19

## 2023-12-25 ENCOUNTER — APPOINTMENT (OUTPATIENT)
Facility: HOSPITAL | Age: 78
End: 2023-12-25
Payer: MEDICARE

## 2023-12-25 ENCOUNTER — HOSPITAL ENCOUNTER (EMERGENCY)
Facility: HOSPITAL | Age: 78
Discharge: HOME | End: 2023-12-25
Attending: FAMILY MEDICINE | Admitting: FAMILY MEDICINE
Payer: MEDICARE

## 2023-12-25 VITALS
RESPIRATION RATE: 24 BRPM | TEMPERATURE: 97.8 F | HEART RATE: 87 BPM | WEIGHT: 148 LBS | BODY MASS INDEX: 29.06 KG/M2 | HEIGHT: 60 IN | OXYGEN SATURATION: 98 % | SYSTOLIC BLOOD PRESSURE: 114 MMHG | DIASTOLIC BLOOD PRESSURE: 50 MMHG

## 2023-12-25 DIAGNOSIS — R00.0 TACHYCARDIA: Primary | ICD-10-CM

## 2023-12-25 LAB
ANION GAP SERPL CALC-SCNC: 10 MMOL/L (ref 5–15)
BUN SERPL-MCNC: 19 MG/DL (ref 6–20)
BUN/CREAT SERPL: 18 (ref 12–20)
CALCIUM SERPL-MCNC: 9.3 MG/DL (ref 8.5–10.1)
CHLORIDE SERPL-SCNC: 96 MMOL/L (ref 97–108)
CO2 SERPL-SCNC: 30 MMOL/L (ref 21–32)
CREAT SERPL-MCNC: 1.05 MG/DL (ref 0.55–1.02)
GLUCOSE SERPL-MCNC: 90 MG/DL (ref 65–100)
POTASSIUM SERPL-SCNC: 3.8 MMOL/L (ref 3.5–5.1)
SODIUM SERPL-SCNC: 136 MMOL/L (ref 136–145)
TROPONIN I SERPL HS-MCNC: 23 NG/L (ref 0–51)
TROPONIN I SERPL HS-MCNC: 26 NG/L (ref 0–51)

## 2023-12-25 PROCEDURE — 84484 ASSAY OF TROPONIN QUANT: CPT

## 2023-12-25 PROCEDURE — 71046 X-RAY EXAM CHEST 2 VIEWS: CPT

## 2023-12-25 PROCEDURE — 80048 BASIC METABOLIC PNL TOTAL CA: CPT

## 2023-12-25 PROCEDURE — 36415 COLL VENOUS BLD VENIPUNCTURE: CPT

## 2023-12-25 PROCEDURE — 99285 EMERGENCY DEPT VISIT HI MDM: CPT

## 2023-12-25 PROCEDURE — 93005 ELECTROCARDIOGRAM TRACING: CPT | Performed by: FAMILY MEDICINE

## 2023-12-25 NOTE — ED TRIAGE NOTES
Pt arrived with c/o tachycardia after a pacemaker placement on the 19th, raised her arm on Friday and states her heart  rate has been  high and she seems to be on afib since then

## 2023-12-26 NOTE — ED NOTES
Bedside shift change report given to kailey RN (oncoming nurse) by me (offgoing nurse). Report included the following information Nurse Handoff Report, ED Encounter Summary, and ED SBAR.

## 2023-12-26 NOTE — ED PROVIDER NOTES
[address] [place] [address] & [address] ENCOUNTER       Pt Name: Jillian Oglesby  MRN: [id number]  [address] [facility] 1945  Date of evaluation: 12/25/2023  Provider: Bel Ríos MD   PCP: BUBBA Coreas NP  Note Started: 8:29 PM EST 12/25/23     CHIEF COMPLAINT       Chief Complaint   Patient presents with    Tachycardia        HISTORY OF PRESENT ILLNESS: 1 or more elements      History From: Patient  HPI Limitations: None     Jillian Oglesby is a 77 y.o. female who presents to the ED because of chest pain and tachycardia for the last 3 days. She reports that she had a pacemaker placed 12/19, and during the next several days, she had a heart rate in the 60's. Three days ago she reached up to get something off the top of the refrigerator, and at that point she felt a pulling sensation in her chest, and she noticed that her heart rate increased to 87 or so. It has been fairly consistent at 85-90, and the chest pain has been a constant ache about the device, radiating into her left axilla. Nursing Notes were all reviewed and agreed with or any disagreements were addressed in the HPI. REVIEW OF SYSTEMS      Review of Systems     Positives and Pertinent negatives as per HPI. PAST HISTORY     Past Medical History:  Past Medical History:   Diagnosis Date    Arthritis     Atrial fibrillation ([address])     Cancer ([address])     Colitis     COPD (chronic obstructive pulmonary disease) (HCC)     Hx MRSA infection     right leg few years ago ? cellulitis    Hyperlipidemia     Hypertension     Osteoporosis     Raynauds disease          Past Surgical History:  Past Surgical History:   Procedure Laterality Date    APPENDECTOMY      CARDIAC ELECTROPHYSIOLOGY MAPPING AND ABLATION      CARDIAC SURGERY Left 10/09/2023    TRANSPERICARDIAL HYBRID ABLATION WITH FLUORO, LEFT VIDEO ASSISTED THORACIC SURGERY (VATS), JENNIFER PERFORMED BY DR. Lon White.  performed by Caleb Delarosa MD at [address] [place] [address]

## 2023-12-27 LAB
EKG ATRIAL RATE: 87 BPM
EKG DIAGNOSIS: NORMAL
EKG P AXIS: 37 DEGREES
EKG P-R INTERVAL: 128 MS
EKG Q-T INTERVAL: 370 MS
EKG QRS DURATION: 102 MS
EKG QTC CALCULATION (BAZETT): 445 MS
EKG R AXIS: 3 DEGREES
EKG T AXIS: 73 DEGREES
EKG VENTRICULAR RATE: 87 BPM

## 2023-12-28 RX ORDER — AMLODIPINE BESYLATE 5 MG/1
5 TABLET ORAL DAILY
Qty: 30 TABLET | Refills: 1 | OUTPATIENT
Start: 2023-12-28

## 2024-01-02 RX ORDER — LEVOTHYROXINE SODIUM 0.1 MG/1
TABLET ORAL
Qty: 45 TABLET | Refills: 3 | Status: SHIPPED | OUTPATIENT
Start: 2024-01-02

## 2024-01-18 RX ORDER — LISINOPRIL 40 MG/1
40 TABLET ORAL DAILY
Qty: 90 TABLET | Refills: 3 | Status: SHIPPED | OUTPATIENT
Start: 2024-01-18

## 2024-02-13 ENCOUNTER — OFFICE VISIT (OUTPATIENT)
Age: 79
End: 2024-02-13
Payer: MEDICARE

## 2024-02-13 VITALS
BODY MASS INDEX: 25.52 KG/M2 | TEMPERATURE: 97.6 F | HEIGHT: 60 IN | HEART RATE: 55 BPM | SYSTOLIC BLOOD PRESSURE: 120 MMHG | WEIGHT: 130 LBS | RESPIRATION RATE: 20 BRPM | DIASTOLIC BLOOD PRESSURE: 78 MMHG | OXYGEN SATURATION: 98 %

## 2024-02-13 DIAGNOSIS — Z95.0 S/P PLACEMENT OF CARDIAC PACEMAKER: ICD-10-CM

## 2024-02-13 DIAGNOSIS — I48.21 PERMANENT ATRIAL FIBRILLATION (HCC): ICD-10-CM

## 2024-02-13 DIAGNOSIS — R41.3 MEMORY CHANGES: ICD-10-CM

## 2024-02-13 DIAGNOSIS — M81.0 AGE-RELATED OSTEOPOROSIS WITHOUT CURRENT PATHOLOGICAL FRACTURE: Primary | ICD-10-CM

## 2024-02-13 DIAGNOSIS — I49.5 SSS (SICK SINUS SYNDROME) (HCC): ICD-10-CM

## 2024-02-13 DIAGNOSIS — J44.9 CHRONIC OBSTRUCTIVE PULMONARY DISEASE, UNSPECIFIED COPD TYPE (HCC): ICD-10-CM

## 2024-02-13 PROCEDURE — 1090F PRES/ABSN URINE INCON ASSESS: CPT | Performed by: NURSE PRACTITIONER

## 2024-02-13 PROCEDURE — 3023F SPIROM DOC REV: CPT | Performed by: NURSE PRACTITIONER

## 2024-02-13 PROCEDURE — G8484 FLU IMMUNIZE NO ADMIN: HCPCS | Performed by: NURSE PRACTITIONER

## 2024-02-13 PROCEDURE — 1036F TOBACCO NON-USER: CPT | Performed by: NURSE PRACTITIONER

## 2024-02-13 PROCEDURE — 3074F SYST BP LT 130 MM HG: CPT | Performed by: NURSE PRACTITIONER

## 2024-02-13 PROCEDURE — 99214 OFFICE O/P EST MOD 30 MIN: CPT | Performed by: NURSE PRACTITIONER

## 2024-02-13 PROCEDURE — G8419 CALC BMI OUT NRM PARAM NOF/U: HCPCS | Performed by: NURSE PRACTITIONER

## 2024-02-13 PROCEDURE — 3078F DIAST BP <80 MM HG: CPT | Performed by: NURSE PRACTITIONER

## 2024-02-13 PROCEDURE — G8427 DOCREV CUR MEDS BY ELIG CLIN: HCPCS | Performed by: NURSE PRACTITIONER

## 2024-02-13 PROCEDURE — G8399 PT W/DXA RESULTS DOCUMENT: HCPCS | Performed by: NURSE PRACTITIONER

## 2024-02-13 PROCEDURE — 1123F ACP DISCUSS/DSCN MKR DOCD: CPT | Performed by: NURSE PRACTITIONER

## 2024-02-13 RX ORDER — AMLODIPINE BESYLATE 5 MG/1
5 TABLET ORAL DAILY
Qty: 90 TABLET | Refills: 3 | Status: SHIPPED | OUTPATIENT
Start: 2024-02-13

## 2024-02-13 NOTE — PROGRESS NOTES
Chief Complaint   Patient presents with    Follow-up       ASSESSMENT AND PLAN:      Diagnosis Orders   1. Age-related osteoporosis without current pathological fracture        2. Chronic obstructive pulmonary disease, unspecified COPD type ([facility])        3. SSS (sick sinus syndrome) ([facility])        4. S/P placement of cardiac pacemaker        5. Permanent atrial fibrillation ([facility])        6. Memory changes  External Referral To Neurology          I recommended we complete four Prolia cycles and then repeat the DEXA before making any changes. She is tolerating Prolia without difficulty. Discussed greater effectiveness than bisphosphonate therapy. She agrees. MOCA 27/30. Reassurance provided. With the level of distress the memory changes are causing her, I do want her to be evaluated by neurology. Referral placed to [facility]. She does indeed remain in atrial fibrillation. I've requested office notes from Dr. Boykin and look forward to his suggestions after her upcoming visit.    Patient aware of plan of care and verbalized understanding. Questions answered. RTC 3-4 months, or sooner if needed.    HPI:     is a 78 y.o. female in today for follow up.    HTN: On lisinopril, amlodipine.     Cardiology: Hx afib s/p cardioversion, ablation in 2023. Pacemker inserted 12/19/23 for bradycardia. EP is Dr. Boykin.      COPD: Sees pulmonary yearly, Dr. Strong. Budesonide changed to Flovent, remains on Anoro. Stable, intermittent cough but tolerable.     Oral lichen planus/candidiasis: Followed by Dr. Diaz in [place]. Also with hx of SCC, BCC.      Osteopenia: Failed Boniva, remains on Prolia. Next DEXA August 2024.     New issues: In today to discuss Prolia. She worries her osteoporosis is worsening. She is scheduled for her next infusion in 1 month.    She reports her heart is in 98% atrial fibrillation. She went to Dr. Boykin's yesterday to have her device checked. She is discouraged by this; was under the impression

## 2024-02-14 ENCOUNTER — TELEPHONE (OUTPATIENT)
Age: 79
End: 2024-02-14

## 2024-02-14 NOTE — TELEPHONE ENCOUNTER
----- Message from BUBBA Muñoz - NP sent at 2/13/2024  3:41 PM EST -----  Please get office notes from Dr. Boykin. Thanks.

## 2024-02-23 ENCOUNTER — HOSPITAL ENCOUNTER (OUTPATIENT)
Facility: HOSPITAL | Age: 79
Discharge: HOME | End: 2024-02-26

## 2024-02-28 ENCOUNTER — HOSPITAL ENCOUNTER (OUTPATIENT)
Facility: HOSPITAL | Age: 79
Discharge: HOME | End: 2024-02-28
Attending: INTERNAL MEDICINE | Admitting: INTERNAL MEDICINE
Payer: MEDICARE

## 2024-02-28 ENCOUNTER — HOSPITAL ENCOUNTER (OUTPATIENT)
Facility: HOSPITAL | Age: 79
Discharge: HOME | End: 2024-03-01
Attending: INTERNAL MEDICINE
Payer: MEDICARE

## 2024-02-28 ENCOUNTER — ANESTHESIA EVENT (OUTPATIENT)
Facility: HOSPITAL | Age: 79
End: 2024-02-28
Payer: MEDICARE

## 2024-02-28 ENCOUNTER — ANESTHESIA (OUTPATIENT)
Facility: HOSPITAL | Age: 79
End: 2024-02-28
Payer: MEDICARE

## 2024-02-28 VITALS
BODY MASS INDEX: 25.91 KG/M2 | WEIGHT: 132 LBS | RESPIRATION RATE: 16 BRPM | TEMPERATURE: 98.1 F | HEART RATE: 75 BPM | OXYGEN SATURATION: 95 % | HEIGHT: 60 IN | SYSTOLIC BLOOD PRESSURE: 120 MMHG | DIASTOLIC BLOOD PRESSURE: 46 MMHG

## 2024-02-28 DIAGNOSIS — I48.92 ATRIAL FLUTTER (HCC): ICD-10-CM

## 2024-02-28 DIAGNOSIS — I48.91 A-FIB (HCC): ICD-10-CM

## 2024-02-28 DIAGNOSIS — I48.4 ATYPICAL ATRIAL FLUTTER (HCC): Primary | ICD-10-CM

## 2024-02-28 LAB
ECHO BSA: 1.59 M2
ECHO BSA: 1.59 M2

## 2024-02-28 PROCEDURE — 3700000001 HC ADD 15 MINUTES (ANESTHESIA): Performed by: INTERNAL MEDICINE

## 2024-02-28 PROCEDURE — 93325 DOPPLER ECHO COLOR FLOW MAPG: CPT

## 2024-02-28 PROCEDURE — 2709999900 HC NON-CHARGEABLE SUPPLY: Performed by: INTERNAL MEDICINE

## 2024-02-28 PROCEDURE — C1731 CATH, EP, 20 OR MORE ELEC: HCPCS | Performed by: INTERNAL MEDICINE

## 2024-02-28 PROCEDURE — 2500000003 HC RX 250 WO HCPCS: Performed by: NURSE ANESTHETIST, CERTIFIED REGISTERED

## 2024-02-28 PROCEDURE — C1759 CATH, INTRA ECHOCARDIOGRAPHY: HCPCS | Performed by: INTERNAL MEDICINE

## 2024-02-28 PROCEDURE — 2720000010 HC SURG SUPPLY STERILE: Performed by: INTERNAL MEDICINE

## 2024-02-28 PROCEDURE — C1732 CATH, EP, DIAG/ABL, 3D/VECT: HCPCS | Performed by: INTERNAL MEDICINE

## 2024-02-28 PROCEDURE — 3700000000 HC ANESTHESIA ATTENDED CARE: Performed by: INTERNAL MEDICINE

## 2024-02-28 PROCEDURE — C1894 INTRO/SHEATH, NON-LASER: HCPCS | Performed by: INTERNAL MEDICINE

## 2024-02-28 PROCEDURE — 2580000003 HC RX 258: Performed by: INTERNAL MEDICINE

## 2024-02-28 PROCEDURE — 7100000000 HC PACU RECOVERY - FIRST 15 MIN: Performed by: INTERNAL MEDICINE

## 2024-02-28 PROCEDURE — C1766 INTRO/SHEATH,STRBLE,NON-PEEL: HCPCS | Performed by: INTERNAL MEDICINE

## 2024-02-28 PROCEDURE — 2580000003 HC RX 258: Performed by: NURSE ANESTHETIST, CERTIFIED REGISTERED

## 2024-02-28 PROCEDURE — 93656 COMPRE EP EVAL ABLTJ ATR FIB: CPT | Performed by: INTERNAL MEDICINE

## 2024-02-28 PROCEDURE — 6360000002 HC RX W HCPCS: Performed by: NURSE ANESTHETIST, CERTIFIED REGISTERED

## 2024-02-28 PROCEDURE — 2500000003 HC RX 250 WO HCPCS: Performed by: INTERNAL MEDICINE

## 2024-02-28 PROCEDURE — 7100000001 HC PACU RECOVERY - ADDTL 15 MIN: Performed by: INTERNAL MEDICINE

## 2024-02-28 RX ORDER — SODIUM CHLORIDE 0.9 % (FLUSH) 0.9 %
5-40 SYRINGE (ML) INJECTION PRN
Status: DISCONTINUED | OUTPATIENT
Start: 2024-02-28 | End: 2024-02-28 | Stop reason: HOSPADM

## 2024-02-28 RX ORDER — HEPARIN SODIUM 1000 [USP'U]/ML
INJECTION, SOLUTION INTRAVENOUS; SUBCUTANEOUS PRN
Status: DISCONTINUED | OUTPATIENT
Start: 2024-02-28 | End: 2024-02-28 | Stop reason: SDUPTHER

## 2024-02-28 RX ORDER — ROCURONIUM BROMIDE 10 MG/ML
INJECTION, SOLUTION INTRAVENOUS PRN
Status: DISCONTINUED | OUTPATIENT
Start: 2024-02-28 | End: 2024-02-28 | Stop reason: SDUPTHER

## 2024-02-28 RX ORDER — PROTAMINE SULFATE 10 MG/ML
INJECTION, SOLUTION INTRAVENOUS PRN
Status: DISCONTINUED | OUTPATIENT
Start: 2024-02-28 | End: 2024-02-28 | Stop reason: SDUPTHER

## 2024-02-28 RX ORDER — CETIRIZINE HYDROCHLORIDE 5 MG/1
5 TABLET ORAL DAILY
COMMUNITY

## 2024-02-28 RX ORDER — ONDANSETRON 2 MG/ML
INJECTION INTRAMUSCULAR; INTRAVENOUS PRN
Status: DISCONTINUED | OUTPATIENT
Start: 2024-02-28 | End: 2024-02-28 | Stop reason: SDUPTHER

## 2024-02-28 RX ORDER — FENTANYL CITRATE 50 UG/ML
INJECTION, SOLUTION INTRAMUSCULAR; INTRAVENOUS PRN
Status: DISCONTINUED | OUTPATIENT
Start: 2024-02-28 | End: 2024-02-28 | Stop reason: SDUPTHER

## 2024-02-28 RX ORDER — ACETAMINOPHEN 325 MG/1
650 TABLET ORAL EVERY 4 HOURS PRN
Status: DISCONTINUED | OUTPATIENT
Start: 2024-02-28 | End: 2024-02-28 | Stop reason: HOSPADM

## 2024-02-28 RX ORDER — LIDOCAINE HYDROCHLORIDE 20 MG/ML
INJECTION, SOLUTION EPIDURAL; INFILTRATION; INTRACAUDAL; PERINEURAL PRN
Status: DISCONTINUED | OUTPATIENT
Start: 2024-02-28 | End: 2024-02-28 | Stop reason: SDUPTHER

## 2024-02-28 RX ORDER — SODIUM CHLORIDE 0.9 % (FLUSH) 0.9 %
5-40 SYRINGE (ML) INJECTION EVERY 12 HOURS SCHEDULED
Status: DISCONTINUED | OUTPATIENT
Start: 2024-02-28 | End: 2024-02-28 | Stop reason: HOSPADM

## 2024-02-28 RX ORDER — SODIUM CHLORIDE 9 MG/ML
INJECTION, SOLUTION INTRAVENOUS PRN
Status: DISCONTINUED | OUTPATIENT
Start: 2024-02-28 | End: 2024-02-28 | Stop reason: HOSPADM

## 2024-02-28 RX ORDER — SUCCINYLCHOLINE CHLORIDE 20 MG/ML
INJECTION INTRAMUSCULAR; INTRAVENOUS PRN
Status: DISCONTINUED | OUTPATIENT
Start: 2024-02-28 | End: 2024-02-28 | Stop reason: SDUPTHER

## 2024-02-28 RX ORDER — SODIUM CHLORIDE 9 MG/ML
INJECTION, SOLUTION INTRAVENOUS CONTINUOUS PRN
Status: DISCONTINUED | OUTPATIENT
Start: 2024-02-28 | End: 2024-02-28 | Stop reason: SDUPTHER

## 2024-02-28 RX ADMIN — PROPOFOL 100 MG: 10 INJECTION, EMULSION INTRAVENOUS at 10:33

## 2024-02-28 RX ADMIN — SODIUM CHLORIDE: 9 INJECTION, SOLUTION INTRAVENOUS at 10:24

## 2024-02-28 RX ADMIN — SUCCINYLCHOLINE CHLORIDE 100 MG: 20 INJECTION, SOLUTION INTRAMUSCULAR; INTRAVENOUS at 10:33

## 2024-02-28 RX ADMIN — HEPARIN SODIUM 12000 UNITS: 1000 INJECTION, SOLUTION INTRAVENOUS; SUBCUTANEOUS at 11:03

## 2024-02-28 RX ADMIN — FENTANYL CITRATE 50 MCG: 50 INJECTION, SOLUTION INTRAMUSCULAR; INTRAVENOUS at 10:52

## 2024-02-28 RX ADMIN — PHENYLEPHRINE HYDROCHLORIDE 20 MCG/MIN: 10 INJECTION INTRAVENOUS at 10:44

## 2024-02-28 RX ADMIN — ONDANSETRON HYDROCHLORIDE 4 MG: 2 INJECTION, SOLUTION INTRAMUSCULAR; INTRAVENOUS at 10:52

## 2024-02-28 RX ADMIN — HEPARIN SODIUM 2000 UNITS: 1000 INJECTION, SOLUTION INTRAVENOUS; SUBCUTANEOUS at 11:23

## 2024-02-28 RX ADMIN — PROTAMINE SULFATE 100 MG: 10 INJECTION, SOLUTION INTRAVENOUS at 11:40

## 2024-02-28 RX ADMIN — FENTANYL CITRATE 50 MCG: 50 INJECTION, SOLUTION INTRAMUSCULAR; INTRAVENOUS at 10:58

## 2024-02-28 RX ADMIN — FENTANYL CITRATE 50 MCG: 50 INJECTION, SOLUTION INTRAMUSCULAR; INTRAVENOUS at 10:33

## 2024-02-28 RX ADMIN — FENTANYL CITRATE 50 MCG: 50 INJECTION, SOLUTION INTRAMUSCULAR; INTRAVENOUS at 10:48

## 2024-02-28 RX ADMIN — ROCURONIUM BROMIDE 5 MG: 10 INJECTION INTRAVENOUS at 10:33

## 2024-02-28 RX ADMIN — LIDOCAINE HYDROCHLORIDE 20 MG: 20 INJECTION, SOLUTION EPIDURAL; INFILTRATION; INTRACAUDAL; PERINEURAL at 10:33

## 2024-02-28 NOTE — PROGRESS NOTES
Dual Skin preformed with ALFREDO Castillo    Cardiac Cath Lab Recovery Arrival Note:      Florecita Raya arrived to Cardiac Cath Lab, Recovery Area. Staff introduced to patient. Patient identifiers verified with NAME and DATE OF BIRTH. Procedure verified with patient. Consent forms reviewed and signed by patient or authorized representative and verified. Allergies verified.     Patient and family oriented to department. Patient and family informed of procedure and plan of care.     Questions answered with review. Patient prepped for procedure, per orders from physician, prior to arrival.    Patient on cardiac monitor, non-invasive blood pressure, SPO2 monitor. On RA. Patient is A&Ox 4. Patient reports no pain.     Patient in stretcher, in low position, with side rails up, call bell within reach, patient instructed to call if assistance as needed.    Patient prep in: [facility], [place] [address].     Family in: Holly, daughter .   Prep by: Jackie

## 2024-02-28 NOTE — ANESTHESIA POSTPROCEDURE EVALUATION
Department of Anesthesiology  Postprocedure Note    Patient: Florecita Raya  MRN: [id number]  YOB: 1945  Date of evaluation: 2/28/2024    Procedure Summary       Date: 02/28/24 Room / Location: \[facility]"" EP LAB / \[facility]\"" CARDIAC CATH LAB    Anesthesia Start: 1024 Anesthesia Stop: 1215    Procedure: Ablation A-fib w complete ep study Diagnosis: Atypical atrial flutter (HCC)    Providers: Emmanuel Boykin MD Responsible Provider: Kerri Spencer DO    Anesthesia Type: General ASA Status: 3            Anesthesia Type: General    Surya Phase I: Surya Score: 8    Surya Phase II:      Anesthesia Post Evaluation    Patient location during evaluation: PACU  Patient participation: complete - patient participated  Level of consciousness: awake and alert  Pain score: 0  Airway patency: patent  Nausea & Vomiting: no nausea and no vomiting  Cardiovascular status: hemodynamically stable  Respiratory status: acceptable  Hydration status: euvolemic  Pain management: adequate    There were no known notable events for this encounter.

## 2024-02-28 NOTE — ANESTHESIA PRE PROCEDURE
Diagnosis Date    Arthritis     Atrial fibrillation (HCC)     Cancer (HCC)     Colitis     COPD (chronic obstructive pulmonary disease) (HCC)     Hx MRSA infection     right leg few years ago ?cellulitis    Hyperlipidemia     Hypertension     Osteoporosis     Raynauds disease        Past Surgical History:        Procedure Laterality Date    APPENDECTOMY      CARDIAC ELECTROPHYSIOLOGY MAPPING AND ABLATION      CARDIAC SURGERY Left 10/09/2023    TRANSPERICARDIAL HYBRID ABLATION WITH FLUORO, LEFT VIDEO ASSISTED THORACIC SURGERY (VATS), JENNIFER PERFORMED BY DR. SAPP. performed by Kelvin Suarez MD at [facility]    CARDIOVERSION       SECTION      times 1    EP DEVICE PROCEDURE N/A 10/09/2023    Ablation A-fib w complete ep study performed by Emmanuel Boykin MD at [facility] CARDIAC CATH LAB    EP DEVICE PROCEDURE N/A 2023    Insert PPM dual performed by Emmanuel Boykin MD at [facility] CARDIAC CATH LAB    EYE SURGERY Bilateral     cataract extraction    HYSTERECTOMY (CERVIX STATUS UNKNOWN)  30 yesrs ago    JOINT REPLACEMENT Bilateral     knees    TONSILLECTOMY      VENOUS CLOT SURGERY      leg       Social History:    Social History     Tobacco Use    Smoking status: Former     Current packs/day: 1.50     Average packs/day: 1.5 packs/day for 10.0 years (15.0 ttl pk-yrs)     Types: Cigarettes     Passive exposure: Past    Smokeless tobacco: Never    Tobacco comments:     Cold turkey 1978   Substance Use Topics    Alcohol use: Not Currently     Comment: maybe 1 drink per week                                Counseling given: Not Answered  Tobacco comments: Cold turkey 1978      Vital Signs (Current):   Vitals:    24 0730 24 0742   BP:  (!) 149/60   Pulse:  89   Resp:  25   Temp:  97.4 °F (36.3 °C)   TempSrc:  Oral   SpO2:  98%   Weight: 59.9 kg (132 lb)    Height: 1.524 m (5')                                               BP Readings from Last 3 Encounters:   24 (!) 149/60

## 2024-02-28 NOTE — PROGRESS NOTES
TRANSFER - IN REPORT:    Verbal report received from ekta on Florecita Raya  being received from PACU for routine care. Report consisted of patient’s Situation, Background, Assessment and Recommendations(SBAR). Information from the following report(s) procedure log was reviewed with the receiving clinician. Opportunity for questions and clarification was provided. Assessment completed upon patient’s arrival to Cardiac Cath Lab RECOVERY AREA and care assumed.       Cardiac Cath Lab Recovery Arrival Note:    Florecita Raya arrived to [facility] recovery area.  Patient procedure= ablation. Patient on cardiac monitor, non-invasive blood pressure, SPO2 monitor. On RA. Patient status doing well without problems. Patient is A&Ox 4. Patient reports no pain.    PROCEDURE SITE CHECK:    Procedure site:without any bleeding and no hematoma, no pain/discomfort reported at procedure site.     No change in patient status. Continue to monitor patient and status.

## 2024-02-28 NOTE — PROGRESS NOTES
Patient has walked the hallway of recovery. No SOB, light headiness or dizziness noted.        I have reviewed discharge instructions with the patient.  The patient verbalized understanding.    Discharge medications reviewed with patient and appropriate educational materials regarding medications and side effects teaching were provided.    Site care instructions reviewed with patient. Pain management teaching completed.    Patient instructed to make follow up appointments per discharge instructions.     Patient belongings packed up and accounted for with patient and family. All patient belongings sent home with patient.    Telemetry monitor and wires removed      Patient signed discharge instructions after reviewing them, and duplicate copy placed in chart.

## 2024-03-05 LAB
ACT BLD: 169 SECS (ref 79–138)
ACT BLD: 309 SECS (ref 79–138)

## 2024-03-07 ENCOUNTER — HOSPITAL ENCOUNTER (OUTPATIENT)
Facility: HOSPITAL | Age: 79
Setting detail: INFUSION SERIES
Discharge: HOME | End: 2024-03-07
Payer: MEDICARE

## 2024-03-07 VITALS
BODY MASS INDEX: 26.68 KG/M2 | OXYGEN SATURATION: 95 % | TEMPERATURE: 97.8 F | SYSTOLIC BLOOD PRESSURE: 121 MMHG | WEIGHT: 136.6 LBS | HEART RATE: 96 BPM | RESPIRATION RATE: 16 BRPM | DIASTOLIC BLOOD PRESSURE: 55 MMHG

## 2024-03-07 DIAGNOSIS — M85.80 OSTEOPENIA WITH HIGH RISK OF FRACTURE: Primary | ICD-10-CM

## 2024-03-07 LAB
ALBUMIN SERPL-MCNC: 3.6 G/DL (ref 3.5–5)
ALBUMIN/GLOB SERPL: 1.2 (ref 1.1–2.2)
ALP SERPL-CCNC: 101 U/L (ref 45–117)
ALT SERPL-CCNC: 25 U/L (ref 12–78)
ANION GAP SERPL CALC-SCNC: 9 MMOL/L (ref 5–15)
AST SERPL-CCNC: 21 U/L (ref 15–37)
BILIRUB SERPL-MCNC: 1.1 MG/DL (ref 0.2–1)
BUN SERPL-MCNC: 28 MG/DL (ref 6–20)
BUN/CREAT SERPL: 25 (ref 12–20)
CALCIUM SERPL-MCNC: 9.3 MG/DL (ref 8.5–10.1)
CHLORIDE SERPL-SCNC: 100 MMOL/L (ref 97–108)
CO2 SERPL-SCNC: 29 MMOL/L (ref 21–32)
CREAT SERPL-MCNC: 1.12 MG/DL (ref 0.55–1.02)
GLOBULIN SER CALC-MCNC: 3.1 G/DL (ref 2–4)
GLUCOSE SERPL-MCNC: 113 MG/DL (ref 65–100)
PHOSPHATE SERPL-MCNC: 3.5 MG/DL (ref 2.6–4.7)
POTASSIUM SERPL-SCNC: 3.7 MMOL/L (ref 3.5–5.1)
PROT SERPL-MCNC: 6.7 G/DL (ref 6.4–8.2)
SODIUM SERPL-SCNC: 138 MMOL/L (ref 136–145)

## 2024-03-07 PROCEDURE — 96372 THER/PROPH/DIAG INJ SC/IM: CPT

## 2024-03-07 PROCEDURE — 80053 COMPREHEN METABOLIC PANEL: CPT

## 2024-03-07 PROCEDURE — 6360000002 HC RX W HCPCS: Performed by: NURSE PRACTITIONER

## 2024-03-07 PROCEDURE — 36415 COLL VENOUS BLD VENIPUNCTURE: CPT

## 2024-03-07 PROCEDURE — 84100 ASSAY OF PHOSPHORUS: CPT

## 2024-03-07 RX ORDER — SODIUM CHLORIDE 9 MG/ML
INJECTION, SOLUTION INTRAVENOUS CONTINUOUS
OUTPATIENT
Start: 2024-09-01

## 2024-03-07 RX ORDER — ACETAMINOPHEN 325 MG/1
650 TABLET ORAL
OUTPATIENT
Start: 2024-09-01

## 2024-03-07 RX ORDER — DIPHENHYDRAMINE HYDROCHLORIDE 50 MG/ML
50 INJECTION INTRAMUSCULAR; INTRAVENOUS
OUTPATIENT
Start: 2024-09-01

## 2024-03-07 RX ORDER — EPINEPHRINE 1 MG/ML
0.3 INJECTION, SOLUTION, CONCENTRATE INTRAVENOUS PRN
OUTPATIENT
Start: 2024-09-01

## 2024-03-07 RX ORDER — ONDANSETRON 2 MG/ML
8 INJECTION INTRAMUSCULAR; INTRAVENOUS
OUTPATIENT
Start: 2024-09-01

## 2024-03-07 RX ORDER — ALBUTEROL SULFATE 90 UG/1
4 AEROSOL, METERED RESPIRATORY (INHALATION) PRN
OUTPATIENT
Start: 2024-09-01

## 2024-03-07 RX ADMIN — DENOSUMAB 60 MG: 60 INJECTION SUBCUTANEOUS at 09:43

## 2024-03-07 NOTE — PLAN OF CARE
Problem: Safety - Adult  Goal: Free from fall injury  Outcome: Completed     Problem: Chronic Conditions and Co-morbidities  Goal: Patient's chronic conditions and co-morbidity symptoms are monitored and maintained or improved  Outcome: Completed
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
## 2024-04-03 RX ORDER — MIRABEGRON 50 MG/1
TABLET, FILM COATED, EXTENDED RELEASE ORAL
Qty: 90 TABLET | Refills: 3 | Status: SHIPPED | OUTPATIENT
Start: 2024-04-03

## 2024-04-03 NOTE — TELEPHONE ENCOUNTER
Patient requesting refill on     Requested Prescriptions     Pending Prescriptions Disp Refills    MYRBETRIQ 50 MG TB24 [Pharmacy Med Name: MYRBETRIQ ER 50 MG TABLET] 90 tablet 3     Sig: TAKE 1 TABLET BY MOUTH EVERY DAY        Last OV 2/13/2024

## 2024-04-19 ENCOUNTER — OFFICE VISIT (OUTPATIENT)
Age: 79
End: 2024-04-19
Payer: MEDICARE

## 2024-04-19 VITALS
WEIGHT: 133.6 LBS | DIASTOLIC BLOOD PRESSURE: 70 MMHG | OXYGEN SATURATION: 97 % | SYSTOLIC BLOOD PRESSURE: 120 MMHG | HEIGHT: 60 IN | TEMPERATURE: 97.4 F | HEART RATE: 84 BPM | RESPIRATION RATE: 17 BRPM | BODY MASS INDEX: 26.23 KG/M2

## 2024-04-19 DIAGNOSIS — I10 ESSENTIAL HYPERTENSION: ICD-10-CM

## 2024-04-19 DIAGNOSIS — Z86.79 S/P ABLATION OF ATRIAL FIBRILLATION: ICD-10-CM

## 2024-04-19 DIAGNOSIS — M81.0 AGE-RELATED OSTEOPOROSIS WITHOUT CURRENT PATHOLOGICAL FRACTURE: ICD-10-CM

## 2024-04-19 DIAGNOSIS — Z00.00 MEDICARE ANNUAL WELLNESS VISIT, SUBSEQUENT: Primary | ICD-10-CM

## 2024-04-19 DIAGNOSIS — Z98.890 S/P ABLATION OF ATRIAL FIBRILLATION: ICD-10-CM

## 2024-04-19 DIAGNOSIS — J44.9 CHRONIC OBSTRUCTIVE PULMONARY DISEASE, UNSPECIFIED COPD TYPE (HCC): ICD-10-CM

## 2024-04-19 PROCEDURE — 1123F ACP DISCUSS/DSCN MKR DOCD: CPT | Performed by: NURSE PRACTITIONER

## 2024-04-19 PROCEDURE — G0439 PPPS, SUBSEQ VISIT: HCPCS | Performed by: NURSE PRACTITIONER

## 2024-04-19 PROCEDURE — 3078F DIAST BP <80 MM HG: CPT | Performed by: NURSE PRACTITIONER

## 2024-04-19 PROCEDURE — 3074F SYST BP LT 130 MM HG: CPT | Performed by: NURSE PRACTITIONER

## 2024-04-19 RX ORDER — METOPROLOL SUCCINATE 25 MG/1
25 TABLET, EXTENDED RELEASE ORAL
COMMUNITY

## 2024-04-19 ASSESSMENT — PATIENT HEALTH QUESTIONNAIRE - PHQ9
SUM OF ALL RESPONSES TO PHQ QUESTIONS 1-9: 0
SUM OF ALL RESPONSES TO PHQ9 QUESTIONS 1 & 2: 0
2. FEELING DOWN, DEPRESSED OR HOPELESS: NOT AT ALL
SUM OF ALL RESPONSES TO PHQ QUESTIONS 1-9: 0
1. LITTLE INTEREST OR PLEASURE IN DOING THINGS: NOT AT ALL
SUM OF ALL RESPONSES TO PHQ QUESTIONS 1-9: 0
SUM OF ALL RESPONSES TO PHQ QUESTIONS 1-9: 0

## 2024-04-19 NOTE — PATIENT INSTRUCTIONS
Learning About Being Active as an Older Adult  Why is being active important as you get older?     Being active is one of the best things you can do for your health. And it's never too late to start. Being active--or getting active, if you aren't already--has definite benefits. It can:  Give you more energy,  Keep your mind sharp.  Improve balance to reduce your risk of falls.  Help you manage chronic illness with fewer medicines.  No matter how old you are, how fit you are, or what health problems you have, there is a form of activity that will work for you. And the more physical activity you can do, the better your overall health will be.  What kinds of activity can help you stay healthy?  Being more active will make your daily activities easier. Physical activity includes planned exercise and things you do in daily life. There are four types of activity:  Aerobic.  Doing aerobic activity makes your heart and lungs strong.  Includes walking, dancing, and gardening.  Aim for at least 2½ hours spread throughout the week.  It improves your energy and can help you sleep better.  Muscle-strengthening.  This type of activity can help maintain muscle and strengthen bones.  Includes climbing stairs, using resistance bands, and lifting or carrying heavy loads.  Aim for at least twice a week.  It can help protect the knees and other joints.  Stretching.  Stretching gives you better range of motion in joints and muscles.  Includes upper arm stretches, calf stretches, and gentle yoga.  Aim for at least twice a week, preferably after your muscles are warmed up from other activities.  It can help you function better in daily life.  Balancing.  This helps you stay coordinated and have good posture.  Includes heel-to-toe walking, jeffrey chi, and certain types of yoga.  Aim for at least 3 days a week.  It can reduce your risk of falling.  Even if you have a hard time meeting the recommendations, it's better to be more active

## 2024-04-19 NOTE — PROGRESS NOTES
\"Have you been to the ER, urgent care clinic since your last visit?  Hospitalized since your last visit?\"    NO    “Have you seen or consulted any other health care providers outside of [facility] since your last visit?”    NO            Click Here for Release of Records Request

## 2024-05-10 RX ORDER — RIVAROXABAN 20 MG/1
20 TABLET, FILM COATED ORAL
Qty: 90 TABLET | Refills: 1 | Status: SHIPPED | OUTPATIENT
Start: 2024-05-10

## 2024-05-31 ENCOUNTER — OFFICE VISIT (OUTPATIENT)
Age: 79
End: 2024-05-31

## 2024-05-31 VITALS
HEIGHT: 60 IN | SYSTOLIC BLOOD PRESSURE: 140 MMHG | WEIGHT: 132.6 LBS | BODY MASS INDEX: 26.03 KG/M2 | RESPIRATION RATE: 16 BRPM | TEMPERATURE: 98.2 F | HEART RATE: 81 BPM | DIASTOLIC BLOOD PRESSURE: 82 MMHG | OXYGEN SATURATION: 98 %

## 2024-05-31 DIAGNOSIS — I10 ESSENTIAL HYPERTENSION: ICD-10-CM

## 2024-05-31 DIAGNOSIS — R60.0 LOCALIZED EDEMA: ICD-10-CM

## 2024-05-31 DIAGNOSIS — Z09 HOSPITAL DISCHARGE FOLLOW-UP: Primary | ICD-10-CM

## 2024-05-31 DIAGNOSIS — Z91.81 AT HIGH RISK FOR FALLS: ICD-10-CM

## 2024-05-31 RX ORDER — FUROSEMIDE 20 MG/1
TABLET ORAL
Qty: 90 TABLET | Refills: 1 | Status: SHIPPED
Start: 2024-05-31

## 2024-05-31 ASSESSMENT — PATIENT HEALTH QUESTIONNAIRE - PHQ9
1. LITTLE INTEREST OR PLEASURE IN DOING THINGS: NOT AT ALL
SUM OF ALL RESPONSES TO PHQ QUESTIONS 1-9: 0
2. FEELING DOWN, DEPRESSED OR HOPELESS: NOT AT ALL
SUM OF ALL RESPONSES TO PHQ9 QUESTIONS 1 & 2: 0
SUM OF ALL RESPONSES TO PHQ QUESTIONS 1-9: 0

## 2024-05-31 NOTE — PROGRESS NOTES
Chief Complaint   Patient presents with    3 Month Follow-Up       ASSESSMENT AND PLAN:      Diagnosis Orders   1. Hospital discharge follow-up        2. Essential hypertension        3. Localized edema        4. At high risk for falls          Reviewed patient's discharge instructions. Cr 0.84, potassium 4.1. /66 at discharge. She has bilateral lower extremity edema and mild HTN in the office. Increase furosemide up to every other day dosing. If any lightheadedness, weakness, or falls, notify me immediately. Continue with neuro appt. Plan to see me again in the earlier fall for repeat labs.    Patient aware of plan of care and verbalized understanding. Questions answered. RTC 3-4 months, or sooner if needed.    HPI:     is a 78 y.o. female in today for follow up.    HTN: On lisinopril, amlodipine stopped by cardiology.     Cardiology: Hx afib s/p cardioversion, ablation in 2023. Pacemker inserted 12/19/23 for bradycardia. EP is Dr. Boykin. Repeat ablation 2/2024.      COPD: Sees pulmonary yearly, Dr. Strong. Budesonide changed to Flovent, remains on Anoro. Stable, intermittent cough but tolerable.     Oral lichen planus/candidiasis: Followed by Dr. Diaz in [place]. Also with hx of SCC, BCC.      Osteopenia: Failed Boniva, remains on Prolia. Next DEXA August 2024.     New issues: She had two falls on Mother's Day and the second fall was associated with a syncopal episode. She was hospitalized at [facility] x 3 days. She had a CT scan of her head without contrast that didn't show any acute findings. She had an EEG per her report and that was negative. She notes L sided weakness which is chronic, not associated with her recent falls. The hospitalist team cut back her furosemide because she was dehydrated, only taking it twice a week.     She has an appt to see Dr. Fall on July 30th, neurologist.    Allergies   Allergen Reactions    Latex Rash    Adhesive Tape Rash       Prior to Visit Medications

## 2024-05-31 NOTE — PROGRESS NOTES
\"Have you been to the ER, urgent care clinic since your last visit?  Hospitalized since your last visit?\"    NO    “Have you seen or consulted any other health care providers outside of [facility] since your last visit?”    NO            Click Here for Release of Records Request

## 2024-07-11 ENCOUNTER — TELEPHONE (OUTPATIENT)
Age: 79
End: 2024-07-11

## 2024-08-23 RX ORDER — ATORVASTATIN CALCIUM 10 MG/1
TABLET, FILM COATED ORAL
Qty: 90 TABLET | Refills: 3 | Status: SHIPPED | OUTPATIENT
Start: 2024-08-23

## 2024-08-29 ENCOUNTER — TRANSCRIBE ORDERS (OUTPATIENT)
Facility: HOSPITAL | Age: 79
End: 2024-08-29

## 2024-08-29 ENCOUNTER — HOSPITAL ENCOUNTER (OUTPATIENT)
Facility: HOSPITAL | Age: 79
Discharge: HOME | End: 2024-08-29
Payer: MEDICARE

## 2024-08-29 DIAGNOSIS — Z12.31 SCREENING MAMMOGRAM FOR BREAST CANCER: Primary | ICD-10-CM

## 2024-08-29 LAB
CHOLEST SERPL-MCNC: 147 MG/DL
ERYTHROCYTE [DISTWIDTH] IN BLOOD BY AUTOMATED COUNT: 13.8 % (ref 11.5–14.5)
HCT VFR BLD AUTO: 35.2 % (ref 35–47)
HDLC SERPL-MCNC: 92 MG/DL
HDLC SERPL: 1.6 (ref 0–5)
HGB BLD-MCNC: 11.6 G/DL (ref 11.5–16)
LDLC SERPL CALC-MCNC: 49 MG/DL (ref 0–100)
MCH RBC QN AUTO: 30.9 PG (ref 26–34)
MCHC RBC AUTO-ENTMCNC: 33 G/DL (ref 30–36.5)
MCV RBC AUTO: 93.9 FL (ref 80–99)
NRBC # BLD: 0 K/UL (ref 0–0.01)
NRBC BLD-RTO: 0 PER 100 WBC
PLATELET # BLD AUTO: 232 K/UL (ref 150–400)
PMV BLD AUTO: 9.1 FL (ref 8.9–12.9)
RBC # BLD AUTO: 3.75 M/UL (ref 3.8–5.2)
TRIGL SERPL-MCNC: 30 MG/DL
TSH SERPL DL<=0.05 MIU/L-ACNC: 3.59 UIU/ML (ref 0.36–3.74)
VLDLC SERPL CALC-MCNC: 6 MG/DL
WBC # BLD AUTO: 5.5 K/UL (ref 3.6–11)

## 2024-08-29 PROCEDURE — 85027 COMPLETE CBC AUTOMATED: CPT

## 2024-08-29 PROCEDURE — 84443 ASSAY THYROID STIM HORMONE: CPT

## 2024-08-29 PROCEDURE — 80061 LIPID PANEL: CPT

## 2024-08-29 PROCEDURE — 36415 COLL VENOUS BLD VENIPUNCTURE: CPT

## 2024-09-03 DIAGNOSIS — M85.80 OSTEOPENIA WITH HIGH RISK OF FRACTURE: Primary | ICD-10-CM

## 2024-09-03 RX ORDER — ONDANSETRON 2 MG/ML
8 INJECTION INTRAMUSCULAR; INTRAVENOUS
Status: CANCELLED | OUTPATIENT
Start: 2024-09-03

## 2024-09-03 RX ORDER — FAMOTIDINE 10 MG/ML
20 INJECTION, SOLUTION INTRAVENOUS
Status: CANCELLED | OUTPATIENT
Start: 2024-09-03

## 2024-09-03 RX ORDER — ALBUTEROL SULFATE 90 UG/1
4 AEROSOL, METERED RESPIRATORY (INHALATION) PRN
Status: CANCELLED | OUTPATIENT
Start: 2024-09-03

## 2024-09-03 RX ORDER — ACETAMINOPHEN 325 MG/1
650 TABLET ORAL
Status: CANCELLED | OUTPATIENT
Start: 2024-09-03

## 2024-09-03 RX ORDER — DIPHENHYDRAMINE HYDROCHLORIDE 50 MG/ML
50 INJECTION INTRAMUSCULAR; INTRAVENOUS
Status: CANCELLED | OUTPATIENT
Start: 2024-09-03

## 2024-09-03 RX ORDER — EPINEPHRINE 1 MG/ML
0.3 INJECTION, SOLUTION, CONCENTRATE INTRAVENOUS PRN
Status: CANCELLED | OUTPATIENT
Start: 2024-09-03

## 2024-09-03 RX ORDER — SODIUM CHLORIDE 9 MG/ML
INJECTION, SOLUTION INTRAVENOUS CONTINUOUS
Status: CANCELLED | OUTPATIENT
Start: 2024-09-03

## 2024-09-05 ENCOUNTER — HOSPITAL ENCOUNTER (OUTPATIENT)
Facility: HOSPITAL | Age: 79
Setting detail: INFUSION SERIES
Discharge: HOME | End: 2024-09-05
Payer: MEDICARE

## 2024-09-05 VITALS
HEART RATE: 97 BPM | TEMPERATURE: 98.6 F | OXYGEN SATURATION: 97 % | DIASTOLIC BLOOD PRESSURE: 76 MMHG | WEIGHT: 136 LBS | RESPIRATION RATE: 17 BRPM | SYSTOLIC BLOOD PRESSURE: 175 MMHG | BODY MASS INDEX: 26.56 KG/M2

## 2024-09-05 DIAGNOSIS — M85.80 OSTEOPENIA WITH HIGH RISK OF FRACTURE: Primary | ICD-10-CM

## 2024-09-05 LAB
ALBUMIN SERPL-MCNC: 3.3 G/DL (ref 3.5–5)
ALBUMIN/GLOB SERPL: 1.1 (ref 1.1–2.2)
ALP SERPL-CCNC: 108 U/L (ref 45–117)
ALT SERPL-CCNC: 27 U/L (ref 12–78)
ANION GAP SERPL CALC-SCNC: 7 MMOL/L (ref 2–12)
AST SERPL-CCNC: 21 U/L (ref 15–37)
BILIRUB SERPL-MCNC: 0.4 MG/DL (ref 0.2–1)
BUN SERPL-MCNC: 19 MG/DL (ref 6–20)
BUN/CREAT SERPL: 23 (ref 12–20)
CALCIUM SERPL-MCNC: 9.1 MG/DL (ref 8.5–10.1)
CHLORIDE SERPL-SCNC: 97 MMOL/L (ref 97–108)
CO2 SERPL-SCNC: 29 MMOL/L (ref 21–32)
CREAT SERPL-MCNC: 0.83 MG/DL (ref 0.55–1.02)
GLOBULIN SER CALC-MCNC: 3 G/DL (ref 2–4)
GLUCOSE SERPL-MCNC: 77 MG/DL (ref 65–100)
PHOSPHATE SERPL-MCNC: 3.8 MG/DL (ref 2.6–4.7)
POTASSIUM SERPL-SCNC: 4.4 MMOL/L (ref 3.5–5.1)
PROT SERPL-MCNC: 6.3 G/DL (ref 6.4–8.2)
SODIUM SERPL-SCNC: 133 MMOL/L (ref 136–145)

## 2024-09-05 PROCEDURE — 96372 THER/PROPH/DIAG INJ SC/IM: CPT

## 2024-09-05 PROCEDURE — 6360000002 HC RX W HCPCS: Performed by: NURSE PRACTITIONER

## 2024-09-05 PROCEDURE — 80053 COMPREHEN METABOLIC PANEL: CPT

## 2024-09-05 PROCEDURE — 84100 ASSAY OF PHOSPHORUS: CPT

## 2024-09-05 PROCEDURE — 36415 COLL VENOUS BLD VENIPUNCTURE: CPT

## 2024-09-05 RX ORDER — DULOXETIN HYDROCHLORIDE 20 MG/1
20 CAPSULE, DELAYED RELEASE ORAL DAILY
COMMUNITY

## 2024-09-05 RX ORDER — ALBUTEROL SULFATE 90 UG/1
4 AEROSOL, METERED RESPIRATORY (INHALATION) PRN
OUTPATIENT
Start: 2025-03-02

## 2024-09-05 RX ORDER — SODIUM CHLORIDE 9 MG/ML
INJECTION, SOLUTION INTRAVENOUS CONTINUOUS
Status: DISCONTINUED | OUTPATIENT
Start: 2024-09-05 | End: 2024-09-06 | Stop reason: HOSPADM

## 2024-09-05 RX ORDER — ACETAMINOPHEN 325 MG/1
650 TABLET ORAL
Status: DISCONTINUED | OUTPATIENT
Start: 2024-09-05 | End: 2024-09-06 | Stop reason: HOSPADM

## 2024-09-05 RX ORDER — ALBUTEROL SULFATE 90 UG/1
4 AEROSOL, METERED RESPIRATORY (INHALATION) PRN
Status: DISCONTINUED | OUTPATIENT
Start: 2024-09-05 | End: 2024-09-06 | Stop reason: HOSPADM

## 2024-09-05 RX ORDER — SODIUM CHLORIDE 9 MG/ML
INJECTION, SOLUTION INTRAVENOUS CONTINUOUS
OUTPATIENT
Start: 2025-03-02

## 2024-09-05 RX ORDER — DIPHENHYDRAMINE HYDROCHLORIDE 50 MG/ML
50 INJECTION INTRAMUSCULAR; INTRAVENOUS
OUTPATIENT
Start: 2025-03-02

## 2024-09-05 RX ORDER — EPINEPHRINE 1 MG/ML
0.3 INJECTION, SOLUTION, CONCENTRATE INTRAVENOUS PRN
OUTPATIENT
Start: 2025-03-02

## 2024-09-05 RX ORDER — EPINEPHRINE 1 MG/ML
0.3 INJECTION, SOLUTION, CONCENTRATE INTRAVENOUS PRN
Status: DISCONTINUED | OUTPATIENT
Start: 2024-09-05 | End: 2024-09-06 | Stop reason: HOSPADM

## 2024-09-05 RX ORDER — ONDANSETRON 2 MG/ML
8 INJECTION INTRAMUSCULAR; INTRAVENOUS
Status: DISCONTINUED | OUTPATIENT
Start: 2024-09-05 | End: 2024-09-06 | Stop reason: HOSPADM

## 2024-09-05 RX ORDER — ACETAMINOPHEN 325 MG/1
650 TABLET ORAL
OUTPATIENT
Start: 2025-03-02

## 2024-09-05 RX ORDER — DIPHENHYDRAMINE HYDROCHLORIDE 50 MG/ML
50 INJECTION INTRAMUSCULAR; INTRAVENOUS
Status: DISCONTINUED | OUTPATIENT
Start: 2024-09-05 | End: 2024-09-06 | Stop reason: HOSPADM

## 2024-09-05 RX ORDER — ONDANSETRON 2 MG/ML
8 INJECTION INTRAMUSCULAR; INTRAVENOUS
OUTPATIENT
Start: 2025-03-02

## 2024-09-05 RX ADMIN — DENOSUMAB 60 MG: 60 INJECTION SUBCUTANEOUS at 13:10

## 2024-09-05 NOTE — PROGRESS NOTES
1055: Pt arrived ambulatory and in no distress for Prolia..  Assessment completed. Patient has had quite extensive cardiac intervention over the last 6 mths.    Lab drawn  Patient will return later for her shot  1300: Patient returned    Prolia given in L arm\    1315: Discharged home ambulatory and in no distress.. Next appointment 3/5/25

## 2024-09-05 NOTE — PLAN OF CARE
Problem: Discharge Planning  Goal: Discharge to home or other facility with appropriate resources  Reactivated

## 2024-09-06 NOTE — TELEPHONE ENCOUNTER
Patient requesting refill on     Requested Prescriptions     Pending Prescriptions Disp Refills    XARELTO 20 MG TABS tablet [Pharmacy Med Name: XARELTO 20 MG TABLET] 90 tablet 1     Sig: TAKE 1 TABLET BY MOUTH EVERY DAY WITH BREAKFAST        Last OV 5/31/2024

## 2024-09-07 RX ORDER — RIVAROXABAN 20 MG/1
20 TABLET, FILM COATED ORAL
Qty: 90 TABLET | Refills: 3 | Status: SHIPPED | OUTPATIENT
Start: 2024-09-07

## 2024-10-02 ENCOUNTER — HOSPITAL ENCOUNTER (OUTPATIENT)
Facility: HOSPITAL | Age: 79
Discharge: HOME | End: 2024-10-05
Payer: MEDICARE

## 2024-10-02 DIAGNOSIS — Z12.31 SCREENING MAMMOGRAM FOR BREAST CANCER: ICD-10-CM

## 2024-10-02 PROCEDURE — 77063 BREAST TOMOSYNTHESIS BI: CPT

## 2024-10-16 DIAGNOSIS — Z23 ENCOUNTER FOR IMMUNIZATION: ICD-10-CM

## 2024-10-16 NOTE — TELEPHONE ENCOUNTER
Patient requesting refill on     Requested Prescriptions     Pending Prescriptions Disp Refills    levothyroxine (SYNTHROID) 88 MCG tablet [Pharmacy Med Name: LEVOTHYROXINE 88 MCG TABLET] 90 tablet 1     Sig: TAKE 1 TABLET BY MOUTH EVERY DAY BEFORE BREAKFAST        Last OV 05/31/2024

## 2024-10-18 RX ORDER — LEVOTHYROXINE SODIUM 88 UG/1
TABLET ORAL
Qty: 90 TABLET | Refills: 1 | Status: SHIPPED | OUTPATIENT
Start: 2024-10-18

## 2024-11-20 ENCOUNTER — HOSPITAL ENCOUNTER (OUTPATIENT)
Facility: HOSPITAL | Age: 79
Discharge: HOME | End: 2024-11-23
Payer: MEDICARE

## 2024-11-20 DIAGNOSIS — M81.0 AGE-RELATED OSTEOPOROSIS WITHOUT CURRENT PATHOLOGICAL FRACTURE: ICD-10-CM

## 2024-11-20 PROCEDURE — 77080 DXA BONE DENSITY AXIAL: CPT

## 2024-12-31 ENCOUNTER — HOSPITAL ENCOUNTER (OUTPATIENT)
Facility: HOSPITAL | Age: 79
Discharge: HOME | End: 2025-01-03
Payer: MEDICARE

## 2024-12-31 DIAGNOSIS — R41.3 OTHER AMNESIA: ICD-10-CM

## 2024-12-31 PROCEDURE — 70551 MRI BRAIN STEM W/O DYE: CPT

## 2025-01-20 RX ORDER — LISINOPRIL 40 MG/1
40 TABLET ORAL DAILY
Qty: 90 TABLET | Refills: 3 | Status: SHIPPED | OUTPATIENT
Start: 2025-01-20

## 2025-03-05 ENCOUNTER — HOSPITAL ENCOUNTER (OUTPATIENT)
Facility: HOSPITAL | Age: 80
Setting detail: INFUSION SERIES
Discharge: HOME | End: 2025-03-05
Payer: MEDICARE

## 2025-03-05 VITALS
HEART RATE: 82 BPM | DIASTOLIC BLOOD PRESSURE: 91 MMHG | OXYGEN SATURATION: 96 % | TEMPERATURE: 97.7 F | BODY MASS INDEX: 27.48 KG/M2 | HEIGHT: 60 IN | WEIGHT: 140 LBS | RESPIRATION RATE: 19 BRPM | SYSTOLIC BLOOD PRESSURE: 163 MMHG

## 2025-03-05 DIAGNOSIS — M85.80 OSTEOPENIA WITH HIGH RISK OF FRACTURE: Primary | ICD-10-CM

## 2025-03-05 LAB
ALBUMIN SERPL-MCNC: 3.8 G/DL (ref 3.5–5)
ALBUMIN/GLOB SERPL: 1.2 (ref 1.1–2.2)
ALP SERPL-CCNC: 128 U/L (ref 45–117)
ALT SERPL-CCNC: 35 U/L (ref 12–78)
ANION GAP SERPL CALC-SCNC: 6 MMOL/L (ref 2–12)
AST SERPL-CCNC: 27 U/L (ref 15–37)
BILIRUB SERPL-MCNC: 0.5 MG/DL (ref 0.2–1)
BUN SERPL-MCNC: 19 MG/DL (ref 6–20)
BUN/CREAT SERPL: 23 (ref 12–20)
CALCIUM SERPL-MCNC: 9.4 MG/DL (ref 8.5–10.1)
CHLORIDE SERPL-SCNC: 91 MMOL/L (ref 97–108)
CO2 SERPL-SCNC: 30 MMOL/L (ref 21–32)
CREAT SERPL-MCNC: 0.83 MG/DL (ref 0.55–1.02)
GLOBULIN SER CALC-MCNC: 3.2 G/DL (ref 2–4)
GLUCOSE SERPL-MCNC: 83 MG/DL (ref 65–100)
PHOSPHATE SERPL-MCNC: 5.2 MG/DL (ref 2.6–4.7)
POTASSIUM SERPL-SCNC: 4.4 MMOL/L (ref 3.5–5.1)
PROT SERPL-MCNC: 7 G/DL (ref 6.4–8.2)
SODIUM SERPL-SCNC: 127 MMOL/L (ref 136–145)

## 2025-03-05 PROCEDURE — 6360000002 HC RX W HCPCS: Performed by: NURSE PRACTITIONER

## 2025-03-05 PROCEDURE — 84100 ASSAY OF PHOSPHORUS: CPT

## 2025-03-05 PROCEDURE — 80053 COMPREHEN METABOLIC PANEL: CPT

## 2025-03-05 PROCEDURE — 96372 THER/PROPH/DIAG INJ SC/IM: CPT

## 2025-03-05 PROCEDURE — 36415 COLL VENOUS BLD VENIPUNCTURE: CPT

## 2025-03-05 RX ORDER — HYDROCORTISONE SODIUM SUCCINATE 100 MG/2ML
100 INJECTION INTRAMUSCULAR; INTRAVENOUS
Status: DISCONTINUED | OUTPATIENT
Start: 2025-03-05 | End: 2025-03-06 | Stop reason: HOSPADM

## 2025-03-05 RX ORDER — ACETAMINOPHEN 325 MG/1
650 TABLET ORAL
OUTPATIENT
Start: 2025-08-31

## 2025-03-05 RX ORDER — HYDROCORTISONE SODIUM SUCCINATE 100 MG/2ML
100 INJECTION INTRAMUSCULAR; INTRAVENOUS
OUTPATIENT
Start: 2025-08-31

## 2025-03-05 RX ORDER — SODIUM CHLORIDE 9 MG/ML
INJECTION, SOLUTION INTRAVENOUS CONTINUOUS
Status: DISCONTINUED | OUTPATIENT
Start: 2025-03-05 | End: 2025-03-06 | Stop reason: HOSPADM

## 2025-03-05 RX ORDER — DIPHENHYDRAMINE HYDROCHLORIDE 50 MG/ML
50 INJECTION INTRAMUSCULAR; INTRAVENOUS
OUTPATIENT
Start: 2025-08-31

## 2025-03-05 RX ORDER — ONDANSETRON 2 MG/ML
8 INJECTION INTRAMUSCULAR; INTRAVENOUS
OUTPATIENT
Start: 2025-08-31

## 2025-03-05 RX ORDER — SODIUM CHLORIDE 9 MG/ML
INJECTION, SOLUTION INTRAVENOUS CONTINUOUS
OUTPATIENT
Start: 2025-08-31

## 2025-03-05 RX ORDER — ALBUTEROL SULFATE 90 UG/1
4 INHALANT RESPIRATORY (INHALATION) PRN
Status: DISCONTINUED | OUTPATIENT
Start: 2025-03-05 | End: 2025-03-06 | Stop reason: HOSPADM

## 2025-03-05 RX ORDER — DIPHENHYDRAMINE HYDROCHLORIDE 50 MG/ML
50 INJECTION INTRAMUSCULAR; INTRAVENOUS
Status: DISCONTINUED | OUTPATIENT
Start: 2025-03-05 | End: 2025-03-06 | Stop reason: HOSPADM

## 2025-03-05 RX ORDER — ONDANSETRON 2 MG/ML
8 INJECTION INTRAMUSCULAR; INTRAVENOUS
Status: DISCONTINUED | OUTPATIENT
Start: 2025-03-05 | End: 2025-03-06 | Stop reason: HOSPADM

## 2025-03-05 RX ORDER — EPINEPHRINE 1 MG/ML
0.3 INJECTION, SOLUTION, CONCENTRATE INTRAVENOUS PRN
Status: DISCONTINUED | OUTPATIENT
Start: 2025-03-05 | End: 2025-03-06 | Stop reason: HOSPADM

## 2025-03-05 RX ORDER — EPINEPHRINE 1 MG/ML
0.3 INJECTION, SOLUTION, CONCENTRATE INTRAVENOUS PRN
OUTPATIENT
Start: 2025-08-31

## 2025-03-05 RX ORDER — ALBUTEROL SULFATE 90 UG/1
4 INHALANT RESPIRATORY (INHALATION) PRN
OUTPATIENT
Start: 2025-08-31

## 2025-03-05 RX ORDER — ACETAMINOPHEN 325 MG/1
650 TABLET ORAL
Status: DISCONTINUED | OUTPATIENT
Start: 2025-03-05 | End: 2025-03-06 | Stop reason: HOSPADM

## 2025-03-05 RX ADMIN — DENOSUMAB 60 MG: 60 INJECTION SUBCUTANEOUS at 13:01

## 2025-03-05 NOTE — PROGRESS NOTES
[facility] Progress Note    Date: 2025    Name: Florecita Raya    MRN: [id number]         : 1945      Ms. Raya amb[facility], for Prolia injection as treatment for osteoporosis. Patient is alert and oriented. Name and  verified. No s/s infection. Patient  denies pain or discomfort.     Patient was assessed and education was provided.     Ms. Raya's vitals were reviewed.  Vitals:    25 1135   BP: (!) 163/91   Pulse: 82   Resp: 19   Temp: 97.7 °F (36.5 °C)   SpO2: 96%     Lab results were obtained and reviewed.  Recent Results (from the past 12 hour(s))   Comprehensive metabolic panel    Collection Time: 25 11:50 AM   Result Value Ref Range    Sodium 127 (L) 136 - 145 mmol/L    Potassium 4.4 3.5 - 5.1 mmol/L    Chloride 91 (L) 97 - 108 mmol/L    CO2 30 21 - 32 mmol/L    Anion Gap 6 2 - 12 mmol/L    Glucose 83 65 - 100 mg/dL    BUN 19 6 - 20 MG/DL    Creatinine 0.83 0.55 - 1.02 MG/DL    BUN/Creatinine Ratio 23 (H) 12 - 20      Est, Glom Filt Rate 71 >60 ml/min/1.73m2    Calcium 9.4 8.5 - 10.1 MG/DL    Total Bilirubin 0.5 0.2 - 1.0 MG/DL    ALT 35 12 - 78 U/L    AST 27 15 - 37 U/L    Alk Phosphatase 128 (H) 45 - 117 U/L    Total Protein 7.0 6.4 - 8.2 g/dL    Albumin 3.8 3.5 - 5.0 g/dL    Globulin 3.2 2.0 - 4.0 g/dL    Albumin/Globulin Ratio 1.2 1.1 - 2.2     Phosphorus    Collection Time: 25 11:50 AM   Result Value Ref Range    Phosphorus 5.2 (H) 2.6 - 4.7 MG/DL     1301: Prolia 60 mg was administered subcutaneously in pt's left arm. Site WNL. No hematoma, bleeding, or leaking noted at site. Band-Aid applied.     Ms. Raya tolerated injection well, and had no complaints.      Patient armband was removed and placed in the shred bin.    [facility] condition at 1308. She is to return on 2025 at 1 PM for her next appointment.    Charlotte Dejesus RN  2025

## 2025-03-10 ENCOUNTER — RESULTS FOLLOW-UP (OUTPATIENT)
Facility: HOSPITAL | Age: 80
End: 2025-03-10

## 2025-03-10 RX ORDER — LEVOTHYROXINE SODIUM 100 UG/1
TABLET ORAL
Qty: 45 TABLET | Refills: 3 | Status: SHIPPED | OUTPATIENT
Start: 2025-03-10

## 2025-04-24 DIAGNOSIS — Z23 ENCOUNTER FOR IMMUNIZATION: ICD-10-CM

## 2025-04-25 RX ORDER — LEVOTHYROXINE SODIUM 88 UG/1
88 TABLET ORAL
Qty: 90 TABLET | Refills: 1 | Status: SHIPPED | OUTPATIENT
Start: 2025-04-25

## 2025-05-02 ENCOUNTER — TELEPHONE (OUTPATIENT)
Age: 80
End: 2025-05-02

## 2025-05-23 RX ORDER — ATORVASTATIN CALCIUM 10 MG/1
10 TABLET, FILM COATED ORAL DAILY
Qty: 90 TABLET | Refills: 3 | Status: SHIPPED | OUTPATIENT
Start: 2025-05-23

## 2025-07-09 ENCOUNTER — TELEPHONE (OUTPATIENT)
Age: 80
End: 2025-07-09

## 2025-07-09 NOTE — TELEPHONE ENCOUNTER
My chart message received from patient requesting an appointment for elevated BP.  I left voicemail for patient to return call.  Patient needs to call PCP.

## 2025-07-09 NOTE — TELEPHONE ENCOUNTER
Pt daughter called stating pt was at PT and her BP went up to 218/101 after 30 min rest it only got down to 192/85. Also, states that O2 would drop down to 75% but would go back up to the 90's and did this twice. Confirmed with nurse and told pt's daughter that pt should go to ER.

## 2025-07-30 ENCOUNTER — HOSPITAL ENCOUNTER (EMERGENCY)
Facility: HOSPITAL | Age: 80
Discharge: HOME | End: 2025-07-30
Attending: FAMILY MEDICINE | Admitting: FAMILY MEDICINE
Payer: MEDICARE

## 2025-07-30 VITALS
TEMPERATURE: 98.1 F | BODY MASS INDEX: 26.9 KG/M2 | WEIGHT: 137 LBS | SYSTOLIC BLOOD PRESSURE: 168 MMHG | DIASTOLIC BLOOD PRESSURE: 66 MMHG | HEIGHT: 60 IN | OXYGEN SATURATION: 100 % | HEART RATE: 76 BPM | RESPIRATION RATE: 15 BRPM

## 2025-07-30 DIAGNOSIS — S80.861A NONVENOMOUS INSECT BITE OF RIGHT LOWER EXTREMITY, INITIAL ENCOUNTER: Primary | ICD-10-CM
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
## 2025-07-30 PROCEDURE — 99283 EMERGENCY DEPT VISIT LOW MDM: CPT

## 2025-07-30 ASSESSMENT — PAIN DESCRIPTION - ONSET: ONSET: ON-GOING

## 2025-07-30 ASSESSMENT — PAIN - FUNCTIONAL ASSESSMENT
PAIN_FUNCTIONAL_ASSESSMENT: ACTIVITIES ARE NOT PREVENTED
PAIN_FUNCTIONAL_ASSESSMENT: 0-10

## 2025-07-30 ASSESSMENT — PAIN DESCRIPTION - DESCRIPTORS: DESCRIPTORS: ACHING;THROBBING

## 2025-07-30 ASSESSMENT — LIFESTYLE VARIABLES
HOW MANY STANDARD DRINKS CONTAINING ALCOHOL DO YOU HAVE ON A TYPICAL DAY: PATIENT DOES NOT DRINK
HOW OFTEN DO YOU HAVE A DRINK CONTAINING ALCOHOL: NEVER

## 2025-07-30 ASSESSMENT — PAIN DESCRIPTION - ORIENTATION: ORIENTATION: RIGHT;POSTERIOR

## 2025-07-30 ASSESSMENT — PAIN DESCRIPTION - PAIN TYPE: TYPE: ACUTE PAIN

## 2025-07-30 ASSESSMENT — PAIN SCALES - GENERAL: PAINLEVEL_OUTOF10: 1

## 2025-07-30 ASSESSMENT — PAIN DESCRIPTION - FREQUENCY: FREQUENCY: CONTINUOUS

## 2025-07-30 ASSESSMENT — PAIN DESCRIPTION - LOCATION: LOCATION: ANKLE
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
## 2025-07-31 NOTE — DISCHARGE INSTRUCTIONS
--Tonight, place ice on the ankle and take diphenhydramine 25 mg. (Benadryl)  --If the ankle looks more red, swollen, and infected, then fill the prescription for cephalexin 250 mg 4 times daily for 7 days.

## 2025-08-01 NOTE — ED PROVIDER NOTES
SYNTHROID  TAKE 1 TAB BY MOUTH EVERY OTHER MORNING. ALTERNATE WITH 88 MCG DOSE.     * levothyroxine 88 MCG tablet  Commonly known as: SYNTHROID  TAKE 1 TABLET BY MOUTH EVERY DAY BEFORE BREAKFAST     lisinopril 40 MG tablet  Commonly known as: PRINIVIL;ZESTRIL  TAKE 1 TABLET BY MOUTH EVERY DAY     metoprolol succinate 25 MG extended release tablet  Commonly known as: TOPROL XL     montelukast 10 MG tablet  Commonly known as: SINGULAIR     MUCINEX ALLERGY PO     MULTIVITAMIN ADULT PO     Myrbetriq 50 MG Tb24  Generic drug: mirabegron  TAKE 1 TABLET BY MOUTH EVERY DAY     vitamin D 25 MCG (1000 UT) Tabs tablet  Commonly known as: CHOLECALCIFEROL     Xarelto 20 MG Tabs tablet  Generic drug: rivaroxaban  TAKE 1 TABLET BY MOUTH EVERY DAY WITH BREAKFAST           * This list has 2 medication(s) that are the same as other medications prescribed for you. Read the directions carefully, and ask your doctor or other care provider to review them with you.                   Where to Get Your Medications        These medications were sent to [facility]/pharmacy #[id number] - [place], [place] - [address] [facility] - P [phone] - F [phone]  [address] [facility] [address]      Phone: [phone]   cephALEXin 250 MG capsule           DISCONTINUED MEDICATIONS:  Discharge Medication List as of 7/30/2025  8:41 PM          I am the Primary Clinician of Record.   Lizbet Howell MD (electronically signed)      (Please note that parts of this dictation were completed with voice recognition software. Quite often unanticipated grammatical, syntax, homophones, and other interpretive errors are inadvertently transcribed by the computer software. Please disregards these errors. Please excuse any errors that have escaped final proofreading.)          Lizbet Howell MD  07/31/25 0753

## 2025-08-25 ENCOUNTER — TRANSCRIBE ORDERS (OUTPATIENT)
Facility: HOSPITAL | Age: 80
End: 2025-08-25

## 2025-08-25 DIAGNOSIS — Z12.31 OTHER SCREENING MAMMOGRAM: Primary | ICD-10-CM

## 2025-09-03 DIAGNOSIS — M85.80 OSTEOPENIA WITH HIGH RISK OF FRACTURE: Primary | ICD-10-CM

## 2025-09-03 RX ORDER — DIPHENHYDRAMINE HYDROCHLORIDE 50 MG/ML
50 INJECTION, SOLUTION INTRAMUSCULAR; INTRAVENOUS
OUTPATIENT
Start: 2025-09-03

## 2025-09-03 RX ORDER — ALBUTEROL SULFATE 90 UG/1
4 INHALANT RESPIRATORY (INHALATION) PRN
OUTPATIENT
Start: 2025-09-03

## 2025-09-03 RX ORDER — ACETAMINOPHEN 325 MG/1
650 TABLET ORAL
OUTPATIENT
Start: 2025-09-03

## 2025-09-03 RX ORDER — FAMOTIDINE 10 MG/ML
20 INJECTION, SOLUTION INTRAVENOUS
OUTPATIENT
Start: 2025-09-03

## 2025-09-03 RX ORDER — SODIUM CHLORIDE 9 MG/ML
INJECTION, SOLUTION INTRAVENOUS PRN
OUTPATIENT
Start: 2025-09-03

## 2025-09-03 RX ORDER — EPINEPHRINE 1 MG/ML
0.3 INJECTION, SOLUTION, CONCENTRATE INTRAVENOUS PRN
OUTPATIENT
Start: 2025-09-03

## 2025-09-03 RX ORDER — ONDANSETRON 2 MG/ML
8 INJECTION INTRAMUSCULAR; INTRAVENOUS
OUTPATIENT
Start: 2025-09-03

## 2025-09-03 RX ORDER — HYDROCORTISONE SODIUM SUCCINATE 100 MG/2ML
100 INJECTION INTRAMUSCULAR; INTRAVENOUS
OUTPATIENT
Start: 2025-09-03

## 2025-09-07 RX ORDER — MIRABEGRON 50 MG/1
50 TABLET, FILM COATED, EXTENDED RELEASE ORAL DAILY
Qty: 90 TABLET | Refills: 3 | Status: SHIPPED | OUTPATIENT
Start: 2025-09-07

## (undated) DEVICE — 3M™ MEDIPORE™ H SOFT CLOTH SURGICAL TAPE 2864, 4 INCH X 10 YARD (10CM X 9,14M), 12 ROLLS/CASE: Brand: 3M™ MEDIPORE™

## (undated) DEVICE — CATHETER ABLAT 8FR L115CM 1-6-2MM SPC TIP 3.5MM FJ CRV

## (undated) DEVICE — TUBING PMP FOR CARTO SYS SMARTABLATE

## (undated) DEVICE — CABLE EP L150CM BLK HEXAPOLAR OCTAPOLAR DECAPOLAR EXTN CONN

## (undated) DEVICE — HYPODERMIC SAFETY NEEDLE: Brand: MAGELLAN

## (undated) DEVICE — CATHETER ULTRASOUND 10 FRX90 CM FOR CARTO 3 SOUNDSTAR ECO

## (undated) DEVICE — STERNUM BLADE, OFFSET (32.0 X 0.8 X 6.3MM)

## (undated) DEVICE — SYRINGE MED 10ML LUERLOCK TIP W/O SFTY DISP

## (undated) DEVICE — SUTURE MCRYL SZ 3-0 L27IN ABSRB UD L24MM PS-1 3/8 CIR PRIM Y936H

## (undated) DEVICE — SUTURE VCRL SZ 0 L18IN ABSRB VLT L40MM CT 1/2 CIR J752D

## (undated) DEVICE — CATHETER MAP D CRV 3-3-3-3-3 MM SPC GALAXY OCTARAY

## (undated) DEVICE — PRESSURE MONITORING SET: Brand: TRUWAVE

## (undated) DEVICE — DRAPE,UTILTY,TAPE,15X26, 4EA/PK: Brand: MEDLINE

## (undated) DEVICE — CATHETER EP LG 2-8-2 MM 7 FRX95 CM LIVEWIRE

## (undated) DEVICE — PATCH REF EXT FOR CARTO 3 SYS (EA = 6 PACKS)

## (undated) DEVICE — CATHETER MAP 7FR L115CM 2-6-2 SPC D CRV 22 ELECTRD PENTARAY

## (undated) DEVICE — 3M™ IOBAN™ 2 ANTIMICROBIAL INCISE DRAPE 6648EZ: Brand: IOBAN™ 2

## (undated) DEVICE — SOLUTION IRRIG 1000ML STRL H2O USP PLAS POUR BTL

## (undated) DEVICE — SYSTEM TRANSSEPTAL ACCESS ACQCROSS QX VZ 71CM

## (undated) DEVICE — DEVICE ES L3M EDGE COAT HEX LOK BLDE ELECTRD HOLSTER FORC

## (undated) DEVICE — CABLE EP L150CM RED HEXAPOLAR OCTAPOLAR DECAPOLAR EXTN CONN

## (undated) DEVICE — KIT,ANTI FOG,W/SPONGE & FLUID,SOFT PACK: Brand: MEDLINE

## (undated) DEVICE — DRESSING FOAM W8.7XL9.1IN SAFETAC LAYR SELF ADH MEPILEX

## (undated) DEVICE — CABLE CATH L10FT RED PIN CONN 34-34 FOR THERMOCOOL

## (undated) DEVICE — CABLE CATH L2.7M CONNECTS TO CARTO 3 SYS PIU FOR LASSO ECO

## (undated) DEVICE — 1LYRTR 16FR10ML100%SILTMPS SNP: Brand: MEDLINE INDUSTRIES, INC.

## (undated) DEVICE — STERILE (15.2 TAPERED TO 7.6 X 183CM) POLYETHYLENE ACCORDION-FOLDED COVER FOR USE WITH SIEMENS ACUNAV ULTRASOUND CATHETER FAMILY CONNECTOR: Brand: SWIFTLINK TRANSDUCER COVER

## (undated) DEVICE — CATHETER ELECTROPHYSIOLOGY LG 2-8-2 MM 7 FRX95 CM LIVEWIRE

## (undated) DEVICE — Device: Brand: S-CATH INTERCONNECT CABLE

## (undated) DEVICE — DEVICE COAG 3CM GUID 6130 FOR EPICARD ABLAT EPI-SENSE

## (undated) DEVICE — SOLUTION IV 500ML 0.9% SOD CHL PH 5 INJ USP VIAFLX PLAS

## (undated) DEVICE — DRAIN SURG L3/8-1/2IN DIA3/16IN SIL CARD CONN 1:1 BLAK

## (undated) DEVICE — LIQUIBAND RAPID ADHESIVE 36/CS 0.8ML: Brand: MEDLINE

## (undated) DEVICE — DRESSING HEMSTAT W12XL12IN 3 PLY QUIKCLOT CONTROL+

## (undated) DEVICE — DRAIN SURG SGL COLL PT TB FOR ATS BG OASIS

## (undated) DEVICE — ELECTRODE PT RET AD L9FT HI MOIST COND ADH HYDRGEL CORDED

## (undated) DEVICE — MEDI-TRACE CADENCE ADULT, DEFIBRILLATION ELECTRODE -RTS  (10 PR/PK) - PHYSIO-CONTROL: Brand: MEDI-TRACE CADENCE

## (undated) DEVICE — CABLE CATH L10FT YEL CONN 12-12 PIN ELECTROGRAM CONDUCTION

## (undated) DEVICE — SET GRAVITY SET 20DP 1 SS DEHP-FREE 47177E

## (undated) DEVICE — PINNACLE INTRODUCER SHEATH: Brand: PINNACLE

## (undated) DEVICE — SET ADMIN L104IN 18ML GRAV CK VLV RLER CLMP 2 SMRTSITE NDL

## (undated) DEVICE — 72" ARTERIAL PRESSURE TUBING: Brand: ICU MEDICAL

## (undated) DEVICE — PACK,LAPAROTOMY,2 REINFORCED GOWNS: Brand: MEDLINE

## (undated) DEVICE — SHEATH GUID 11.5X8.5FR L71MM M CRV L22MM BIDIR STEER CARTO

## (undated) DEVICE — LAPAROSCOPIC TROCAR SLEEVE/SINGLE USE: Brand: KII® OPTICAL ACCESS SYSTEM

## (undated) DEVICE — SOLUTION IRRIG 1000ML 0.9% SOD CHL CONT

## (undated) DEVICE — DRESSING HEMOSTATIC INTVENT W/O SLT QUIKCLOT

## (undated) DEVICE — PROVE COVER: Brand: UNBRANDED

## (undated) DEVICE — SUTURE PERMA-HAND SZ 0 L30IN NONABSORBABLE BLK L36MM CT-1 424H

## (undated) DEVICE — GLOVE SURG SZ 8 CRM LTX FREE POLYISOPRENE POLYMER BEAD ANTI

## (undated) DEVICE — CATHETER SNDSTR ECO 3D DGNSTC ULTRSND USE SMNS IMGNG SSTM 10

## (undated) DEVICE — HEART CATH-MRMC: Brand: MEDLINE INDUSTRIES, INC.

## (undated) DEVICE — Device

## (undated) DEVICE — SOLUTION IV 1000ML 0.9% SOD CHL PH 5 INJ USP VIAFLX PLAS

## (undated) DEVICE — SPONGE GZ W4XL4IN COT 12 PLY TYP VII WVN C FLD DSGN STERILE

## (undated) DEVICE — GOWN,SIRUS,NONRNF,SETINSLV,XL,20/CS: Brand: MEDLINE

## (undated) DEVICE — MAJOR LAPAROTOMY-MRMC: Brand: MEDLINE INDUSTRIES, INC.

## (undated) DEVICE — BLANKET WRM W25XL64IN NONWOVEN SFT LTWT PLIABLE HYPR

## (undated) DEVICE — SEALER/DIVIDER LAP SHFT L37CM JAW APER 11.4MM 315DEG ROT

## (undated) DEVICE — SOLUTION ANTIFOG VIS SYS CLEARIFY LAPSCP

## (undated) DEVICE — SUTURE SZ 0 27IN 5/8 CIR UR-6  TAPER PT VIOLET ABSRB VICRYL J603H

## (undated) DEVICE — 1 X VERSACROSS TRANSSEPTAL SHEATH (INCLUDING  1 X J-TIP GUIDEWIRE); 1 X VERSACROSS RF WIRE (INCLUDING 1 X CONNECTOR CABLE (SINGLE USE)); 1 X DISPERSIVE ELECTRODE: Brand: VERSACROSS ACCESS SOLUTION

## (undated) DEVICE — DRAIN SURG 19FR 0.25IN SIL RND W/ TRCR INDIC DOT RADPQ FULL

## (undated) DEVICE — CANISTER, RIGID, 3000CC: Brand: MEDLINE INDUSTRIES, INC.

## (undated) DEVICE — TROCAR: Brand: KII FIOS FIRST ENTRY

## (undated) DEVICE — GLOVE SURG SZ 7 CRM LTX FREE POLYISOPRENE POLYMER BEAD ANTI